# Patient Record
Sex: MALE | Race: WHITE | Employment: OTHER | URBAN - METROPOLITAN AREA
[De-identification: names, ages, dates, MRNs, and addresses within clinical notes are randomized per-mention and may not be internally consistent; named-entity substitution may affect disease eponyms.]

---

## 2018-01-02 ENCOUNTER — OFFICE VISIT (OUTPATIENT)
Dept: INTERNAL MEDICINE CLINIC | Age: 73
End: 2018-01-02

## 2018-01-02 VITALS
HEIGHT: 70 IN | TEMPERATURE: 98.3 F | SYSTOLIC BLOOD PRESSURE: 142 MMHG | WEIGHT: 196 LBS | DIASTOLIC BLOOD PRESSURE: 84 MMHG | OXYGEN SATURATION: 97 % | BODY MASS INDEX: 28.06 KG/M2 | RESPIRATION RATE: 16 BRPM | HEART RATE: 86 BPM

## 2018-01-02 DIAGNOSIS — E78.00 PURE HYPERCHOLESTEROLEMIA: ICD-10-CM

## 2018-01-02 DIAGNOSIS — R37 SEXUAL DYSFUNCTION: ICD-10-CM

## 2018-01-02 DIAGNOSIS — E11.9 TYPE 2 DIABETES MELLITUS WITHOUT COMPLICATION, WITHOUT LONG-TERM CURRENT USE OF INSULIN (HCC): Primary | ICD-10-CM

## 2018-01-02 RX ORDER — CEPHALEXIN 250 MG/1
500 CAPSULE ORAL 4 TIMES DAILY
COMMUNITY
End: 2018-10-01 | Stop reason: ALTCHOICE

## 2018-01-02 RX ORDER — HYDROCORTISONE 1 %
CREAM (GRAM) TOPICAL
Qty: 30 G | Refills: 2 | Status: SHIPPED | OUTPATIENT
Start: 2018-01-02 | End: 2022-06-14

## 2018-01-02 RX ORDER — SILDENAFIL 100 MG/1
TABLET, FILM COATED ORAL
Qty: 6 TAB | Refills: 2 | Status: SHIPPED | OUTPATIENT
Start: 2018-01-02 | End: 2018-10-01

## 2018-01-02 NOTE — PATIENT INSTRUCTIONS
Health Maintenance Due   Topic    Hepatitis C Screening     FOOT EXAM Q1     MICROALBUMIN Q1     EYE EXAM RETINAL OR DILATED Q1     DTaP/Tdap/Td series (1 - Tdap)    FOBT Q 1 YEAR AGE 50-75     ZOSTER VACCINE AGE 60>     GLAUCOMA SCREENING Q2Y     Pneumococcal 65+ Low/Medium Risk (1 of 2 - PCV13)    MEDICARE YEARLY EXAM     LIPID PANEL Q1     HEMOGLOBIN A1C Q6M     Influenza Age 5 to Adult

## 2018-01-02 NOTE — PROGRESS NOTES
1. Have you been to the ER, urgent care clinic since your last visit? Hospitalized since your last visit? No    2. Have you seen or consulted any other health care providers outside of the [address] [place] [address] since your last visit? Include any pap smears or colon screening.  Dr. Margarita Lopez - Neuro Surgeon - two surgeries - Spinal stenosis and osteo arthritis in neck in shoulders

## 2018-01-03 NOTE — PROGRESS NOTES
The patient presents to the office today with the chief complaint of sexual dysfunction    HPI    The patient has type II diabetes mellitus. He had been on medications in the past but he stopped this due to GI side effects. The patient has chronic low back pain from spinal stenosis with a fair response to surgery. The patient has chronic headaches with fair control with daily Excedrin. The patient has chronic sinus congestion. He has used Afrin for years. The patient has noted decreasing sexual function over the past several years. The patient has a chronic problem with dermatitis most marked over his forehead and scalp      Review of Systems   Respiratory: Negative for shortness of breath. Cardiovascular: Negative for chest pain and leg swelling. Musculoskeletal: Positive for back pain. Allergies   Allergen Reactions    Codeine Not Reported This Time    Tegretol [Carbamazepine] Not Reported This Time       Current Outpatient Prescriptions   Medication Sig Dispense Refill    diphenhydrAMINE HCl (WAL-FRED, DIPHENHYDRAMINE,) 25 mg TbDi Take 25 mg by mouth nightly.  cephALEXin (KEFLEX) 250 mg capsule Take 500 mg by mouth four (4) times daily.  aspirin-acetaminophen-caffeine (EXCEDRIN ES) 250-250-65 mg per tablet Take 1 Tab by mouth every six (6) hours as needed for Headache. Indications: Patient takes 4 - 6 tablets daily      sildenafil citrate (VIAGRA) 100 mg tablet 1 tablet before activity 6 Tab 2    hydrocortisone (CORTISONE) 1 % topical cream Apply to face and scalp twice per day for two weeks 30 g 2    oxyCODONE-acetaminophen (PERCOCET) 5-325 mg per tablet Take 1 Tab by mouth every four (4) hours as needed.          Past Medical History:   Diagnosis Date    Arthritis     GERD (gastroesophageal reflux disease)     High blood pressure     Insomnia     Periorbital headache     Sleep apnea     Spinal stenosis        Past Surgical History:   Procedure Laterality Date    ENDOSCOPY, COLON, DIAGNOSTIC      HX LUMBAR DISKECTOMY  6/2001, 4/11    left L3-4 microdiskectomy with intradural rupture    NEUROLOGICAL PROCEDURE UNLISTED  9/11    spinal fusion, L3-5       Social History     Social History    Marital status:      Spouse name: N/A    Number of children: N/A    Years of education: N/A     Occupational History    Not on file. Social History Main Topics    Smoking status: Current Every Day Smoker     Packs/day: 1.00    Smokeless tobacco: Never Used    Alcohol use Yes      Comment: rarely    Drug use: No    Sexual activity: Yes     Partners: Female     Other Topics Concern    Not on file     Social History Narrative       Patient does not have an advanced directive on file    Visit Vitals    /84 (BP 1 Location: Left arm, BP Patient Position: Sitting)    Pulse 86    Temp 98.3 °F (36.8 °C) (Tympanic)    Resp 16    Ht 5' 9.5\" (1.765 m)    Wt 196 lb (88.9 kg)    SpO2 97%    BMI 28.53 kg/m2       Physical Exam    BMI:  OK    No visits with results within 3 Month(s) from this visit.   Latest known visit with results is:    Lab Only on 09/06/2012   Component Date Value Ref Range Status    Hemoglobin A1c 09/06/2012 6.4* 4.8 - 5.6 % Final    Comment:          Increased risk for diabetes: 5.7 - 6.4                                    Diabetes: >6.4                                    Glycemic control for adults with diabetes: <7.0                           Performed At: [address]  [id number]                           Milton Ventura MD                           [id number]    Glucose 09/06/2012 140* 70 - 100 mg/dL Final    Comment: Performed At: [address] [place] [address]  [id number]                           Juan José Del Rosario MD [id number]       . No results found for any visits on 01/02/18. Assessment / Plan      ICD-10-CM ICD-9-CM    1. Type 2 diabetes mellitus without complication, without long-term current use of insulin (HCC) E11.9 250.00 diphenhydrAMINE HCl (WAL-FRED, DIPHENHYDRAMINE,) 25 mg TbDi      cephALEXin (KEFLEX) 250 mg capsule      aspirin-acetaminophen-caffeine (EXCEDRIN ES) 250-250-65 mg per tablet      CBC WITH AUTOMATED DIFF      METABOLIC PANEL, COMPREHENSIVE      PSA SCREENING (SCREENING)      LIPID PANEL      TESTOSTERONE, FREE & TOTAL      HEMOGLOBIN A1C WITH EAG   2. Pure hypercholesterolemia E78.00 272.0 diphenhydrAMINE HCl (WAL-FRED, DIPHENHYDRAMINE,) 25 mg TbDi      cephALEXin (KEFLEX) 250 mg capsule      aspirin-acetaminophen-caffeine (EXCEDRIN ES) 250-250-65 mg per tablet      CBC WITH AUTOMATED DIFF      METABOLIC PANEL, COMPREHENSIVE      PSA SCREENING (SCREENING)      LIPID PANEL      TESTOSTERONE, FREE & TOTAL      HEMOGLOBIN A1C WITH EAG   3. Sexual dysfunction R37 302.70 diphenhydrAMINE HCl (WAL-FRED, DIPHENHYDRAMINE,) 25 mg TbDi      cephALEXin (KEFLEX) 250 mg capsule      aspirin-acetaminophen-caffeine (EXCEDRIN ES) 250-250-65 mg per tablet      CBC WITH AUTOMATED DIFF      METABOLIC PANEL, COMPREHENSIVE      PSA SCREENING (SCREENING)      LIPID PANEL      TESTOSTERONE, FREE & TOTAL      HEMOGLOBIN A1C WITH EAG       Labs  PRN Viagra  Hydrocortisone Cream  Discussed cigarettes - the patient does not wish to stop at this time    Follow-up Disposition:  Return in about 3 months (around 4/2/2018). I asked Cari Dillard if he has any questions and I answered the questions. aCri Dillard states that he understands the treatment plan and agrees with the treatment plan

## 2018-10-01 ENCOUNTER — OFFICE VISIT (OUTPATIENT)
Dept: INTERNAL MEDICINE CLINIC | Age: 73
End: 2018-10-01

## 2018-10-01 VITALS
DIASTOLIC BLOOD PRESSURE: 76 MMHG | HEIGHT: 70 IN | RESPIRATION RATE: 14 BRPM | TEMPERATURE: 98.8 F | BODY MASS INDEX: 26.2 KG/M2 | SYSTOLIC BLOOD PRESSURE: 132 MMHG | HEART RATE: 76 BPM | OXYGEN SATURATION: 97 % | WEIGHT: 183 LBS

## 2018-10-01 DIAGNOSIS — M54.50 CHRONIC MIDLINE LOW BACK PAIN WITHOUT SCIATICA: ICD-10-CM

## 2018-10-01 DIAGNOSIS — I10 ESSENTIAL HYPERTENSION: ICD-10-CM

## 2018-10-01 DIAGNOSIS — Z12.5 PROSTATE CANCER SCREENING: ICD-10-CM

## 2018-10-01 DIAGNOSIS — G89.29 CHRONIC MIDLINE LOW BACK PAIN WITHOUT SCIATICA: ICD-10-CM

## 2018-10-01 DIAGNOSIS — R63.4 WEIGHT LOSS: ICD-10-CM

## 2018-10-01 DIAGNOSIS — E11.40 TYPE 2 DIABETES MELLITUS WITH DIABETIC NEUROPATHY, WITHOUT LONG-TERM CURRENT USE OF INSULIN (HCC): Primary | ICD-10-CM

## 2018-10-01 DIAGNOSIS — N52.01 ERECTILE DYSFUNCTION DUE TO ARTERIAL INSUFFICIENCY: ICD-10-CM

## 2018-10-01 RX ORDER — OXYCODONE AND ACETAMINOPHEN 5; 325 MG/1; MG/1
1 TABLET ORAL
Qty: 40 TAB | Refills: 0 | Status: SHIPPED | OUTPATIENT
Start: 2018-10-01 | End: 2019-02-18 | Stop reason: SDUPTHER

## 2018-10-01 NOTE — PROGRESS NOTES
Blair Villanueva 1945, is a 72 y.o. male, who is seen today for Evaluation of diabetes, probable neuropathy, erectile dysfunction. He has been diabetic for at least 6 years, when I was seeing him 6 years ago he had a hemoglobin A1c of over 8 and that improved a lot with medication but with metformin he had diarrhea and his recollection is that when he is glyburide he had diarrhea as well so he stopped both medicines soon thereafter. He has not really been following his diet but has lost 34 pounds in the last 6 years. He has not been seeing doctors in general except he did see Dr. Vicky Reeves recently. For unclear reasons he now comes back here for evaluation. He has noticed for some time that his toes and distal feet feel cold at night but when he checks his feet they do not feel cold. They do not feel numb. The tips of his fingers feel numb and this bothers him with playing the guitar. That is been going on for at least a few months and he is pretty sure it is from diabetes. He has had chronic back pain which did improve with disc surgery done by Dr. Merry De Jesus, a neurosurgeon but has been left with chronic lumbar pain related to spinal stenosis apparently. He uses Percocet once every 1-2 weeks but has had a hard time getting prescriptions. Past Medical History:   Diagnosis Date    Arthritis     GERD (gastroesophageal reflux disease)     High blood pressure     Insomnia     Periorbital headache     Sleep apnea     Spinal stenosis      Current Outpatient Prescriptions   Medication Sig Dispense Refill    oxyCODONE-acetaminophen (PERCOCET) 5-325 mg per tablet Take 1 Tab by mouth every four (4) hours as needed. Max Daily Amount: 6 Tabs. 40 Tab 0    diphenhydrAMINE HCl (WAL-FRED, DIPHENHYDRAMINE,) 25 mg TbDi Take 25 mg by mouth nightly.  aspirin-acetaminophen-caffeine (EXCEDRIN ES) 250-250-65 mg per tablet Take 1 Tab by mouth every six (6) hours as needed for Headache.  Indications: Patient takes 4 - 6 tablets daily      sildenafil citrate (VIAGRA) 100 mg tablet 1 tablet before activity 6 Tab 2    hydrocortisone (CORTISONE) 1 % topical cream Apply to face and scalp twice per day for two weeks 30 g 2     Visit Vitals    /76    Pulse 76    Temp 98.8 °F (37.1 °C) (Oral)    Resp 14    Ht 5' 9.5\" (1.765 m)    Wt 183 lb (83 kg)    SpO2 97%    BMI 26.64 kg/m2     Lungs are clear to percussion. Good breath sounds with no wheezing or crackles. Heart reveals a regular rhythm with normal S1 and S2 no murmur gallop click or rub. Apical impulse is not palpable. Abdomen is soft and nontender with no hepatosplenomegaly or masses and no bruits. Extremities reveal no clubbing cyanosis or edema. Pulses are 2+. The fingers feel normal though subjectively a little numb. The toes are not cold or discolored. Subjectively slight numbness of the toes. Assessment: #1.  Diabetes uncertain control now that he is off medicine. With a 34 pound weight loss over the last 6 years he may not need medication for his diabetes. We will check hemoglobin A1c and fasting glucose within the next couple of weeks. #2.  Possible diabetic neuropathy, we will check lab, and since the numbness in the fingers is not usually seen at this stage of diabetes I would probably have been seen by a neurologist for consideration of nerve conduction studies for proper diagnosis. #3.  Erectile dysfunction, Viagra was not of any benefit, we will check testosterone level but most likely the erectile dysfunction is related to age and diabetes, i.e. vascular disease. Follow-up in about 2 weeks for 30-minute full evaluation and he will have lab done at least 2 days before that. Manuel Cm MD FACP    Please note: This document has been produced using voice recognition software. Unrecognized errors in transcription may be present.     This visit lasted 25 minutes, greater than 50% of the time was spent counseling on treatment of diabetes, monitoring diabetes, evaluation of neuropathy and its possible relation to diabetes and treatment evaluation of erectile dysfunction.

## 2018-10-01 NOTE — MR AVS SNAPSHOT
Kanchan Cannon 
 
 
 [address], [address] [place] [address] [facility] 
[phone] Patient: Sandi Delvalle MRN: [id number] Visit Information Date & Time Provider Department Dept. Phone Encounter #  
 10/1/2018  3:30 PM Bisi Rodriguez MD [facility] [phone] Your Appointments 10/5/2018  9:05 AM  
LAB with [facility] NURSE VISIT [facility] ([facility]) Appt Note: labs per [facility], [address] [address] [address] [place]  
  
   
 [address], [address]  
  
    
 10/15/2018  2:00 PM  
Office Visit with Bisi Rodriguez MD  
[facility]) Appt Note: 2 week follow up  
 [address], [address] [address] [address] [place]  
  
   
 [address], [address] Upcoming Health Maintenance Date Due Hepatitis C Screening 1945 FOOT EXAM Q1 5/13/1957 MICROALBUMIN Q1 5/13/1957 EYE EXAM RETINAL OR DILATED Q1 5/13/1957 DTaP/Tdap/Td series (1 - Tdap) 5/13/1968 Shingrix Vaccine Age 50> (1 of 2) 5/13/1997 FOBT Q 1 YEAR AGE 50-75 5/13/1997 GLAUCOMA SCREENING Q2Y 5/13/2012 Pneumococcal 65+ Low/Medium Risk (1 of 2 - PCV13) 5/13/2012 LIPID PANEL Q1 2/21/2013 HEMOGLOBIN A1C Q6M 3/6/2013 Influenza Age 5 to Adult 8/1/2018 Allergies as of 10/1/2018  Review Complete On: 10/1/2018 By: Bisi Rodriguez MD  
  
 Severity Noted Reaction Type Reactions Codeine    Not Reported This Time Tegretol [Carbamazepine]    Not Reported This Time Current Immunizations  Never Reviewed No immunizations on file. Not reviewed this visit You Were Diagnosed With   
  
 Codes Comments Type 2 diabetes mellitus with diabetic neuropathy, without long-term current use of insulin (HCC)    -  Primary ICD-10-CM: E11.40 ICD-9-CM: 250.60, 357.2 Chronic midline low back pain without sciatica     ICD-10-CM: M54.5, G89.29 ICD-9-CM: 724.2, 338.29 Vitals BP Pulse Temp Resp Height(growth percentile) Weight(growth percentile) 132/76 76 98.8 °F (37.1 °C) (Oral) 14 5' 9.5\" (1.765 m) 183 lb (83 kg) SpO2 BMI Smoking Status 97% 26.64 kg/m2 Current Every Day Smoker Vitals History BMI and BSA Data Body Mass Index Body Surface Area  
 26.64 kg/m 2 2.02 m 2 Preferred Pharmacy Pharmacy Name Phone [phone] - [address], [address] AT [address] [phone] Your Updated Medication List  
  
   
This list is accurate as of 10/1/18  4:30 PM.  Always use your most recent med list.  
  
  
  
  
 aspirin-acetaminophen-caffeine 250-250-65 mg per tablet Commonly known as:  EXCEDRIN ES Take 1 Tab by mouth every six (6) hours as needed for Headache. Indications: Patient takes 4 - 6 tablets daily  
  
 hydrocortisone 1 % topical cream  
Commonly known as:  Cortisone Apply to face and scalp twice per day for two weeks  
  
 oxyCODONE-acetaminophen 5-325 mg per tablet Commonly known as:  PERCOCET Take 1 Tab by mouth every four (4) hours as needed. Max Daily Amount: 6 Tabs.  
  
 sildenafil citrate 100 mg tablet Commonly known as:  VIAGRA  
1 tablet before activity [employer] (diphenhydrAMINE) 25 mg Tbdi Generic drug:  diphenhydrAMINE HCl Take 25 mg by mouth nightly. Prescriptions Printed Refills  
 oxyCODONE-acetaminophen (PERCOCET) 5-325 mg per tablet 0 Sig: Take 1 Tab by mouth every four (4) hours as needed. Max Daily Amount: 6 Tabs. Class: Print Route: Oral  
  
Introducing [facility] & HEALTH SERVICES! [facility] introduces [employer] patient portal. Now you can access parts of your medical record, email your doctor's office, and request medication refills online.    
 
1. In your internet browser, go to [email or web address] 2. Click on the First Time User? Click Here link in the Sign In box. You will see the New Member Sign Up page. 3. Enter your [employer] Access Code exactly as it appears below. You will not need to use this code after youve completed the sign-up process. If you do not sign up before the expiration date, you must request a new code. · [employer] Access Code: [email or web address] Expires: 12/30/2018  3:32 PM 
 
4. Enter the last four digits of your Social Security Number (xxxx) and Date of Birth (mm/dd/yyyy) as indicated and click Submit. You will be taken to the next sign-up page. 5. Create a Com[employer] ID. This will be your [employer] login ID and cannot be changed, so think of one that is secure and easy to remember. 6. Create a [employer] password. You can change your password at any time. 7. Enter your Password Reset Question and Answer. This can be used at a later time if you forget your password. 8. Enter your e-mail address. You will receive e-mail notification when new information is available in [address] 9. Click Sign Up. You can now view and download portions of your medical record. 10. Click the Download Summary menu link to download a portable copy of your medical information. If you have questions, please visit the Frequently Asked Questions section of the [employer] website. Remember, [employer] is NOT to be used for urgent needs. For medical emergencies, dial 911. Now available from your iPhone and Android! Please provide this summary of care documentation to your next provider. Your primary care clinician is listed as Leilani Osgood. Acie Spain. If you have any questions after today's visit, please call [phone].

## 2018-10-05 ENCOUNTER — LAB ONLY (OUTPATIENT)
Dept: INTERNAL MEDICINE CLINIC | Age: 73
End: 2018-10-05

## 2018-10-05 ENCOUNTER — HOSPITAL ENCOUNTER (OUTPATIENT)
Dept: LAB | Age: 73
Discharge: HOME | End: 2018-10-05
Payer: COMMERCIAL

## 2018-10-05 DIAGNOSIS — Z12.5 PROSTATE CANCER SCREENING: ICD-10-CM

## 2018-10-05 DIAGNOSIS — N52.01 ERECTILE DYSFUNCTION DUE TO ARTERIAL INSUFFICIENCY: ICD-10-CM

## 2018-10-05 DIAGNOSIS — E11.40 TYPE 2 DIABETES MELLITUS WITH DIABETIC NEUROPATHY, WITHOUT LONG-TERM CURRENT USE OF INSULIN (HCC): ICD-10-CM

## 2018-10-05 DIAGNOSIS — R63.4 WEIGHT LOSS: ICD-10-CM

## 2018-10-05 DIAGNOSIS — E11.40 TYPE 2 DIABETES MELLITUS WITH DIABETIC NEUROPATHY, WITHOUT LONG-TERM CURRENT USE OF INSULIN (HCC): Primary | ICD-10-CM

## 2018-10-05 DIAGNOSIS — I10 ESSENTIAL HYPERTENSION: ICD-10-CM

## 2018-10-05 LAB
ALBUMIN SERPL-MCNC: 4.1 G/DL (ref 3.4–5)
ALBUMIN/GLOB SERPL: 1.1 {RATIO} (ref 0.8–1.7)
ALP SERPL-CCNC: 82 U/L (ref 45–117)
ALT SERPL-CCNC: 23 U/L (ref 16–61)
ANION GAP SERPL CALC-SCNC: 8 MMOL/L (ref 3–18)
AST SERPL-CCNC: 14 U/L (ref 15–37)
BASOPHILS # BLD: 0 K/UL (ref 0–0.1)
BASOPHILS NFR BLD: 0 % (ref 0–2)
BILIRUB SERPL-MCNC: 0.5 MG/DL (ref 0.2–1)
BUN SERPL-MCNC: 21 MG/DL (ref 7–18)
BUN/CREAT SERPL: 17 (ref 12–20)
CALCIUM SERPL-MCNC: 9.2 MG/DL (ref 8.5–10.1)
CHLORIDE SERPL-SCNC: 104 MMOL/L (ref 100–108)
CHOLEST SERPL-MCNC: 163 MG/DL
CO2 SERPL-SCNC: 26 MMOL/L (ref 21–32)
CREAT SERPL-MCNC: 1.24 MG/DL (ref 0.6–1.3)
DIFFERENTIAL METHOD BLD: ABNORMAL
EOSINOPHIL # BLD: 0.4 K/UL (ref 0–0.4)
EOSINOPHIL NFR BLD: 5 % (ref 0–5)
ERYTHROCYTE [DISTWIDTH] IN BLOOD BY AUTOMATED COUNT: 13.1 % (ref 11.6–14.5)
EST. AVERAGE GLUCOSE BLD GHB EST-MCNC: 171 MG/DL
GLOBULIN SER CALC-MCNC: 3.7 G/DL (ref 2–4)
GLUCOSE SERPL-MCNC: 165 MG/DL (ref 74–99)
HBA1C MFR BLD: 7.6 % (ref 4.2–5.6)
HCT VFR BLD AUTO: 51.3 % (ref 36–48)
HDLC SERPL-MCNC: 29 MG/DL (ref 40–60)
HDLC SERPL: 5.6 {RATIO} (ref 0–5)
HGB BLD-MCNC: 16.9 G/DL (ref 13–16)
LDLC SERPL CALC-MCNC: 75.6 MG/DL (ref 0–100)
LIPID PROFILE,FLP: ABNORMAL
LYMPHOCYTES # BLD: 2.5 K/UL (ref 0.9–3.6)
LYMPHOCYTES NFR BLD: 31 % (ref 21–52)
MCH RBC QN AUTO: 32.7 PG (ref 24–34)
MCHC RBC AUTO-ENTMCNC: 32.9 G/DL (ref 31–37)
MCV RBC AUTO: 99.2 FL (ref 74–97)
MONOCYTES # BLD: 0.6 K/UL (ref 0.05–1.2)
MONOCYTES NFR BLD: 7 % (ref 3–10)
NEUTS SEG # BLD: 4.5 K/UL (ref 1.8–8)
NEUTS SEG NFR BLD: 57 % (ref 40–73)
PLATELET # BLD AUTO: 208 K/UL (ref 135–420)
PMV BLD AUTO: 11.8 FL (ref 9.2–11.8)
POTASSIUM SERPL-SCNC: 4.2 MMOL/L (ref 3.5–5.5)
PROT SERPL-MCNC: 7.8 G/DL (ref 6.4–8.2)
PSA SERPL-MCNC: 1.8 NG/ML (ref 0–4)
RBC # BLD AUTO: 5.17 M/UL (ref 4.7–5.5)
SODIUM SERPL-SCNC: 138 MMOL/L (ref 136–145)
T4 FREE SERPL-MCNC: 1.2 NG/DL (ref 0.7–1.5)
TRIGL SERPL-MCNC: 292 MG/DL (ref ?–150)
TSH SERPL DL<=0.05 MIU/L-ACNC: 2.7 UIU/ML (ref 0.36–3.74)
VLDLC SERPL CALC-MCNC: 58.4 MG/DL
WBC # BLD AUTO: 8 K/UL (ref 4.6–13.2)

## 2018-10-05 PROCEDURE — 85025 COMPLETE CBC W/AUTO DIFF WBC: CPT | Performed by: INTERNAL MEDICINE

## 2018-10-05 PROCEDURE — 80061 LIPID PANEL: CPT | Performed by: INTERNAL MEDICINE

## 2018-10-05 PROCEDURE — 84443 ASSAY THYROID STIM HORMONE: CPT | Performed by: INTERNAL MEDICINE

## 2018-10-05 PROCEDURE — 84439 ASSAY OF FREE THYROXINE: CPT | Performed by: INTERNAL MEDICINE

## 2018-10-05 PROCEDURE — 84403 ASSAY OF TOTAL TESTOSTERONE: CPT | Performed by: INTERNAL MEDICINE

## 2018-10-05 PROCEDURE — 84153 ASSAY OF PSA TOTAL: CPT | Performed by: INTERNAL MEDICINE

## 2018-10-05 PROCEDURE — 83036 HEMOGLOBIN GLYCOSYLATED A1C: CPT | Performed by: INTERNAL MEDICINE

## 2018-10-05 PROCEDURE — 36415 COLL VENOUS BLD VENIPUNCTURE: CPT | Performed by: INTERNAL MEDICINE

## 2018-10-05 PROCEDURE — 80053 COMPREHEN METABOLIC PANEL: CPT | Performed by: INTERNAL MEDICINE

## 2018-10-06 LAB
TESTOST FREE SERPL-MCNC: 10.3 PG/ML (ref 6.6–18.1)
TESTOST SERPL-MCNC: 197 NG/DL (ref 264–916)

## 2018-10-15 ENCOUNTER — OFFICE VISIT (OUTPATIENT)
Dept: INTERNAL MEDICINE CLINIC | Age: 73
End: 2018-10-15

## 2018-10-15 ENCOUNTER — HOSPITAL ENCOUNTER (OUTPATIENT)
Dept: LAB | Age: 73
Discharge: HOME | End: 2018-10-15
Payer: COMMERCIAL

## 2018-10-15 VITALS
HEIGHT: 69 IN | BODY MASS INDEX: 27.7 KG/M2 | OXYGEN SATURATION: 96 % | WEIGHT: 187 LBS | HEART RATE: 88 BPM | DIASTOLIC BLOOD PRESSURE: 88 MMHG | SYSTOLIC BLOOD PRESSURE: 124 MMHG | TEMPERATURE: 98.5 F

## 2018-10-15 DIAGNOSIS — E11.40 TYPE 2 DIABETES MELLITUS WITH DIABETIC NEUROPATHY, WITHOUT LONG-TERM CURRENT USE OF INSULIN (HCC): Primary | ICD-10-CM

## 2018-10-15 DIAGNOSIS — M51.37 DEGENERATION OF LUMBAR OR LUMBOSACRAL INTERVERTEBRAL DISC: ICD-10-CM

## 2018-10-15 DIAGNOSIS — N52.01 ERECTILE DYSFUNCTION DUE TO ARTERIAL INSUFFICIENCY: ICD-10-CM

## 2018-10-15 DIAGNOSIS — E11.40 TYPE 2 DIABETES MELLITUS WITH DIABETIC NEUROPATHY, WITHOUT LONG-TERM CURRENT USE OF INSULIN (HCC): ICD-10-CM

## 2018-10-15 LAB
APPEARANCE UR: ABNORMAL
BACTERIA URNS QL MICRO: NEGATIVE /HPF
BILIRUB UR QL: NEGATIVE
COLOR UR: YELLOW
EPITH CASTS URNS QL MICRO: NORMAL /LPF (ref 0–5)
GLUCOSE UR STRIP.AUTO-MCNC: 250 MG/DL
HGB UR QL STRIP: NEGATIVE
KETONES UR QL STRIP.AUTO: NEGATIVE MG/DL
LEUKOCYTE ESTERASE UR QL STRIP.AUTO: NEGATIVE
NITRITE UR QL STRIP.AUTO: NEGATIVE
PH UR STRIP: 5.5 [PH] (ref 5–8)
PROT UR STRIP-MCNC: 100 MG/DL
RBC #/AREA URNS HPF: 0 /HPF (ref 0–5)
SP GR UR REFRACTOMETRY: 1.01 (ref 1–1.03)
SPERM URNS QL MICRO: NORMAL
UROBILINOGEN UR QL STRIP.AUTO: 0.2 EU/DL (ref 0.2–1)
WBC URNS QL MICRO: NORMAL /HPF (ref 0–4)

## 2018-10-15 PROCEDURE — 82043 UR ALBUMIN QUANTITATIVE: CPT | Performed by: INTERNAL MEDICINE

## 2018-10-15 PROCEDURE — 81001 URINALYSIS AUTO W/SCOPE: CPT | Performed by: INTERNAL MEDICINE

## 2018-10-15 RX ORDER — TADALAFIL 20 MG/1
TABLET ORAL
Qty: 4 TAB | Refills: 11 | Status: SHIPPED | OUTPATIENT
Start: 2018-10-15 | End: 2022-06-14

## 2018-10-15 NOTE — MR AVS SNAPSHOT
[address] [facility] 
 
 
 [address], [address] [place] [address] [facility] 
[phone] Patient: Kayla Frank MRN: [id number] JARROD:[id number] Visit Information Date & Time Provider Department Dept. Phone Encounter #  
 10/15/2018  2:00 PM Renuka Pack MD [facility] [address] [place] [address] [phone] Your Appointments 2/11/2019  9:05 AM  
LAB with [facility] NURSE VISIT [facility] [address] [place] [address] [facility] Appt Note: fasting labs [address], [address] [place] [address] [address] [place]  
  
   
 [address], [address]  
  
    
 2/18/2019  1:00 PM  
Office Visit with Renuka Pack MD  
[facility] [address] [place] [address]) Appt Note: 4 month with labs [address], [address] [place] [address] [address] [place]  
  
   
 [address], [address] Upcoming Health Maintenance Date Due Hepatitis C Screening 1945 FOOT EXAM Q1 5/13/1957 MICROALBUMIN Q1 5/13/1957 EYE EXAM RETINAL OR DILATED Q1 5/13/1957 DTaP/Tdap/Td series (1 - Tdap) 5/13/1968 Shingrix Vaccine Age 50> (1 of 2) 5/13/1997 FOBT Q 1 YEAR AGE 50-75 5/13/1997 GLAUCOMA SCREENING Q2Y 5/13/2012 Pneumococcal 65+ Low/Medium Risk (1 of 2 - PCV13) 5/13/2012 Influenza Age 5 to Adult 8/1/2018 HEMOGLOBIN A1C Q6M 4/5/2019 LIPID PANEL Q1 10/5/2019 Allergies as of 10/15/2018  Review Complete On: 10/15/2018 By: Renuka Pack MD  
  
 Severity Noted Reaction Type Reactions Codeine    Not Reported This Time Tegretol [Carbamazepine]    Not Reported This Time Current Immunizations  Reviewed on 10/15/2018 No immunizations on file. Reviewed by Renuka Pack MD on 10/15/2018 at  2:04 PM  
You Were Diagnosed With   
  
 Codes Comments  Type 2 diabetes mellitus with diabetic neuropathy, without long-term current use of insulin ([facility] [address]    -  Primary ICD-10-CM: E11.40 ICD-9-CM: 250.60, 357.2 Erectile dysfunction due to arterial insufficiency     ICD-10-CM: N52.01 
ICD-9-CM: 607.84 Degeneration of lumbar or lumbosacral intervertebral disc     ICD-10-CM: M51.37 
ICD-9-CM: 722.52 Vitals BP Pulse Temp Height(growth percentile) Weight(growth percentile) SpO2  
 124/88 (BP 1 Location: Right arm, BP Patient Position: Sitting) 88 98.5 °F (36.9 °C) (Oral) 5' 9\" (1.753 m) 187 lb (84.8 kg) 96% BMI Smoking Status 27.62 kg/m2 Current Every Day Smoker BMI and BSA Data Body Mass Index Body Surface Area  
 27.62 kg/m 2 2.03 m 2 Preferred Pharmacy Pharmacy Name Phone [phone] - [place], [address] [phone] Your Updated Medication List  
  
   
This list is accurate as of 10/15/18  2:22 PM.  Always use your most recent med list.  
  
  
  
  
 aspirin-acetaminophen-caffeine 250-250-65 mg per tablet Commonly known as:  EXCEDRIN ES Take 1 Tab by mouth every six (6) hours as needed for Headache. Indications: Patient takes 4 - 6 tablets daily  
  
 hydrocortisone 1 % topical cream  
Commonly known as:  Cortisone Apply to face and scalp twice per day for two weeks  
  
 oxyCODONE-acetaminophen 5-325 mg per tablet Commonly known as:  PERCOCET Take 1 Tab by mouth every four (4) hours as needed. Max Daily Amount: 6 Tabs.  
  
 tadalafil 20 mg tablet Commonly known as:  CIALIS  
1 tablet at least 1 hour before intercourse [employer] (diphenhydrAMINE) 25 mg Tbdi Generic drug:  diphenhydrAMINE HCl Take 25 mg by mouth nightly. Prescriptions Printed Refills  
 tadalafil (CIALIS) 20 mg tablet 11 Si tablet at least 1 hour before intercourse Class: Print Introducing [facility] & HEALTH SERVICES!    
 [employer] introduces [employer] patient portal. Now you can access parts of your medical record, email your doctor's office, and request medication refills online. 1. In your internet browser, go to [email or web address] 2. Click on the First Time User? Click Here link in the Sign In box. You will see the New Member Sign Up page. 3. Enter your [employer] Access Code exactly as it appears below. You will not need to use this code after youve completed the sign-up process. If you do not sign up before the expiration date, you must request a new code. · [employer] Access Code: [email or web address] Expires: 12/30/2018  3:32 PM 
 
4. Enter the last four digits of your Social Security Number (xxxx) and Date of Birth (mm/dd/yyyy) as indicated and click Submit. You will be taken to the next sign-up page. 5. Create a [employer] ID. This will be your [employer] login ID and cannot be changed, so think of one that is secure and easy to remember. 6. Create a [employer] password. You can change your password at any time. 7. Enter your Password Reset Question and Answer. This can be used at a later time if you forget your password. 8. Enter your e-mail address. You will receive e-mail notification when new information is available in [address] 9. Click Sign Up. You can now view and download portions of your medical record. 10. Click the Download Summary menu link to download a portable copy of your medical information. If you have questions, please visit the Frequently Asked Questions section of the [employer] website. Remember, [employer] is NOT to be used for urgent needs. For medical emergencies, dial 911. Now available from your iPhone and Android! Please provide this summary of care documentation to your next provider. Your primary care clinician is listed as Deann Pitt. Namrata Peter. If you have any questions after today's visit, please call [phone].

## 2018-10-15 NOTE — PROGRESS NOTES
Addendum: Today's visit lasted 30 minutes, greater than 50% of the time was spent counseling on the issues in the assessment.

## 2018-10-15 NOTE — PROGRESS NOTES
Mica Looney 1945, is a 72 y.o. male, who is seen today for reevaluation of diabetes, cigarette smoking, erectile dysfunction, and neuropathy. His feet feel cold at night but when he touches them are not cold. His fingertips are little numb and it makes it hard to play the guitar. He has had erectile dysfunction for quite a while and tried 100 mg of Viagra without benefit. He still smokes 1 pack/day since age 23 and has no interest in quitting. He has had diabetes dating back quite a few years when I used to see him more than 6 years ago he had diarrhea with metformin and other side effects with glyburide so has been off medicine since then. He does not have as much of an appetite the last few years and has lost 34 pounds since this time I saw him 6 years ago. Past Medical History:   Diagnosis Date    Arthritis     GERD (gastroesophageal reflux disease)     High blood pressure     Insomnia     Periorbital headache     Sleep apnea     Spinal stenosis      Past Surgical History:   Procedure Laterality Date    ENDOSCOPY, COLON, DIAGNOSTIC      HX LUMBAR DISKECTOMY  6/2001, 4/11    left L3-4 microdiskectomy with intradural rupture    NEUROLOGICAL PROCEDURE UNLISTED  9/11    spinal fusion, L3-5     Current Outpatient Prescriptions   Medication Sig Dispense Refill    tadalafil (CIALIS) 20 mg tablet 1 tablet at least 1 hour before intercourse 4 Tab 11    oxyCODONE-acetaminophen (PERCOCET) 5-325 mg per tablet Take 1 Tab by mouth every four (4) hours as needed. Max Daily Amount: 6 Tabs. 40 Tab 0    diphenhydrAMINE HCl (WAL-FRED, DIPHENHYDRAMINE,) 25 mg TbDi Take 25 mg by mouth nightly.  aspirin-acetaminophen-caffeine (EXCEDRIN ES) 250-250-65 mg per tablet Take 1 Tab by mouth every six (6) hours as needed for Headache.  Indications: Patient takes 4 - 6 tablets daily      hydrocortisone (CORTISONE) 1 % topical cream Apply to face and scalp twice per day for two weeks 30 g 2     Visit Vitals    /88 (BP 1 Location: Right arm, BP Patient Position: Sitting)    Pulse 88    Temp 98.5 °F (36.9 °C) (Oral)    Ht 5' 9\" (1.753 m)    Wt 187 lb (84.8 kg)    SpO2 96%    BMI 27.62 kg/m2     I checked him the other day and I did not reexamine him. Results for orders placed or performed during the hospital encounter of 10/05/18   LIPID PANEL   Result Value Ref Range    LIPID PROFILE          Cholesterol, total 163 <200 MG/DL    Triglyceride 292 (H) <150 MG/DL    HDL Cholesterol 29 (L) 40 - 60 MG/DL    LDL, calculated 75.6 0 - 100 MG/DL    VLDL, calculated 58.4 MG/DL    CHOL/HDL Ratio 5.6 (H) 0 - 5.0     HEMOGLOBIN A1C WITH EAG   Result Value Ref Range    Hemoglobin A1c 7.6 (H) 4.2 - 5.6 %    Est. average glucose 171 mg/dL   CBC WITH AUTOMATED DIFF   Result Value Ref Range    WBC 8.0 4.6 - 13.2 K/uL    RBC 5.17 4.70 - 5.50 M/uL    HGB 16.9 (H) 13.0 - 16.0 g/dL    HCT 51.3 (H) 36.0 - 48.0 %    MCV 99.2 (H) 74.0 - 97.0 FL    MCH 32.7 24.0 - 34.0 PG    MCHC 32.9 31.0 - 37.0 g/dL    RDW 13.1 11.6 - 14.5 %    PLATELET 527 117 - 359 K/uL    MPV 11.8 9.2 - 11.8 FL    NEUTROPHILS 57 40 - 73 %    LYMPHOCYTES 31 21 - 52 %    MONOCYTES 7 3 - 10 %    EOSINOPHILS 5 0 - 5 %    BASOPHILS 0 0 - 2 %    ABS. NEUTROPHILS 4.5 1.8 - 8.0 K/UL    ABS. LYMPHOCYTES 2.5 0.9 - 3.6 K/UL    ABS. MONOCYTES 0.6 0.05 - 1.2 K/UL    ABS. EOSINOPHILS 0.4 0.0 - 0.4 K/UL    ABS.  BASOPHILS 0.0 0.0 - 0.1 K/UL    DF AUTOMATED     METABOLIC PANEL, COMPREHENSIVE   Result Value Ref Range    Sodium 138 136 - 145 mmol/L    Potassium 4.2 3.5 - 5.5 mmol/L    Chloride 104 100 - 108 mmol/L    CO2 26 21 - 32 mmol/L    Anion gap 8 3.0 - 18 mmol/L    Glucose 165 (H) 74 - 99 mg/dL    BUN 21 (H) 7.0 - 18 MG/DL    Creatinine 1.24 0.6 - 1.3 MG/DL    BUN/Creatinine ratio 17 12 - 20      GFR est AA >60 >60 ml/min/1.73m2    GFR est non-AA 57 (L) >60 ml/min/1.73m2    Calcium 9.2 8.5 - 10.1 MG/DL    Bilirubin, total 0.5 0.2 - 1.0 MG/DL    ALT (SGPT) 23 16 - 61 U/L    AST (SGOT) 14 (L) 15 - 37 U/L    Alk. phosphatase 82 45 - 117 U/L    Protein, total 7.8 6.4 - 8.2 g/dL    Albumin 4.1 3.4 - 5.0 g/dL    Globulin 3.7 2.0 - 4.0 g/dL    A-G Ratio 1.1 0.8 - 1.7     TSH 3RD GENERATION   Result Value Ref Range    TSH 2.70 0.36 - 3.74 uIU/mL   T4, FREE   Result Value Ref Range    T4, Free 1.2 0.7 - 1.5 NG/DL   TESTOSTERONE, FREE & TOTAL   Result Value Ref Range    Testosterone 197 (L) 264 - 916 ng/dL    Free testosterone (Direct) 10.3 6.6 - 18.1 pg/mL   PSA SCREENING (SCREENING)   Result Value Ref Range    Prostate Specific Ag 1.8 0.0 - 4.0 ng/mL     Assessment: #1.  Diabetes fairly well controlled with diet at this point. I told him the literature is not clear as to whether he should keep his A1c less than 8 or less than 7 at this age. For now he will go without medicine for diabetes and we will recheck glucose and A1c in about 4 months. He will continue diabetic type diet. #2.  Erectile dysfunction, did not respond to Viagra, we will try him on Cialis 20 mg at least an hour before intercourse as he requested. Free testosterone level is normal.  #3.  Cigarette smoker, I have told him he really should not smoke and that that is probably part of the reason why he has erectile dysfunction along with being diabetic but he has no interest in quitting. He understands it could cause lung cancer and other serious disease. #4.  Chronic lumbar pain, uses Percocet every 2 or 3 weeks and I did give him Percocet 2 weeks ago. Follow-up in 4 months with [facility] JIM Peck MD FACP    Please note: This document has been produced using voice recognition software. Unrecognized errors in transcription may be present.   He is

## 2018-10-15 NOTE — PROGRESS NOTES
1. Have you been to the ER, urgent care clinic or hospitalized since your last visit? NO           2. Have you seen or consulted any other health care providers outside of the [address] [place] [address] since your last visit (Include any pap smears or colon screening)? YES    Do you have an Advanced Directive? NO    Would you like information on Advanced Directives?  NO

## 2018-10-16 LAB
CREAT UR-MCNC: 78.78 MG/DL (ref 30–125)
MICROALBUMIN UR-MCNC: 1.2 MG/DL (ref 0–3)
MICROALBUMIN/CREAT UR-RTO: 15 MG/G (ref 0–30)

## 2019-02-11 ENCOUNTER — LAB ONLY (OUTPATIENT)
Dept: INTERNAL MEDICINE CLINIC | Age: 74
End: 2019-02-11

## 2019-02-11 ENCOUNTER — HOSPITAL ENCOUNTER (OUTPATIENT)
Dept: LAB | Age: 74
Discharge: HOME | End: 2019-02-11
Payer: COMMERCIAL

## 2019-02-11 DIAGNOSIS — E11.40 TYPE 2 DIABETES MELLITUS WITH DIABETIC NEUROPATHY, WITHOUT LONG-TERM CURRENT USE OF INSULIN (HCC): ICD-10-CM

## 2019-02-11 DIAGNOSIS — Z11.59 NEED FOR HEPATITIS C SCREENING TEST: Primary | ICD-10-CM

## 2019-02-11 LAB
EST. AVERAGE GLUCOSE BLD GHB EST-MCNC: 169 MG/DL
GLUCOSE SERPL-MCNC: 200 MG/DL (ref 74–99)
HBA1C MFR BLD: 7.5 % (ref 4.2–5.6)

## 2019-02-11 PROCEDURE — 86803 HEPATITIS C AB TEST: CPT

## 2019-02-11 PROCEDURE — 36415 COLL VENOUS BLD VENIPUNCTURE: CPT

## 2019-02-11 PROCEDURE — 83036 HEMOGLOBIN GLYCOSYLATED A1C: CPT

## 2019-02-11 PROCEDURE — 82947 ASSAY GLUCOSE BLOOD QUANT: CPT

## 2019-02-12 LAB
HCV AB SER IA-ACNC: 0.03 INDEX
HCV AB SERPL QL IA: NEGATIVE
HCV COMMENT,HCGAC: NORMAL

## 2019-02-18 ENCOUNTER — OFFICE VISIT (OUTPATIENT)
Dept: INTERNAL MEDICINE CLINIC | Age: 74
End: 2019-02-18

## 2019-02-18 VITALS
BODY MASS INDEX: 28.02 KG/M2 | OXYGEN SATURATION: 97 % | HEART RATE: 87 BPM | HEIGHT: 69 IN | TEMPERATURE: 98.7 F | RESPIRATION RATE: 16 BRPM | WEIGHT: 189.2 LBS | DIASTOLIC BLOOD PRESSURE: 86 MMHG | SYSTOLIC BLOOD PRESSURE: 138 MMHG

## 2019-02-18 DIAGNOSIS — R20.2 NUMBNESS AND TINGLING OF BOTH FEET: ICD-10-CM

## 2019-02-18 DIAGNOSIS — N52.01 ERECTILE DYSFUNCTION DUE TO ARTERIAL INSUFFICIENCY: ICD-10-CM

## 2019-02-18 DIAGNOSIS — M54.50 CHRONIC MIDLINE LOW BACK PAIN WITHOUT SCIATICA: ICD-10-CM

## 2019-02-18 DIAGNOSIS — R20.0 NUMBNESS AND TINGLING OF BOTH FEET: ICD-10-CM

## 2019-02-18 DIAGNOSIS — G89.29 CHRONIC MIDLINE LOW BACK PAIN WITHOUT SCIATICA: ICD-10-CM

## 2019-02-18 DIAGNOSIS — R20.2 NUMBNESS AND TINGLING IN BOTH HANDS: ICD-10-CM

## 2019-02-18 DIAGNOSIS — R20.0 NUMBNESS AND TINGLING IN BOTH HANDS: ICD-10-CM

## 2019-02-18 DIAGNOSIS — G25.0 TREMOR, ESSENTIAL: ICD-10-CM

## 2019-02-18 DIAGNOSIS — E11.40 TYPE 2 DIABETES MELLITUS WITH DIABETIC NEUROPATHY, WITHOUT LONG-TERM CURRENT USE OF INSULIN (HCC): Primary | ICD-10-CM

## 2019-02-18 RX ORDER — GLIPIZIDE 5 MG/1
5 TABLET, FILM COATED, EXTENDED RELEASE ORAL DAILY
Qty: 90 TAB | Refills: 3 | Status: SHIPPED | OUTPATIENT
Start: 2019-02-18 | End: 2019-05-22 | Stop reason: SDUPTHER

## 2019-02-18 RX ORDER — OXYCODONE AND ACETAMINOPHEN 5; 325 MG/1; MG/1
1 TABLET ORAL
Qty: 40 TAB | Refills: 0 | Status: SHIPPED | OUTPATIENT
Start: 2019-02-18 | End: 2019-05-22 | Stop reason: SDUPTHER

## 2019-02-18 NOTE — PROGRESS NOTES
1. Have you been to the ER, urgent care clinic or hospitalized since your last visit? NO           2. Have you seen or consulted any other health care providers outside of the [address] [place] [address] since your last visit (Include any pap smears or colon screening)? NO    Do you have an Advanced Directive? YES    Would you like information on Advanced Directives?  NO

## 2019-02-18 NOTE — PROGRESS NOTES
Bethanie Santoro 1945, is a 72 y.o. male, who is seen today for follow-up of diabetes, erectile dysfunction, no mentioning that he also has tingling and numbness in his hands and fingers day and night and the same thing in his toes. Hands feel cold feet feel cold but not quite as close the hands. He has tried Cialis 20 mg without any benefit for ED. Still smoking a pack per day. Try to follow his diabetic diet but is actually gained 6 pounds in the last 4 months. In the past he had been on Metformin which caused severe diarrhea has stopped taking it. Past Medical History:   Diagnosis Date    Arthritis     GERD (gastroesophageal reflux disease)     High blood pressure     Insomnia     Periorbital headache     Sleep apnea     Spinal stenosis      Current Outpatient Medications   Medication Sig Dispense Refill    oxyCODONE-acetaminophen (PERCOCET) 5-325 mg per tablet Take 1 Tab by mouth every four (4) hours as needed. Max Daily Amount: 6 Tabs. 40 Tab 0    glipiZIDE SR (GLUCOTROL XL) 5 mg CR tablet Take 1 Tab by mouth daily. 90 Tab 3    tadalafil (CIALIS) 20 mg tablet 1 tablet at least 1 hour before intercourse 4 Tab 11    diphenhydrAMINE HCl (WAL-FRED, DIPHENHYDRAMINE,) 25 mg TbDi Take 25 mg by mouth nightly.  aspirin-acetaminophen-caffeine (EXCEDRIN ES) 250-250-65 mg per tablet Take 1 Tab by mouth every six (6) hours as needed for Headache. Indications: Patient takes 4 - 6 tablets daily      hydrocortisone (CORTISONE) 1 % topical cream Apply to face and scalp twice per day for two weeks 30 g 2     Visit Vitals  /86 (BP 1 Location: Right arm, BP Patient Position: Sitting)   Pulse 87   Temp 98.7 °F (37.1 °C) (Oral)   Resp 16   Ht 5' 9\" (1.753 m)   Wt 189 lb 3.2 oz (85.8 kg)   SpO2 97%   BMI 27.94 kg/m²     Hands and feet are warm and dry. We will sign is absent. Pulses are 2+.     Results for orders placed or performed during the hospital encounter of 02/11/19   HEMOGLOBIN A1C WITH EAG   Result Value Ref Range    Hemoglobin A1c 7.5 (H) 4.2 - 5.6 %    Est. average glucose 169 mg/dL   GLUCOSE, RANDOM   Result Value Ref Range    Glucose 200 (H) 74 - 99 mg/dL   HEPATITIS C AB   Result Value Ref Range    Hepatitis C virus Ab 0.03 <0.80 Index    Hep C  virus Ab Interp. NEGATIVE  NEG      Hep C  virus Ab comment           Assessment: #1.  Diabetes with A1c no better than it was 4 months ago, start him on glipizide SR 5 mg daily. Continue diabetic diet and work on weight loss. #2.  Some tingling and coolness in the hands and feet without actual coldness, this seems to be neuropathy in the feet and he may have carpal tunnel or other neuropathy in the hands. We will have him see a neurologist for these issues. #3.  Still smoking, of encouraged him to quit smoking. #4.  Erectile dysfunction, and Cialis did not work the others will not help him either. Explained to him that this is generally circulatory and is due to diabetes and smoking etc.  #5.  Tremor in the right hand writing but not with other activities. Discussed that with a neurologist as well. #6.  Chronic lumbar pain, he says he uses Percocet about once a week but he needs a refill so he is clearly using it 2 or 3 times per week, nonetheless this is acceptable. Follow-up in 3 months with [facility] JIM Quevedo MD FACP    Please note: This document has been produced using voice recognition software. Unrecognized errors in transcription may be present.

## 2019-04-05 ENCOUNTER — OFFICE VISIT (OUTPATIENT)
Dept: NEUROLOGY | Age: 74
End: 2019-04-05

## 2019-04-05 DIAGNOSIS — R25.1 TREMOR: Primary | ICD-10-CM

## 2019-04-09 ENCOUNTER — OFFICE VISIT (OUTPATIENT)
Dept: NEUROLOGY | Age: 74
End: 2019-04-09

## 2019-04-09 VITALS
HEIGHT: 69 IN | DIASTOLIC BLOOD PRESSURE: 62 MMHG | WEIGHT: 189 LBS | HEART RATE: 94 BPM | SYSTOLIC BLOOD PRESSURE: 132 MMHG | BODY MASS INDEX: 27.99 KG/M2 | RESPIRATION RATE: 16 BRPM | TEMPERATURE: 98.5 F | OXYGEN SATURATION: 96 %

## 2019-04-09 DIAGNOSIS — G25.0 ESSENTIAL TREMOR: ICD-10-CM

## 2019-04-09 DIAGNOSIS — M48.062 LUMBAR STENOSIS WITH NEUROGENIC CLAUDICATION: ICD-10-CM

## 2019-04-09 DIAGNOSIS — E11.42 DIABETIC POLYNEUROPATHY ASSOCIATED WITH TYPE 2 DIABETES MELLITUS (HCC): Primary | ICD-10-CM

## 2019-04-09 DIAGNOSIS — G44.89 OTHER HEADACHE SYNDROME: ICD-10-CM

## 2019-04-09 NOTE — PROGRESS NOTES
ROOM # 3    Pat Garner presents today for   Chief Complaint   Patient presents with   [address] preferred language for health care discussion is english/other. Depression Screening:  3 most recent PHQ Screens 4/9/2019 2/18/2019 10/15/2018 10/1/2018 1/2/2018   Little interest or pleasure in doing things Not at all Not at all Not at all Several days Not at all   Feeling down, depressed, irritable, or hopeless Not at all Not at all Not at all Several days Nearly every day   Total Score PHQ 2 0 0 0 2 3   Trouble falling or staying asleep, or sleeping too much - - - - Not at all   Feeling tired or having little energy - - - - Nearly every day   Poor appetite, weight loss, or overeating - - - - Nearly every day   Feeling bad about yourself - or that you are a failure or have let yourself or your family down - - - - Not at all   Trouble concentrating on things such as school, work, reading, or watching TV - - - - Not at all   Moving or speaking so slowly that other people could have noticed; or the opposite being so fidgety that others notice - - - - Not at all   Thoughts of being better off dead, or hurting yourself in some way - - - - Not at all   PHQ 9 Score - - - - 9   How difficult have these problems made it for you to do your work, take care of your home and get along with others - - - - Somewhat difficult       Learning Assessment:  No flowsheet data found. Abuse Screening:  No flowsheet data found. Fall Risk  Fall Risk Assessment, last 12 mths 4/9/2019 2/18/2019 10/15/2018 10/1/2018 1/2/2018   Able to walk? Yes Yes Yes Yes Yes   Fall in past 12 months?  No No No No Yes   Fall with injury? - - - - No   Number of falls in past 12 months - - - - 1   Fall Risk Score - - - - 1       Visit Vitals  /62 (BP 1 Location: Left arm, BP Patient Position: Sitting)   Pulse 94   Temp 98.5 °F (36.9 °C) (Oral)   Resp 16   Ht 5' 9\" (1.753 m)   Wt 189 lb (85.7 kg) SpO2 96%   BMI 27.91 kg/m²       Health Maintenance reviewed and discussed per provider. Yes    Francisca Lopez is due for   Health Maintenance Due   Topic Date Due    FOOT EXAM Q1  05/13/1955    EYE EXAM RETINAL OR DILATED  05/13/1957    DTaP/Tdap/Td series (1 - Tdap) 05/13/1968    Shingrix Vaccine Age 50> (1 of 2) 05/13/1997    GLAUCOMA SCREENING Q2Y  05/13/2012    AAA Screening 73-67 YO Male Smoking Patients  05/13/2012    Pneumococcal 65+ years (1 of 2 - PCV13) 05/13/2012     Please order/place referral if appropriate. Advance Directive:  1. Do you have an advance directive in place? Patient Reply: No    2. If not, would you like material regarding how to put one in place? Patient Reply: No    Coordination of Care:  1. Have you been to the ER, urgent care clinic since your last visit? Hospitalized since your last visit?  No

## 2019-04-09 NOTE — ACP (ADVANCE CARE PLANNING)
The patient was advised and counseled regarding advanced directives.   The patient was provided with an information packet Heme/Onc Progress Note (Dr. García )    Interval History: Pt denies any issues overnight including gross bleeding or bruising, no fevers o/n. Patient receiving IVIG therapy.     Allergies    No Known Allergies    Intolerances        Medications:  MEDICATIONS  (STANDING):  chlorhexidine 2% Cloths 1 Application(s) Topical <User Schedule>  enoxaparin Injectable 40 milliGRAM(s) SubCutaneous daily  ertapenem  IVPB 1000 milliGRAM(s) IV Intermittent every 24 hours  famotidine Injectable 20 milliGRAM(s) IV Push two times a day  immune   globulin gamma IVPB 25 Gram(s) IV Intermittent daily  nystatin/triamcinolone Cream 1 Application(s) Topical every 12 hours  pantoprazole  Injectable 40 milliGRAM(s) IV Push daily  thiamine IVPB 500 milliGRAM(s) IV Intermittent every 24 hours    MEDICATIONS  (PRN):  ibuprofen  Tablet. 600 milliGRAM(s) Oral every 6 hours PRN Mild Pain (1 - 3)    enoxaparin Injectable 40 milliGRAM(s) SubCutaneous daily          PHYSICAL EXAM:    T(F): 98.3 (04-09-19 @ 14:16), Max: 99.3 (04-09-19 @ 09:14)  HR: 75 (04-09-19 @ 14:16) (75 - 103)  BP: 134/59 (04-09-19 @ 14:16) (127/84 - 142/88)  RR: 17 (04-09-19 @ 14:16) (15 - 17)  SpO2: 95% (04-09-19 @ 14:16) (89% - 95%)  Wt(kg): --    Daily     Daily     GEN: Pale, sitting in chair  HEENT:AT/NC   CVS:+S1, S2, RRR   LUNG: CTA b/L   GI: +BS, no ostomy no bloody outpt   EXT; no c/c/e, picc line discontinued, no local skin infx  NEURO: aaox3      Labs:                          7.0    5.03  )-----------( 254      ( 08 Apr 2019 08:00 )             23.7     CBC Full  -  ( 09 Apr 2019 06:05 )  WBC Count : x  RBC Count : 2.44 M/uL  Hemoglobin : x  Hematocrit : x  Platelet Count - Automated : x  Mean Cell Volume : x  Mean Cell Hemoglobin : x  Mean Cell Hemoglobin Concentration : x  Auto Neutrophil # : x  Auto Lymphocyte # : x  Auto Monocyte # : x  Auto Eosinophil # : x  Auto Basophil # : x  Auto Neutrophil % : x  Auto Lymphocyte % : x  Auto Monocyte % : x  Auto Eosinophil % : x  Auto Basophil % : x    PT/INR - ( 08 Apr 2019 07:37 )   PT: 11.2 sec;   INR: 0.99          PTT - ( 08 Apr 2019 07:37 )  PTT:30.5 sec    04-08    140  |  97  |  12  ----------------------------<  160<H>  4.0   |  35<H>  |  0.54    Ca    9.2      08 Apr 2019 07:59  Phos  4.3     04-08  Mg     1.8     04-08    TPro  7.5  /  Alb  2.9<L>  /  TBili  0.2  /  DBili  x   /  AST  70<H>  /  ALT  165<H>  /  AlkPhos  364<H>  04-08

## 2019-04-09 NOTE — PROGRESS NOTES
HPI: 82-year-old male who is referred for evaluation of bilateral hand and feet paresthesias as well as a right hand tremor. He comes with his wife. He provides a long history which started around 2008 2009 when he developed terrible back pain. He underwent evaluation and conservative treatment for 2-3 years leading to neuroimaging and a March 2011 laminectomy. He is not sure about which levels he needed repair. At any rate he continues to have problems after days and he ended up a few years later having a second surgery. He has had some screws and some instrumentation. He has continued to have chronic lumbar pain despite his 2 surgeries. He has to take Percocet as needed when his pain is pretty bad. He reports that he walks a certain distance, for example he walks from here to his car and back and when he sits down he gets this severe pain in the back that is relieved after sitting or laying for long time. At one point there was discussion of additional decompressive surgery, but after failing to have surgery attempts and given his age she did not want to pursue any additional surgery. He has a diagnosis recently made of type 2 diabetes and he is on glipizide after he did not do very well with metformin. His last hemoglobin A1c was 7.5. He reports that for the last year he has felt a sensation of a buzzing both hands, that perhaps lately has been a little bit better. He also feels that his feet when he lays down in bed are like a bottle of ice water, symmetrically. He denies burning, tingling, or pain in his feet. He does take about 5 Excedrin as a day to deal with his chronic low back pain. He denies any pain radiating into the lower extremities at this time. He is never been a heavy drinker.     He did see a neurologist back in 2011 for headaches that were happening mostly in the left frontal area, not retro-orbital, not exclusively on the left side, without any symptoms of nausea, light sensitivity, ptosis, lacrimation, or ipsilateral nasalcongestion. Reportedly reportedly a neurologist diagnosed him with clusters, he took oxygen for some time, which reportedly did work, but he has not had any headaches for 4-5 years. He did not have headaches prior to  This period of a year to a year and a half where he had them and has no prior history of migraines. He also has noted a slight tremor when he tries to do fine things or right with the right hand. Denies any resting tremor. Denies any changes in his voice volume. Wife denies any dream enacting behavior.     Social History     Socioeconomic History    Marital status:      Spouse name: Not on file    Number of children: Not on file    Years of education: Not on file    Highest education level: Not on file   Occupational History    Not on file   Social Needs    Financial resource strain: Not on file    Food insecurity:     Worry: Not on file     Inability: Not on file    Transportation needs:     Medical: Not on file     Non-medical: Not on file   Tobacco Use    Smoking status: Current Every Day Smoker     Packs/day: 1.00     Years: 51.00     Pack years: 51.00    Smokeless tobacco: Never Used   Substance and Sexual Activity    Alcohol use: Yes     Comment: rarely    Drug use: No    Sexual activity: Yes     Partners: Female   Lifestyle    Physical activity:     Days per week: Not on file     Minutes per session: Not on file    Stress: Not on file   Relationships    Social connections:     Talks on phone: Not on file     Gets together: Not on file     Attends [employer] service: Not on file     Active member of club or organization: Not on file     Attends meetings of clubs or organizations: Not on file     Relationship status: Not on file    Intimate partner violence:     Fear of current or ex partner: Not on file     Emotionally abused: Not on file     Physically abused: Not on file     Forced sexual activity: Not on file Other Topics Concern    Not on file   Social History Narrative    Not on file       Family History   Problem Relation Age of Onset    Heart Attack Mother     Cancer Mother     Heart Disease Mother     Hypertension Father     Seizures Father     Alcohol abuse Father        Current Outpatient Medications   Medication Sig Dispense Refill    oxyCODONE-acetaminophen (PERCOCET) 5-325 mg per tablet Take 1 Tab by mouth every four (4) hours as needed. Max Daily Amount: 6 Tabs. 40 Tab 0    glipiZIDE SR (GLUCOTROL XL) 5 mg CR tablet Take 1 Tab by mouth daily. 90 Tab 3    diphenhydrAMINE HCl (WAL-FRED, DIPHENHYDRAMINE,) 25 mg TbDi Take 25 mg by mouth nightly.  aspirin-acetaminophen-caffeine (EXCEDRIN ES) 250-250-65 mg per tablet Take 1 Tab by mouth every six (6) hours as needed for Headache.  Indications: Patient takes 4 - 6 tablets daily      tadalafil (CIALIS) 20 mg tablet 1 tablet at least 1 hour before intercourse 4 Tab 11    hydrocortisone (CORTISONE) 1 % topical cream Apply to face and scalp twice per day for two weeks 30 g 2       Past Medical History:   Diagnosis Date    Arthritis     GERD (gastroesophageal reflux disease)     High blood pressure     Insomnia     Periorbital headache     Sleep apnea     Spinal stenosis        Past Surgical History:   Procedure Laterality Date    ENDOSCOPY, COLON, DIAGNOSTIC      HX LUMBAR DISKECTOMY  6/2001, 4/11    left L3-4 microdiskectomy with intradural rupture    NEUROLOGICAL PROCEDURE UNLISTED  9/11    spinal fusion, L3-5       Allergies   Allergen Reactions    Codeine Not Reported This Time    Tegretol [Carbamazepine] Not Reported This Time       Patient Active Problem List   Diagnosis Code    Thoracic or lumbosacral neuritis or radiculitis, unspecified [id number]    Degeneration of lumbar or lumbosacral intervertebral disc M51.37    Lumbago M54.5    Hyperlipemia E78.5    Sleep apnea G47.30    Cluster headaches G44.009    Type 2 diabetes mellitus with diabetic neuropathy, without long-term current use of insulin ([facility]) E11.40    Chronic midline low back pain without sciatica M54.5, G89.29    Erectile dysfunction due to arterial insufficiency N52.01         Review of Systems:   Constitutional: no fever or chills  Skin denies rash or itching  HEENT:  Denies tinnitus, hearing loss, or visual changes  Respiratory: denies shortness of breath  Cardiovascular: denies chest pain, dyspnea on exertion  Gastrointestinal: does not report nausea or vomiting  Genitourinary: does not report dysuria or incontinence  Musculoskeletal: Reports joint pain  Endocrine: denies weight change  Hematology: denies easy bruising or bleeding   Neurological: as above in HPI      PHYSICAL EXAMINATION:         Vital signs:    Visit Vitals  /62 (BP 1 Location: Left arm, BP Patient Position: Sitting)   Pulse 94   Temp 98.5 °F (36.9 °C) (Oral)   Resp 16   Ht 5' 9\" (1.753 m)   Wt 85.7 kg (189 lb)   SpO2 96%   BMI 27.91 kg/m²         GENERAL:                  Well developed, well nourished, in no apparent distress. HEART:                       RR, no murmurs  EXTREMITIES:           No edema is identified. Pulses are +2. HEAD:                         Normocephalic, atraumatic. NEUROLOGIC EXAMINATION       MENTAL STATUS:     Awake, alert, and oriented x 4. Attention and STM are grossly normal. There is no aphasia. Fund of knowledge is adequate. Mood and affect are appropriate  CRANIAL NERVES:   Visual fields are full to confrontation. Pupils are reactive to light and accommodation. Fundi are normal.   Extraocular movements are intact and there is no nystagmus. Facial sensation is normal  Face is symmetrical.   Hearing is present. SCM/TPZ 5/5  Tongue protrudes midline, palate elevates symmetrically. MOTOR:          5/5 strength throughout, normal tone.   No cogwheeling or bradykinesia, normal blink rate     CEREBELLAR: Mild postural and finger to nose to finger tremors and slight tremulousness when initiating the archimides spiral     SENSORY:      Decreased distal pinprick, proprioception, and vibration up to knee level bilaterally. Romberg is negative                 DTR's:                        Absent bilateral ankle jerks, +1 in knees, no long tract signs                 GAIT:                           Normal gait, no shuffling, festination, no parkinsonism        Impression: His distal feet symptoms and hand symptoms are likely related to diabetic polyneuropathy rather than from his lumbar spine disease. He does however have symptoms of neurogenic claudication, so I suspect he still has some significant lumbar stenosis. However he is living with this and he does not want to pursue surgery at this time. He may need neuroimaging if he decides to look into this again. He does have a very mild essential tremor. He is not interested in taking medication at this time. We will observe this. Use of weighted utensils and pains may be of benefit. He had headaches that did not have the features of cluster we will revisit this in the future if needed. Headache syndrome. He has been headache free for 4-5 years, so    Plan:  1-continue diabetic control. 2-he has mostly negative symptoms of polyneuropathy. Therefore I do not think that he would benefit from neuropathic pain medications at this time, but should he be having symptoms of neuropathic pain then I would think that a medication such as duloxetine would be the best option given the fact that he has significant depression related to his chronic low back pain. 3-advised him about the natural progression of lumbar stenosis. If he continues to have increasing problems with ambulation and neurogenic medication he may need repeat neuroimaging. 4-he will return to neurology as needed.        PLEASE NOTE:   Portions of this document may have been produced using voice recognition software. Unrecognized errors in transcription may be present. This note will not be viewable in [address]

## 2019-05-15 ENCOUNTER — APPOINTMENT (OUTPATIENT)
Dept: INTERNAL MEDICINE CLINIC | Age: 74
End: 2019-05-15

## 2019-05-15 ENCOUNTER — HOSPITAL ENCOUNTER (OUTPATIENT)
Dept: LAB | Age: 74
Discharge: HOME | End: 2019-05-15
Payer: COMMERCIAL

## 2019-05-15 DIAGNOSIS — E11.40 TYPE 2 DIABETES MELLITUS WITH DIABETIC NEUROPATHY, WITHOUT LONG-TERM CURRENT USE OF INSULIN (HCC): ICD-10-CM

## 2019-05-15 LAB
EST. AVERAGE GLUCOSE BLD GHB EST-MCNC: 143 MG/DL
GLUCOSE SERPL-MCNC: 142 MG/DL (ref 74–99)
HBA1C MFR BLD: 6.6 % (ref 4.2–5.6)

## 2019-05-15 PROCEDURE — 82947 ASSAY GLUCOSE BLOOD QUANT: CPT

## 2019-05-15 PROCEDURE — 36415 COLL VENOUS BLD VENIPUNCTURE: CPT

## 2019-05-15 PROCEDURE — 83036 HEMOGLOBIN GLYCOSYLATED A1C: CPT

## 2019-05-22 ENCOUNTER — OFFICE VISIT (OUTPATIENT)
Dept: INTERNAL MEDICINE CLINIC | Age: 74
End: 2019-05-22

## 2019-05-22 VITALS
HEART RATE: 83 BPM | HEIGHT: 69 IN | BODY MASS INDEX: 27.49 KG/M2 | DIASTOLIC BLOOD PRESSURE: 68 MMHG | SYSTOLIC BLOOD PRESSURE: 116 MMHG | OXYGEN SATURATION: 98 % | RESPIRATION RATE: 12 BRPM | WEIGHT: 185.6 LBS | TEMPERATURE: 98.3 F

## 2019-05-22 DIAGNOSIS — N52.01 ERECTILE DYSFUNCTION DUE TO ARTERIAL INSUFFICIENCY: ICD-10-CM

## 2019-05-22 DIAGNOSIS — G89.29 CHRONIC MIDLINE LOW BACK PAIN WITHOUT SCIATICA: ICD-10-CM

## 2019-05-22 DIAGNOSIS — M54.50 CHRONIC MIDLINE LOW BACK PAIN WITHOUT SCIATICA: ICD-10-CM

## 2019-05-22 DIAGNOSIS — E11.40 TYPE 2 DIABETES MELLITUS WITH DIABETIC NEUROPATHY, WITHOUT LONG-TERM CURRENT USE OF INSULIN (HCC): Primary | ICD-10-CM

## 2019-05-22 RX ORDER — CLINDAMYCIN HYDROCHLORIDE 300 MG/1
300 CAPSULE ORAL 3 TIMES DAILY
COMMUNITY
End: 2019-06-25 | Stop reason: ALTCHOICE

## 2019-05-22 RX ORDER — OXYCODONE AND ACETAMINOPHEN 5; 325 MG/1; MG/1
1 TABLET ORAL
Qty: 60 TAB | Refills: 0 | Status: SHIPPED | OUTPATIENT
Start: 2019-05-22 | End: 2019-11-25 | Stop reason: SDUPTHER

## 2019-05-22 RX ORDER — GLIPIZIDE 5 MG/1
5 TABLET, FILM COATED, EXTENDED RELEASE ORAL DAILY
Qty: 90 TAB | Refills: 3 | Status: SHIPPED | OUTPATIENT
Start: 2019-05-22 | End: 2019-11-25 | Stop reason: SDUPTHER

## 2019-05-22 NOTE — PROGRESS NOTES
Mahnaz Blackmon 1945, is a 69 y.o. male, who is seen today for reevaluation of diabetes neuropathy overweight and refill dysfunction. He is bothered by neuropathy in his feet and in his hands did have nerve conduction studies and was told to check back with me about that. He says the neurologist did not recommend medication. After reading the neurology evaluation the neurologist felt that it was all diabetic related rather than spinal related and he did not recommend medication. Patient says it does not keep him awake at night but does burn and feel numb at bedtime and off and on through the daytime as well. It does not wake him up or keep him awake at night. Follow his diabetic diet and has lost 4 more pounds in the last 3 months and he is using glipizide regularly. He uses Cialis as needed for ED. No other new complaints. Past Medical History:   Diagnosis Date    Arthritis     GERD (gastroesophageal reflux disease)     High blood pressure     Insomnia     Periorbital headache     Sleep apnea     Spinal stenosis      Current Outpatient Medications   Medication Sig Dispense Refill    clindamycin (CLEOCIN) 300 mg capsule Take 300 mg by mouth three (3) times daily.  oxyCODONE-acetaminophen (PERCOCET) 5-325 mg per tablet Take 1 Tab by mouth every four (4) hours as needed (Back pain) for up to 30 days. Max Daily Amount: 6 Tabs. 60 Tab 0    glipiZIDE SR (GLUCOTROL XL) 5 mg CR tablet Take 1 Tab by mouth daily. 90 Tab 3    tadalafil (CIALIS) 20 mg tablet 1 tablet at least 1 hour before intercourse 4 Tab 11    diphenhydrAMINE HCl (WAL-FRED, DIPHENHYDRAMINE,) 25 mg TbDi Take 25 mg by mouth nightly.  aspirin-acetaminophen-caffeine (EXCEDRIN ES) 250-250-65 mg per tablet Take 1 Tab by mouth every six (6) hours as needed for Headache.  Indications: Patient takes 4 - 6 tablets daily      hydrocortisone (CORTISONE) 1 % topical cream Apply to face and scalp twice per day for two weeks 30 g 2     Visit Vitals  /68 (BP 1 Location: Left arm, BP Patient Position: Sitting)   Pulse 83   Temp 98.3 °F (36.8 °C) (Oral)   Resp 12   Ht 5' 9\" (1.753 m)   Wt 185 lb 9.6 oz (84.2 kg)   SpO2 98%   BMI 27.41 kg/m²     He was not further examined today at his request.    Results for orders placed or performed during the hospital encounter of 05/15/19   HEMOGLOBIN A1C WITH EAG   Result Value Ref Range    Hemoglobin A1c 6.6 (H) 4.2 - 5.6 %    Est. average glucose 143 mg/dL   GLUCOSE, RANDOM   Result Value Ref Range    Glucose 142 (H) 74 - 99 mg/dL     Assessment: #1.  Diabetes doing much better, he will continue diabetic diet, work on gradual further weight loss, continue glipizide SR 5 mg daily and we will recheck him and lab in 6 months. #2.  Cigarette smoker, he understands that he should quit smoking but has not been able to do so. #3.  Feet probably diabetic related but it is unusual to see neuropathy in the hands this early in the course of disease where he does not feel neuropathy more proximally in the legs. Nonetheless it is not terribly painful is not keeping him awake and I do not recommend medicine for him right now I told him I would recommend gabapentin if symptoms worsen to the point where it interferes with his life or keeps him awake at night. #3.  Erectile dysfunction, continue Cialis 20 mg as needed. #4.  Overweight improved by months, he will continue to work on weight loss and follow his diabetic diet. Follow-up 6 months with complete lab    Manuel Ly MD FACP    Please note: This document has been produced using voice recognition software. Unrecognized errors in transcription may be present.

## 2019-06-10 ENCOUNTER — TELEPHONE (OUTPATIENT)
Dept: INTERNAL MEDICINE CLINIC | Age: 74
End: 2019-06-10

## 2019-06-10 NOTE — TELEPHONE ENCOUNTER
Pt said he is having cluster headaches, in the past  9 years ago , his pcp would order him oxygen from [facility], he is asking if Dr Darin Schmitz would fax an order for oxygen to [facility] for his headaches   He said that was the only thing that would help him  With the headaches,   FAX # [phone]

## 2019-06-11 NOTE — TELEPHONE ENCOUNTER
Pt is calling and said [facility] no longer provides oxygen for cluster headaches. Patient wants order for oxygen sent to another medical supply company that will provide it for him. Please advise patient at [phone] (cell) or [phone] (home).

## 2019-06-13 NOTE — TELEPHONE ENCOUNTER
Reji Solis MD  [facility] Nurses Yesterday (7:24 AM)      I have no idea who can supply oxygen for this purpose.  Perhaps 1 of the nurse navigators can answer this question. Maryam Lizama RN  You 17 hours ago (3:57 PM)      He may not be able to get oxygen for this diagnosis because of an insurance issue. Maybe patient should call his neurologist and discuss with him. Routing comment            Pt aware of message below and verbalized understanding. No further questions or concerns from pt at this time.

## 2019-06-25 ENCOUNTER — OFFICE VISIT (OUTPATIENT)
Dept: INTERNAL MEDICINE CLINIC | Age: 74
End: 2019-06-25

## 2019-06-25 VITALS
WEIGHT: 185 LBS | BODY MASS INDEX: 27.4 KG/M2 | RESPIRATION RATE: 12 BRPM | HEIGHT: 69 IN | DIASTOLIC BLOOD PRESSURE: 76 MMHG | OXYGEN SATURATION: 97 % | SYSTOLIC BLOOD PRESSURE: 120 MMHG | HEART RATE: 95 BPM | TEMPERATURE: 98.2 F

## 2019-06-25 DIAGNOSIS — G44.001 INTRACTABLE CLUSTER HEADACHE SYNDROME, UNSPECIFIED CHRONICITY PATTERN: Primary | ICD-10-CM

## 2019-06-25 RX ORDER — VERAPAMIL HYDROCHLORIDE 240 MG/1
TABLET, FILM COATED, EXTENDED RELEASE ORAL
Qty: 90 TAB | Refills: 2 | Status: SHIPPED | OUTPATIENT
Start: 2019-06-25 | End: 2019-08-04 | Stop reason: DRUGHIGH

## 2019-06-25 RX ORDER — SUMATRIPTAN 6 MG/.5ML
INJECTION, SOLUTION SUBCUTANEOUS
Qty: 2 SYRINGE | Refills: 5 | Status: SHIPPED | OUTPATIENT
Start: 2019-06-25 | End: 2019-09-23 | Stop reason: DRUGHIGH

## 2019-06-25 RX ORDER — VERAPAMIL HYDROCHLORIDE 240 MG/1
TABLET, FILM COATED, EXTENDED RELEASE ORAL
Qty: 30 TAB | Refills: 2 | Status: SHIPPED | OUTPATIENT
Start: 2019-06-25 | End: 2019-06-25 | Stop reason: SDUPTHER

## 2019-06-25 NOTE — PROGRESS NOTES
oYng Garcia,born 1945, is a 69 y.o. male, who is seen today for cluster headaches. The patient been having cluster headaches back around the year 2011 and had to surgeries on his cervical spine and after the second 1 headaches totally cleared. 2 weeks ago the headaches came back, they occur 2 or 3 times a day or severe and occur behind his left eye. They are associated with sweating and intense pain. He had some leftover oxygen and within 5 minutes the headaches clear with the oxygen but now he is out of oxygen and his insurance company will not cover oxygen for cluster headaches. He has been using Excedrin Migraine without benefit. Past Medical History:   Diagnosis Date    Arthritis     GERD (gastroesophageal reflux disease)     High blood pressure     Insomnia     Periorbital headache     Sleep apnea     Spinal stenosis      Current Outpatient Medications   Medication Sig Dispense Refill    glipiZIDE SR (GLUCOTROL XL) 5 mg CR tablet Take 1 Tab by mouth daily. 90 Tab 3    tadalafil (CIALIS) 20 mg tablet 1 tablet at least 1 hour before intercourse 4 Tab 11    diphenhydrAMINE HCl (WAL-FRED, DIPHENHYDRAMINE,) 25 mg TbDi Take 25 mg by mouth nightly.  aspirin-acetaminophen-caffeine (EXCEDRIN ES) 250-250-65 mg per tablet Take 1 Tab by mouth every six (6) hours as needed for Headache. Indications: Patient takes 4 - 6 tablets daily      hydrocortisone (CORTISONE) 1 % topical cream Apply to face and scalp twice per day for two weeks 30 g 2     Visit Vitals  /76 (BP 1 Location: Left arm, BP Patient Position: Sitting)   Pulse 95   Temp 98.2 °F (36.8 °C) (Oral)   Resp 12   Ht 5' 9\" (1.753 m)   Wt 185 lb (83.9 kg)   SpO2 97%   BMI 27.32 kg/m²     Scalp is nontender. There is no rash. Assessment: Cluster headaches, we will try him on verapamil 240 mg daily and escalate the dose if needed.   We will also try him on sumatriptan injection, 6 mg subcutaneously as needed for cluster headache. Ideally he would be on oxygen but he cannot afford to pay out-of-pocket for oxygen at home. Follow-up 2 weeks, we may increase verapamil dose if needed at that time. Proven options are quite limited for cluster headaches, it is unfortunate that he is not able to get oxygen which has worked the best for him in the past.    This visit lasted 25 minutes, greater than 50% of the time spent counseling, reviewing the above issues and how to use medicines as above and the fact that his diabetic medicine is not going to interfere with the new medicines I ordered. Manuel Martinez MD FACP    Please note: This document has been produced using voice recognition software. Unrecognized errors in transcription may be present.

## 2019-07-09 ENCOUNTER — OFFICE VISIT (OUTPATIENT)
Dept: INTERNAL MEDICINE CLINIC | Age: 74
End: 2019-07-09

## 2019-07-09 VITALS
BODY MASS INDEX: 27.49 KG/M2 | TEMPERATURE: 97.6 F | RESPIRATION RATE: 12 BRPM | HEART RATE: 61 BPM | HEIGHT: 69 IN | SYSTOLIC BLOOD PRESSURE: 120 MMHG | DIASTOLIC BLOOD PRESSURE: 62 MMHG | WEIGHT: 185.6 LBS | OXYGEN SATURATION: 98 %

## 2019-07-09 DIAGNOSIS — G44.009 CLUSTER HEADACHE, NOT INTRACTABLE, UNSPECIFIED CHRONICITY PATTERN: Primary | ICD-10-CM

## 2019-07-09 RX ORDER — SUMATRIPTAN 100 MG/1
TABLET, FILM COATED ORAL
Qty: 9 TAB | Refills: 5 | Status: SHIPPED | OUTPATIENT
Start: 2019-07-09 | End: 2019-09-23 | Stop reason: DRUGHIGH

## 2019-07-09 NOTE — PROGRESS NOTES
Stevo Garcia,born 1945, is a 69 y.o. male, who is seen today for follow-up of cluster headaches. Started on sustained release verapamil 240 mg daily 2 weeks ago when I last saw him and most recently he has increase that to 3 times a day over the last couple of days, no side effects but no benefit so far for the cluster headaches. Some days he has none and other days he has one and then it keeps coming back. I gave him injectable sumatriptan and he decided he did not want to use a needle so he has the prescription but did not use it. When he has used oxygen in the past within 4 minutes the headache goes away, he can get oxygen now, insurance will not cover it for this indication. He has some oxygen leftover from many years ago and it has helped him when he has used that for 4 minutes. Past Medical History:   Diagnosis Date    Arthritis     GERD (gastroesophageal reflux disease)     High blood pressure     Insomnia     Periorbital headache     Sleep apnea     Spinal stenosis      Current Outpatient Medications   Medication Sig Dispense Refill    SUMAtriptan (IMITREX) 100 mg tablet 1 tablet by mouth up to 1/day as needed for cluster headache 9 Tab 5    verapamil ER (CALAN-SR) 240 mg CR tablet TAKE 1 TABLET BY MOUTH DAILY 90 Tab 2    glipiZIDE SR (GLUCOTROL XL) 5 mg CR tablet Take 1 Tab by mouth daily. 90 Tab 3    tadalafil (CIALIS) 20 mg tablet 1 tablet at least 1 hour before intercourse 4 Tab 11    diphenhydrAMINE HCl (WAL-FRED, DIPHENHYDRAMINE,) 25 mg TbDi Take 25 mg by mouth nightly.  aspirin-acetaminophen-caffeine (EXCEDRIN ES) 250-250-65 mg per tablet Take 1 Tab by mouth every six (6) hours as needed for Headache.  Indications: Patient takes 4 - 6 tablets daily      hydrocortisone (CORTISONE) 1 % topical cream Apply to face and scalp twice per day for two weeks 30 g 2    SUMAtriptan succinate 6 mg/0.5 mL syrg 6 mg subcutaneously daily as needed for cluster headache 2 Syringe 5 Visit Vitals  /62 (BP 1 Location: Left arm, BP Patient Position: Sitting)   Pulse 61   Temp 97.6 °F (36.4 °C) (Oral)   Resp 12   Ht 5' 9\" (1.753 m)   Wt 185 lb 9.6 oz (84.2 kg)   SpO2 98%   BMI 27.41 kg/m²     He is not reexamined today. Assessment: #1.  Cluster headaches persist, told him he could use verapamil 240 mg twice a day but no more than that. His heart rate is good and blood pressure is not too low. He also was given oral sumatriptan and although that may will not work for him, I told him it is much too slow and that cluster headaches for him usually go away before that would help him but he wants a prescription anyway. I will refer him to Dr. Nikolas Jensen, neurology. Follow-up as previously planned in November or sooner if needed    [employer]. Joseph Pedraza MD FACP    Please note: This document has been produced using voice recognition software. Unrecognized errors in transcription may be present. This visit lasted 25 minutes, greater than 50% of the time was spent arranging for consultation and explaining to him how these drugs work and not to use too much of the verapamil and that it would be great to get some oxygen and perhaps Dr. Sanjay Guzman knows how to do that.

## 2019-08-04 RX ORDER — VERAPAMIL HYDROCHLORIDE 240 MG/1
TABLET, FILM COATED, EXTENDED RELEASE ORAL
Qty: 60 TAB | Refills: 0 | Status: SHIPPED | OUTPATIENT
Start: 2019-08-04 | End: 2021-04-09

## 2019-08-04 NOTE — PROGRESS NOTES
The patient called me Davion morning, today, 10:40 AM.  He is still having significant headaches and his appointment with Dr. Sanjay Guzman is not scheduled until August 12. He has been using verapamil 240 mg twice a day, I reluctantly let him use this much because he was actually taking 3 and 4/day prior to our last visit. He tells me he wants an increase in the strength on sumatriptan as well and I told him that there is no information on whether it is safe or effective to increase beyond 100 mg at a time. I would rather he stay with 100 mg since he declined to use the injectable form that I actually did recommend to him, and we will see what Dr. Sanjay Guzman has to offer him in 8 more days. At that point I will leave all prescribing for his headaches up to Dr. Sanjay Guzman.

## 2019-08-06 ENCOUNTER — TELEPHONE (OUTPATIENT)
Dept: INTERNAL MEDICINE CLINIC | Age: 74
End: 2019-08-06

## 2019-08-06 NOTE — TELEPHONE ENCOUNTER
Pt calling says [facility] needs last office note faxed to [phone] [facility]. saying pt needs oxygen. Pt says at last visit RM gave him order for oxygen but medicare doesn't cover it.     [phone] in [place] Number

## 2019-08-06 NOTE — TELEPHONE ENCOUNTER
Yamil Cheatham from [facility]. Says she needs order with liter flow.  Says the npi needs to be on the order

## 2019-09-04 ENCOUNTER — TELEPHONE (OUTPATIENT)
Dept: INTERNAL MEDICINE CLINIC | Age: 74
End: 2019-09-04

## 2019-09-04 NOTE — TELEPHONE ENCOUNTER
Pt calling, says [facility] will not file his oxygen to medicare. He called [facility] and they told him they do file to insurance. Wants an order for oxygen to go to [facility] in home care There phone number is [phone] tried to call them to get fax but no one answered their phone.     He wants call when done

## 2019-09-04 NOTE — TELEPHONE ENCOUNTER
Be tripp from [facility] calling, says they got the rx for oxygen but they need an office note or letter saying that the patient needs the oxygen for the cluster headaches    Fax is [phone]

## 2019-09-05 NOTE — TELEPHONE ENCOUNTER
[facility] calling again. Asking if rm can change the order Says for cluster headaches patient will normally need 8-10 liters for 4 minutes. The order they had was not written for enough liters.  They can only get order for every 90 days so if it isn't enough oxygen it can't be updated for 90 days

## 2019-09-20 ENCOUNTER — TELEPHONE (OUTPATIENT)
Dept: INTERNAL MEDICINE CLINIC | Age: 74
End: 2019-09-20

## 2019-09-20 NOTE — TELEPHONE ENCOUNTER
Called patient back to come to the office and  his MRI order from Dr. Sanjay Guzman. Patient has been instructed to take the order with him at the time of his MRI schedule.

## 2019-09-20 NOTE — TELEPHONE ENCOUNTER
Patient is requesting a new rx for Giana Hassan increased it to take two a day. Pharmacy wont refill it now the way it is currently written.

## 2019-09-20 NOTE — TELEPHONE ENCOUNTER
Pt called (frustrated) said he was referred to Dr Ana Hernandez  For his cluster headaches , Dr Katia Soto put orders in for a MRI,. Pt needs an open MRI  Every location he is being sent to says they do not have open MRI .  Please advise

## 2019-09-20 NOTE — TELEPHONE ENCOUNTER
I called the patient and advised him to go to [address] [place] [address] for an open MRI. Patient was giving # [phone] to schedule an appointment. The patient was advised to get a copy of his MRI order to take with him because [facility] can't see into our database. The patient asked for assistance in retrieving the order in fear the imaging center would not get the order in time for the scheduled appointment. Dr. Tanisha Suarez office is faxing over his MRI order so I can mail a copy to the patient and fax a copy to [facility] [phone].

## 2019-09-23 RX ORDER — SUMATRIPTAN 100 MG/1
TABLET, FILM COATED ORAL
Qty: 18 TAB | Refills: 5 | Status: SHIPPED | OUTPATIENT
Start: 2019-09-23 | End: 2019-10-05 | Stop reason: SDUPTHER

## 2019-09-23 NOTE — TELEPHONE ENCOUNTER
Pt calling again to see when he will get an answer on this.     Ran on [facility]    [phone]

## 2019-09-23 NOTE — TELEPHONE ENCOUNTER
Pt calling again asking to speak to nurse. Very upset he has no answer on this test as of yet.  Please call [phone]

## 2019-09-23 NOTE — TELEPHONE ENCOUNTER
I called patient to let him know his MRI order was faxed from Dr. Liberty Mclean office to Dr. Emma Barba nurse as a curtesy. The patient asked for assistance in finding a facility that had an open MRI. The patient called this morning very upset. He forgot that he was suppose to take the MRI order up to [facility] to be schedule. The patient stated verbal understanding and will go to hand the poorly faxed copy to the facility to make an appointment for his MRI.

## 2019-10-05 ENCOUNTER — TELEPHONE (OUTPATIENT)
Dept: INTERNAL MEDICINE CLINIC | Age: 74
End: 2019-10-05

## 2019-10-05 RX ORDER — SUMATRIPTAN 100 MG/1
TABLET, FILM COATED ORAL
Qty: 18 TAB | Refills: 5 | Status: SHIPPED | OUTPATIENT
Start: 2019-10-05 | End: 2019-10-07 | Stop reason: SDUPTHER

## 2019-10-05 NOTE — TELEPHONE ENCOUNTER
Pt upset that insurance will not cover more than 18 imitrex a month  Requesting that we call another prescription in    Explained to him that some insurances have monthly volume limits on triptans which we usually can't get around    Prescription sent although I told him that i'm not sure the insurance will cover and he may have to pay the $160 cost

## 2019-10-07 ENCOUNTER — TELEPHONE (OUTPATIENT)
Dept: INTERNAL MEDICINE CLINIC | Age: 74
End: 2019-10-07

## 2019-10-07 RX ORDER — SUMATRIPTAN 100 MG/1
TABLET, FILM COATED ORAL
Qty: 27 TAB | Refills: 5 | Status: SHIPPED | OUTPATIENT
Start: 2019-10-07 | End: 2022-09-13

## 2019-10-07 RX ORDER — SUMATRIPTAN 100 MG/1
TABLET, FILM COATED ORAL
Qty: 18 TAB | Refills: 5 | Status: CANCELLED | OUTPATIENT
Start: 2019-10-07

## 2019-10-07 NOTE — TELEPHONE ENCOUNTER
Darron Arnold MD  [facility] Nurses 18 minutes ago (4:02 PM)      It is unlikely his insurance company will give him more than 9 Imitrex tablets per month, I would prefer that he get all of his headache medication from the neurologist, Dr. Oli Toro. Kenny Casiano a neurologist will have better luck getting the insurance company to approve headache medicine.     Routing comment

## 2019-10-07 NOTE — TELEPHONE ENCOUNTER
Called and spoke with patient gave message below. He verbalized understanding. He was not happy. He wanted to know if there was any other medication that he could be prescribed. I asked him if he had contacted his neurologist he said not yet. Advised him again that Dr. Michael Ricks recommends he contact his neurologist for further medications. Patient stated \"ok\" and hung up the phone.

## 2019-10-07 NOTE — TELEPHONE ENCOUNTER
Pt says [facility] needs prior auth for Imitrex [phone]. Says he needs the rx today. Says he gets headaches and insurance will only give him 9 pills at a time.     Says he also had mri done for neurologist.

## 2019-11-05 ENCOUNTER — DOCUMENTATION ONLY (OUTPATIENT)
Dept: INTERNAL MEDICINE CLINIC | Age: 74
End: 2019-11-05

## 2019-11-05 NOTE — PROGRESS NOTES
The prior authorization request for Imitrex 100mg is not approvable at this time. The indicated use of  this medication does not meet the Service Benefit Plans criteria for medical necessity due to the  following reason: the diagnosis of cluster headache is considered an experimental or  investigational indication for this drug. Experimental or investigational indications are those  which further studies or clinical trials are necessary to determine the maximum tolerated  dose, toxicity, safety, efficacy, or efficacy as compared with the standard means of treatment.

## 2019-11-18 ENCOUNTER — APPOINTMENT (OUTPATIENT)
Dept: INTERNAL MEDICINE CLINIC | Age: 74
End: 2019-11-18

## 2019-11-18 ENCOUNTER — HOSPITAL ENCOUNTER (OUTPATIENT)
Dept: LAB | Age: 74
Discharge: HOME | End: 2019-11-18
Payer: COMMERCIAL

## 2019-11-18 DIAGNOSIS — M54.50 CHRONIC MIDLINE LOW BACK PAIN WITHOUT SCIATICA: ICD-10-CM

## 2019-11-18 DIAGNOSIS — E11.40 TYPE 2 DIABETES MELLITUS WITH DIABETIC NEUROPATHY, WITHOUT LONG-TERM CURRENT USE OF INSULIN (HCC): ICD-10-CM

## 2019-11-18 DIAGNOSIS — G89.29 CHRONIC MIDLINE LOW BACK PAIN WITHOUT SCIATICA: ICD-10-CM

## 2019-11-18 LAB
ALBUMIN SERPL-MCNC: 4.1 G/DL (ref 3.4–5)
ALBUMIN/GLOB SERPL: 1.1 {RATIO} (ref 0.8–1.7)
ALP SERPL-CCNC: 96 U/L (ref 45–117)
ALT SERPL-CCNC: 19 U/L (ref 16–61)
ANION GAP SERPL CALC-SCNC: 4 MMOL/L (ref 3–18)
APPEARANCE UR: CLEAR
AST SERPL-CCNC: 9 U/L (ref 10–38)
BASOPHILS # BLD: 0 K/UL (ref 0–0.1)
BASOPHILS NFR BLD: 0 % (ref 0–2)
BILIRUB SERPL-MCNC: 0.3 MG/DL (ref 0.2–1)
BILIRUB UR QL: NEGATIVE
BUN SERPL-MCNC: 18 MG/DL (ref 7–18)
BUN/CREAT SERPL: 16 (ref 12–20)
CALCIUM SERPL-MCNC: 10.1 MG/DL (ref 8.5–10.1)
CHLORIDE SERPL-SCNC: 104 MMOL/L (ref 100–111)
CO2 SERPL-SCNC: 28 MMOL/L (ref 21–32)
COLOR UR: YELLOW
CREAT SERPL-MCNC: 1.11 MG/DL (ref 0.6–1.3)
DIFFERENTIAL METHOD BLD: ABNORMAL
EOSINOPHIL # BLD: 0.6 K/UL (ref 0–0.4)
EOSINOPHIL NFR BLD: 6 % (ref 0–5)
ERYTHROCYTE [DISTWIDTH] IN BLOOD BY AUTOMATED COUNT: 13.1 % (ref 11.6–14.5)
EST. AVERAGE GLUCOSE BLD GHB EST-MCNC: 148 MG/DL
GLOBULIN SER CALC-MCNC: 3.6 G/DL (ref 2–4)
GLUCOSE SERPL-MCNC: 195 MG/DL (ref 74–99)
GLUCOSE UR STRIP.AUTO-MCNC: NEGATIVE MG/DL
HBA1C MFR BLD: 6.8 % (ref 4.2–5.6)
HCT VFR BLD AUTO: 46 % (ref 36–48)
HGB BLD-MCNC: 15.8 G/DL (ref 13–16)
HGB UR QL STRIP: NEGATIVE
KETONES UR QL STRIP.AUTO: NEGATIVE MG/DL
LEUKOCYTE ESTERASE UR QL STRIP.AUTO: NEGATIVE
LYMPHOCYTES # BLD: 2.8 K/UL (ref 0.9–3.6)
LYMPHOCYTES NFR BLD: 28 % (ref 21–52)
MCH RBC QN AUTO: 33.1 PG (ref 24–34)
MCHC RBC AUTO-ENTMCNC: 34.3 G/DL (ref 31–37)
MCV RBC AUTO: 96.2 FL (ref 74–97)
MONOCYTES # BLD: 0.8 K/UL (ref 0.05–1.2)
MONOCYTES NFR BLD: 8 % (ref 3–10)
NEUTS SEG # BLD: 5.7 K/UL (ref 1.8–8)
NEUTS SEG NFR BLD: 58 % (ref 40–73)
NITRITE UR QL STRIP.AUTO: NEGATIVE
PH UR STRIP: 5.5 [PH] (ref 5–8)
PLATELET # BLD AUTO: 225 K/UL (ref 135–420)
PMV BLD AUTO: 11.7 FL (ref 9.2–11.8)
POTASSIUM SERPL-SCNC: 4.4 MMOL/L (ref 3.5–5.5)
PROT SERPL-MCNC: 7.7 G/DL (ref 6.4–8.2)
PROT UR STRIP-MCNC: NEGATIVE MG/DL
RBC # BLD AUTO: 4.78 M/UL (ref 4.7–5.5)
SODIUM SERPL-SCNC: 136 MMOL/L (ref 136–145)
SP GR UR REFRACTOMETRY: 1.02 (ref 1–1.03)
UROBILINOGEN UR QL STRIP.AUTO: 0.2 EU/DL (ref 0.2–1)
WBC # BLD AUTO: 9.9 K/UL (ref 4.6–13.2)

## 2019-11-18 PROCEDURE — 80053 COMPREHEN METABOLIC PANEL: CPT

## 2019-11-18 PROCEDURE — 81003 URINALYSIS AUTO W/O SCOPE: CPT

## 2019-11-18 PROCEDURE — 80061 LIPID PANEL: CPT

## 2019-11-18 PROCEDURE — 83036 HEMOGLOBIN GLYCOSYLATED A1C: CPT

## 2019-11-18 PROCEDURE — 36415 COLL VENOUS BLD VENIPUNCTURE: CPT

## 2019-11-18 PROCEDURE — 82043 UR ALBUMIN QUANTITATIVE: CPT

## 2019-11-18 PROCEDURE — 85025 COMPLETE CBC W/AUTO DIFF WBC: CPT

## 2019-11-19 LAB
CHOLEST SERPL-MCNC: 183 MG/DL
CREAT UR-MCNC: 144 MG/DL (ref 30–125)
HDLC SERPL-MCNC: 28 MG/DL (ref 40–60)
HDLC SERPL: 6.5 {RATIO} (ref 0–5)
LDLC SERPL CALC-MCNC: 87.4 MG/DL (ref 0–100)
LIPID PROFILE,FLP: ABNORMAL
MICROALBUMIN UR-MCNC: 1.99 MG/DL (ref 0–3)
MICROALBUMIN/CREAT UR-RTO: 14 MG/G (ref 0–30)
TRIGL SERPL-MCNC: 338 MG/DL (ref ?–150)
VLDLC SERPL CALC-MCNC: 67.6 MG/DL

## 2019-11-25 ENCOUNTER — OFFICE VISIT (OUTPATIENT)
Dept: INTERNAL MEDICINE CLINIC | Age: 74
End: 2019-11-25

## 2019-11-25 VITALS
WEIGHT: 183 LBS | HEIGHT: 69 IN | RESPIRATION RATE: 16 BRPM | HEART RATE: 84 BPM | BODY MASS INDEX: 27.11 KG/M2 | TEMPERATURE: 98.1 F | SYSTOLIC BLOOD PRESSURE: 124 MMHG | OXYGEN SATURATION: 96 % | DIASTOLIC BLOOD PRESSURE: 72 MMHG

## 2019-11-25 DIAGNOSIS — E11.40 TYPE 2 DIABETES MELLITUS WITH DIABETIC NEUROPATHY, WITHOUT LONG-TERM CURRENT USE OF INSULIN (HCC): Primary | ICD-10-CM

## 2019-11-25 DIAGNOSIS — M54.50 CHRONIC MIDLINE LOW BACK PAIN WITHOUT SCIATICA: ICD-10-CM

## 2019-11-25 DIAGNOSIS — G44.019 EPISODIC CLUSTER HEADACHE, NOT INTRACTABLE: ICD-10-CM

## 2019-11-25 DIAGNOSIS — G89.29 CHRONIC MIDLINE LOW BACK PAIN WITHOUT SCIATICA: ICD-10-CM

## 2019-11-25 RX ORDER — OXYCODONE AND ACETAMINOPHEN 5; 325 MG/1; MG/1
1 TABLET ORAL
Qty: 60 TAB | Refills: 0 | Status: SHIPPED | OUTPATIENT
Start: 2019-11-25 | End: 2019-12-25

## 2019-11-25 RX ORDER — GLIPIZIDE 5 MG/1
5 TABLET, FILM COATED, EXTENDED RELEASE ORAL DAILY
Qty: 90 TAB | Refills: 3 | Status: SHIPPED | OUTPATIENT
Start: 2019-11-25 | End: 2021-02-23 | Stop reason: SDUPTHER

## 2019-11-25 NOTE — PROGRESS NOTES
Riley Aquino presents today for   Chief Complaint   Patient presents with    Diabetes     6 month follow up labs done     Numbness     numbness in feet and hands x 2 months     Headache     cluster headaches               Depression Screening:  3 most recent PHQ Screens 11/25/2019   Little interest or pleasure in doing things Not at all   Feeling down, depressed, irritable, or hopeless Not at all   Total Score PHQ 2 0   Trouble falling or staying asleep, or sleeping too much -   Feeling tired or having little energy -   Poor appetite, weight loss, or overeating -   Feeling bad about yourself - or that you are a failure or have let yourself or your family down -   Trouble concentrating on things such as school, work, reading, or watching TV -   Moving or speaking so slowly that other people could have noticed; or the opposite being so fidgety that others notice -   Thoughts of being better off dead, or hurting yourself in some way -   PHQ 9 Score -   How difficult have these problems made it for you to do your work, take care of your home and get along with others -       Learning Assessment:  No flowsheet data found. Abuse Screening:  Abuse Screening Questionnaire 11/25/2019   Do you ever feel afraid of your partner? N   Are you in a relationship with someone who physically or mentally threatens you? N   Is it safe for you to go home? Y       Fall Risk  Fall Risk Assessment, last 12 mths 11/25/2019   Able to walk? Yes   Fall in past 12 months? No   Fall with injury? -   Number of falls in past 12 months -   Fall Risk Score -           Coordination of Care:  1. Have you been to the ER, urgent care clinic since your last visit? Hospitalized since your last visit? no    2. Have you seen or consulted any other health care providers outside of the [address] [place] [address] since your last visit? Include any pap smears or colon screening.  Yes Dr. Justina Hand

## 2019-11-25 NOTE — PROGRESS NOTES
Shelley Carrillo 1945, is a 69 y.o. male, who is seen today for reevaluation of diabetes, cluster headaches, peripheral neuropathy. He has had neuropathic numbness but not pain in the hands and feet for years, keeps getting worse and he has not been able to play his guitar for the last few years because of this. He has never mentioned it to the neurologists he has seen. He has not had any cluster headaches for 2 weeks but when he does get those, 5 minutes of oxygen generally relieves the headache. It works much better than sumatriptan and he has also been on verapamil without definite benefit for his headaches. He stopped seeing Dr. Rasheed Rosenthal and has been seeing Dr. 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 He has done surgery on his spine twice. Does have chronic back pain and uses Percocet once or twice a week on average when the pain is severe. He is here with his wife as usual.    Past Medical History:   Diagnosis Date    Arthritis     GERD (gastroesophageal reflux disease)     High blood pressure     Insomnia     Periorbital headache     Sleep apnea     Spinal stenosis      Current Outpatient Medications   Medication Sig Dispense Refill    oxyCODONE-acetaminophen (PERCOCET) 5-325 mg per tablet Take 1 Tab by mouth every four (4) hours as needed (Back pain) for up to 30 days. Max Daily Amount: 6 Tabs. 60 Tab 0    glipiZIDE SR (GLUCOTROL XL) 5 mg CR tablet Take 1 Tab by mouth daily. 90 Tab 3    SUMAtriptan (IMITREX) 100 mg tablet 1 tablet by mouth as needed for headache, may repeat 1 tablet if headache is not resolved in 2 hours. Maximum of 200 mg per 24 hours.  27 Tab 5    verapamil ER (CALAN-SR) 240 mg CR tablet 1 tablet by mouth twice a day 60 Tab 0    tadalafil (CIALIS) 20 mg tablet 1 tablet at least 1 hour before intercourse 4 Tab 11    diphenhydrAMINE HCl (WAL-FRED, DIPHENHYDRAMINE,) 25 mg TbDi Take 25 mg by mouth nightly.  aspirin-acetaminophen-caffeine (EXCEDRIN ES) 250-250-65 mg per tablet Take 1 Tab by mouth every six (6) hours as needed for Headache. Indications: Patient takes 4 - 6 tablets daily      hydrocortisone (CORTISONE) 1 % topical cream Apply to face and scalp twice per day for two weeks 30 g 2     Visit Vitals  /72 (BP 1 Location: Left arm, BP Patient Position: Sitting)   Pulse 84   Temp 98.1 °F (36.7 °C) (Oral)   Resp 16   Ht 5' 9\" (1.753 m)   Wt 183 lb (83 kg)   SpO2 96%   BMI 27.02 kg/m²     Carotids are 2+ without bruits. Lungs are clear to percussion. Good breath sounds with no wheezing or crackles. Heart reveals a regular rhythm with normal S1-S2 no murmur gallop click or rub. Apical impulse is not palpable. Abdomen is soft and nontender with no hepatosplenomegaly or masses and no bruits. Extremities reveal no clubbing cyanosis or edema. Pulses are 2+ except absent dorsalis pedis pulses. Feet and hands are warm. Feet reveal no deformities ulcerations or calluses, slightly diminished sensation to monofilament testing in the distal feet. Results for orders placed or performed during the hospital encounter of 11/18/19   CBC WITH AUTOMATED DIFF   Result Value Ref Range    WBC 9.9 4.6 - 13.2 K/uL    RBC 4.78 4.70 - 5.50 M/uL    HGB 15.8 13.0 - 16.0 g/dL    HCT 46.0 36.0 - 48.0 %    MCV 96.2 74.0 - 97.0 FL    MCH 33.1 24.0 - 34.0 PG    MCHC 34.3 31.0 - 37.0 g/dL    RDW 13.1 11.6 - 14.5 %    PLATELET 421 984 - 929 K/uL    MPV 11.7 9.2 - 11.8 FL    NEUTROPHILS 58 40 - 73 %    LYMPHOCYTES 28 21 - 52 %    MONOCYTES 8 3 - 10 %    EOSINOPHILS 6 (H) 0 - 5 %    BASOPHILS 0 0 - 2 %    ABS. NEUTROPHILS 5.7 1.8 - 8.0 K/UL    ABS. LYMPHOCYTES 2.8 0.9 - 3.6 K/UL    ABS. MONOCYTES 0.8 0.05 - 1.2 K/UL    ABS. EOSINOPHILS 0.6 (H) 0.0 - 0.4 K/UL    ABS.  BASOPHILS 0.0 0.0 - 0.1 K/UL    DF AUTOMATED     LIPID PANEL   Result Value Ref Range    LIPID PROFILE          Cholesterol, total 183 <200 MG/DL Triglyceride 338 (H) <150 MG/DL    HDL Cholesterol 28 (L) 40 - 60 MG/DL    LDL, calculated 87.4 0 - 100 MG/DL    VLDL, calculated 67.6 MG/DL    CHOL/HDL Ratio 6.5 (H) 0 - 5.0     HEMOGLOBIN A1C WITH EAG   Result Value Ref Range    Hemoglobin A1c 6.8 (H) 4.2 - 5.6 %    Est. average glucose 354 mg/dL   METABOLIC PANEL, COMPREHENSIVE   Result Value Ref Range    Sodium 136 136 - 145 mmol/L    Potassium 4.4 3.5 - 5.5 mmol/L    Chloride 104 100 - 111 mmol/L    CO2 28 21 - 32 mmol/L    Anion gap 4 3.0 - 18 mmol/L    Glucose 195 (H) 74 - 99 mg/dL    BUN 18 7.0 - 18 MG/DL    Creatinine 1.11 0.6 - 1.3 MG/DL    BUN/Creatinine ratio 16 12 - 20      GFR est AA >60 >60 ml/min/1.73m2    GFR est non-AA >60 >60 ml/min/1.73m2    Calcium 10.1 8.5 - 10.1 MG/DL    Bilirubin, total 0.3 0.2 - 1.0 MG/DL    ALT (SGPT) 19 16 - 61 U/L    AST (SGOT) 9 (L) 10 - 38 U/L    Alk. phosphatase 96 45 - 117 U/L    Protein, total 7.7 6.4 - 8.2 g/dL    Albumin 4.1 3.4 - 5.0 g/dL    Globulin 3.6 2.0 - 4.0 g/dL    A-G Ratio 1.1 0.8 - 1.7     MICROALBUMIN, UR, RAND W/ MICROALB/CREAT RATIO   Result Value Ref Range    Microalbumin,urine random 1.99 0 - 3.0 MG/DL    Creatinine, urine 144.00 (H) 30 - 125 mg/dL    Microalbumin/Creat ratio (mg/g creat) 14 0 - 30 mg/g   URINALYSIS W/ RFLX MICROSCOPIC   Result Value Ref Range    Color YELLOW      Appearance CLEAR      Specific gravity 1.020 1.005 - 1.030      pH (UA) 5.5 5.0 - 8.0      Protein NEGATIVE  NEG mg/dL    Glucose NEGATIVE  NEG mg/dL    Ketone NEGATIVE  NEG mg/dL    Bilirubin NEGATIVE  NEG      Blood NEGATIVE  NEG      Urobilinogen 0.2 0.2 - 1.0 EU/dL    Nitrites NEGATIVE  NEG      Leukocyte Esterase NEGATIVE  NEG       Assessment: #1.  Diabetes controlled, he will continue glipizide SR 5 mg daily and diabetic diet. #2.  Cluster headaches which fluctuate in frequency, he will use oxygen when needed.   #3.  Apparent neuropathy but without pain, told him nothing will really help this since he is not having pain, if he was we could try Lyrica or gabapentin. #4.  Body mass index is 27 which room is acceptable at this point. #8.  History of erectile dysfunction, he will continue Cialis 20 mg when needed. He declines all vaccinations as usual.  Follow-up 6 months with lab. Not interested in statin therapy. Manuel Wells MD FACP    Please note: This document has been produced using voice recognition software. Unrecognized errors in transcription may be present.

## 2020-06-22 ENCOUNTER — HOSPITAL ENCOUNTER (OUTPATIENT)
Dept: LAB | Age: 75
Discharge: HOME | End: 2020-06-22
Payer: COMMERCIAL

## 2020-06-22 ENCOUNTER — APPOINTMENT (OUTPATIENT)
Dept: INTERNAL MEDICINE CLINIC | Age: 75
End: 2020-06-22

## 2020-06-22 DIAGNOSIS — E11.40 TYPE 2 DIABETES MELLITUS WITH DIABETIC NEUROPATHY, WITHOUT LONG-TERM CURRENT USE OF INSULIN (HCC): ICD-10-CM

## 2020-06-22 LAB
ALBUMIN SERPL-MCNC: 4 G/DL (ref 3.4–5)
ALBUMIN/GLOB SERPL: 1 {RATIO} (ref 0.8–1.7)
ALP SERPL-CCNC: 82 U/L (ref 45–117)
ALT SERPL-CCNC: 23 U/L (ref 16–61)
ANION GAP SERPL CALC-SCNC: 7 MMOL/L (ref 3–18)
AST SERPL-CCNC: 14 U/L (ref 10–38)
BILIRUB SERPL-MCNC: 0.4 MG/DL (ref 0.2–1)
BUN SERPL-MCNC: 22 MG/DL (ref 7–18)
BUN/CREAT SERPL: 15 (ref 12–20)
CALCIUM SERPL-MCNC: 9.3 MG/DL (ref 8.5–10.1)
CHLORIDE SERPL-SCNC: 104 MMOL/L (ref 100–111)
CHOLEST SERPL-MCNC: 196 MG/DL
CO2 SERPL-SCNC: 25 MMOL/L (ref 21–32)
CREAT SERPL-MCNC: 1.45 MG/DL (ref 0.6–1.3)
EST. AVERAGE GLUCOSE BLD GHB EST-MCNC: 157 MG/DL
GLOBULIN SER CALC-MCNC: 3.9 G/DL (ref 2–4)
GLUCOSE SERPL-MCNC: 155 MG/DL (ref 74–99)
HBA1C MFR BLD: 7.1 % (ref 4.2–5.6)
HDLC SERPL-MCNC: 29 MG/DL (ref 40–60)
HDLC SERPL: 6.8 {RATIO} (ref 0–5)
LDLC SERPL CALC-MCNC: 101.2 MG/DL (ref 0–100)
LIPID PROFILE,FLP: ABNORMAL
POTASSIUM SERPL-SCNC: 4.4 MMOL/L (ref 3.5–5.5)
PROT SERPL-MCNC: 7.9 G/DL (ref 6.4–8.2)
SODIUM SERPL-SCNC: 136 MMOL/L (ref 136–145)
TRIGL SERPL-MCNC: 329 MG/DL (ref ?–150)
VLDLC SERPL CALC-MCNC: 65.8 MG/DL

## 2020-06-22 PROCEDURE — 80061 LIPID PANEL: CPT

## 2020-06-22 PROCEDURE — 83036 HEMOGLOBIN GLYCOSYLATED A1C: CPT

## 2020-06-22 PROCEDURE — 80053 COMPREHEN METABOLIC PANEL: CPT

## 2020-06-22 PROCEDURE — 36415 COLL VENOUS BLD VENIPUNCTURE: CPT

## 2020-06-29 ENCOUNTER — OFFICE VISIT (OUTPATIENT)
Dept: INTERNAL MEDICINE CLINIC | Age: 75
End: 2020-06-29

## 2020-06-29 VITALS
HEIGHT: 69 IN | HEART RATE: 91 BPM | BODY MASS INDEX: 27.46 KG/M2 | DIASTOLIC BLOOD PRESSURE: 64 MMHG | TEMPERATURE: 98.2 F | SYSTOLIC BLOOD PRESSURE: 104 MMHG | RESPIRATION RATE: 14 BRPM | OXYGEN SATURATION: 95 % | WEIGHT: 185.4 LBS

## 2020-06-29 DIAGNOSIS — G89.29 CHRONIC MIDLINE LOW BACK PAIN WITHOUT SCIATICA: ICD-10-CM

## 2020-06-29 DIAGNOSIS — M54.50 CHRONIC MIDLINE LOW BACK PAIN WITHOUT SCIATICA: ICD-10-CM

## 2020-06-29 DIAGNOSIS — G44.019 EPISODIC CLUSTER HEADACHE, NOT INTRACTABLE: ICD-10-CM

## 2020-06-29 DIAGNOSIS — E11.40 TYPE 2 DIABETES MELLITUS WITH DIABETIC NEUROPATHY, WITHOUT LONG-TERM CURRENT USE OF INSULIN (HCC): Primary | ICD-10-CM

## 2020-06-29 RX ORDER — OXYCODONE AND ACETAMINOPHEN 5; 325 MG/1; MG/1
TABLET ORAL
COMMUNITY
End: 2020-06-29 | Stop reason: SDUPTHER

## 2020-06-29 RX ORDER — OXYCODONE AND ACETAMINOPHEN 5; 325 MG/1; MG/1
1 TABLET ORAL
Qty: 60 TAB | Refills: 0 | Status: SHIPPED | OUTPATIENT
Start: 2020-06-29 | End: 2020-07-29

## 2020-06-29 NOTE — PROGRESS NOTES
Alexei Schneider 1945, is a 70 y.o. male, who is seen today for reevaluation of cluster headaches diabetes hyperlipidemia cigarette smoking. He tells me he has lost 40 pounds in the last year but when I checked his chart his weight is exactly what it was this time last year. Appetite is not as great but he has been eating a lot of [employer] chocolate bars lately. Most of the time he enjoys lots of vegetables and usually eats a very healthy diet. He is having no chest pain or dyspnea or cough. Cluster headaches all of a sudden cleared. He has chronic lumbar pain and only occasionally requires low-dose Percocet. He is diabetic and takes glipizide regularly. Neck reveals no adenopathy or thyromegaly. Past Medical History:   Diagnosis Date    Arthritis     GERD (gastroesophageal reflux disease)     High blood pressure     Insomnia     Periorbital headache     Sleep apnea     Spinal stenosis      Current Outpatient Medications   Medication Sig Dispense Refill    oxyCODONE-acetaminophen (PERCOCET) 5-325 mg per tablet Take 1 Tab by mouth every four (4) hours as needed for Pain for up to 30 days. Max Daily Amount: 6 Tabs. 60 Tab 0    glipiZIDE SR (GLUCOTROL XL) 5 mg CR tablet Take 1 Tab by mouth daily. 90 Tab 3    aspirin-acetaminophen-caffeine (EXCEDRIN ES) 250-250-65 mg per tablet Take 1 Tab by mouth every six (6) hours as needed for Headache. Indications: Patient takes 4 - 6 tablets daily      SUMAtriptan (IMITREX) 100 mg tablet 1 tablet by mouth as needed for headache, may repeat 1 tablet if headache is not resolved in 2 hours. Maximum of 200 mg per 24 hours. 27 Tab 5    verapamil ER (CALAN-SR) 240 mg CR tablet 1 tablet by mouth twice a day 60 Tab 0    tadalafil (CIALIS) 20 mg tablet 1 tablet at least 1 hour before intercourse 4 Tab 11    diphenhydrAMINE HCl (WAL-FRED, DIPHENHYDRAMINE,) 25 mg TbDi Take 25 mg by mouth nightly.       hydrocortisone (CORTISONE) 1 % topical cream Apply to face and scalp twice per day for two weeks 30 g 2     Visit Vitals  /64 (BP 1 Location: Left arm, BP Patient Position: Sitting)   Pulse 91   Temp 98.2 °F (36.8 °C) (Oral)   Resp 14   Ht 5' 9\" (1.753 m)   Wt 185 lb 6.4 oz (84.1 kg)   SpO2 95%   BMI 27.38 kg/m²     Neck reveals no adenopathy or thyromegaly. Carotids are 2+ without bruits. Lungs are clear to percussion. Good breath sounds with no wheezing or crackles. Heart reveals a regular rhythm with normal S1 and S2 no pulse is not palpable. Abdomen is soft and nontender with no hepatosplenomegaly or masses and no bruits. Extremities reveal no clubbing cyanosis or edema. Pulses are intact. Results for orders placed or performed during the hospital encounter of 06/22/20   LIPID PANEL   Result Value Ref Range    LIPID PROFILE          Cholesterol, total 196 <200 MG/DL    Triglyceride 329 (H) <150 MG/DL    HDL Cholesterol 29 (L) 40 - 60 MG/DL    LDL, calculated 101.2 (H) 0 - 100 MG/DL    VLDL, calculated 65.8 MG/DL    CHOL/HDL Ratio 6.8 (H) 0 - 5.0     HEMOGLOBIN A1C WITH EAG   Result Value Ref Range    Hemoglobin A1c 7.1 (H) 4.2 - 5.6 %    Est. average glucose 228 mg/dL   METABOLIC PANEL, COMPREHENSIVE   Result Value Ref Range    Sodium 136 136 - 145 mmol/L    Potassium 4.4 3.5 - 5.5 mmol/L    Chloride 104 100 - 111 mmol/L    CO2 25 21 - 32 mmol/L    Anion gap 7 3.0 - 18 mmol/L    Glucose 155 (H) 74 - 99 mg/dL    BUN 22 (H) 7.0 - 18 MG/DL    Creatinine 1.45 (H) 0.6 - 1.3 MG/DL    BUN/Creatinine ratio 15 12 - 20      GFR est AA 58 (L) >60 ml/min/1.73m2    GFR est non-AA 48 (L) >60 ml/min/1.73m2    Calcium 9.3 8.5 - 10.1 MG/DL    Bilirubin, total 0.4 0.2 - 1.0 MG/DL    ALT (SGPT) 23 16 - 61 U/L    AST (SGOT) 14 10 - 38 U/L    Alk.  phosphatase 82 45 - 117 U/L    Protein, total 7.9 6.4 - 8.2 g/dL    Albumin 4.0 3.4 - 5.0 g/dL    Globulin 3.9 2.0 - 4.0 g/dL    A-G Ratio 1.0 0.8 - 1.7         Assessment: #1.  Diabetes adequately controlled with glipizide, he will continue glipizide and diabetic type diet and avoid significant weight gain. Diet and #2. Hyperlipidemia, continue as at present with healthy diet and stop eating chocolate bars. #3.  Cluster headaches finally subsided. #4.  Chronic lumbar pain, he will continue Excedrin when needed and Percocet when needed occasionally, I did send him another 60 tablets today, this lasts him a long time at 1 or 2 a week. #5.  Creatinine clearance has worsened from last visit, unclear reasons, possibly lab variation, that will be rechecked in 6 months. Follow-up 6 months with Dr. Claudia Melchor. Negar Bal MD FACP    Please note: This document has been produced using voice recognition software. Unrecognized errors in transcription may be present.

## 2021-02-23 RX ORDER — GLIPIZIDE 5 MG/1
5 TABLET, FILM COATED, EXTENDED RELEASE ORAL DAILY
Qty: 90 TAB | Refills: 0 | Status: SHIPPED | OUTPATIENT
Start: 2021-02-23 | End: 2021-06-07 | Stop reason: SDUPTHER

## 2021-03-04 ENCOUNTER — LAB ONLY (OUTPATIENT)
Dept: INTERNAL MEDICINE CLINIC | Age: 76
End: 2021-03-04

## 2021-03-04 DIAGNOSIS — Z12.5 PROSTATE CANCER SCREENING: ICD-10-CM

## 2021-03-04 DIAGNOSIS — E11.40 TYPE 2 DIABETES MELLITUS WITH DIABETIC NEUROPATHY, WITHOUT LONG-TERM CURRENT USE OF INSULIN (HCC): Primary | ICD-10-CM

## 2021-04-02 ENCOUNTER — HOSPITAL ENCOUNTER (OUTPATIENT)
Dept: LAB | Age: 76
Discharge: HOME | End: 2021-04-02
Payer: COMMERCIAL

## 2021-04-02 ENCOUNTER — APPOINTMENT (OUTPATIENT)
Dept: INTERNAL MEDICINE CLINIC | Age: 76
End: 2021-04-02

## 2021-04-02 DIAGNOSIS — E11.40 TYPE 2 DIABETES MELLITUS WITH DIABETIC NEUROPATHY, WITHOUT LONG-TERM CURRENT USE OF INSULIN (HCC): ICD-10-CM

## 2021-04-02 DIAGNOSIS — Z12.5 PROSTATE CANCER SCREENING: ICD-10-CM

## 2021-04-02 LAB
ALBUMIN SERPL-MCNC: 3.9 G/DL (ref 3.4–5)
ALBUMIN/GLOB SERPL: 1 {RATIO} (ref 0.8–1.7)
ALP SERPL-CCNC: 85 U/L (ref 45–117)
ALT SERPL-CCNC: 20 U/L (ref 16–61)
ANION GAP SERPL CALC-SCNC: 10 MMOL/L (ref 3–18)
AST SERPL-CCNC: 10 U/L (ref 10–38)
BILIRUB SERPL-MCNC: 0.6 MG/DL (ref 0.2–1)
BUN SERPL-MCNC: 17 MG/DL (ref 7–18)
BUN/CREAT SERPL: 17 (ref 12–20)
CALCIUM SERPL-MCNC: 9.3 MG/DL (ref 8.5–10.1)
CHLORIDE SERPL-SCNC: 103 MMOL/L (ref 100–111)
CHOLEST SERPL-MCNC: 176 MG/DL
CO2 SERPL-SCNC: 26 MMOL/L (ref 21–32)
CREAT SERPL-MCNC: 1.03 MG/DL (ref 0.6–1.3)
CREAT UR-MCNC: 102 MG/DL (ref 30–125)
GLOBULIN SER CALC-MCNC: 3.8 G/DL (ref 2–4)
GLUCOSE SERPL-MCNC: 187 MG/DL (ref 74–99)
HBA1C MFR BLD: 7.7 % (ref 4.2–5.6)
HDLC SERPL-MCNC: 28 MG/DL (ref 40–60)
HDLC SERPL: 6.3 {RATIO} (ref 0–5)
LDLC SERPL CALC-MCNC: 93 MG/DL (ref 0–100)
LIPID PROFILE,FLP: ABNORMAL
MICROALBUMIN UR-MCNC: 1.47 MG/DL (ref 0–3)
MICROALBUMIN/CREAT UR-RTO: 14 MG/G (ref 0–30)
POTASSIUM SERPL-SCNC: 4.4 MMOL/L (ref 3.5–5.5)
PROT SERPL-MCNC: 7.7 G/DL (ref 6.4–8.2)
PSA SERPL-MCNC: 2.2 NG/ML (ref 0–4)
SODIUM SERPL-SCNC: 139 MMOL/L (ref 136–145)
TRIGL SERPL-MCNC: 275 MG/DL (ref ?–150)
VLDLC SERPL CALC-MCNC: 55 MG/DL

## 2021-04-02 PROCEDURE — 83036 HEMOGLOBIN GLYCOSYLATED A1C: CPT

## 2021-04-02 PROCEDURE — 80061 LIPID PANEL: CPT

## 2021-04-02 PROCEDURE — 84153 ASSAY OF PSA TOTAL: CPT

## 2021-04-02 PROCEDURE — 82043 UR ALBUMIN QUANTITATIVE: CPT

## 2021-04-02 PROCEDURE — 80053 COMPREHEN METABOLIC PANEL: CPT

## 2021-04-09 ENCOUNTER — OFFICE VISIT (OUTPATIENT)
Dept: INTERNAL MEDICINE CLINIC | Age: 76
End: 2021-04-09
Payer: COMMERCIAL

## 2021-04-09 VITALS
RESPIRATION RATE: 20 BRPM | WEIGHT: 188 LBS | OXYGEN SATURATION: 97 % | DIASTOLIC BLOOD PRESSURE: 70 MMHG | SYSTOLIC BLOOD PRESSURE: 135 MMHG | HEART RATE: 79 BPM | TEMPERATURE: 97.8 F | HEIGHT: 69 IN | BODY MASS INDEX: 27.85 KG/M2

## 2021-04-09 DIAGNOSIS — R41.3 MEMORY DIFFICULTIES: ICD-10-CM

## 2021-04-09 DIAGNOSIS — R01.1 HEART MURMUR: ICD-10-CM

## 2021-04-09 DIAGNOSIS — E78.5 DYSLIPIDEMIA: ICD-10-CM

## 2021-04-09 DIAGNOSIS — M54.50 CHRONIC MIDLINE LOW BACK PAIN WITHOUT SCIATICA: ICD-10-CM

## 2021-04-09 DIAGNOSIS — E11.40 TYPE 2 DIABETES MELLITUS WITH DIABETIC NEUROPATHY, WITHOUT LONG-TERM CURRENT USE OF INSULIN (HCC): Primary | ICD-10-CM

## 2021-04-09 DIAGNOSIS — M48.062 SPINAL STENOSIS OF LUMBAR REGION WITH NEUROGENIC CLAUDICATION: ICD-10-CM

## 2021-04-09 DIAGNOSIS — G89.29 CHRONIC MIDLINE LOW BACK PAIN WITHOUT SCIATICA: ICD-10-CM

## 2021-04-09 DIAGNOSIS — I10 ESSENTIAL HYPERTENSION: ICD-10-CM

## 2021-04-09 PROCEDURE — 3051F HG A1C>EQUAL 7.0%<8.0%: CPT | Performed by: INTERNAL MEDICINE

## 2021-04-09 PROCEDURE — 99214 OFFICE O/P EST MOD 30 MIN: CPT | Performed by: INTERNAL MEDICINE

## 2021-04-09 RX ORDER — OXYCODONE AND ACETAMINOPHEN 5; 325 MG/1; MG/1
TABLET ORAL
COMMUNITY
End: 2022-01-10 | Stop reason: SDUPTHER

## 2021-04-09 NOTE — PROGRESS NOTES
Subjective:       Chief Complaint  The patient presents for follow up of diabetesand high cholesterol. Chronic back pain due to spinal stenosis        HPI  Rocío Wang is a 70 y.o. male seen for follow up of diabetes. Healso has  hyperlipidemia. Diabetes no significant medication side effects noted, poorly controlled, on Glucotrol XL 5 mg, pt admits there is a lot of sugar and starches that he can cut from his diet, hyperlipidemia poorly controlled, not on a statin and pt has significant h/o tobacco use. .    Diet and Lifestyle: not attempting to follow a low fat, low cholesterol diet, does not rigorously follow a diabetic diet, sedentary    Diabetic Review of Systems - home glucose monitoring: is not performed. Other symptoms and concerns: Patient's blood pressure appears to be currently controlled without medication and he does not have microalbuminuria. Patient does have peripheral neuropathy from diabetes and possibly carpal tunnel syndrome in his hands. Patient would need EMG with neurology to further evaluate his hands which she does not want to do at this time. Patient according to his wife who is with him today says he has problems with his memory and the concern is that he may have a early signs of Alzheimer's dementia. Discussed with patient and wife that we can refer him for neuropsych evaluation to see how significant problems this is. Patient also has a history of cluster headaches in the past which were treated with home oxygen. The headaches have resolved and he no longer has oxygen at home. He has seen neurology for the headaches but currently is not seeing a neurologist.  Patient also has a history of spinal stenosis and neurogenic claudication. He has had spinal surgery in the past which has helped but he still uses Percocet about 2 tablets he tells me a month it sounds about correct with his refill history seen on [facility].   We will do a urine drug screen in the near future and new pain contract. Discussed the patient's BMI with him. The BMI follow up plan is as follows: I have counseled this patient on diet and exercise regimens. Current Outpatient Medications   Medication Sig    oxyCODONE-acetaminophen (Percocet) 5-325 mg per tablet Take  by mouth every four (4) hours as needed for Pain.  soft lens rinse,store solution (UNISOL PLUS) by Does Not Apply route.  glipiZIDE SR (GLUCOTROL XL) 5 mg CR tablet Take 1 Tab by mouth daily.  aspirin-acetaminophen-caffeine (EXCEDRIN ES) 250-250-65 mg per tablet Take 1 Tab by mouth every six (6) hours as needed for Headache. Indications: Patient takes 4 - 6 tablets daily    SUMAtriptan (IMITREX) 100 mg tablet 1 tablet by mouth as needed for headache, may repeat 1 tablet if headache is not resolved in 2 hours. Maximum of 200 mg per 24 hours.  tadalafil (CIALIS) 20 mg tablet 1 tablet at least 1 hour before intercourse    diphenhydrAMINE HCl (WAL-FRED, DIPHENHYDRAMINE,) 25 mg TbDi Take 25 mg by mouth nightly.  hydrocortisone (CORTISONE) 1 % topical cream Apply to face and scalp twice per day for two weeks     No current facility-administered medications for this visit.             Review of Systems  Respiratory: negative for dyspnea on exertion  Cardiovascular: negative for chest pain    Objective:     Visit Vitals  /70 (BP 1 Location: Left arm, BP Patient Position: Sitting, BP Cuff Size: Adult)   Pulse 79   Temp 97.8 °F (36.6 °C) (Temporal)   Resp 20   Ht 5' 9\" (1.753 m)   Wt 188 lb (85.3 kg)   SpO2 97%   BMI 27.76 kg/m²        Eyes - pupils equal and reactive, extraocular eye movements intact  Ears -decreased hearing  Chest - clear to auscultation, no wheezes, rales or rhonchi, symmetric air entry  Heart - normal rate and regular rhythm, systolic murmur 3/6 at 2nd left intercostal space  Extremities - no pedal edema noted      Results for orders placed or performed during the hospital encounter of 04/02/21   HEMOGLOBIN A1C W/O EAG   Result Value Ref Range    Hemoglobin A1c 7.7 (H) 4.2 - 5.6 %   METABOLIC PANEL, COMPREHENSIVE   Result Value Ref Range    Sodium 139 136 - 145 mmol/L    Potassium 4.4 3.5 - 5.5 mmol/L    Chloride 103 100 - 111 mmol/L    CO2 26 21 - 32 mmol/L    Anion gap 10 3.0 - 18 mmol/L    Glucose 187 (H) 74 - 99 mg/dL    BUN 17 7.0 - 18 MG/DL    Creatinine 1.03 0.6 - 1.3 MG/DL    BUN/Creatinine ratio 17 12 - 20      GFR est AA >60 >60 ml/min/1.73m2    GFR est non-AA >60 >60 ml/min/1.73m2    Calcium 9.3 8.5 - 10.1 MG/DL    Bilirubin, total 0.6 0.2 - 1.0 MG/DL    ALT (SGPT) 20 16 - 61 U/L    AST (SGOT) 10 10 - 38 U/L    Alk. phosphatase 85 45 - 117 U/L    Protein, total 7.7 6.4 - 8.2 g/dL    Albumin 3.9 3.4 - 5.0 g/dL    Globulin 3.8 2.0 - 4.0 g/dL    A-G Ratio 1.0 0.8 - 1.7     LIPID PANEL   Result Value Ref Range    LIPID PROFILE          Cholesterol, total 176 <200 MG/DL    Triglyceride 275 (H) <150 MG/DL    HDL Cholesterol 28 (L) 40 - 60 MG/DL    LDL, calculated 93 0 - 100 MG/DL    VLDL, calculated 55 MG/DL    CHOL/HDL Ratio 6.3 (H) 0 - 5.0     MICROALBUMIN, UR, RAND W/ MICROALB/CREAT RATIO   Result Value Ref Range    Microalbumin,urine random 1.47 0 - 3.0 MG/DL    Creatinine, urine 102.00 30 - 125 mg/dL    Microalbumin/Creat ratio (mg/g creat) 14 0 - 30 mg/g   PSA SCREENING (SCREENING)   Result Value Ref Range    Prostate Specific Ag 2.2 0.0 - 4.0 ng/mL           Assessment / Plan     Diabetes poorly controlled, on Glucotrol XL 5 mg. Patient will try and improve diet over the next 4 months but if not improving would increase his Glucotrol to 10 mg. Hyperlipidemia poorly controlled, with patient's significant history of tobacco use as well as diabetes and with new heart murmur that he is says he has never been told that he has we will refer him to cardiology for more thorough evaluation including possible echocardiogram and nuclear stress test..      ICD-10-CM ICD-9-CM    1.  Type 2 diabetes mellitus with diabetic neuropathy, without long-term current use of insulin (HCC)  E11.40 250.60 HEMOGLOBIN A1C W/O EAG     357.2    2. Dyslipidemia  E78.5 272.4 LIPID PANEL   3. Essential hypertension  I10 401.9  controlled currently off of any medications METABOLIC PANEL, COMPREHENSIVE   4. Chronic midline low back pain without sciatica  M54.5 724.2  patient uses Percocet few tablets a month according to PMI gets his prescription about once or twice a year. G89.29 338.29    5. Spinal stenosis of lumbar region with neurogenic claudication  M48.062 724.03  management as per #4   6. Heart murmur  R01.1 785.2 REFERRAL TO CARDIOLOGY for further evaluation   7. Memory difficulties  R41.3 780.93  patient and wife to consider neuropsych testing            Diabetic issues reviewed with him: diabetic diet discussed in detail, and low cholesterol diet, weight control and daily exercise discussed. Follow-up and Dispositions    · Return in about 4 months (around 8/9/2021) for labs 1 week before. Reviewed plan of care. Patient has provided input and agrees with goals.

## 2021-04-09 NOTE — PROGRESS NOTES
Patient is in the office today to establish care. Patient states he has spinal stenosis and has had 2 back surgeries by Dr. Philip Rodriguez at [facility]. Patient states he has neuropathy cold feet and hands due to diabetes. Patient states he has Cluster Headaches. Patient states he has seen Dr. Paola Oquendo and does not want to go back to Dr. Paola Oquendo. 1. Have you been to the ER, urgent care clinic since your last visit? Hospitalized since your last visit? No    2. Have you seen or consulted any other health care providers outside of the [address] [place] [address] since your last visit? Include any pap smears or colon screening. yes, Dr. Alondra Campos.

## 2021-04-09 NOTE — PATIENT INSTRUCTIONS

## 2021-04-12 ENCOUNTER — TELEPHONE (OUTPATIENT)
Dept: INTERNAL MEDICINE CLINIC | Age: 76
End: 2021-04-12

## 2021-04-12 NOTE — TELEPHONE ENCOUNTER
----- Message from Leida Corona MD sent at 4/9/2021  6:19 PM EDT -----  See if Dr Nelia Nyhan has record of colonoscopy

## 2021-04-12 NOTE — TELEPHONE ENCOUNTER
----- Message from Bethany Garces MD sent at 4/9/2021  6:19 PM EDT -----  See if Dr Kobe Montalvo has record of colonoscopy

## 2021-06-02 ENCOUNTER — OFFICE VISIT (OUTPATIENT)
Dept: CARDIOLOGY CLINIC | Age: 76
End: 2021-06-02
Payer: COMMERCIAL

## 2021-06-02 VITALS
HEIGHT: 69 IN | OXYGEN SATURATION: 97 % | DIASTOLIC BLOOD PRESSURE: 82 MMHG | HEART RATE: 96 BPM | BODY MASS INDEX: 27.4 KG/M2 | SYSTOLIC BLOOD PRESSURE: 120 MMHG | WEIGHT: 185 LBS

## 2021-06-02 DIAGNOSIS — R01.1 HEART MURMUR: Primary | ICD-10-CM

## 2021-06-02 PROCEDURE — 99204 OFFICE O/P NEW MOD 45 MIN: CPT | Performed by: INTERNAL MEDICINE

## 2021-06-02 PROCEDURE — 93000 ELECTROCARDIOGRAM COMPLETE: CPT | Performed by: INTERNAL MEDICINE

## 2021-06-02 NOTE — PROGRESS NOTES
Edu Sanchez presents today for   Chief Complaint   Patient presents with    New Patient     ref by PCP for heart murmur       Edu Sanchez preferred language for health care discussion is english/other. Is someone accompanying this pt? no    Is the patient using any DME equipment during [address]? no    Depression Screening:  3 most recent PHQ Screens 6/2/2021   Little interest or pleasure in doing things Not at all   Feeling down, depressed, irritable, or hopeless Not at all   Total Score PHQ 2 0   Trouble falling or staying asleep, or sleeping too much -   Feeling tired or having little energy -   Poor appetite, weight loss, or overeating -   Feeling bad about yourself - or that you are a failure or have let yourself or your family down -   Trouble concentrating on things such as school, work, reading, or watching TV -   Moving or speaking so slowly that other people could have noticed; or the opposite being so fidgety that others notice -   Thoughts of being better off dead, or hurting yourself in some way -   PHQ 9 Score -   How difficult have these problems made it for you to do your work, take care of your home and get along with others -       Learning Assessment:  Learning Assessment 6/2/2021   PRIMARY LEARNER Patient   PRIMARY LANGUAGE ENGLISH   LEARNER PREFERENCE PRIMARY DEMONSTRATION   ANSWERED BY patient   RELATIONSHIP SELF       Abuse Screening:  Abuse Screening Questionnaire 6/2/2021   Do you ever feel afraid of your partner? N   Are you in a relationship with someone who physically or mentally threatens you? N   Is it safe for you to go home? Y       Fall Risk  Fall Risk Assessment, last 12 mths 6/2/2021   Able to walk? Yes   Fall in past 12 months? 0   Do you feel unsteady? 0   Are you worried about falling 0   Is TUG test greater than 12 seconds? -   Is the gait abnormal? -   Number of falls in past 12 months -   Fall with injury? -       Pt currently taking Anticoagulant therapy? no    Coordination of Care:  1. Have you been to the ER, urgent care clinic since your last visit? Hospitalized since your last visit? no    2. Have you seen or consulted any other health care providers outside of the [address] [place] [address] since your last visit? Include any pap smears or colon screening.  no

## 2021-06-02 NOTE — PROGRESS NOTES
Leonardo Day    Chief Complaint   Patient presents with    New Patient     ref by PCP for heart murmur       HPI    Leonardo Day is a 78 y.o. male with DM2, tobacco abuse, referred for initial CV evaluation of murmur. Pt doesn't know why he's here, brings typed out notes regarding his med hx (see scanned into chart). Says one Dr says he has diabetes, another not, one time his HBA1C 7 and the next time 7.7 \"I dont know what to believe. \"    Spends majority of time discussing his back pain history, and questions about his neuropathy. Regardless, has no known heart disease, has not had any CV evaluation. Hx reviewed below, in detail. 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 Estefany Healy 2011    NEUROLOGICAL PROCEDURE UNLISTED  9/11    spinal fusion, L3-5       Current Outpatient Medications   Medication Sig Dispense Refill    oxyCODONE-acetaminophen (Percocet) 5-325 mg per tablet Take  by mouth every four (4) hours as needed for Pain.  soft lens rinse,store solution (UNISOL PLUS) by Does Not Apply route.  glipiZIDE SR (GLUCOTROL XL) 5 mg CR tablet Take 1 Tab by mouth daily. 90 Tab 0    aspirin-acetaminophen-caffeine (EXCEDRIN ES) 250-250-65 mg per tablet Take 1 Tab by mouth every six (6) hours as needed for Headache. Indications: Patient takes 4 - 6 tablets daily      SUMAtriptan (IMITREX) 100 mg tablet 1 tablet by mouth as needed for headache, may repeat 1 tablet if headache is not resolved in 2 hours. Maximum of 200 mg per 24 hours.  (Patient not taking: Reported on 6/2/2021) 27 Tab 5    tadalafil (CIALIS) 20 mg tablet 1 tablet at least 1 hour before intercourse (Patient not taking: Reported on 6/2/2021) 4 Tab 11    diphenhydrAMINE HCl (WAL-FRED, DIPHENHYDRAMINE,) 25 mg TbDi Take 25 mg by mouth nightly. (Patient not taking: Reported on 6/2/2021)      hydrocortisone (CORTISONE) 1 % topical cream Apply to face and scalp twice per day for two weeks (Patient not taking: Reported on 6/2/2021) 30 g 2       Allergies   Allergen Reactions    Codeine Not Reported This Time    Tegretol [Carbamazepine] Not Reported This Time       Social History     Socioeconomic History    Marital status:      Spouse name: Not on file    Number of children: Not on file    Years of education: Not on file    Highest education level: Not on file   Occupational History    Not on file   Tobacco Use    Smoking status: Current Every Day Smoker     Packs/day: 1.00     Years: 51.00     Pack years: 51.00    Smokeless tobacco: Never Used   Vaping Use    Vaping Use: Never used   Substance and Sexual Activity    Alcohol use: Yes     Comment: rarely    Drug use: No    Sexual activity: Yes     Partners: Female   Other Topics Concern    Not on file   Social History Narrative    Not on file     Social Determinants of Health     Financial Resource Strain:     Difficulty of Paying Living Expenses:    Food Insecurity:     Worried About Running Out of Food in the Last Year:     Ran Out of Food in the Last Year:    Transportation Needs:     Lack of Transportation (Medical):      Lack of Transportation (Non-Medical):    Physical Activity:     Days of Exercise per Week:     Minutes of Exercise per Session:    Stress:     Feeling of Stress :    Social Connections:     Frequency of Communication with Friends and Family:     Frequency of Social Gatherings with Friends and Family:     Attends Anabaptism Services:     Active Member of Clubs or Organizations:     Attends Club or Organization Meetings:     Marital Status: Intimate Partner Violence:     Fear of Current or Ex-Partner:     Emotionally Abused:     Physically Abused:     Sexually Abused:         FH: n/a    Review of Systems    14 pt Review of Systems is negative unless otherwise mentioned in the HPI. Wt Readings from Last 3 Encounters:   06/02/21 83.9 kg (185 lb)   04/09/21 85.3 kg (188 lb)   06/29/20 84.1 kg (185 lb 6.4 oz)     Temp Readings from Last 3 Encounters:   04/09/21 97.8 °F (36.6 °C) (Temporal)   06/29/20 98.2 °F (36.8 °C) (Oral)   11/25/19 98.1 °F (36.7 °C) (Oral)     BP Readings from Last 3 Encounters:   06/02/21 120/82   04/09/21 135/70   06/29/20 104/64     Pulse Readings from Last 3 Encounters:   06/02/21 96   04/09/21 79   06/29/20 91       Physical Exam:    Visit Vitals  /82 (BP 1 Location: Left upper arm, BP Patient Position: Sitting, BP Cuff Size: Adult)   Pulse 96   Ht 5' 9\" (1.753 m)   Wt 83.9 kg (185 lb)   SpO2 97%   BMI 27.32 kg/m²      Says cannot communicate with me if I wear a mask   Physical Exam  HENT:      Head: Normocephalic and atraumatic. Eyes:      General: No scleral icterus. Pupils: Pupils are equal, round, and reactive to light. Cardiovascular:      Rate and Rhythm: Normal rate and regular rhythm. Heart sounds: Murmur heard. No friction rub. No gallop. Pulmonary:      Effort: Pulmonary effort is normal. No respiratory distress. Breath sounds: Normal breath sounds. No wheezing or rales. Chest:      Chest wall: No tenderness. Abdominal:      General: Bowel sounds are normal.      Palpations: Abdomen is soft. Skin:     General: Skin is warm and dry. Findings: No rash. Neurological:      Mental Status: He is alert and oriented to person, place, and time. Psychiatric:         Mood and Affect: Mood is depressed.          EKG today shows: NSR, normal axis and intervals, no ST segment abnormalities    Lab Results   Component Value Date/Time    Cholesterol, total 176 04/02/2021 08:35 AM    HDL Cholesterol 28 (L) 04/02/2021 08:35 AM    LDL, calculated 93 04/02/2021 08:35 AM    VLDL, calculated 55 04/02/2021 08:35 AM    Triglyceride 275 (H) 04/02/2021 08:35 AM    CHOL/HDL Ratio 6.3 (H) 04/02/2021 08:35 AM         Impression and Plan:  Danny Conception is a 78 y. o. with:    1.) Murmur, most likely mild AS/ aortic sclerosis  2.) DM2 with HL  3.) ED  4.) Chronic back pain/ spinal stenosis  5.) Longstanding tobacco abuse    1.) Echocardiogram  2.) Will call with results and guidance if any surveillance necessary    Pt had several questions regarding his back pain/ neuropathy/ DM2- all of which I kindly deferred to his PCP  He also requested I scan his notes and share todays consult with you  Agree has a murmur, sounds benign and related to aortic valve but let's assess severity, with recs to follow    Thank you for allowing me to participate in the care of your patient, please do not hesitate to call with questions or concerns. Follow-up and Dispositions    · Return if symptoms worsen or fail to improve.      [address],    Lonell Selma, DO

## 2021-06-07 RX ORDER — GLIPIZIDE 5 MG/1
5 TABLET, FILM COATED, EXTENDED RELEASE ORAL DAILY
Qty: 90 TABLET | Refills: 1 | Status: SHIPPED | OUTPATIENT
Start: 2021-06-07 | End: 2021-12-12

## 2021-06-07 NOTE — TELEPHONE ENCOUNTER
Last Visit: 4/9/21 with MD Orr  Next Appointment: 8/13/21 with MD Orr  Previous Refill Encounter(s): 2/23/21 #90    Requested Prescriptions     Pending Prescriptions Disp Refills    glipiZIDE SR (GLUCOTROL XL) 5 mg CR tablet 90 Tablet 1     Sig: Take 1 Tablet by mouth daily.

## 2021-06-29 ENCOUNTER — TELEPHONE (OUTPATIENT)
Dept: CARDIOLOGY CLINIC | Age: 76
End: 2021-06-29

## 2021-08-04 ENCOUNTER — DOCUMENTATION ONLY (OUTPATIENT)
Dept: CARDIOLOGY CLINIC | Age: 76
End: 2021-08-04

## 2021-08-04 NOTE — PROGRESS NOTES
Mild AS on echo  Can be monitored by exam  [place] that pt had poor understanding despite lengthy conversation with him and his wife at their appt

## 2021-08-05 ENCOUNTER — TELEPHONE (OUTPATIENT)
Dept: CARDIOLOGY CLINIC | Age: 76
End: 2021-08-05

## 2021-08-05 NOTE — LETTER
8/12/2021    Mr. Whit Arambula  [facility] [address]        Dear  Sandra Khan,    We have been unable to reach you by phone to notify you of your test results. Please call our office at [phone] and ask to speak with my nurse in order to explain these results to you and advise you of any recommendations.       Sincerely,        Ashlie Douglas, DO

## 2021-08-05 NOTE — TELEPHONE ENCOUNTER
----- Message from Roberta Em DO sent at 8/4/2021  8:14 AM EDT -----  He has very mild buildup on his aortic valve that causes the murmur. Its mild and will let his PCP know. Thanks    ----- Message -----  From: Heydi Lemon LPN  Sent: 4/8/1122   8:11 AM EDT  To: Roberta Em DO    Per your last note\" Jenn Dempsey is a 78 y. o. with:     1.) Murmur, most likely mild AS/ aortic sclerosis  2.) DM2 with HL  3.) ED  4.) Chronic back pain/ spinal stenosis  5.) Longstanding tobacco abuse     1.) Echocardiogram  2.) Will call with results and guidance if any surveillance necessary     Pt had several questions regarding his back pain/ neuropathy/ DM2- all of which I kindly deferred to his PCP  He also requested I scan his notes and share todays consult with you  Agree has a murmur, sounds benign and related to aortic valve but let's assess severity, with recs to follow     Thank you for allowing me to participate in the care of your patient, please do not hesitate to call with questions or concerns.

## 2021-08-06 ENCOUNTER — TELEPHONE (OUTPATIENT)
Dept: CARDIOLOGY CLINIC | Age: 76
End: 2021-08-06

## 2021-08-06 NOTE — TELEPHONE ENCOUNTER
Verified consent in chart to speak with patients wife, results and recommendations have been fully explained to the patient, who indicates understanding.

## 2021-08-06 NOTE — TELEPHONE ENCOUNTER
----- Message from Manisha Lyon DO sent at 8/4/2021  8:14 AM EDT -----  He has very mild buildup on his aortic valve that causes the murmur. Its mild and will let his PCP know. Thanks    ----- Message -----  From: Jenetta Hodgkins, LPN  Sent: 9/3/1462   8:11 AM EDT  To: Manisha Lyon DO    Per your last note\" Rei Wiley is a 78 y. o. with:     1.) Murmur, most likely mild AS/ aortic sclerosis  2.) DM2 with HL  3.) ED  4.) Chronic back pain/ spinal stenosis  5.) Longstanding tobacco abuse     1.) Echocardiogram  2.) Will call with results and guidance if any surveillance necessary     Pt had several questions regarding his back pain/ neuropathy/ DM2- all of which I kindly deferred to his PCP  He also requested I scan his notes and share todays consult with you  Agree has a murmur, sounds benign and related to aortic valve but let's assess severity, with recs to follow     Thank you for allowing me to participate in the care of your patient, please do not hesitate to call with questions or concerns.

## 2021-08-10 ENCOUNTER — HOSPITAL ENCOUNTER (OUTPATIENT)
Dept: LAB | Age: 76
Discharge: HOME | End: 2021-08-10
Payer: COMMERCIAL

## 2021-08-10 ENCOUNTER — APPOINTMENT (OUTPATIENT)
Dept: INTERNAL MEDICINE CLINIC | Age: 76
End: 2021-08-10

## 2021-08-10 DIAGNOSIS — I10 ESSENTIAL HYPERTENSION: ICD-10-CM

## 2021-08-10 DIAGNOSIS — E78.5 DYSLIPIDEMIA: ICD-10-CM

## 2021-08-10 DIAGNOSIS — E11.40 TYPE 2 DIABETES MELLITUS WITH DIABETIC NEUROPATHY, WITHOUT LONG-TERM CURRENT USE OF INSULIN (HCC): ICD-10-CM

## 2021-08-10 LAB
ALBUMIN SERPL-MCNC: 3.9 G/DL (ref 3.4–5)
ALBUMIN/GLOB SERPL: 1.1 {RATIO} (ref 0.8–1.7)
ALP SERPL-CCNC: 83 U/L (ref 45–117)
ALT SERPL-CCNC: 16 U/L (ref 16–61)
ANION GAP SERPL CALC-SCNC: 9 MMOL/L (ref 3–18)
AST SERPL-CCNC: 14 U/L (ref 10–38)
BILIRUB SERPL-MCNC: 0.4 MG/DL (ref 0.2–1)
BUN SERPL-MCNC: 20 MG/DL (ref 7–18)
BUN/CREAT SERPL: 17 (ref 12–20)
CALCIUM SERPL-MCNC: 9.3 MG/DL (ref 8.5–10.1)
CHLORIDE SERPL-SCNC: 103 MMOL/L (ref 100–111)
CHOLEST SERPL-MCNC: 185 MG/DL
CO2 SERPL-SCNC: 26 MMOL/L (ref 21–32)
CREAT SERPL-MCNC: 1.15 MG/DL (ref 0.6–1.3)
GLOBULIN SER CALC-MCNC: 3.7 G/DL (ref 2–4)
GLUCOSE SERPL-MCNC: 113 MG/DL (ref 74–99)
HBA1C MFR BLD: 6.7 % (ref 4.2–5.6)
HDLC SERPL-MCNC: 34 MG/DL (ref 40–60)
HDLC SERPL: 5.4 {RATIO} (ref 0–5)
LDLC SERPL CALC-MCNC: 86 MG/DL (ref 0–100)
LIPID PROFILE,FLP: ABNORMAL
POTASSIUM SERPL-SCNC: 4.2 MMOL/L (ref 3.5–5.5)
PROT SERPL-MCNC: 7.6 G/DL (ref 6.4–8.2)
SODIUM SERPL-SCNC: 138 MMOL/L (ref 136–145)
TRIGL SERPL-MCNC: 325 MG/DL (ref ?–150)
VLDLC SERPL CALC-MCNC: 65 MG/DL

## 2021-08-10 PROCEDURE — 80053 COMPREHEN METABOLIC PANEL: CPT

## 2021-08-10 PROCEDURE — 36415 COLL VENOUS BLD VENIPUNCTURE: CPT

## 2021-08-10 PROCEDURE — 80061 LIPID PANEL: CPT

## 2021-08-10 PROCEDURE — 83036 HEMOGLOBIN GLYCOSYLATED A1C: CPT

## 2021-08-13 ENCOUNTER — OFFICE VISIT (OUTPATIENT)
Dept: INTERNAL MEDICINE CLINIC | Age: 76
End: 2021-08-13
Payer: COMMERCIAL

## 2021-08-13 VITALS
TEMPERATURE: 97.1 F | SYSTOLIC BLOOD PRESSURE: 138 MMHG | RESPIRATION RATE: 16 BRPM | HEART RATE: 63 BPM | DIASTOLIC BLOOD PRESSURE: 71 MMHG | BODY MASS INDEX: 26.99 KG/M2 | WEIGHT: 182.8 LBS | OXYGEN SATURATION: 98 %

## 2021-08-13 DIAGNOSIS — R20.0 NUMBNESS IN BOTH HANDS: ICD-10-CM

## 2021-08-13 DIAGNOSIS — E78.5 DYSLIPIDEMIA: ICD-10-CM

## 2021-08-13 DIAGNOSIS — G62.9 PERIPHERAL POLYNEUROPATHY: ICD-10-CM

## 2021-08-13 DIAGNOSIS — E11.40 TYPE 2 DIABETES MELLITUS WITH DIABETIC NEUROPATHY, WITHOUT LONG-TERM CURRENT USE OF INSULIN (HCC): Primary | ICD-10-CM

## 2021-08-13 DIAGNOSIS — I10 ESSENTIAL HYPERTENSION: ICD-10-CM

## 2021-08-13 PROCEDURE — 99214 OFFICE O/P EST MOD 30 MIN: CPT | Performed by: INTERNAL MEDICINE

## 2021-08-13 NOTE — PROGRESS NOTES
Pt is here for   Chief Complaint   Patient presents with    Diabetes     folllow up    Hypertension    Cholesterol Problem     1. Have you been to the ER, urgent care clinic or hospitalized since your last visit? NO.     2. Have you seen or consulted any other health care providers outside of the [address] [place] [address] since your last visit (Include any pap smears or colon screening)? YES, Cardiologist     Do you have an Advanced Directive? YES    Would you like information on Advanced Directives?  NO

## 2021-12-12 RX ORDER — GLIPIZIDE 5 MG/1
5 TABLET, FILM COATED, EXTENDED RELEASE ORAL DAILY
Qty: 90 TABLET | Refills: 1 | Status: SHIPPED | OUTPATIENT
Start: 2021-12-12 | End: 2022-06-09 | Stop reason: ALTCHOICE

## 2022-01-10 DIAGNOSIS — G89.29 CHRONIC MIDLINE LOW BACK PAIN WITHOUT SCIATICA: Primary | ICD-10-CM

## 2022-01-10 DIAGNOSIS — M54.50 CHRONIC MIDLINE LOW BACK PAIN WITHOUT SCIATICA: Primary | ICD-10-CM

## 2022-01-10 RX ORDER — OXYCODONE AND ACETAMINOPHEN 5; 325 MG/1; MG/1
1 TABLET ORAL
Qty: 60 TABLET | Refills: 0 | Status: SHIPPED | OUTPATIENT
Start: 2022-01-10 | End: 2022-02-09

## 2022-01-10 NOTE — TELEPHONE ENCOUNTER
Patient is requesting a refill for his pain. He said he is unable to see the neurosurgeon until the end of January and OTC products are not helping. [facility] reports the last fill date for Percocet as 6/29/21 for a 7 d/s. Last Visit: 8/13/21 with MD Orr  Next Appointment: 2/18/22 with MD Orr  Previous Refill Encounter(s): 6/29/20 #60    Requested Prescriptions     Pending Prescriptions Disp Refills    oxyCODONE-acetaminophen (Percocet) 5-325 mg per tablet 60 Tablet 0     Sig: Take 1 Tablet by mouth every four (4) hours as needed for Pain for up to 30 days. Max Daily Amount: 6 Tablets.

## 2022-04-21 LAB — HBA1C MFR BLD HPLC: 8.7 %

## 2022-04-29 ENCOUNTER — TELEPHONE (OUTPATIENT)
Dept: INTERNAL MEDICINE CLINIC | Age: 77
End: 2022-04-29

## 2022-04-29 NOTE — TELEPHONE ENCOUNTER
Silvio, a nurse with [facility] care, called to let Dr. Hansel Chaney know that they will be establishing care with Patient over the weekend. They already have orders that have been signed by a rehab facility and they will be seeing Patient on Sunday. They will be faxing over careplans that will need to be signed by Dr. Hansel Chaney after they initiate care with Patient. Silvio can be reached at [phone] if needed.  Thank you

## 2022-05-26 ENCOUNTER — TELEPHONE (OUTPATIENT)
Dept: INTERNAL MEDICINE CLINIC | Age: 77
End: 2022-05-26

## 2022-05-26 NOTE — TELEPHONE ENCOUNTER
Renetta Jean-Baptiste, a physical therapist with home health, is asking if she can do a verbal order for a  to come out for the patient. Patient has multiple personal and financial situations which she believe could be assisted by a . Please advise her at [phone].

## 2022-05-31 ENCOUNTER — TELEPHONE (OUTPATIENT)
Dept: INTERNAL MEDICINE CLINIC | Age: 77
End: 2022-05-31

## 2022-05-31 NOTE — TELEPHONE ENCOUNTER
Pt request appt as soon as possible. The only day he can't come is formerly [facility] 06/02/22 this week. He is having light headed and dizziness but that is secondary to pain 24 hours per day in neck and shoulders. Stated he had major surgery Dr Paige Sharp at [facility] 1 mo ago but is still having pain.  Said rehab was not helpful     He was advised to follow up with his pcp       Please advise,

## 2022-05-31 NOTE — TELEPHONE ENCOUNTER
Patient scheduled and advised patient to call surgeon. Patient states he has already called surgeon and has an appointment for this Thursday 6/2/2022.

## 2022-06-03 ENCOUNTER — TELEPHONE (OUTPATIENT)
Dept: INTERNAL MEDICINE CLINIC | Age: 77
End: 2022-06-03

## 2022-06-03 NOTE — TELEPHONE ENCOUNTER
Kahuku Gabriele is calling from [facility] stating she is seeing him for [facility] and PT after having recent back surgery. Stating the son said he is not available today and wants to rsd to next week. She is requesting verbal permission to do so.

## 2022-06-09 ENCOUNTER — OFFICE VISIT (OUTPATIENT)
Dept: INTERNAL MEDICINE CLINIC | Age: 77
End: 2022-06-09
Payer: COMMERCIAL

## 2022-06-09 ENCOUNTER — TELEPHONE (OUTPATIENT)
Dept: INTERNAL MEDICINE CLINIC | Age: 77
End: 2022-06-09

## 2022-06-09 VITALS
TEMPERATURE: 97.7 F | HEIGHT: 69 IN | BODY MASS INDEX: 21.48 KG/M2 | OXYGEN SATURATION: 98 % | DIASTOLIC BLOOD PRESSURE: 75 MMHG | RESPIRATION RATE: 20 BRPM | WEIGHT: 145 LBS | HEART RATE: 84 BPM | SYSTOLIC BLOOD PRESSURE: 120 MMHG

## 2022-06-09 DIAGNOSIS — R41.3 MEMORY LOSS: ICD-10-CM

## 2022-06-09 DIAGNOSIS — R63.4 WEIGHT LOSS: ICD-10-CM

## 2022-06-09 DIAGNOSIS — R42 DIZZINESS: ICD-10-CM

## 2022-06-09 DIAGNOSIS — F51.01 PRIMARY INSOMNIA: ICD-10-CM

## 2022-06-09 DIAGNOSIS — F33.1 MODERATE EPISODE OF RECURRENT MAJOR DEPRESSIVE DISORDER (HCC): ICD-10-CM

## 2022-06-09 DIAGNOSIS — R20.0 NUMBNESS IN BOTH HANDS: ICD-10-CM

## 2022-06-09 DIAGNOSIS — M54.50 CHRONIC MIDLINE LOW BACK PAIN WITHOUT SCIATICA: ICD-10-CM

## 2022-06-09 DIAGNOSIS — E78.5 DYSLIPIDEMIA: ICD-10-CM

## 2022-06-09 DIAGNOSIS — G89.29 CHRONIC MIDLINE LOW BACK PAIN WITHOUT SCIATICA: ICD-10-CM

## 2022-06-09 DIAGNOSIS — E11.40 TYPE 2 DIABETES MELLITUS WITH DIABETIC NEUROPATHY, WITHOUT LONG-TERM CURRENT USE OF INSULIN (HCC): Primary | ICD-10-CM

## 2022-06-09 PROBLEM — F33.0 MAJOR DEPRESSIVE DISORDER, RECURRENT, MILD (HCC): Status: ACTIVE | Noted: 2022-06-09

## 2022-06-09 PROBLEM — F33.9 MAJOR DEPRESSIVE DISORDER, RECURRENT, UNSPECIFIED (HCC): Status: ACTIVE | Noted: 2022-06-09

## 2022-06-09 PROCEDURE — 99214 OFFICE O/P EST MOD 30 MIN: CPT | Performed by: INTERNAL MEDICINE

## 2022-06-09 PROCEDURE — 1123F ACP DISCUSS/DSCN MKR DOCD: CPT | Performed by: INTERNAL MEDICINE

## 2022-06-09 RX ORDER — HYDROCODONE BITARTRATE AND ACETAMINOPHEN 5; 325 MG/1; MG/1
1 TABLET ORAL
COMMUNITY
Start: 2022-06-03 | End: 2022-06-10

## 2022-06-09 RX ORDER — DRONABINOL 2.5 MG/1
2.5 CAPSULE ORAL 2 TIMES DAILY
COMMUNITY
Start: 2022-06-02

## 2022-06-09 RX ORDER — MECLIZINE HYDROCHLORIDE 25 MG/1
25 TABLET ORAL
Qty: 30 TABLET | Refills: 1 | Status: SHIPPED | OUTPATIENT
Start: 2022-06-09 | End: 2022-09-13

## 2022-06-09 RX ORDER — SERTRALINE HYDROCHLORIDE 50 MG/1
50 TABLET, FILM COATED ORAL DAILY
Qty: 90 TABLET | Refills: 2 | Status: ON HOLD | OUTPATIENT
Start: 2022-06-09 | End: 2022-10-13 | Stop reason: SDUPTHER

## 2022-06-09 RX ORDER — TRAZODONE HYDROCHLORIDE 50 MG/1
50 TABLET ORAL
Qty: 30 TABLET | Refills: 2 | Status: SHIPPED | OUTPATIENT
Start: 2022-06-09 | End: 2022-09-13

## 2022-06-09 NOTE — PROGRESS NOTES
Patient is in the office today for dizziness. Patient states he is depressed. Patient states Dr. Ayesha Wang stopped glipizide. Do you have an Advance Directive no  Do you want more information : information given     1. \"Have you been to the ER, urgent care clinic since your last visit? Hospitalized since your last visit? \" yes, [place] for surgery. 2. \"Have you seen or consulted any other health care providers outside of the [address] [place] [address] since your last visit? \" yes, Dr. Juanjose Shoemaker. Neurosurgery. 3. For patients aged 39-70: Has the patient had a colonoscopy / FIT/ Cologuard? NA - based on age      If the patient is female:    4. For patients aged 41-77: Has the patient had a mammogram within the past 2 years? NA - based on age or sex      11. For patients aged 21-65: Has the patient had a pap smear?  NA - based on age or sex

## 2022-06-09 NOTE — PROGRESS NOTES
Subjective:       Chief Complaint  The patient presents for follow up of diabetesand high cholesterol. Chronic back pain due to spinal stenosis        HPI  Lucy Ceja is a 78 y.o. male seen for follow up of diabetes. Healso has  hyperlipidemia. Diabetes no significant medication side effects noted, well controlled, off Glucotrol XL 5 mg due significant weight loss of 40 lbs. hyperlipidemia borderline controlled, not on a statin and pt has significant h/o tobacco use. The 10-year ASCVD risk score (Bravo Elliott, et al., 2013) is: 44.9% would benefit from being on a statin but he is reluctant to add more medications to his regimen. Patient is followed by cardiology for aortic stenosis. Diet and Lifestyle: generally follows a low fat low cholesterol diet, follows a diabetic diet regularly, sedentary    Diabetic Review of Systems - home glucose monitoring: is not performed. Other symptoms and concerns: Patient had extensive spinal surgery done on 4/15/2022 by Dr. Bhakti Cline. Patient had a history of spinal stenosis. Unfortunately has been having continued problems with controlling the pain. His spinal surgeon has been trying to taper down his narcotics. Patient in the meantime tells me that his wife wants to have a divorce and he is about to be foreclosed on his home. He is with his son today but says he has poor support system. He denies any suicidal plan or ideation. He has lost a lot of weight most likely due to severe depression this has led to poor healing of his recent surgery per neurosurgery. Patient is encouraged to eat more protein and has been given appetite stimulant with minimal improvement. Patient is unsteady in his feet and has had multiple falls. Patient does not have Medicaid and is unable to transition to an assisted living facility at this time.   He tells me he does not have any options for living and may end up homeless if he is unable to get into assisted living facility nursing home. He has been advised to go to the emergency room if after he has any difficulty walking or worsening neurological symptoms like leg weakness arm weakness. Patient does have significant problems with his memory and will need neuropsych evaluation at some point. Patient is also having problems with dizziness which is most likely due to vertigo but treated recent back surgery did not want to manipulate him to do the Pacheco-Hallpike maneuver at this time. We will treat empirically with Antivert    Depression  Patient complains of depression. He complains of depressed mood, anhedonia, weight loss, insomnia, psychomotor agitation, impaired memory and suicidal thoughts without plan. Onset was approximately several months ago, gradually worsening since that time. He denies current suicidal and homicidal plan or intent. Family history significant for nothing. Possible organic causes contributing are: none. Risk factors: negative life event patient is going through a divorce and foreclosure on his home previous treatment includes none and no other therapies. He complains of the following side effects from the treatment: none. Discussed the patient's BMI with him. The BMI follow up plan is as follows: I have counseled this patient on diet and exercise regimens. Current Outpatient Medications   Medication Sig    dronabinoL (MARINOL) 2.5 mg capsule Take 2.5 mg by mouth.  traZODone (DESYREL) 50 mg tablet Take 1 Tablet by mouth nightly.  sertraline (ZOLOFT) 50 mg tablet Take 1 Tablet by mouth daily.  meclizine (ANTIVERT) 25 mg tablet Take 1 Tablet by mouth three (3) times daily as needed for Dizziness.  soft lens rinse,store solution (UNISOL PLUS) by Does Not Apply route.  SUMAtriptan (IMITREX) 100 mg tablet 1 tablet by mouth as needed for headache, may repeat 1 tablet if headache is not resolved in 2 hours. Maximum of 200 mg per 24 hours.  (Patient not taking: Reported on 6/2/2021)     No current facility-administered medications for this visit. Review of Systems  Respiratory: negative for dyspnea on exertion  Cardiovascular: negative for chest pain    Objective:     Visit Vitals  /75 (BP 1 Location: Left arm, BP Patient Position: Sitting, BP Cuff Size: Adult)   Pulse 84   Temp 97.7 °F (36.5 °C) (Temporal)   Resp 20   Ht 5' 9\" (1.753 m)   Wt 145 lb (65.8 kg)   SpO2 98%   BMI 21.41 kg/m²        Eyes - pupils equal and reactive, extraocular eye movements intact  Ears -decreased hearing  Chest - clear to auscultation, no wheezes, rales or rhonchi, symmetric air entry  Heart - normal rate and regular rhythm, systolic murmur 3/6 at 2nd left intercostal space  Extremities - no pedal edema noted      Labs:   Lab Results   Component Value Date/Time    WBC 9.9 11/18/2019 09:04 AM    HGB 15.8 11/18/2019 09:04 AM    HCT 46.0 11/18/2019 09:04 AM    PLATELET 229 90/77/3676 09:04 AM    MCV 96.2 11/18/2019 09:04 AM     Lab Results   Component Value Date/Time    Cholesterol, total 185 08/10/2021 03:25 PM    HDL Cholesterol 34 (L) 08/10/2021 03:25 PM    LDL, calculated 86 08/10/2021 03:25 PM    Triglyceride 325 (H) 08/10/2021 03:25 PM    CHOL/HDL Ratio 5.4 (H) 08/10/2021 03:25 PM     Lab Results   Component Value Date/Time    Sodium 138 08/10/2021 03:25 PM    Potassium 4.2 08/10/2021 03:25 PM    Chloride 103 08/10/2021 03:25 PM    CO2 26 08/10/2021 03:25 PM    Anion gap 9 08/10/2021 03:25 PM    Glucose 113 (H) 08/10/2021 03:25 PM    BUN 20 (H) 08/10/2021 03:25 PM    Creatinine 1.15 08/10/2021 03:25 PM    BUN/Creatinine ratio 17 08/10/2021 03:25 PM    GFR est AA >60 08/10/2021 03:25 PM    GFR est non-AA >60 08/10/2021 03:25 PM    Calcium 9.3 08/10/2021 03:25 PM    Bilirubin, total 0.4 08/10/2021 03:25 PM    ALT (SGPT) 16 08/10/2021 03:25 PM    Alk.  phosphatase 83 08/10/2021 03:25 PM    Protein, total 7.6 08/10/2021 03:25 PM    Albumin 3.9 08/10/2021 03:25 PM    Globulin 3.7 08/10/2021 03:25 PM    A-G Ratio 1.1 08/10/2021 03:25 PM      Lab Results   Component Value Date/Time    Hemoglobin A1c 6.7 (H) 08/10/2021 03:25 PM              Assessment / Plan     Diabetes improved with significant weight loss. Will check Hba1c    Hyperlipidemia likely needs to be on a statin with his The 10-year ASCVD risk score (Irish June, et al., 2013) is: 44.9% patient resistant to starting new medications    ICD-10-CM ICD-9-CM    1. Type 2 diabetes mellitus with diabetic neuropathy, without long-term current use of insulin (HCC)  E11.40 250.60      357.2    2. Dyslipidemia  E78.5 272.4    3. Dizziness  R42 780.4 Will treat with meclizine (ANTIVERT) 25 mg tablet   4. Moderate episode of recurrent major depressive disorder (HCC)  F33.1 296.32 Will start on sertraline (ZOLOFT) 50 mg tablet and revaluate in 4 weeks    5. Primary insomnia  F51.01 307.42 Will  treat with traZODone (DESYREL) 50 mg tablet   6. Numbness in both hands  R20.0 782.0    7. Weight loss  R63.4 783.21 Pt will continue dronabinoL (MARINOL) 2.5 mg capsule   8. Chronic midline low back pain without sciatica  M54.50 724.2  patient is status post major surgery and is currently receiving narcotics from his neurosurgeon. Advised patient to continue to get this medication through his neurosurgeon. G89.29 338.29    9. Memory loss  R41.3 780.93  patient will need neuropsych testing to see how severe his his cognitive deficits are              Diabetic issues reviewed with him: diabetic diet discussed in detail, and low cholesterol diet, weight control and daily exercise discussed. Follow-up and Dispositions    · Return in about 4 weeks (around 7/7/2022) for Depression. Reviewed plan of care. Patient has provided input and agrees with goals.

## 2022-06-09 NOTE — TELEPHONE ENCOUNTER
Upon checkout pt was asking to speak with  he wanted to know what is calling his dizziness please advise

## 2022-06-09 NOTE — TELEPHONE ENCOUNTER
Patient is aware per Dr. Garland Solid it could be vertigo which he is unable to check due to him just having surgery, or it could be when he stands up his BP drops because patient has lost so much weight. Patient verbalizes understanding.

## 2022-06-21 ENCOUNTER — APPOINTMENT (OUTPATIENT)
Dept: CT IMAGING | Age: 77
End: 2022-06-21
Attending: EMERGENCY MEDICINE
Payer: COMMERCIAL

## 2022-06-21 ENCOUNTER — APPOINTMENT (OUTPATIENT)
Dept: GENERAL RADIOLOGY | Age: 77
End: 2022-06-21
Attending: EMERGENCY MEDICINE
Payer: COMMERCIAL

## 2022-06-21 ENCOUNTER — APPOINTMENT (OUTPATIENT)
Age: 77
End: 2022-06-21
Attending: EMERGENCY MEDICINE
Payer: COMMERCIAL

## 2022-06-21 ENCOUNTER — HOSPITAL ENCOUNTER (EMERGENCY)
Age: 77
Discharge: NURSING, REHAB OR LONG-TERM CARE | End: 2022-06-23
Attending: EMERGENCY MEDICINE
Payer: COMMERCIAL
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
## 2022-06-21 DIAGNOSIS — Z78.9 UNABLE TO CARE FOR SELF: ICD-10-CM

## 2022-06-21 DIAGNOSIS — S32.019A CLOSED FRACTURE OF FIRST LUMBAR VERTEBRA, UNSPECIFIED FRACTURE MORPHOLOGY, INITIAL ENCOUNTER (HCC): Primary | ICD-10-CM

## 2022-06-21 LAB
ALBUMIN SERPL-MCNC: 3.6 G/DL (ref 3.4–5)
ALBUMIN/GLOB SERPL: 0.9 {RATIO} (ref 0.8–1.7)
ALP SERPL-CCNC: 123 U/L (ref 45–117)
ALT SERPL-CCNC: 25 U/L (ref 16–61)
ANION GAP SERPL CALC-SCNC: 7 MMOL/L (ref 3–18)
APPEARANCE UR: CLEAR
AST SERPL-CCNC: 12 U/L (ref 10–38)
BACTERIA URNS QL MICRO: NEGATIVE /HPF
BASOPHILS # BLD: 0 K/UL (ref 0–0.1)
BASOPHILS NFR BLD: 0 % (ref 0–2)
BILIRUB SERPL-MCNC: 0.4 MG/DL (ref 0.2–1)
BILIRUB UR QL: NEGATIVE
BUN SERPL-MCNC: 15 MG/DL (ref 7–18)
BUN/CREAT SERPL: 13 (ref 12–20)
CALCIUM SERPL-MCNC: 9.4 MG/DL (ref 8.5–10.1)
CHLORIDE SERPL-SCNC: 99 MMOL/L (ref 100–111)
CK SERPL-CCNC: 37 U/L (ref 39–308)
CO2 SERPL-SCNC: 30 MMOL/L (ref 21–32)
COLOR UR: YELLOW
CREAT SERPL-MCNC: 1.12 MG/DL (ref 0.6–1.3)
DIFFERENTIAL METHOD BLD: ABNORMAL
EOSINOPHIL # BLD: 0 K/UL (ref 0–0.4)
EOSINOPHIL NFR BLD: 0 % (ref 0–5)
EPITH CASTS URNS QL MICRO: NORMAL /LPF (ref 0–5)
ERYTHROCYTE [DISTWIDTH] IN BLOOD BY AUTOMATED COUNT: 12.3 % (ref 11.6–14.5)
GLOBULIN SER CALC-MCNC: 4 G/DL (ref 2–4)
GLUCOSE SERPL-MCNC: 167 MG/DL (ref 74–99)
GLUCOSE UR STRIP.AUTO-MCNC: NEGATIVE MG/DL
HCT VFR BLD AUTO: 42 % (ref 36–48)
HGB BLD-MCNC: 15 G/DL (ref 13–16)
HGB UR QL STRIP: ABNORMAL
IMM GRANULOCYTES # BLD AUTO: 0 K/UL (ref 0–0.04)
IMM GRANULOCYTES NFR BLD AUTO: 0 % (ref 0–0.5)
KETONES UR QL STRIP.AUTO: ABNORMAL MG/DL
LEUKOCYTE ESTERASE UR QL STRIP.AUTO: NEGATIVE
LYMPHOCYTES # BLD: 1.2 K/UL (ref 0.9–3.6)
LYMPHOCYTES NFR BLD: 9 % (ref 21–52)
MCH RBC QN AUTO: 31.6 PG (ref 24–34)
MCHC RBC AUTO-ENTMCNC: 35.7 G/DL (ref 31–37)
MCV RBC AUTO: 88.4 FL (ref 78–100)
MONOCYTES # BLD: 0.7 K/UL (ref 0.05–1.2)
MONOCYTES NFR BLD: 5 % (ref 3–10)
NEUTS SEG # BLD: 11.5 K/UL (ref 1.8–8)
NEUTS SEG NFR BLD: 85 % (ref 40–73)
NITRITE UR QL STRIP.AUTO: NEGATIVE
NRBC # BLD: 0 K/UL (ref 0–0.01)
NRBC BLD-RTO: 0 PER 100 WBC
PH UR STRIP: 5.5 [PH] (ref 5–8)
PLATELET # BLD AUTO: 222 K/UL (ref 135–420)
PMV BLD AUTO: 10.4 FL (ref 9.2–11.8)
POTASSIUM SERPL-SCNC: 4.2 MMOL/L (ref 3.5–5.5)
PROT SERPL-MCNC: 7.6 G/DL (ref 6.4–8.2)
PROT UR STRIP-MCNC: NEGATIVE MG/DL
RBC # BLD AUTO: 4.75 M/UL (ref 4.35–5.65)
RBC #/AREA URNS HPF: NORMAL /HPF (ref 0–5)
SODIUM SERPL-SCNC: 136 MMOL/L (ref 136–145)
SP GR UR REFRACTOMETRY: 1.02 (ref 1–1.03)
UROBILINOGEN UR QL STRIP.AUTO: 1 EU/DL (ref 0.2–1)
WBC # BLD AUTO: 13.5 K/UL (ref 4.6–13.2)
WBC URNS QL MICRO: NORMAL /HPF (ref 0–4)

## 2022-06-21 PROCEDURE — 82550 ASSAY OF CK (CPK): CPT

## 2022-06-21 PROCEDURE — 72158 MRI LUMBAR SPINE W/O & W/DYE: CPT

## 2022-06-21 PROCEDURE — A9575 INJ GADOTERATE MEGLUMI 0.1ML: HCPCS | Performed by: EMERGENCY MEDICINE

## 2022-06-21 PROCEDURE — 72131 CT LUMBAR SPINE W/O DYE: CPT

## 2022-06-21 PROCEDURE — 80053 COMPREHEN METABOLIC PANEL: CPT

## 2022-06-21 PROCEDURE — 81001 URINALYSIS AUTO W/SCOPE: CPT

## 2022-06-21 PROCEDURE — 72170 X-RAY EXAM OF PELVIS: CPT

## 2022-06-21 PROCEDURE — 85025 COMPLETE CBC W/AUTO DIFF WBC: CPT

## 2022-06-21 PROCEDURE — 99285 EMERGENCY DEPT VISIT HI MDM: CPT

## 2022-06-21 PROCEDURE — 93005 ELECTROCARDIOGRAM TRACING: CPT

## 2022-06-21 PROCEDURE — 70450 CT HEAD/BRAIN W/O DYE: CPT

## 2022-06-21 PROCEDURE — 74011250636 HC RX REV CODE- 250/636: Performed by: EMERGENCY MEDICINE

## 2022-06-21 PROCEDURE — 71045 X-RAY EXAM CHEST 1 VIEW: CPT

## 2022-06-21 RX ORDER — GLIPIZIDE 5 MG/1
TABLET, FILM COATED, EXTENDED RELEASE ORAL
COMMUNITY
End: 2022-09-13

## 2022-06-21 RX ORDER — GABAPENTIN 300 MG/1
CAPSULE ORAL
COMMUNITY
Start: 2022-04-06 | End: 2022-06-23

## 2022-06-21 RX ORDER — HYDROCODONE BITARTRATE AND ACETAMINOPHEN 5; 325 MG/1; MG/1
TABLET ORAL
COMMUNITY
Start: 2022-06-14 | End: 2022-09-13

## 2022-06-21 RX ORDER — OXYCODONE HYDROCHLORIDE 5 MG/1
TABLET ORAL
COMMUNITY
Start: 2022-05-09 | End: 2022-06-23 | Stop reason: SDUPTHER

## 2022-06-21 RX ORDER — CELECOXIB 200 MG/1
200 CAPSULE ORAL 2 TIMES DAILY
COMMUNITY
Start: 2022-04-29 | End: 2022-09-21

## 2022-06-21 RX ADMIN — GADOTERATE MEGLUMINE 13 ML: 376.9 INJECTION INTRAVENOUS at 16:42

## 2022-06-21 RX ADMIN — SODIUM CHLORIDE 500 ML: 900 INJECTION, SOLUTION INTRAVENOUS at 15:30

## 2022-06-21 NOTE — ED NOTES
Assumed care of patient. Dried fecal matter over rectum, legs, feet, and in nails. Skin care and linen change given. Patient turned on right side for skin care. Warm blankets. I have introduced myself to the patient and discussed anticipated plan of care. Patient resting quietly on stretcher in low and locked position. Call bell in reach. Side rail up 2. Patient verbalizes need for urine specimen and agreement to hit the call bell when he needs to urinate.

## 2022-06-21 NOTE — ED TRIAGE NOTES
Pt presents to ED via EMS after sustaining a fall this morning. States has had frequent falls since his back surgery in April. Pt with noted dried feces on his legs. Pt denies striking his head. Denies any back pain while laying down. States  Ha pain when ambulating.

## 2022-06-21 NOTE — ED NOTES
Patient resting quietly on stretcher, c/o wait times. Patient updated on need for urine specimen, helped with urinal, but unable to produce a specimen. Patient resting quietly on stretcher with warm blankets in position of comfort. Side rial up x2 for safety, call bell in reach. Patient verbalizes intention to call when he needs to urinate.

## 2022-06-21 NOTE — ED PROVIDER NOTES
EMERGENCY DEPARTMENT HISTORY AND PHYSICAL EXAM    2:58 PM  Date: 6/21/2022  Patient Name: Tianna Freed    History of Presenting Illness       History Provided By: patient     HPI: Tianna Freed is a 78 y.o. male with Patient with past medical history of T12/L3 laminectomy, spinal fusion in April presents after losing his balance as he was going to the bathroom this morning. Patient was able to get up and was found by his son on the floor. Denies any LOC, denies any head injury. Patient states that he has chronic back pain. Ambulates with a walker. Patient states that he had multiple falls after surgery. Patient lives alone and he has great difficulty taking care of himself. Patient complains of bilateral leg weakness but no groin numbness. Patient states that he has lost a lot of weight in the last few months. PCP: Brooke Hawkins MD    Past History     Past Medical History:  Past Medical History:   Diagnosis Date    Arthritis     GERD (gastroesophageal reflux disease)     High blood pressure     Insomnia     Periorbital headache     Sleep apnea     Spinal stenosis        Past Surgical History:  Past Surgical History:   Procedure Laterality Date    ENDOSCOPY, COLON, DIAGNOSTIC      HX LUMBAR DISKECTOMY  6/2001, 4/11    left L3-4 microdiskectomy with intradural rupture    HX ORTHOPAEDIC      spinal Surgery Dr. Ayesha Wang 2011   [id number] [facility]  9/11    spinal fusion, L3-5       Family History:  Family History   Problem Relation Age of Onset    Heart Attack Mother     Cancer Mother     Heart Disease Mother     Hypertension Father     Seizures Father     Alcohol abuse Father        Social History:  Social History     Tobacco Use    Smoking status: Current Every Day Smoker     Packs/day: 1.00     Years: 51.00     Pack years: 51.00    Smokeless tobacco: Never Used   Vaping Use    Vaping Use: Never used   Substance Use Topics    Alcohol use:  Yes Comment: rarely    Drug use: No       Allergies: Allergies   Allergen Reactions    Codeine Not Reported This Time    Tegretol [Carbamazepine] Not Reported This Time       Review of Systems   Review of Systems   Constitutional: Negative for activity change, appetite change and chills. HENT: Negative for congestion, ear discharge, ear pain and sore throat. Eyes: Negative for photophobia and pain. Respiratory: Negative for cough and choking. Cardiovascular: Negative for palpitations and leg swelling. Gastrointestinal: Negative for anal bleeding and rectal pain. Endocrine: Negative for polydipsia and polyuria. Genitourinary: Negative for genital sores and urgency. Musculoskeletal: Positive for back pain. Negative for arthralgias and myalgias. Neurological: Positive for weakness. Negative for dizziness, seizures and speech difficulty. Psychiatric/Behavioral: Negative for hallucinations, self-injury and suicidal ideas. Physical Exam     Patient Vitals for the past 12 hrs:   Temp Pulse Resp BP SpO2   06/21/22 1510 97.6 °F (36.4 °C) (!) 102 17 118/82 98 %       Physical Exam  Vitals and nursing note reviewed. Constitutional:       Appearance: He is well-developed. HENT:      Head: Normocephalic and atraumatic. Eyes:      General:         Right eye: No discharge. Left eye: No discharge. Cardiovascular:      Rate and Rhythm: Normal rate and regular rhythm. Heart sounds: Normal heart sounds. No murmur heard. Pulmonary:      Effort: Pulmonary effort is normal. No respiratory distress. Breath sounds: Normal breath sounds. No stridor. No wheezing or rales. Chest:      Chest wall: No tenderness. Abdominal:      General: Bowel sounds are normal. There is no distension. Palpations: Abdomen is soft. Tenderness: There is no abdominal tenderness. There is no guarding or rebound.    Genitourinary:     Comments: Normal anal tone  Musculoskeletal: General: Normal range of motion. Cervical back: Normal range of motion and neck supple. Comments: Incision site at lumbar spine with some packing. No purulent discharge   Skin:     General: Skin is warm and dry. Comments: Gluteal area and thighs covered with feculent material   Neurological:      Mental Status: He is alert and oriented to person, place, and time. Sensory: No sensory deficit. Motor: Weakness present. Comments: Decreased strength in both LE bilaterally         Diagnostic Study Results     Labs -  Recent Results (from the past 12 hour(s))   EKG, 12 LEAD, INITIAL    Collection Time: 06/21/22  3:13 PM   Result Value Ref Range    Ventricular Rate 101 BPM    Atrial Rate 101 BPM    P-R Interval 196 ms    QRS Duration 80 ms    Q-T Interval 358 ms    QTC Calculation (Bezet) 464 ms    Calculated P Axis 77 degrees    Calculated R Axis 71 degrees    Calculated T Axis 82 degrees    Diagnosis       Sinus tachycardia  Septal infarct , age undetermined  Abnormal ECG  No previous ECGs available     CBC WITH AUTOMATED DIFF    Collection Time: 06/21/22  3:25 PM   Result Value Ref Range    WBC 13.5 (H) 4.6 - 13.2 K/uL    RBC 4.75 4.35 - 5.65 M/uL    HGB 15.0 13.0 - 16.0 g/dL    HCT 42.0 36.0 - 48.0 %    MCV 88.4 78.0 - 100.0 FL    MCH 31.6 24.0 - 34.0 PG    MCHC 35.7 31.0 - 37.0 g/dL    RDW 12.3 11.6 - 14.5 %    PLATELET 308 279 - 921 K/uL    MPV 10.4 9.2 - 11.8 FL    NRBC 0.0 0  WBC    ABSOLUTE NRBC 0.00 0.00 - 0.01 K/uL    NEUTROPHILS 85 (H) 40 - 73 %    LYMPHOCYTES 9 (L) 21 - 52 %    MONOCYTES 5 3 - 10 %    EOSINOPHILS 0 0 - 5 %    BASOPHILS 0 0 - 2 %    IMMATURE GRANULOCYTES 0 0.0 - 0.5 %    ABS. NEUTROPHILS 11.5 (H) 1.8 - 8.0 K/UL    ABS. LYMPHOCYTES 1.2 0.9 - 3.6 K/UL    ABS. MONOCYTES 0.7 0.05 - 1.2 K/UL    ABS. EOSINOPHILS 0.0 0.0 - 0.4 K/UL    ABS. BASOPHILS 0.0 0.0 - 0.1 K/UL    ABS. IMM.  GRANS. 0.0 0.00 - 0.04 K/UL    DF AUTOMATED     METABOLIC PANEL, COMPREHENSIVE Collection Time: 06/21/22  3:25 PM   Result Value Ref Range    Sodium 136 136 - 145 mmol/L    Potassium 4.2 3.5 - 5.5 mmol/L    Chloride 99 (L) 100 - 111 mmol/L    CO2 30 21 - 32 mmol/L    Anion gap 7 3.0 - 18 mmol/L    Glucose 167 (H) 74 - 99 mg/dL    BUN 15 7.0 - 18 MG/DL    Creatinine 1.12 0.6 - 1.3 MG/DL    BUN/Creatinine ratio 13 12 - 20      GFR est AA >60 >60 ml/min/1.73m2    GFR est non-AA >60 >60 ml/min/1.73m2    Calcium 9.4 8.5 - 10.1 MG/DL    Bilirubin, total 0.4 0.2 - 1.0 MG/DL    ALT (SGPT) 25 16 - 61 U/L    AST (SGOT) 12 10 - 38 U/L    Alk. phosphatase 123 (H) 45 - 117 U/L    Protein, total 7.6 6.4 - 8.2 g/dL    Albumin 3.6 3.4 - 5.0 g/dL    Globulin 4.0 2.0 - 4.0 g/dL    A-G Ratio 0.9 0.8 - 1.7     CK    Collection Time: 06/21/22  3:25 PM   Result Value Ref Range    CK 37 (L) 39 - 308 U/L       Radiologic Studies -   CT HEAD WO CONT    Result Date: 6/21/2022  No clearly acute intracranial findings. Mild periventricular white matter low-attenuation, likely chronic microvascular ischemic change. CT SPINE LUMB WO CONT    Result Date: 6/21/2022  L1 anterior inferior endplate slightly distracted avulsion fragment, with vertical fracture line, age not completely clear, but acute fracture possible. At least T12 through iliac hardware fixation and T12-L5 posterior decompression, but postoperative changes extends above T12 beyond field-of-view. Associated artifact significantly limits evaluation, but no obvious acute hardware complication. Dextrocurvature. Ostomy also limits evaluation. Degenerative changes summarized above. Left upper pole nonobstructive calculus. Abdominal aorta aneurysm, 3.6 cm caliber. XR PELV 1 OR 2 V    Result Date: 6/21/2022  No acute radiographic findings. Of note, MRI is more sensitive for detecting nondisplaced pelvic fracture. XR CHEST PORT    Result Date: 6/21/2022  No acute radiographic findings.         Medical Decision Making     ED Course: Progress Notes, Reevaluation, and Consults:    2:58 PM Initial assessment performed. The patients presenting problems have been discussed, and they/their family are in agreement with the care plan formulated and outlined with them. I have encouraged them to ask questions as they arise throughout their visit. Provider Notes (Medical Decision Making):   Patient presents with a fall earlier today. Patient had multiple falls after spinal surgery in April. Lives alone have great difficulty ambulating and taking care of himself. Lower body covered in feculent material, no neurological deficit on exam  White cell count 13.5   sinus tachycardia, no ST elevations  X-ray chest pelvis no acute findings  CT head no acute findings  CT spine L1 anterior inferior endplate slightly distracted avulsion fragment, with vertical fracture line, age not completely clear, but acute fracture possible, but no obvious acute hardware complication. Dextrocurvature. Given bilateral LE weakness will obtain MRI to r/o compression, infection of lumbar spine, epidural abscess  Patient most likely will need PT OT evaluation and placement. His son spoke with nurse and confirmed that his father has multiple falls, difficulty ambulating and will need nursing home placement. 18:00 AM : Pt care transferred to Dr. Rubin Weber  ,ED provider. History of patient complaint(s), available diagnostic reports and current treatment plan has been discussed thoroughly. Intended disposition of patient : Admit  Pending diagnostics reports and/or labs (please list): MRI lumbar spine  Dr. Joni Bell assistance in completion of this plan is greatly appreciated but it should be noted that I will be the provider of record for this patient. Vital Signs-Reviewed the patient's vital signs. Reviewed pt's pulse ox reading.          Records Reviewed: old medical records  -I am the first provider for this patient.  -I reviewed the vital signs, available nursing notes, past medical history, past surgical history, family history and social history. Current Outpatient Medications   Medication Sig Dispense Refill    HYDROcodone-acetaminophen (NORCO) 5-325 mg per tablet       oxyCODONE IR (ROXICODONE) 5 mg immediate release tablet       gabapentin (NEURONTIN) 300 mg capsule       glipiZIDE SR (GLUCOTROL XL) 5 mg CR tablet glipizide ER 5 mg tablet, extended release 24 hr      celecoxib (CELEBREX) 200 mg capsule       dronabinoL (MARINOL) 2.5 mg capsule Take 2.5 mg by mouth.  traZODone (DESYREL) 50 mg tablet Take 1 Tablet by mouth nightly. 30 Tablet 2    sertraline (ZOLOFT) 50 mg tablet Take 1 Tablet by mouth daily. 90 Tablet 2    meclizine (ANTIVERT) 25 mg tablet Take 1 Tablet by mouth three (3) times daily as needed for Dizziness. 30 Tablet 1    soft lens rinse,store solution (UNISOL PLUS) by Does Not Apply route.  SUMAtriptan (IMITREX) 100 mg tablet 1 tablet by mouth as needed for headache, may repeat 1 tablet if headache is not resolved in 2 hours. Maximum of 200 mg per 24 hours. (Patient not taking: Reported on 6/2/2021) 27 Tab 5        Clinical Impression     Clinical Impression: No diagnosis found. Disposition: This note was dictated utilizing voice recognition software which may lead to typographical errors. I apologize in advance if the situation occurs. If questions arise please do not hesitate to contact me or call our department.     Mame Squires MD  2:58 PM

## 2022-06-21 NOTE — ED NOTES
Urine specimen colected by a quick cath assisted by [address] [place] [address]. Pt given a urinal previous with no results.

## 2022-06-21 NOTE — ED NOTES
Patient c/o diarrhea, needing BSC. Patient assisted to [facility] with near fall when he attempted to shift body weight around. No fall, staff available to guide body to [facility]. Non slip socks in place, Stay with me plan in place. Patient with diarrhea, skin cleansed and assisted back in bed.

## 2022-06-22 LAB
ATRIAL RATE: 101 BPM
CALCULATED P AXIS, ECG09: 77 DEGREES
CALCULATED R AXIS, ECG10: 71 DEGREES
CALCULATED T AXIS, ECG11: 82 DEGREES
DIAGNOSIS, 93000: NORMAL
P-R INTERVAL, ECG05: 196 MS
Q-T INTERVAL, ECG07: 358 MS
QRS DURATION, ECG06: 80 MS
QTC CALCULATION (BEZET), ECG08: 464 MS
VENTRICULAR RATE, ECG03: 101 BPM

## 2022-06-22 PROCEDURE — 74011250637 HC RX REV CODE- 250/637: Performed by: STUDENT IN AN ORGANIZED HEALTH CARE EDUCATION/TRAINING PROGRAM

## 2022-06-22 PROCEDURE — 97166 OT EVAL MOD COMPLEX 45 MIN: CPT

## 2022-06-22 PROCEDURE — 97530 THERAPEUTIC ACTIVITIES: CPT

## 2022-06-22 PROCEDURE — 97162 PT EVAL MOD COMPLEX 30 MIN: CPT

## 2022-06-22 PROCEDURE — 97535 SELF CARE MNGMENT TRAINING: CPT

## 2022-06-22 RX ORDER — SERTRALINE HYDROCHLORIDE 50 MG/1
50 TABLET, FILM COATED ORAL DAILY
Status: DISCONTINUED | OUTPATIENT
Start: 2022-06-22 | End: 2022-06-23 | Stop reason: HOSPADM

## 2022-06-22 RX ORDER — TRAZODONE HYDROCHLORIDE 100 MG/1
50 TABLET ORAL
Status: DISCONTINUED | OUTPATIENT
Start: 2022-06-22 | End: 2022-06-23 | Stop reason: HOSPADM

## 2022-06-22 RX ORDER — GABAPENTIN 300 MG/1
300 CAPSULE ORAL 2 TIMES DAILY
Status: DISCONTINUED | OUTPATIENT
Start: 2022-06-22 | End: 2022-06-23 | Stop reason: HOSPADM

## 2022-06-22 RX ORDER — CELECOXIB 100 MG/1
200 CAPSULE ORAL DAILY
Status: DISCONTINUED | OUTPATIENT
Start: 2022-06-22 | End: 2022-06-23 | Stop reason: HOSPADM

## 2022-06-22 RX ORDER — ACETAMINOPHEN 325 MG/1
975 TABLET ORAL
Status: DISCONTINUED | OUTPATIENT
Start: 2022-06-22 | End: 2022-06-23 | Stop reason: HOSPADM

## 2022-06-22 RX ORDER — OXYCODONE HYDROCHLORIDE 5 MG/1
5 TABLET ORAL
Status: DISCONTINUED | OUTPATIENT
Start: 2022-06-22 | End: 2022-06-23 | Stop reason: HOSPADM

## 2022-06-22 RX ORDER — GLIPIZIDE 5 MG/1
5 TABLET ORAL
Status: DISCONTINUED | OUTPATIENT
Start: 2022-06-22 | End: 2022-06-23 | Stop reason: HOSPADM

## 2022-06-22 RX ADMIN — TRAZODONE HYDROCHLORIDE 50 MG: 100 TABLET ORAL at 01:53

## 2022-06-22 RX ADMIN — GABAPENTIN 300 MG: 300 CAPSULE ORAL at 22:07

## 2022-06-22 RX ADMIN — TRAZODONE HYDROCHLORIDE 50 MG: 100 TABLET ORAL at 22:07

## 2022-06-22 RX ADMIN — OXYCODONE HYDROCHLORIDE 5 MG: 5 TABLET ORAL at 22:07

## 2022-06-22 NOTE — ED NOTES
Patient care assumed from Dr. Molly Johnson. Refer to original note for more details. Briefly is a 28-year-old male with history of T12-L3 laminectomy and spinal fusion this morning. Complains of back pain. He is unkempt, covered in fecal matter and unable to care for himself. Patient lives alone. Imaging demonstrates an L1 superior endplate avulsion fracture. Discussed case with spinal surgery, Dr. Shira Leventhal at [facility]. Patient is nonoperative and will require PT and OT. Patient is an unsafe to discharge as he lives alone and will require case management for discharge planning. Patient care will be signed out to Dr. Aida Napoles to follow up with case management.

## 2022-06-22 NOTE — PROGRESS NOTES
CM called and spoke with patient's son Jos Canada [phone], collected information for UAI/LTSS to be completed. Patient's son said patient has Medicare, he is going to see about [facility] faxing Medicare card for [facility] registration. CM will start the UAI/LTSS in the [facility], but CM will need Medicaid number to submit UAI/LTSS, or may be denied without all insurance for patient if not listed. CM spoke to [address] [place] [address], and updated.              Aguilar Verdugo, RN  Case Management [phone]

## 2022-06-22 NOTE — ED NOTES
Pt stated \" need to go poop\". Pt was assisted with myself and a tech to the bedside commode. Pt cleaned and placed back in bed. Pt is a high risk for a fall. Pt has a hard time even keeping balance when being assisted on both sides. Pt covered in blankets and call bell within reach, bed rails up and bed lowered.

## 2022-06-22 NOTE — PROGRESS NOTES
Spoke with pt's son Isha Packer. He stated he will like for pt to go long term care. He stated he applied for Medicaid for pt and he received approval yesterday. Informed him to give the Medicaid information to Registration at [facility] so they can update pt's demography. We discussed placement at [facility] and pt's on ok with it. Informed him that he can take the Medicaid info to [facility] and CM will call [address] of [place] ZAMBRANOdericklolly Nicholson and she stated she will be looking out for pt's son. 1510: Spoke with Aleena Rosalino. She stated she can see Medicaid pending in pt's face sheet. She stated they will need verification or more information concerning pt's Medicaid. She stated they can bring pt in through [facility] but will need PTOT notes for auth. She also asked if pt has Medicare. They will need a UAI. She stated they cannot accept pt today as they have some pt's discharging and beds will e available tomorrow. Called pt's son Cassidy Gorman [phone]. He stated pt has Medicare but card is with pt. He stated pt is not covid vaccinated. He will be contacting [facility] today or tomorrow morning. Informed pt's son that he is ready to be discharged from the ED. Spoke with nurse Sabrina Hidalgo updated her and she confirmed pt has his Medicare card and she will give it to Registration to update pt's face sheet. HAYLIE SandersN RN  Care Management  Pager: [phone]

## 2022-06-22 NOTE — ED NOTES
Pt stood up to be placed on bedside commode and is unable to stand on his own, very unbalanced and needs assistance. There was a small amount of red blood noted in the urine left in bucket. Pt cleaned and placed back in the bed. Provider made aware.

## 2022-06-22 NOTE — PROGRESS NOTES
Per Case Management consult, pt needs SNF placement. Pt has [facility]. Spoke with Dr. Pablito Bates about PTOT consults. CM will wait for PTOT notes and sent to facilities for review. Informed him that [employer] usually take longer for auth. Called pt's home number to speak to family but no one picked up.             Kathy Salas, BSN RN  Care Management  Pager: [phone]

## 2022-06-22 NOTE — ED NOTES
ED Course as of 06/23/22 0833   Wed Jun 22, 2022   0829 Discussed with Miranda Treviño, patient will likely need long-term placement. She will follow-up.  [CB]      ED Course User Index  [CB] Vero Miranda MD

## 2022-06-22 NOTE — ED NOTES
Transferred patient to hospital bed for comfort. Call bell given to patient and encouraged to call for assistance if needed, verbalized understanding.

## 2022-06-22 NOTE — ED NOTES
Patient noted to be trying to get out of bed. Patient say he needs to use urinal. Patient is confused. Patient states that he is talking to his wife (no one else is in the room). Patient re-oriented to place and situation.  Patient voids in urinal.

## 2022-06-22 NOTE — ED NOTES
Son returned call and stated that \" called  to place him in Nursing home because I can't take care of him, Im taking care of my mom. He can;t take care of himself, urinates and defecates on himself and is rude, uncooperative and house is getting foreclosed on\". \" I need help for him\".

## 2022-06-22 NOTE — PROGRESS NOTES
Problem: Mobility Impaired (Adult and Pediatric)  Goal: *Acute Goals and Plan of Care (Insert Text)  Description: Physical Therapy Goals  Initiated 6/22/2022 and to be accomplished within 7 day(s)  1. Patient will move from supine to sit and sit to supine , scoot up and down, and roll side to side in bed with modified independence. 2.  Patient will transfer from bed to chair and chair to bed with supervision/set-up using the least restrictive device. 3.  Patient will perform sit to stand with supervision/set-up. 4.  Patient will ambulate with supervision/set-up for 50 feet with the least restrictive device. 5.  Patient will ascend/descend 3 stairs with 1 handrail(s) with minimal assistance/contact guard assist.  6.  Patient will perform BLE therex as prescribed to tolerance. PLOF: Pt living with son in a 1 SH with 3 MARCO, mod ind with functional mobility with cane and walker as needed however with increase in falls recently, hx spinal surgery in April 2022      Outcome: Progressing Towards Goal     PHYSICAL THERAPY EVALUATION    Patient: Rich Fair (73 y.o. male)  Date: 6/22/2022  Primary Diagnosis: No admission diagnoses are documented for this encounter. Precautions:   Fall,Skin  WBAT   PLOF: see above     ASSESSMENT :  Based on the objective data described below, the patient presents with generalized weakness, decreased activity tolerance, impaired balance and gait, decreased functional mobility and insight into deficits with impaired safety awareness. Pt seen in bed in NAD, oriented to person and place at start of session however otherwise confused throughout, pt thinks he is at home talking about his bedroom and throw rugs. Pt with noted posterior lean in sitting and standing this date, min A for cues for anterior weight shifting although difficulty maintaining this date.  Pt amb around room approx 15 ft with one LOB needing mod A for recovery with the RW, otherwise min A for safety and steadying and would be high falls risk if not being assisted due to the posterior lean. At end of session, pt returned to bed with bed alarm on and all needs met, will continue to follow per POC in the acute setting, recommend SNF rehab at discharge. Patient will benefit from skilled intervention to address the above impairments. Patient's rehabilitation potential is considered to be Good  Factors which may influence rehabilitation potential include:   []         None noted  [x]         Mental ability/status  [x]         Medical condition  [x]         Home/family situation and support systems  [x]         Safety awareness  []         Pain tolerance/management  []         Other:      PLAN :  Recommendations and Planned Interventions:   [x]           Bed Mobility Training             [x]    Neuromuscular Re-Education  [x]           Transfer Training                   []    Orthotic/Prosthetic Training  [x]           Gait Training                          []    Modalities  [x]           Therapeutic Exercises           []    Edema Management/Control  [x]           Therapeutic Activities            [x]    Family Training/Education  [x]           Patient Education  []           Other (comment):    Frequency/Duration: Patient will be followed by physical therapy 1-2 times per day/3-5 days per week to address goals. Discharge Recommendations: SNF   Further Equipment Recommendations for Discharge: rolling walker     SUBJECTIVE:   Patient stated I wont stay another day in this place. 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 Matheus Dale  9/11    spinal fusion, L3-5     Barriers to Learning/Limitations: yes;  physical  Compensate with: Visual Cues, Verbal Cues, and Tactile Cues  Home Situation:  Home Situation  Home Environment: Private residence  # Steps to Enter: 4  One/Two Story Residence: One story  Living Alone: No  Support Systems: Child(clau)  Current DME Used/Available at Home: Walker, rolling,Cane, straight  Critical Behavior:  Neurologic State: Alert;Confused;Irritable  Orientation Level: Oriented to person;Oriented to place  Cognition: Follows commands;Decreased attention/concentration  Safety/Judgement: Fall prevention  Psychosocial  Patient Behaviors: Calm;Confused                 Strength:    Strength: Generally decreased, functional                    Tone & Sensation:   Tone: Normal              Sensation: Intact               Range Of Motion:  AROM: Within functional limits                       Posture:        Functional Mobility:  Bed Mobility:     Supine to Sit: Minimum assistance  Sit to Supine: Minimum assistance  Scooting: Contact guard assistance  Transfers:  Sit to Stand: Minimum assistance  Stand to Sit: Minimum assistance                       Balance:   Sitting: Impaired; With support  Sitting - Static: Fair (occasional)  Sitting - Dynamic: Fair (occasional)  Standing: Impaired; With support  Standing - Static: Fair  Standing - Dynamic : Fair  Wheelchair Mobility:        Ambulation/Gait Training:  Distance (ft): 15 Feet (ft)  Assistive Device: Walker, rolling  Ambulation - Level of Assistance: Minimal assistance; Moderate assistance        Gait Abnormalities: Decreased step clearance;Scissoring;Shuffling gait        Base of Support: Narrowed; Center of gravity altered     Speed/Nayeli: Slow;Shuffled  Step Length: Right shortened;Left shortened          Pain:  Pain level pre-treatment: 8/10   Pain level post-treatment: 8/10   Pain Intervention(s) : Medication (see MAR); Rest, Ice, Repositioning  Response to intervention: Nurse notified    Activity Tolerance:   Good    Please refer to the flowsheet for vital signs taken during this treatment. After treatment:   []         Patient left in no apparent distress sitting up in chair  [x]         Patient left in no apparent distress in bed  [x]         Call bell left within reach  [x]         Nursing notified  []         Caregiver present  [x]         Bed alarm activated  []         SCDs applied    COMMUNICATION/EDUCATION:   [x]         Role of Physical Therapy in the acute care setting. [x]         Fall prevention education was provided and the patient/caregiver indicated understanding. [x]         Patient/family have participated as able in goal setting and plan of care. [x]         Patient/family agree to work toward stated goals and plan of care. []         Patient understands intent and goals of therapy, but is neutral about his/her participation. []         Patient is unable to participate in goal setting/plan of care: ongoing with therapy staff.  []         Other:     Thank you for this referral.  Angie Peraza   Time Calculation: 20 mins      Eval Complexity: History: MEDIUM  Complexity : 1-2 comorbidities / personal factors will impact the outcome/ POC Exam:MEDIUM Complexity : 3 Standardized tests and measures addressing body structure, function, activity limitation and / or participation in recreation  Presentation: MEDIUM Complexity : Evolving with changing characteristics  Clinical Decision Making:Medium Complexity    Overall Complexity:MEDIUM

## 2022-06-22 NOTE — ED NOTES
Patient noted to be trying to get out of bed. Patient confused and only oriented to self. There is no bed alarm available. Patient back in bed comfortable with call light in reach.

## 2022-06-22 NOTE — PROGRESS NOTES
For UAI/LTSS, patient lives alone, is , and home is in [facility], son said to list as other for housing. Patient is 5\"9, 145 lbs. Patient uses a RW, and Cane at home. CM cannot start UAI/LTSS at this time, patient's race is White, as confirmed by patient's son, but patient has a Medicaid number in the [address] Medicaid Site, and race is listed as Black, and CM cannot change the race field on the the [address] Medicaid Site. [place] all patient identifiers as correctly listed, SSN, and . CM tried to update Shira Ades via phone. CM spoke with Leonor KING, and updated.            Yaneli Aldridge, RN  Case Management [phone]

## 2022-06-22 NOTE — ED NOTES
Pt given urinal to void. Repositioned in hospital bed & adjusted his covers. Call bell within reach; pt encouraged to ring for assistance as opposed to calling out to staff.

## 2022-06-22 NOTE — PROGRESS NOTES
INTERIM UPDATE - 2145 EST on 6/21/2022    [facility] ER Physician calls requesting admission for a Patient with L1 Endplate Fracture. Notably, Patient has a Spinal Surgery performed last month (5/2022) at [facility] in which the L1 was among the vertebrae fused. Plan:  Given the likelihood that another Orthopedic Surgeon would be apprehensive about working on another Surgeon's previous work and that Patient is only 1 month out, it is strongly recommended that Patient be Transferred to [facility] to the service of Patient's Primary Spinal Orthopedic Surgeon for continuity of care and increased likelihood of surgery if surgery, indeed, is found to be an option for treatment.

## 2022-06-22 NOTE — ED NOTES
Report received from [place], [address] [place] [address]. Assumed care of this patient at this time.

## 2022-06-23 VITALS
RESPIRATION RATE: 16 BRPM | TEMPERATURE: 98 F | HEART RATE: 85 BPM | SYSTOLIC BLOOD PRESSURE: 131 MMHG | HEIGHT: 69 IN | WEIGHT: 145 LBS | OXYGEN SATURATION: 100 % | BODY MASS INDEX: 21.48 KG/M2 | DIASTOLIC BLOOD PRESSURE: 63 MMHG

## 2022-06-23 PROCEDURE — 74011250637 HC RX REV CODE- 250/637: Performed by: STUDENT IN AN ORGANIZED HEALTH CARE EDUCATION/TRAINING PROGRAM

## 2022-06-23 RX ORDER — GABAPENTIN 300 MG/1
300 CAPSULE ORAL 2 TIMES DAILY
Qty: 14 CAPSULE | Refills: 0 | Status: SHIPPED | OUTPATIENT
Start: 2022-06-23 | End: 2022-06-30

## 2022-06-23 RX ORDER — OXYCODONE HYDROCHLORIDE 5 MG/1
5 TABLET ORAL
Qty: 15 TABLET | Refills: 0 | Status: SHIPPED | OUTPATIENT
Start: 2022-06-23 | End: 2022-06-25

## 2022-06-23 RX ADMIN — GLIPIZIDE 5 MG: 5 TABLET ORAL at 12:33

## 2022-06-23 RX ADMIN — SERTRALINE 50 MG: 50 TABLET, FILM COATED ORAL at 12:32

## 2022-06-23 RX ADMIN — CELECOXIB 200 MG: 100 CAPSULE ORAL at 12:32

## 2022-06-23 RX ADMIN — GABAPENTIN 300 MG: 300 CAPSULE ORAL at 12:33

## 2022-06-23 NOTE — ED NOTES
Answered call bell; pt asking where his spouse is & why she is not in his room. Informed pt RN is unsure of her whereabouts. Pt asked \"well what did you do with her; she was laying in the bed with me all morning\". Informed pt he has been alone in his room all morning & that he has not had any visitors today. Pt stated \"I don't know where you get your information from but you're an idiot. You know what, just get the hell out of here\". RN exited the room.

## 2022-06-23 NOTE — PROGRESS NOTES
Problem: Self Care Deficits Care Plan (Adult)  Goal: *Acute Goals and Plan of Care (Insert Text)  Description: Occupational Therapy Goals  Initiated 6/22/2022 within 7 day(s). 1.  Patient will perform lower body dressing with minimal assistance. 2.  Patient will perform functional task for 5 minutes in standing with supervision for balance. 3.  Patient will perform toilet transfers with supervision/set-up. 4.  Patient will perform all aspects of toileting with minimal assistance/contact guard assist.  5.  Patient will participate in upper extremity therapeutic exercise/activities with supervision/set-up for 8 minutes to increase strength/endurance for ADLs. Prior Level of Function: Pt was modified independent with basic self care tasks and used a RW/SPC for functional mobility PTA. He lives with his son. Outcome: Progressing Towards Goal   OCCUPATIONAL THERAPY EVALUATION    Patient: Carrie Hurt (27 y.o. male)  Date: 6/22/2022  Primary Diagnosis: No admission diagnoses are documented for this encounter. Precautions:   Fall,Skin    ASSESSMENT :  Based on the objective data described below, the patient presents with decreased ADLs, decreased functional mobility and muscle weakness, complicated by confusion/irritation. Patient alert and oriented x2 (person, place) upon entry into room. Min to mod assist needed for basic self care tasks while seated and in standing. While seated on EOB, support needed secondary to posterior lean. Min assist also given for functional standing and transfers. Patient seen with PT to maximize safety and functional progress during session. He was confused at times thinking he was at home and there were \"throw rugs\" on the floor. Patient returned to supine in bed at end of session with all needs met. Recommend continued therapy at SNF/LTC to increase his function and safety.       Patient will benefit from skilled intervention to address the above impairments. Patient's rehabilitation potential is considered to be Fair  Factors which may influence rehabilitation potential include:   []             None noted  [x]             Mental ability/status  [x]             Medical condition  []             Home/family situation and support systems  []             Safety awareness  []             Pain tolerance/management  []             Other:      PLAN :  Recommendations and Planned Interventions:   [x]               Self Care Training                  [x]      Therapeutic Activities  [x]               Functional Mobility Training   []      Cognitive Retraining  [x]               Therapeutic Exercises           [x]      Endurance Activities  [x]               Balance Training                    []      Neuromuscular Re-Education  []               Visual/Perceptual Training     [x]      Home Safety Training  [x]               Patient Education                   [x]      Family Training/Education  []               Other (comment):    Frequency/Duration: Patient will be followed by occupational therapy 1-2 times per day/4-7 days per week to address goals. Discharge Recommendations: Skilled Nursing Facility/LTC  Further Equipment Recommendations for Discharge: N/A     SUBJECTIVE:   Patient stated Look in the second drawer down for my pants (referring to his home). 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 Rosalinda Cornejo  9/11    spinal fusion, L3-5     Barriers to Learning/Limitations: yes;  altered mental status (i.e. Confusion)  Compensate with: visual, verbal, tactile, kinesthetic cues/model    Home Situation:   Home Situation  Home Environment: Private residence  # Steps to Enter: 4  One/Two Story Residence: One story  Living Alone: No  Support Systems: Child(clau)  Current DME Used/Available at Home: Walker, rolling,Cane, straight  Tub or Shower Type: Tub/Shower combination  [x]  Right hand dominant   []  Left hand dominant    Cognitive/Behavioral Status:  Neurologic State: Alert;Confused;Irritable  Orientation Level: Oriented to person;Oriented to place  Cognition: Follows commands;Decreased attention/concentration  Safety/Judgement: Fall prevention    Skin: Intact on UEs  Edema: None noted in UEs    Vision/Perceptual:    Acuity: Within Defined Limits      Coordination: BUE  Coordination: Generally decreased, functional  Fine Motor Skills-Upper: Left Intact; Right Intact    Gross Motor Skills-Upper: Left Intact; Right Intact    Balance:  Sitting: Impaired; With support  Sitting - Static: Fair (occasional)  Sitting - Dynamic: Fair (occasional)  Standing: Impaired; With support  Standing - Static: Fair  Standing - Dynamic : Fair    Strength: BUE  Strength: Generally decreased, functional    Tone & Sensation: BUE  Tone: Normal  Sensation: Intact    Range of Motion: BUE  AROM: Within functional limits    Functional Mobility and Transfers for ADLs:  Bed Mobility:  Supine to Sit: Minimum assistance  Sit to Supine: Minimum assistance  Scooting: Contact guard assistance  Transfers:  Sit to Stand: Minimum assistance  Stand to Sit: Minimum assistance   Toilet Transfer : Minimum assistance (with RW)    ADL Assessment:   Feeding: Setup;Supervision    Oral Facial Hygiene/Grooming: Minimum assistance    Bathing: Moderate assistance    Upper Body Dressing: Setup;Supervision    Lower Body Dressing: Moderate assistance    Toileting: Moderate assistance    ADL Intervention:  Patient practiced doffing/donning socks while seated on EOB.   He was able to doff socks but required assist to get socks back over his toes secondary to discomfort/inflexibility. Mod assist given to don underwear while seated and in standing. Underwear removed secondary to being soiled. Cognitive Retraining  Safety/Judgement: Fall prevention    Pain:  Pain level pre-treatment: 5/10, in back and shoulders   Pain level post-treatment: 5/10, in back and shoulders   Pain Intervention(s): Medication (see MAR)  Response to intervention: Nurse notified, See doc flow    Activity Tolerance:   Good  Please refer to the flowsheet for vital signs taken during this treatment. After treatment:   [] Patient left in no apparent distress sitting up in chair  [x] Patient left in no apparent distress in bed  [x] Call bell left within reach  [x] Nursing notified  [] Caregiver present  [] Bed alarm activated    COMMUNICATION/EDUCATION:   [x] Role of Occupational Therapy in the acute care setting  [x] Home safety education was provided and the patient/caregiver indicated understanding. [x] Patient/family have participated as able in goal setting and plan of care. [x] Patient/family agree to work toward stated goals and plan of care. [] Patient understands intent and goals of therapy, but is neutral about his/her participation. [] Patient is unable to participate in goal setting and plan of care. Thank you for this referral.  Kojo Diaz MS OTR/L   Time Calculation: 20 mins    Eval Complexity: History: MEDIUM Complexity : Expanded review of history including physical, cognitive and psychosocial  history ; Examination: MEDIUM Complexity : 3-5 performance deficits relating to physical, cognitive , or psychosocial skils that result in activity limitations and / or participation restrictions; Decision Making:MEDIUM Complexity : Patient may present with comorbidities that affect occupational performnce.  Miniml to moderate modification of tasks or assistance (eg, physical or verbal ) with assesment(s) is necessary to enable patient to complete evaluation

## 2022-06-23 NOTE — ED NOTES
Pt resting with eyes closed & snoring; breathing unlabored. Lights are off for comfort & call bell is clipped to the sheet within pt's reach.

## 2022-06-23 NOTE — ED NOTES
Assisted pt with use of urinal. Repositioned in hospital bed. Oriented pt to location of call bell, which is clipped to the bedding by his left hand.

## 2022-06-23 NOTE — PROGRESS NOTES
Pt's nurse Narda Sherman called CM that she tried calling report to [facility] [employer] but Christian Bull was requesting for a UAI. CM tried calling Christian Bull but she did not . Left a message. Sent text to Ulice Boxer, Case management Director. Called pt's nurse back and she stated Christian Bull just called her to send pt. Sent efe to Ulice Boxer.               Manual HAYLIE LealN RN  Care Management  Pager: [phone]

## 2022-06-23 NOTE — PROGRESS NOTES
Spoke with pt's son Yobani Davies. Informed him we are still waiting for Medicare card to be uploaded in the system. Called Radha of [employer].   Informed her that we could not complete a UAI/LTSS screening because pt is in the ED and CM did not see pt facee to face. Radha stated if pt's Medicare card can be upoaded in the system, they can accept pt for skilled nursing and go from there. Called  and spoke with pt's nurse Dami Roche to please get pt's card for Registration to scan it in the system. She stated she will do and call CM back. 1549: Dami Roche called back. Pt's Medicare now in the system. Informed Radha of [employer] and she will run [employer] and call CM back    Left a message for pt's son.           HAYLIE PalmerN RN  Care Management  Pager: [phone]

## 2022-06-23 NOTE — ED NOTES
Getting patient's medicare card scanned into the system via registration by request [facility] She says she has placement for him but cannot complete process without insurance info.

## 2022-06-23 NOTE — ED NOTES
Evening medications administered. Patient still confused at this time. Patient positioned comfortably in bed with call light in reach.

## 2022-07-06 ENCOUNTER — TELEPHONE (OUTPATIENT)
Dept: INTERNAL MEDICINE CLINIC | Age: 77
End: 2022-07-06

## 2022-07-06 NOTE — TELEPHONE ENCOUNTER
----- Message from Devi Rodriguez sent at 7/6/2022  3:11 PM EDT -----  Subject: Message to Provider    QUESTIONS  Information for Provider? Patient son Chucho Mckinney called stating his father   was put in [facility] and he is thinking he   will be in long term due to back pain and not getting out of bed. He was   admitted on 6/20.  ---------------------------------------------------------------------------  --------------  Kyung Man INFO  [phone]; OK to leave message on voicemail  ---------------------------------------------------------------------------  --------------  SCRIPT ANSWERS  Relationship to Patient? Third Party  Third Party Type? Occupational Therapy Office? Representative Name?  Chucho Mckinney (son)

## 2022-07-21 ENCOUNTER — APPOINTMENT (OUTPATIENT)
Dept: GENERAL RADIOLOGY | Age: 77
End: 2022-07-21
Attending: EMERGENCY MEDICINE
Payer: COMMERCIAL

## 2022-07-21 ENCOUNTER — APPOINTMENT (OUTPATIENT)
Dept: CT IMAGING | Age: 77
End: 2022-07-21
Attending: EMERGENCY MEDICINE
Payer: COMMERCIAL

## 2022-07-21 ENCOUNTER — HOSPITAL ENCOUNTER (EMERGENCY)
Age: 77
Discharge: PSYCHIATRIC OR SUBSTANCE USE FACILITY | End: 2022-07-21
Attending: EMERGENCY MEDICINE
Payer: COMMERCIAL

## 2022-07-21 VITALS
HEIGHT: 70 IN | HEART RATE: 67 BPM | SYSTOLIC BLOOD PRESSURE: 152 MMHG | RESPIRATION RATE: 20 BRPM | BODY MASS INDEX: 21.19 KG/M2 | OXYGEN SATURATION: 100 % | DIASTOLIC BLOOD PRESSURE: 75 MMHG | TEMPERATURE: 97.5 F | WEIGHT: 148 LBS

## 2022-07-21 DIAGNOSIS — I60.9 SUBARACHNOID HEMORRHAGE (HCC): Primary | ICD-10-CM

## 2022-07-21 LAB
ALBUMIN SERPL-MCNC: 3.6 G/DL (ref 3.4–5)
ALBUMIN/GLOB SERPL: 1.1 {RATIO} (ref 0.8–1.7)
ALP SERPL-CCNC: 102 U/L (ref 45–117)
ALT SERPL-CCNC: 17 U/L (ref 16–61)
ANION GAP SERPL CALC-SCNC: 5 MMOL/L (ref 3–18)
AST SERPL-CCNC: 28 U/L (ref 10–38)
ATRIAL RATE: 80 BPM
BASOPHILS # BLD: 0 K/UL (ref 0–0.1)
BASOPHILS NFR BLD: 0 % (ref 0–2)
BILIRUB SERPL-MCNC: 0.7 MG/DL (ref 0.2–1)
BNP SERPL-MCNC: 422 PG/ML (ref 0–1800)
BUN SERPL-MCNC: 21 MG/DL (ref 7–18)
BUN/CREAT SERPL: 24 (ref 12–20)
CALCIUM SERPL-MCNC: 9.4 MG/DL (ref 8.5–10.1)
CALCULATED P AXIS, ECG09: 70 DEGREES
CALCULATED R AXIS, ECG10: 57 DEGREES
CALCULATED T AXIS, ECG11: 73 DEGREES
CHLORIDE SERPL-SCNC: 106 MMOL/L (ref 100–111)
CK SERPL-CCNC: 67 U/L (ref 39–308)
CO2 SERPL-SCNC: 28 MMOL/L (ref 21–32)
CREAT SERPL-MCNC: 0.86 MG/DL (ref 0.6–1.3)
DIAGNOSIS, 93000: NORMAL
DIFFERENTIAL METHOD BLD: ABNORMAL
EOSINOPHIL # BLD: 0.2 K/UL (ref 0–0.4)
EOSINOPHIL NFR BLD: 2 % (ref 0–5)
ERYTHROCYTE [DISTWIDTH] IN BLOOD BY AUTOMATED COUNT: 13 % (ref 11.6–14.5)
GLOBULIN SER CALC-MCNC: 3.4 G/DL (ref 2–4)
GLUCOSE SERPL-MCNC: 101 MG/DL (ref 74–99)
HCT VFR BLD AUTO: 42.1 % (ref 36–48)
HGB BLD-MCNC: 14.5 G/DL (ref 13–16)
IMM GRANULOCYTES # BLD AUTO: 0 K/UL (ref 0–0.04)
IMM GRANULOCYTES NFR BLD AUTO: 0 % (ref 0–0.5)
LYMPHOCYTES # BLD: 1.2 K/UL (ref 0.9–3.6)
LYMPHOCYTES NFR BLD: 13 % (ref 21–52)
MCH RBC QN AUTO: 30.9 PG (ref 24–34)
MCHC RBC AUTO-ENTMCNC: 34.4 G/DL (ref 31–37)
MCV RBC AUTO: 89.6 FL (ref 78–100)
MONOCYTES # BLD: 0.5 K/UL (ref 0.05–1.2)
MONOCYTES NFR BLD: 6 % (ref 3–10)
NEUTS SEG # BLD: 7.1 K/UL (ref 1.8–8)
NEUTS SEG NFR BLD: 79 % (ref 40–73)
NRBC # BLD: 0 K/UL (ref 0–0.01)
NRBC BLD-RTO: 0 PER 100 WBC
P-R INTERVAL, ECG05: 212 MS
PLATELET # BLD AUTO: 204 K/UL (ref 135–420)
PMV BLD AUTO: 10.6 FL (ref 9.2–11.8)
POTASSIUM SERPL-SCNC: 5.3 MMOL/L (ref 3.5–5.5)
PROT SERPL-MCNC: 7 G/DL (ref 6.4–8.2)
Q-T INTERVAL, ECG07: 396 MS
QRS DURATION, ECG06: 78 MS
QTC CALCULATION (BEZET), ECG08: 456 MS
RBC # BLD AUTO: 4.7 M/UL (ref 4.35–5.65)
SODIUM SERPL-SCNC: 139 MMOL/L (ref 136–145)
TROPONIN-HIGH SENSITIVITY: 10 NG/L (ref 0–78)
VENTRICULAR RATE, ECG03: 80 BPM
WBC # BLD AUTO: 9 K/UL (ref 4.6–13.2)

## 2022-07-21 PROCEDURE — 85025 COMPLETE CBC W/AUTO DIFF WBC: CPT

## 2022-07-21 PROCEDURE — 93005 ELECTROCARDIOGRAM TRACING: CPT

## 2022-07-21 PROCEDURE — 99285 EMERGENCY DEPT VISIT HI MDM: CPT

## 2022-07-21 PROCEDURE — 72125 CT NECK SPINE W/O DYE: CPT

## 2022-07-21 PROCEDURE — 80053 COMPREHEN METABOLIC PANEL: CPT

## 2022-07-21 PROCEDURE — 70450 CT HEAD/BRAIN W/O DYE: CPT

## 2022-07-21 PROCEDURE — 82550 ASSAY OF CK (CPK): CPT

## 2022-07-21 PROCEDURE — 70486 CT MAXILLOFACIAL W/O DYE: CPT

## 2022-07-21 PROCEDURE — 75810000293 HC SIMP/SUPERF WND  RPR

## 2022-07-21 PROCEDURE — 72170 X-RAY EXAM OF PELVIS: CPT

## 2022-07-21 PROCEDURE — 71045 X-RAY EXAM CHEST 1 VIEW: CPT

## 2022-07-21 PROCEDURE — 84484 ASSAY OF TROPONIN QUANT: CPT

## 2022-07-21 PROCEDURE — 83880 ASSAY OF NATRIURETIC PEPTIDE: CPT

## 2022-07-21 NOTE — ED TRIAGE NOTES
Pt brought in by EMS/[place] [id number] from [address] [facility] d/t the pt tripping & falling. Pt did not have LOC & has a small laceration about left eye. Pt has dementia & is at his mentation baseline.

## 2022-07-21 NOTE — ED NOTES
RN called [facility] to to verify story concerning pt.'s fall. Per staff, pt. Was seen in bed this AM. Pt. Was found around noon today on floor. They believe he fell trying to go into the hallway. It was unwitnessed but believed his head hit the ground but did not lose consciousness. They verify that he is not blood thinners. They also stated they would update son.

## 2022-07-21 NOTE — ED PROVIDER NOTES
EMERGENCY DEPARTMENT HISTORY AND PHYSICAL EXAM    12:52 PM  Date: 7/21/2022  Patient Name: Chris Crews    History of Presenting Illness       History Provided By: Patient and paramedics    HPI: Chris Crews is a 78 y.o. male with past medical history as below including frequent falls, T12/L3 laminectomy, spinal fusion presents after an unwitnessed fall at nursing home at around noon. Patient states that he \"lost his footing\". Per nursing home staff patient did not lose consciousness. Patient sustained laceration above the left eyelid. Denies any chest pain, abdominal pain, extremity pain. Denies vision changes. Patient is not on anticoagulants. PCP: Dale Guerin MD    Past History     Past Medical History:  Past Medical History:   Diagnosis Date    Arthritis     GERD (gastroesophageal reflux disease)     High blood pressure     Insomnia     Periorbital headache     Sleep apnea     Spinal stenosis        Past Surgical History:  Past Surgical History:   Procedure Laterality Date    ENDOSCOPY, COLON, DIAGNOSTIC      HX LUMBAR DISKECTOMY  6/2001, 4/11    left L3-4 microdiskectomy with intradural rupture    HX ORTHOPAEDIC      spinal Surgery Dr. Leona Poon  9/11    spinal fusion, L3-5       Family History:  Family History   Problem Relation Age of Onset    Heart Attack Mother     Cancer Mother     Heart Disease Mother     Hypertension Father     Seizures Father     Alcohol abuse Father        Social History:  Social History     Tobacco Use    Smoking status: Every Day     Packs/day: 1.00     Years: 51.00     Pack years: 51.00     Types: Cigarettes    Smokeless tobacco: Never   Vaping Use    Vaping Use: Never used   Substance Use Topics    Alcohol use: Yes     Comment: rarely    Drug use: No       Allergies:   Allergies   Allergen Reactions    Codeine Not Reported This Time    Tegretol [Carbamazepine] Not Reported This Time       Review of Systems   Review of Systems   Constitutional:  Negative for activity change, appetite change and chills. HENT:  Negative for congestion, ear discharge, ear pain and sore throat. Eyes:  Negative for photophobia and pain. Respiratory:  Negative for cough and choking. Cardiovascular:  Negative for palpitations and leg swelling. Gastrointestinal:  Negative for anal bleeding and rectal pain. Endocrine: Negative for polydipsia and polyuria. Genitourinary:  Negative for genital sores and urgency. Musculoskeletal:  Negative for arthralgias and myalgias. Neurological:  Negative for dizziness, seizures and speech difficulty. Psychiatric/Behavioral:  Negative for hallucinations, self-injury and suicidal ideas. Physical Exam     Patient Vitals for the past 12 hrs:   Temp Pulse Resp BP SpO2   07/21/22 1423 -- -- -- (!) 136/59 100 %   07/21/22 1323 -- -- -- (!) 149/67 100 %   07/21/22 1243 97.5 °F (36.4 °C) 67 20 (!) 145/72 100 %       Physical Exam  Vitals and nursing note reviewed. Constitutional:       Appearance: He is well-developed. HENT:      Head: Normocephalic and atraumatic. Eyes:      General:         Right eye: No discharge. Left eye: No discharge. Cardiovascular:      Rate and Rhythm: Normal rate and regular rhythm. Heart sounds: Normal heart sounds. No murmur heard. Pulmonary:      Effort: Pulmonary effort is normal. No respiratory distress. Breath sounds: Normal breath sounds. No stridor. No wheezing or rales. Chest:      Chest wall: No tenderness. Abdominal:      General: Bowel sounds are normal. There is no distension. Palpations: Abdomen is soft. Tenderness: There is no abdominal tenderness. There is no guarding or rebound. Musculoskeletal:         General: Normal range of motion. Cervical back: Normal range of motion and neck supple. No tenderness. Skin:     General: Skin is warm and dry.       Comments: Laceration above left eyelid   Neurological: General: No focal deficit present. Mental Status: He is alert and oriented to person, place, and time. Cranial Nerves: No cranial nerve deficit. Diagnostic Study Results     Labs -  Recent Results (from the past 12 hour(s))   EKG, 12 LEAD, INITIAL    Collection Time: 07/21/22  2:33 PM   Result Value Ref Range    Ventricular Rate 80 BPM    Atrial Rate 80 BPM    P-R Interval 212 ms    QRS Duration 78 ms    Q-T Interval 396 ms    QTC Calculation (Bezet) 456 ms    Calculated P Axis 70 degrees    Calculated R Axis 57 degrees    Calculated T Axis 73 degrees    Diagnosis       Sinus rhythm with 1st degree AV block  Septal infarct (cited on or before 21-JUN-2022)  Abnormal ECG  When compared with ECG of 21-JUN-2022 15:13,  No significant change was found     CBC WITH AUTOMATED DIFF    Collection Time: 07/21/22  2:35 PM   Result Value Ref Range    WBC 9.0 4.6 - 13.2 K/uL    RBC 4.70 4.35 - 5.65 M/uL    HGB 14.5 13.0 - 16.0 g/dL    HCT 42.1 36.0 - 48.0 %    MCV 89.6 78.0 - 100.0 FL    MCH 30.9 24.0 - 34.0 PG    MCHC 34.4 31.0 - 37.0 g/dL    RDW 13.0 11.6 - 14.5 %    PLATELET 951 582 - 306 K/uL    MPV 10.6 9.2 - 11.8 FL    NRBC 0.0 0  WBC    ABSOLUTE NRBC 0.00 0.00 - 0.01 K/uL    NEUTROPHILS 79 (H) 40 - 73 %    LYMPHOCYTES 13 (L) 21 - 52 %    MONOCYTES 6 3 - 10 %    EOSINOPHILS 2 0 - 5 %    BASOPHILS 0 0 - 2 %    IMMATURE GRANULOCYTES 0 0.0 - 0.5 %    ABS. NEUTROPHILS 7.1 1.8 - 8.0 K/UL    ABS. LYMPHOCYTES 1.2 0.9 - 3.6 K/UL    ABS. MONOCYTES 0.5 0.05 - 1.2 K/UL    ABS. EOSINOPHILS 0.2 0.0 - 0.4 K/UL    ABS. BASOPHILS 0.0 0.0 - 0.1 K/UL    ABS. IMM. GRANS. 0.0 0.00 - 0.04 K/UL    DF AUTOMATED         Radiologic Studies -   CT HEAD WO CONT    Result Date: 7/21/2022  See separate dedicated CT facial and cervical spine reports. 1.  Small left frontal subarachnoid hemorrhage. No midline shift. 2.  Small left periorbital contusion.  3.  Mild sequela of chronic microvascular ischemic disease and mild generalized volume loss. Results discussed with Dr. Raudel Rodriguez on 7/21/2022 at 1424 hours. CT MAXILLOFACIAL WO CONT    Result Date: 7/21/2022  See separate dedicated CT head and cervical spine reports. 1.  Left periorbital contusion. No underlying fracture. 2.  Left sphenoid sinusitis. 3.  Dental caries and periapical abscesses, as above. 4.  Degenerative changes temporomandibular joints. CT SPINE CERV WO CONT    Result Date: 7/21/2022  1. No acute findings of trauma. 2.  Progressed severe degenerative changes with canal and right greater than left neural foramina stenosis. 3.  Multinodular thyroid, better evaluated with ultrasound on a nonemergent outpatient basis. XR PELV 1 OR 2 V    Result Date: 7/21/2022  No clearly acute radiographic findings. See additional details above. XR CHEST PORT    Result Date: 7/21/2022  No acute radiographic findings. Medical Decision Making     ED Course: Progress Notes, Reevaluation, and Consults:    12:52 PM Initial assessment performed. The patients presenting problems have been discussed, and they/their family are in agreement with the care plan formulated and outlined with them. I have encouraged them to ask questions as they arise throughout their visit.       Provider Notes (Medical Decision Making):   Patient presents with unwitnessed fall from nursing home  Patient with laceration above left eyelid  No neurological deficit on exam  Vitals within normal limits apart from hypertension  No chest wall or abdominal tenderness  Plan to obtain labs, imaging  Labs as interpreted by me  No leukocytosis, no electrolyte abnormality, no elevated troponin  Old medical records reviewed:  EKG as interpreted by me: 80, sinus rhythm, no ST elevation  Laceration repaired as below  CT head with small left frontal subarachnoid hemorrhage  CT: maxillofacial bones, cervical spine: no acute fracture  X-ray chest pelvis no acute findings  Discussed with patient that he will need transfer to trauma center [facility]  Discussed With Dr. Frances Arriola- trauma surgeon who recommended transfer to the emergency department at [facility] for further trauma evaluation  Discussed with Dr. Gustavo Soni ED physician who accepts transfer      Wound Repair    Date/Time: 7/21/2022 3:09 PM  Performed by: attendingPreparation: sterile field established  Location details: face  Wound length:2.6 - 7.5 cm  Anesthesia: local infiltration    Anesthesia:  Local Anesthetic: lidocaine 1% without epinephrine  Foreign bodies: no foreign bodies  Irrigation solution: saline  Irrigation method: jet lavage  Subcutaneous closure: Vicryl  Number of sutures: 4  Technique: simple  Approximation: close                  Vital Signs-Reviewed the patient's vital signs. Reviewed pt's pulse ox reading. Records Reviewed: old medical records  -I am the first provider for this patient.  -I reviewed the vital signs, available nursing notes, past medical history, past surgical history, family history and social history. Current Outpatient Medications   Medication Sig Dispense Refill    HYDROcodone-acetaminophen (NORCO) 5-325 mg per tablet       glipiZIDE SR (GLUCOTROL XL) 5 mg CR tablet glipizide ER 5 mg tablet, extended release 24 hr      celecoxib (CELEBREX) 200 mg capsule       dronabinoL (MARINOL) 2.5 mg capsule Take 2.5 mg by mouth. traZODone (DESYREL) 50 mg tablet Take 1 Tablet by mouth nightly. 30 Tablet 2    sertraline (ZOLOFT) 50 mg tablet Take 1 Tablet by mouth daily. 90 Tablet 2    meclizine (ANTIVERT) 25 mg tablet Take 1 Tablet by mouth three (3) times daily as needed for Dizziness. 30 Tablet 1    soft lens rinse,store solution (UNISOL PLUS) by Does Not Apply route. SUMAtriptan (IMITREX) 100 mg tablet 1 tablet by mouth as needed for headache, may repeat 1 tablet if headache is not resolved in 2 hours. Maximum of 200 mg per 24 hours.  (Patient not taking: Reported on 6/2/2021) 27 Tab 5        Clinical Impression     Clinical Impression: No diagnosis found. Disposition: This note was dictated utilizing voice recognition software which may lead to typographical errors. I apologize in advance if the situation occurs. If questions arise please do not hesitate to contact me or call our department.     Chelita Das MD  12:52 PM

## 2022-07-21 NOTE — ED NOTES
[facility], Encounter, imaging (CD), and other documents from nursing home that were faxed from facility were sent with patient to [facility] loading patient into vehicle @ this time.

## 2022-07-27 ENCOUNTER — HOSPITAL ENCOUNTER (EMERGENCY)
Age: 77
Discharge: NURSING, REHAB OR LONG-TERM CARE | End: 2022-07-28
Attending: EMERGENCY MEDICINE
Payer: COMMERCIAL

## 2022-07-27 ENCOUNTER — APPOINTMENT (OUTPATIENT)
Dept: CT IMAGING | Age: 77
End: 2022-07-27
Attending: EMERGENCY MEDICINE
Payer: COMMERCIAL

## 2022-07-27 VITALS
TEMPERATURE: 97.6 F | BODY MASS INDEX: 22.22 KG/M2 | RESPIRATION RATE: 16 BRPM | HEIGHT: 69 IN | OXYGEN SATURATION: 100 % | DIASTOLIC BLOOD PRESSURE: 60 MMHG | WEIGHT: 150 LBS | HEART RATE: 71 BPM | SYSTOLIC BLOOD PRESSURE: 116 MMHG

## 2022-07-27 DIAGNOSIS — S09.90XA HEAD TRAUMA, INITIAL ENCOUNTER: Primary | ICD-10-CM

## 2022-07-27 DIAGNOSIS — S01.81XA FACIAL LACERATION, INITIAL ENCOUNTER: ICD-10-CM

## 2022-07-27 PROCEDURE — 99284 EMERGENCY DEPT VISIT MOD MDM: CPT

## 2022-07-27 PROCEDURE — 72125 CT NECK SPINE W/O DYE: CPT

## 2022-07-27 PROCEDURE — 70450 CT HEAD/BRAIN W/O DYE: CPT

## 2022-07-28 RX ORDER — ACETAMINOPHEN 325 MG/1
650 TABLET ORAL
Qty: 30 TABLET | Refills: 0 | Status: SHIPPED | OUTPATIENT
Start: 2022-07-28 | End: 2022-09-13

## 2022-07-28 RX ORDER — CEPHALEXIN 500 MG/1
500 CAPSULE ORAL 4 TIMES DAILY
Qty: 20 CAPSULE | Refills: 0 | Status: SHIPPED | OUTPATIENT
Start: 2022-07-28 | End: 2022-08-02

## 2022-07-28 RX ORDER — CEPHALEXIN 500 MG/1
500 CAPSULE ORAL 4 TIMES DAILY
Qty: 20 CAPSULE | Refills: 0 | Status: SHIPPED | OUTPATIENT
Start: 2022-07-28 | End: 2022-07-28

## 2022-07-28 NOTE — ED NOTES
[facility] here and patient transferred to EMS cot. Patient transferred back to nursing home.

## 2022-07-28 NOTE — ED PROVIDER NOTES
HPI 82-year-old male who lives in a nursing home slipped and fell. He was found on the floor. History of frequent falls. He was seen in an ER 1 week ago for head trauma. CT scan should he had a small stable subdural hematoma.      Past Medical History:   Diagnosis Date    Arthritis     GERD (gastroesophageal reflux disease)     High blood pressure     Insomnia     Periorbital headache     Sleep apnea     Spinal stenosis        Past Surgical History:   Procedure Laterality Date    ENDOSCOPY, COLON, DIAGNOSTIC      HX LUMBAR DISKECTOMY  6/2001, 4/11    left L3-4 microdiskectomy with intradural rupture    HX ORTHOPAEDIC      spinal Surgery Dr. Britta Garcia  9/11    spinal fusion, L3-5         Family History:   Problem Relation Age of Onset    Heart Attack Mother     Cancer Mother     Heart Disease Mother     Hypertension Father     Seizures Father     Alcohol abuse Father        Social History     Socioeconomic History    Marital status:      Spouse name: Not on file    Number of children: Not on file    Years of education: Not on file    Highest education level: Not on file   Occupational History    Not on file   Tobacco Use    Smoking status: Every Day     Packs/day: 1.00     Years: 51.00     Pack years: 51.00     Types: Cigarettes    Smokeless tobacco: Never   Vaping Use    Vaping Use: Never used   Substance and Sexual Activity    Alcohol use: Yes     Comment: rarely    Drug use: No    Sexual activity: Yes     Partners: Female   Other Topics Concern    Not on file   Social History Narrative    Not on file     Social Determinants of Health     Financial Resource Strain: Not on file   Food Insecurity: Not on file   Transportation Needs: Not on file   Physical Activity: Not on file   Stress: Not on file   Social Connections: Not on file   Intimate Partner Violence: Not on file   Housing Stability: Not on file         ALLERGIES: Codeine and Tegretol [carbamazepine]    Review of Systems   Constitutional: Negative. HENT: Negative. Face: (+) 1 cm laceration through right eyebrow   Eyes: Negative. Respiratory: Negative. Cardiovascular: Negative. Gastrointestinal: Negative. Endocrine: Negative. Genitourinary: Negative. Musculoskeletal: Negative. Skin: Negative. Allergic/Immunologic: Negative. Neurological: Negative. Hematological: Negative. Psychiatric/Behavioral: Negative. All other systems reviewed and are negative. Vitals:    07/27/22 2150   BP: 116/60   Pulse: 71   Resp: 16   Temp: 97.6 °F (36.4 °C)   SpO2: 100%   Weight: 68 kg (150 lb)   Height: 5' 9\" (1.753 m)            Physical Exam  Vitals and nursing note reviewed. Constitutional:       General: He is not in acute distress. Appearance: He is well-developed. HENT:      Head: Normocephalic. Eyes:      Conjunctiva/sclera: Conjunctivae normal.      Pupils: Pupils are equal, round, and reactive to light. Cardiovascular:      Rate and Rhythm: Normal rate and regular rhythm. Heart sounds: Normal heart sounds. No murmur heard. Pulmonary:      Effort: Pulmonary effort is normal. No respiratory distress. Breath sounds: Normal breath sounds. No wheezing or rales. Chest:      Chest wall: No tenderness. Abdominal:      General: Bowel sounds are normal. There is no distension. Palpations: Abdomen is soft. Tenderness: There is no abdominal tenderness. There is no rebound. Musculoskeletal:         General: No tenderness. Normal range of motion. Cervical back: Normal range of motion and neck supple. Skin:     General: Skin is warm and dry. Findings: No rash. Neurological:      Mental Status: He is alert and oriented to person, place, and time. Cranial Nerves: No cranial nerve deficit. Motor: No abnormal muscle tone.       Coordination: Coordination normal.   Psychiatric:         Behavior: Behavior normal. Thought Content: Thought content normal.         Judgment: Judgment normal.        MDM     Amount and/or Complexity of Data Reviewed  Tests in the radiology section of CPT®: ordered and reviewed    CT scan: interpreted by me, read by radiologist.  (+) stable subdural hemotoma unchange from prior subdural hematoma he acquired 1 week ago. Procedures    Diagnosis: Fracture scar, laceration face, subdural hematoma, epidural hematoma    Diagnosis: laceration of face  Contusion of head    Disposition: D/C home. Head trauma instruction. Suture removal in 8 days by PCP. Rx: keflex tylenol. Return to ER prn. Dictation disclaimer:  Please note that this dictation was completed with [employer], the computer voice recognition software. Quite often unanticipated grammatical, syntax, homophones, and other interpretive errors are inadvertently transcribed by the computer software. Please disregard these errors. Please excuse any errors that have escaped final proofreading.

## 2022-07-28 NOTE — ED TRIAGE NOTES
Patient with history of falls and dementia had ground level fall. No loss of consciousness. Laceration above right eye. Vitals stable.

## 2022-08-11 ENCOUNTER — APPOINTMENT (OUTPATIENT)
Dept: CT IMAGING | Age: 77
End: 2022-08-11
Attending: EMERGENCY MEDICINE
Payer: COMMERCIAL

## 2022-08-11 ENCOUNTER — HOSPITAL ENCOUNTER (EMERGENCY)
Age: 77
Discharge: NURSING, REHAB OR LONG-TERM CARE | End: 2022-08-12
Attending: EMERGENCY MEDICINE
Payer: COMMERCIAL

## 2022-08-11 DIAGNOSIS — Z13.9 ENCOUNTER FOR MEDICAL SCREENING EXAMINATION: Primary | ICD-10-CM

## 2022-08-11 LAB
ALBUMIN SERPL-MCNC: 3.3 G/DL (ref 3.4–5)
ALBUMIN/GLOB SERPL: 1 {RATIO} (ref 0.8–1.7)
ALP SERPL-CCNC: 122 U/L (ref 45–117)
ALT SERPL-CCNC: 12 U/L (ref 16–61)
ANION GAP SERPL CALC-SCNC: 3 MMOL/L (ref 3–18)
APPEARANCE UR: CLEAR
AST SERPL-CCNC: 12 U/L (ref 10–38)
BASOPHILS # BLD: 0 K/UL (ref 0–0.1)
BASOPHILS NFR BLD: 0 % (ref 0–2)
BILIRUB SERPL-MCNC: 0.8 MG/DL (ref 0.2–1)
BILIRUB UR QL: NEGATIVE
BUN SERPL-MCNC: 17 MG/DL (ref 7–18)
BUN/CREAT SERPL: 17 (ref 12–20)
CALCIUM SERPL-MCNC: 9.3 MG/DL (ref 8.5–10.1)
CHLORIDE SERPL-SCNC: 105 MMOL/L (ref 100–111)
CO2 SERPL-SCNC: 30 MMOL/L (ref 21–32)
COLOR UR: YELLOW
CREAT SERPL-MCNC: 0.98 MG/DL (ref 0.6–1.3)
DIFFERENTIAL METHOD BLD: ABNORMAL
EOSINOPHIL # BLD: 0.2 K/UL (ref 0–0.4)
EOSINOPHIL NFR BLD: 3 % (ref 0–5)
ERYTHROCYTE [DISTWIDTH] IN BLOOD BY AUTOMATED COUNT: 13.7 % (ref 11.6–14.5)
GLOBULIN SER CALC-MCNC: 3.4 G/DL (ref 2–4)
GLUCOSE SERPL-MCNC: 149 MG/DL (ref 74–99)
GLUCOSE UR STRIP.AUTO-MCNC: NEGATIVE MG/DL
HCT VFR BLD AUTO: 37.5 % (ref 36–48)
HGB BLD-MCNC: 12.9 G/DL (ref 13–16)
HGB UR QL STRIP: NEGATIVE
IMM GRANULOCYTES # BLD AUTO: 0 K/UL (ref 0–0.04)
IMM GRANULOCYTES NFR BLD AUTO: 0 % (ref 0–0.5)
KETONES UR QL STRIP.AUTO: NEGATIVE MG/DL
LEUKOCYTE ESTERASE UR QL STRIP.AUTO: NEGATIVE
LYMPHOCYTES # BLD: 1.8 K/UL (ref 0.9–3.6)
LYMPHOCYTES NFR BLD: 25 % (ref 21–52)
MAGNESIUM SERPL-MCNC: 2.4 MG/DL (ref 1.6–2.6)
MCH RBC QN AUTO: 30.4 PG (ref 24–34)
MCHC RBC AUTO-ENTMCNC: 34.4 G/DL (ref 31–37)
MCV RBC AUTO: 88.2 FL (ref 78–100)
MONOCYTES # BLD: 0.6 K/UL (ref 0.05–1.2)
MONOCYTES NFR BLD: 8 % (ref 3–10)
NEUTS SEG # BLD: 4.6 K/UL (ref 1.8–8)
NEUTS SEG NFR BLD: 64 % (ref 40–73)
NITRITE UR QL STRIP.AUTO: NEGATIVE
NRBC # BLD: 0 K/UL (ref 0–0.01)
NRBC BLD-RTO: 0 PER 100 WBC
PH UR STRIP: 6.5 [PH] (ref 5–8)
PLATELET # BLD AUTO: 212 K/UL (ref 135–420)
PMV BLD AUTO: 10.5 FL (ref 9.2–11.8)
POTASSIUM SERPL-SCNC: 4.5 MMOL/L (ref 3.5–5.5)
PROT SERPL-MCNC: 6.7 G/DL (ref 6.4–8.2)
PROT UR STRIP-MCNC: NEGATIVE MG/DL
RBC # BLD AUTO: 4.25 M/UL (ref 4.35–5.65)
SODIUM SERPL-SCNC: 138 MMOL/L (ref 136–145)
SP GR UR REFRACTOMETRY: 1.01 (ref 1–1.03)
TROPONIN-HIGH SENSITIVITY: 13 NG/L (ref 0–78)
TROPONIN-HIGH SENSITIVITY: 14 NG/L (ref 0–78)
UROBILINOGEN UR QL STRIP.AUTO: 1 EU/DL (ref 0.2–1)
WBC # BLD AUTO: 7.3 K/UL (ref 4.6–13.2)

## 2022-08-11 PROCEDURE — 74011250636 HC RX REV CODE- 250/636: Performed by: EMERGENCY MEDICINE

## 2022-08-11 PROCEDURE — 85025 COMPLETE CBC W/AUTO DIFF WBC: CPT

## 2022-08-11 PROCEDURE — 81003 URINALYSIS AUTO W/O SCOPE: CPT

## 2022-08-11 PROCEDURE — 84484 ASSAY OF TROPONIN QUANT: CPT

## 2022-08-11 PROCEDURE — 99284 EMERGENCY DEPT VISIT MOD MDM: CPT

## 2022-08-11 PROCEDURE — 83735 ASSAY OF MAGNESIUM: CPT

## 2022-08-11 PROCEDURE — 93005 ELECTROCARDIOGRAM TRACING: CPT

## 2022-08-11 PROCEDURE — 70450 CT HEAD/BRAIN W/O DYE: CPT

## 2022-08-11 PROCEDURE — 80053 COMPREHEN METABOLIC PANEL: CPT

## 2022-08-11 RX ADMIN — SODIUM CHLORIDE 500 ML: 900 INJECTION, SOLUTION INTRAVENOUS at 18:22

## 2022-08-11 NOTE — ED TRIAGE NOTES
Pt sent from [facility] for increased in [facility], hx of dementia. Pt answering questions appropriatly at present.

## 2022-08-11 NOTE — ED NOTES
Pt back from CT, placed on monitor. Pt resting with eyes closed, no apparent distress noted at this time, will continue to monitor pt closely.

## 2022-08-11 NOTE — ED NOTES
Bedside shift change report given to Courtney Eddy by  Dilma Lemon RN. Report included the following information SBAR, ED Summary, and MAR.

## 2022-08-11 NOTE — ED PROVIDER NOTES
EMERGENCY DEPARTMENT HISTORY AND PHYSICAL EXAM    6:48 PM      Date: 8/11/2022  Patient Name: Chris Crews    History of Presenting Illness     Chief Complaint   Patient presents with    Altered mental status         History Provided By: Patient and Nursing Home/SNF/Rehab Center  Location/Duration/Severity/Modifying factors   A 13year-old gentleman currently at the [facility] rehab center, brought in by EMS with concern for altered mental status. Unclear context of the patient's altered mental status. Patient states he is uncertain why he is here, expresses discontent and being brought to the emergency department. He denies any falls today or other trauma. He denies any focal chest pain, abdominal pain, difficulty breathing. Review of the medical record shows that the patient has a previous subdural hematoma. HPI somewhat limited due to patient's recall. Altered mental status   Functional status baseline:  [EPIC#1537^NOTE}     PCP: Dale Guerin MD    Current Facility-Administered Medications   Medication Dose Route Frequency Provider Last Rate Last Admin    sodium chloride 0.9 % bolus infusion 500 mL  500 mL IntraVENous ONCE Nato Jean  mL/hr at 08/11/22 1822 500 mL at 08/11/22 1822     Current Outpatient Medications   Medication Sig Dispense Refill    acetaminophen (TYLENOL) 325 mg tablet Take 2 Tablets by mouth every four (4) hours as needed for Pain. 30 Tablet 0    HYDROcodone-acetaminophen (NORCO) 5-325 mg per tablet       glipiZIDE SR (GLUCOTROL XL) 5 mg CR tablet glipizide ER 5 mg tablet, extended release 24 hr      celecoxib (CELEBREX) 200 mg capsule       dronabinoL (MARINOL) 2.5 mg capsule Take 2.5 mg by mouth. traZODone (DESYREL) 50 mg tablet Take 1 Tablet by mouth nightly. 30 Tablet 2    sertraline (ZOLOFT) 50 mg tablet Take 1 Tablet by mouth daily.  90 Tablet 2    meclizine (ANTIVERT) 25 mg tablet Take 1 Tablet by mouth three (3) times daily as needed for Dizziness. 30 Tablet 1    soft lens rinse,store solution (UNISOL PLUS) by Does Not Apply route. SUMAtriptan (IMITREX) 100 mg tablet 1 tablet by mouth as needed for headache, may repeat 1 tablet if headache is not resolved in 2 hours. Maximum of 200 mg per 24 hours. 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 Gavin Lake  9/11    spinal fusion, L3-5       Family History:  Family History   Problem Relation Age of Onset    Heart Attack Mother     Cancer Mother     Heart Disease Mother     Hypertension Father     Seizures Father     Alcohol abuse Father        Social History:  Social History     Tobacco Use    Smoking status: Every Day     Packs/day: 1.00     Years: 51.00     Pack years: 51.00     Types: Cigarettes    Smokeless tobacco: Never   Vaping Use    Vaping Use: Never used   Substance Use Topics    Alcohol use: Yes     Comment: rarely    Drug use: No       Allergies: Allergies   Allergen Reactions    Codeine Not Reported This Time    Tegretol [Carbamazepine] Not Reported This Time         Review of Systems       Review of Systems   Unable to perform ROS: Other (Patient's recall, participation and cooperation.)       Physical Exam   Visit Vitals  BP (!) 142/55   Pulse 60   Temp 97.9 °F (36.6 °C)   Resp 16   Ht 6' (1.829 m)   Wt 68 kg (150 lb)   SpO2 100%   BMI 20.34 kg/m²         Physical Exam  Nursing note and vitals reviewed.   General appearance: non-toxic, no distress sitting in stretcher  Eyes: PERRL, EOMI, anicteric sclera  HEENT: mucous membranes tacky  Pulmonary: Breath sounds present bilaterally, no wheeze  Cardiac: normal rate and regular rhythm,  1+DP pulses, 2+ radial pulses  Abdomen: soft, nontender, nondistended  MSK: no pre-tibial edema  Neuro: Awake, alert, answers some questions, face symmetric, tongue midline,  symmetric, no drift of the upper or lower extremities  Skin: capillary refill 2 to 3 seconds      Diagnostic Study Results     Labs -  Recent Results (from the past 12 hour(s))   CBC WITH AUTOMATED DIFF    Collection Time: 08/11/22  4:53 PM   Result Value Ref Range    WBC 7.3 4.6 - 13.2 K/uL    RBC 4.25 (L) 4.35 - 5.65 M/uL    HGB 12.9 (L) 13.0 - 16.0 g/dL    HCT 37.5 36.0 - 48.0 %    MCV 88.2 78.0 - 100.0 FL    MCH 30.4 24.0 - 34.0 PG    MCHC 34.4 31.0 - 37.0 g/dL    RDW 13.7 11.6 - 14.5 %    PLATELET 951 050 - 649 K/uL    MPV 10.5 9.2 - 11.8 FL    NRBC 0.0 0  WBC    ABSOLUTE NRBC 0.00 0.00 - 0.01 K/uL    NEUTROPHILS 64 40 - 73 %    LYMPHOCYTES 25 21 - 52 %    MONOCYTES 8 3 - 10 %    EOSINOPHILS 3 0 - 5 %    BASOPHILS 0 0 - 2 %    IMMATURE GRANULOCYTES 0 0.0 - 0.5 %    ABS. NEUTROPHILS 4.6 1.8 - 8.0 K/UL    ABS. LYMPHOCYTES 1.8 0.9 - 3.6 K/UL    ABS. MONOCYTES 0.6 0.05 - 1.2 K/UL    ABS. EOSINOPHILS 0.2 0.0 - 0.4 K/UL    ABS. BASOPHILS 0.0 0.0 - 0.1 K/UL    ABS. IMM.  GRANS. 0.0 0.00 - 0.04 K/UL    DF AUTOMATED     METABOLIC PANEL, COMPREHENSIVE    Collection Time: 08/11/22  4:53 PM   Result Value Ref Range    Sodium 138 136 - 145 mmol/L    Potassium 4.5 3.5 - 5.5 mmol/L    Chloride 105 100 - 111 mmol/L    CO2 30 21 - 32 mmol/L    Anion gap 3 3.0 - 18 mmol/L    Glucose 149 (H) 74 - 99 mg/dL    BUN 17 7.0 - 18 MG/DL    Creatinine 0.98 0.6 - 1.3 MG/DL    BUN/Creatinine ratio 17 12 - 20      GFR est AA >60 >60 ml/min/1.73m2    GFR est non-AA >60 >60 ml/min/1.73m2    Calcium 9.3 8.5 - 10.1 MG/DL    Bilirubin, total 0.8 0.2 - 1.0 MG/DL    ALT (SGPT) 12 (L) 16 - 61 U/L    AST (SGOT) 12 10 - 38 U/L    Alk. phosphatase 122 (H) 45 - 117 U/L    Protein, total 6.7 6.4 - 8.2 g/dL    Albumin 3.3 (L) 3.4 - 5.0 g/dL    Globulin 3.4 2.0 - 4.0 g/dL    A-G Ratio 1.0 0.8 - 1.7         Radiologic Studies -   CT HEAD WO CONT    (Results Pending)         Medical Decision Making   I am the first provider for this patient. I reviewed the vital signs, available nursing notes, past medical history, past surgical history, family history and social history. Vital Signs-Reviewed the patient's vital signs. EKG: EKG interpreted by me, time 1656 hrs., sinus rhythm with sinus arrhythmia, normal axis, rate 64, , QTc 460, poor R wave progression, no ST elevation or ST depression noted. Records Reviewed: Old Medical Records, Previous Radiology Studies, and Previous Laboratory Studies (Time of Review: 6:48 PM)    ED Course: Progress Notes, Reevaluation, and Consults:         Provider Notes (Medical Decision Making):   MDM  Number of Diagnoses or Management Options  Encounter for medical screening examination  Diagnosis management comments: 68-year-old gentleman brought over from [facility] [address] [place] [address], apparently has altered mental status. On initial examination, the patient somewhat cantankerous, irritated that he is in the hospital again, and answers some questions but is somewhat limited in his cooperation during the H&P. No obvious deficits noted on examination, there is no report of any acute trauma. Review of the EMR shows he has a history of a subdural hematoma. Noncontrast CT head has been ordered, along with labs, EKG, urinalysis. Patient signed over to the overnight physician, Dr. Donal Lamar, for reevaluation pending completion of his medical work-up and final disposition. Procedures    Critical Care Time: 0      Diagnosis     Clinical Impression:   1.  Encounter for medical screening examination        Disposition: Signed out to Dr. Donal Lamar with pending completion of his work-up. Follow-up Information    None          Patient's Medications   Start Taking    No medications on file   Continue Taking    ACETAMINOPHEN (TYLENOL) 325 MG TABLET    Take 2 Tablets by mouth every four (4) hours as needed for Pain. CELECOXIB (CELEBREX) 200 MG CAPSULE        DRONABINOL (MARINOL) 2.5 MG CAPSULE    Take 2.5 mg by mouth. GLIPIZIDE SR (GLUCOTROL XL) 5 MG CR TABLET    glipizide ER 5 mg tablet, extended release 24 hr    HYDROCODONE-ACETAMINOPHEN (NORCO) 5-325 MG PER TABLET        MECLIZINE (ANTIVERT) 25 MG TABLET    Take 1 Tablet by mouth three (3) times daily as needed for Dizziness. SERTRALINE (ZOLOFT) 50 MG TABLET    Take 1 Tablet by mouth daily. SOFT LENS RINSE,STORE SOLUTION (UNISOL PLUS)    by Does Not Apply route. SUMATRIPTAN (IMITREX) 100 MG TABLET    1 tablet by mouth as needed for headache, may repeat 1 tablet if headache is not resolved in 2 hours. Maximum of 200 mg per 24 hours. TRAZODONE (DESYREL) 50 MG TABLET    Take 1 Tablet by mouth nightly. These Medications have changed    No medications on file   Stop Taking    No medications on file     Disclaimer: Sections of this note are dictated using utilizing voice recognition software. Minor typographical errors may be present. If questions arise, please do not hesitate to contact me or call our department.

## 2022-08-12 VITALS
HEIGHT: 72 IN | WEIGHT: 150 LBS | TEMPERATURE: 97.9 F | RESPIRATION RATE: 15 BRPM | SYSTOLIC BLOOD PRESSURE: 142 MMHG | OXYGEN SATURATION: 100 % | HEART RATE: 58 BPM | BODY MASS INDEX: 20.32 KG/M2 | DIASTOLIC BLOOD PRESSURE: 64 MMHG

## 2022-08-12 LAB
ATRIAL RATE: 64 BPM
CALCULATED P AXIS, ECG09: 68 DEGREES
CALCULATED R AXIS, ECG10: 71 DEGREES
CALCULATED T AXIS, ECG11: 79 DEGREES
DIAGNOSIS, 93000: NORMAL
P-R INTERVAL, ECG05: 198 MS
Q-T INTERVAL, ECG07: 446 MS
QRS DURATION, ECG06: 78 MS
QTC CALCULATION (BEZET), ECG08: 460 MS
VENTRICULAR RATE, ECG03: 64 BPM

## 2022-08-12 RX ORDER — ACETAMINOPHEN 325 MG/1
650 TABLET ORAL
Status: DISCONTINUED | OUTPATIENT
Start: 2022-08-12 | End: 2022-08-12 | Stop reason: HOSPADM

## 2022-08-12 NOTE — ED NOTES
9:45 PM CT head: negative   patient: Patient alert and oriented. And back to normal self. Disposition: DC back to nursing home. Patient to continues his regular medications as scheduled and follow-up. PCP in 2 days. Return to ER as needed.

## 2022-09-13 ENCOUNTER — HOSPITAL ENCOUNTER (INPATIENT)
Age: 77
LOS: 8 days | Discharge: NURSING, REHAB OR LONG-TERM CARE | DRG: 271 | End: 2022-09-21
Attending: EMERGENCY MEDICINE | Admitting: FAMILY MEDICINE
Payer: MEDICARE

## 2022-09-13 ENCOUNTER — APPOINTMENT (OUTPATIENT)
Dept: GENERAL RADIOLOGY | Age: 77
DRG: 271 | End: 2022-09-13
Attending: STUDENT IN AN ORGANIZED HEALTH CARE EDUCATION/TRAINING PROGRAM
Payer: MEDICARE

## 2022-09-13 DIAGNOSIS — I73.9 PAD (PERIPHERAL ARTERY DISEASE) (HCC): ICD-10-CM

## 2022-09-13 DIAGNOSIS — Z51.5 ENCOUNTER FOR PALLIATIVE CARE: ICD-10-CM

## 2022-09-13 DIAGNOSIS — I73.9 PVD (PERIPHERAL VASCULAR DISEASE) (HCC): ICD-10-CM

## 2022-09-13 DIAGNOSIS — Z71.89 GOALS OF CARE, COUNSELING/DISCUSSION: ICD-10-CM

## 2022-09-13 DIAGNOSIS — L03.039 CELLULITIS OF TOE, UNSPECIFIED LATERALITY: ICD-10-CM

## 2022-09-13 DIAGNOSIS — I96 GANGRENE (HCC): Primary | ICD-10-CM

## 2022-09-13 PROBLEM — L03.90 CELLULITIS: Status: ACTIVE | Noted: 2022-09-13

## 2022-09-13 LAB
ALBUMIN SERPL-MCNC: 2.9 G/DL (ref 3.4–5)
ALBUMIN/GLOB SERPL: 0.6 {RATIO} (ref 0.8–1.7)
ALP SERPL-CCNC: 88 U/L (ref 45–117)
ALT SERPL-CCNC: 15 U/L (ref 16–61)
ANION GAP SERPL CALC-SCNC: 3 MMOL/L (ref 3–18)
AST SERPL-CCNC: 9 U/L (ref 10–38)
BASOPHILS # BLD: 0 K/UL (ref 0–0.1)
BASOPHILS NFR BLD: 0 % (ref 0–2)
BILIRUB SERPL-MCNC: 0.7 MG/DL (ref 0.2–1)
BUN SERPL-MCNC: 24 MG/DL (ref 7–18)
BUN/CREAT SERPL: 23 (ref 12–20)
CALCIUM SERPL-MCNC: 9.3 MG/DL (ref 8.5–10.1)
CHLORIDE SERPL-SCNC: 100 MMOL/L (ref 100–111)
CO2 SERPL-SCNC: 32 MMOL/L (ref 21–32)
CREAT SERPL-MCNC: 1.06 MG/DL (ref 0.6–1.3)
CRP SERPL-MCNC: 17.8 MG/DL (ref 0–0.3)
DIFFERENTIAL METHOD BLD: ABNORMAL
EOSINOPHIL # BLD: 0.1 K/UL (ref 0–0.4)
EOSINOPHIL NFR BLD: 1 % (ref 0–5)
ERYTHROCYTE [DISTWIDTH] IN BLOOD BY AUTOMATED COUNT: 13.1 % (ref 11.6–14.5)
ERYTHROCYTE [SEDIMENTATION RATE] IN BLOOD: >140 MM/HR (ref 0–20)
EST. AVERAGE GLUCOSE BLD GHB EST-MCNC: 120 MG/DL
GLOBULIN SER CALC-MCNC: 4.9 G/DL (ref 2–4)
GLUCOSE SERPL-MCNC: 152 MG/DL (ref 74–99)
HBA1C MFR BLD: 5.8 % (ref 4.2–5.6)
HCT VFR BLD AUTO: 32.7 % (ref 36–48)
HGB BLD-MCNC: 10.9 G/DL (ref 13–16)
IMM GRANULOCYTES # BLD AUTO: 0.1 K/UL (ref 0–0.04)
IMM GRANULOCYTES NFR BLD AUTO: 1 % (ref 0–0.5)
LYMPHOCYTES # BLD: 0.7 K/UL (ref 0.9–3.6)
LYMPHOCYTES NFR BLD: 8 % (ref 21–52)
MCH RBC QN AUTO: 29.8 PG (ref 24–34)
MCHC RBC AUTO-ENTMCNC: 33.3 G/DL (ref 31–37)
MCV RBC AUTO: 89.3 FL (ref 78–100)
MONOCYTES # BLD: 0.9 K/UL (ref 0.05–1.2)
MONOCYTES NFR BLD: 11 % (ref 3–10)
NEUTS SEG # BLD: 6.9 K/UL (ref 1.8–8)
NEUTS SEG NFR BLD: 80 % (ref 40–73)
NRBC # BLD: 0 K/UL (ref 0–0.01)
NRBC BLD-RTO: 0 PER 100 WBC
PLATELET # BLD AUTO: 285 K/UL (ref 135–420)
PMV BLD AUTO: 9.8 FL (ref 9.2–11.8)
POTASSIUM SERPL-SCNC: 5 MMOL/L (ref 3.5–5.5)
PROT SERPL-MCNC: 7.8 G/DL (ref 6.4–8.2)
RBC # BLD AUTO: 3.66 M/UL (ref 4.35–5.65)
SODIUM SERPL-SCNC: 135 MMOL/L (ref 136–145)
WBC # BLD AUTO: 8.7 K/UL (ref 4.6–13.2)

## 2022-09-13 PROCEDURE — 80053 COMPREHEN METABOLIC PANEL: CPT

## 2022-09-13 PROCEDURE — 87040 BLOOD CULTURE FOR BACTERIA: CPT

## 2022-09-13 PROCEDURE — APPSS60 APP SPLIT SHARED TIME 46-60 MINUTES: Performed by: NURSE PRACTITIONER

## 2022-09-13 PROCEDURE — 87150 DNA/RNA AMPLIFIED PROBE: CPT

## 2022-09-13 PROCEDURE — 86140 C-REACTIVE PROTEIN: CPT

## 2022-09-13 PROCEDURE — 85025 COMPLETE CBC W/AUTO DIFF WBC: CPT

## 2022-09-13 PROCEDURE — 73630 X-RAY EXAM OF FOOT: CPT

## 2022-09-13 PROCEDURE — 94761 N-INVAS EAR/PLS OXIMETRY MLT: CPT

## 2022-09-13 PROCEDURE — 85652 RBC SED RATE AUTOMATED: CPT

## 2022-09-13 PROCEDURE — 83036 HEMOGLOBIN GLYCOSYLATED A1C: CPT

## 2022-09-13 PROCEDURE — 65270000029 HC RM PRIVATE

## 2022-09-13 PROCEDURE — 99223 1ST HOSP IP/OBS HIGH 75: CPT | Performed by: NURSE PRACTITIONER

## 2022-09-13 PROCEDURE — 99285 EMERGENCY DEPT VISIT HI MDM: CPT

## 2022-09-13 RX ORDER — ENOXAPARIN SODIUM 100 MG/ML
40 INJECTION SUBCUTANEOUS DAILY
Status: DISCONTINUED | OUTPATIENT
Start: 2022-09-14 | End: 2022-09-22 | Stop reason: HOSPADM

## 2022-09-13 RX ORDER — DEXTROSE MONOHYDRATE 100 MG/ML
0-250 INJECTION, SOLUTION INTRAVENOUS AS NEEDED
Status: DISCONTINUED | OUTPATIENT
Start: 2022-09-13 | End: 2022-09-22 | Stop reason: HOSPADM

## 2022-09-13 RX ORDER — CEPHALEXIN 500 MG/1
500 CAPSULE ORAL 2 TIMES DAILY
COMMUNITY
Start: 2022-09-09 | End: 2022-09-21

## 2022-09-13 RX ORDER — DIVALPROEX SODIUM 250 MG/1
250 TABLET, DELAYED RELEASE ORAL 2 TIMES DAILY
Status: DISCONTINUED | OUTPATIENT
Start: 2022-09-14 | End: 2022-09-22 | Stop reason: HOSPADM

## 2022-09-13 RX ORDER — POLYETHYLENE GLYCOL 3350 17 G/17G
17 POWDER, FOR SOLUTION ORAL DAILY PRN
Status: DISCONTINUED | OUTPATIENT
Start: 2022-09-13 | End: 2022-09-22 | Stop reason: HOSPADM

## 2022-09-13 RX ORDER — ACETAMINOPHEN 325 MG/1
650 TABLET ORAL
Status: DISCONTINUED | OUTPATIENT
Start: 2022-09-13 | End: 2022-09-22 | Stop reason: HOSPADM

## 2022-09-13 RX ORDER — GABAPENTIN 300 MG/1
300 CAPSULE ORAL 2 TIMES DAILY
COMMUNITY

## 2022-09-13 RX ORDER — OXYCODONE HYDROCHLORIDE 5 MG/1
5 CAPSULE ORAL
COMMUNITY
End: 2022-09-21

## 2022-09-13 RX ORDER — ACETAMINOPHEN 650 MG/1
650 SUPPOSITORY RECTAL
Status: DISCONTINUED | OUTPATIENT
Start: 2022-09-13 | End: 2022-09-22 | Stop reason: HOSPADM

## 2022-09-13 RX ORDER — ONDANSETRON 4 MG/1
4 TABLET, ORALLY DISINTEGRATING ORAL
Status: DISCONTINUED | OUTPATIENT
Start: 2022-09-13 | End: 2022-09-22 | Stop reason: HOSPADM

## 2022-09-13 RX ORDER — DIVALPROEX SODIUM 250 MG/1
TABLET, DELAYED RELEASE ORAL 2 TIMES DAILY
COMMUNITY

## 2022-09-13 RX ORDER — SODIUM CHLORIDE 0.9 % (FLUSH) 0.9 %
5-40 SYRINGE (ML) INJECTION AS NEEDED
Status: DISCONTINUED | OUTPATIENT
Start: 2022-09-13 | End: 2022-09-22 | Stop reason: HOSPADM

## 2022-09-13 RX ORDER — DRONABINOL 2.5 MG/1
2.5 CAPSULE ORAL 2 TIMES DAILY
Status: DISCONTINUED | OUTPATIENT
Start: 2022-09-14 | End: 2022-09-22 | Stop reason: HOSPADM

## 2022-09-13 RX ORDER — INSULIN LISPRO 100 [IU]/ML
INJECTION, SOLUTION INTRAVENOUS; SUBCUTANEOUS
Status: DISCONTINUED | OUTPATIENT
Start: 2022-09-14 | End: 2022-09-13

## 2022-09-13 RX ORDER — SODIUM CHLORIDE 9 MG/ML
100 INJECTION, SOLUTION INTRAVENOUS CONTINUOUS
Status: DISPENSED | OUTPATIENT
Start: 2022-09-13 | End: 2022-09-18

## 2022-09-13 RX ORDER — SUMATRIPTAN 100 MG/1
100 TABLET, FILM COATED ORAL
COMMUNITY
End: 2022-09-21

## 2022-09-13 RX ORDER — SERTRALINE HYDROCHLORIDE 50 MG/1
50 TABLET, FILM COATED ORAL DAILY
Status: DISCONTINUED | OUTPATIENT
Start: 2022-09-14 | End: 2022-09-22 | Stop reason: HOSPADM

## 2022-09-13 RX ORDER — INSULIN LISPRO 100 [IU]/ML
INJECTION, SOLUTION INTRAVENOUS; SUBCUTANEOUS EVERY 6 HOURS
Status: DISCONTINUED | OUTPATIENT
Start: 2022-09-14 | End: 2022-09-22 | Stop reason: HOSPADM

## 2022-09-13 RX ORDER — SODIUM CHLORIDE 9 MG/ML
100 INJECTION, SOLUTION INTRAVENOUS CONTINUOUS
Status: DISCONTINUED | OUTPATIENT
Start: 2022-09-13 | End: 2022-09-13

## 2022-09-13 RX ORDER — SODIUM CHLORIDE 0.9 % (FLUSH) 0.9 %
5-40 SYRINGE (ML) INJECTION EVERY 8 HOURS
Status: DISCONTINUED | OUTPATIENT
Start: 2022-09-13 | End: 2022-09-22 | Stop reason: HOSPADM

## 2022-09-13 RX ORDER — ONDANSETRON 2 MG/ML
4 INJECTION INTRAMUSCULAR; INTRAVENOUS
Status: DISCONTINUED | OUTPATIENT
Start: 2022-09-13 | End: 2022-09-22 | Stop reason: HOSPADM

## 2022-09-13 RX ORDER — GABAPENTIN 300 MG/1
300 CAPSULE ORAL 2 TIMES DAILY
Status: DISCONTINUED | OUTPATIENT
Start: 2022-09-14 | End: 2022-09-22 | Stop reason: HOSPADM

## 2022-09-13 RX ORDER — MAGNESIUM SULFATE 100 %
4 CRYSTALS MISCELLANEOUS AS NEEDED
Status: DISCONTINUED | OUTPATIENT
Start: 2022-09-13 | End: 2022-09-22 | Stop reason: HOSPADM

## 2022-09-13 NOTE — ED PROVIDER NOTES
EMERGENCY DEPARTMENT HISTORY AND PHYSICAL EXAM    7:40 PM      Date: 9/13/2022  Patient Name: Soham Covert    History of Presenting Illness     Chief Complaint   Patient presents with    Wound Infection         History Provided By: Patient  Location/Duration/Severity/Modifying factors   46-year-old male with history of DM, HTN, headaches presents for 2 to 3 weeks of worsening \" infection in toes. \"  [facility] Patient states that he came to the ER at the request of his family and requests a foot specialist.  Patient denies systemic symptoms, fevers, cough, chest pain, dyspnea, injury to the area. He has been on Keflex recently with no response of cellulitis. PCP: Dilshad Bergeron MD    Current Facility-Administered Medications   Medication Dose Route Frequency Provider Last Rate Last Admin    0.9% sodium chloride infusion  100 mL/hr IntraVENous CONTINUOUS Nicko Ward MD         Current Outpatient Medications   Medication Sig Dispense Refill    acetaminophen (TYLENOL) 325 mg tablet Take 2 Tablets by mouth every four (4) hours as needed for Pain. 30 Tablet 0    HYDROcodone-acetaminophen (NORCO) 5-325 mg per tablet       glipiZIDE SR (GLUCOTROL XL) 5 mg CR tablet glipizide ER 5 mg tablet, extended release 24 hr      celecoxib (CELEBREX) 200 mg capsule       dronabinoL (MARINOL) 2.5 mg capsule Take 2.5 mg by mouth. traZODone (DESYREL) 50 mg tablet Take 1 Tablet by mouth nightly. 30 Tablet 2    sertraline (ZOLOFT) 50 mg tablet Take 1 Tablet by mouth daily. 90 Tablet 2    meclizine (ANTIVERT) 25 mg tablet Take 1 Tablet by mouth three (3) times daily as needed for Dizziness. 30 Tablet 1    soft lens rinse,store solution (UNISOL PLUS) by Does Not Apply route. SUMAtriptan (IMITREX) 100 mg tablet 1 tablet by mouth as needed for headache, may repeat 1 tablet if headache is not resolved in 2 hours. Maximum of 200 mg per 24 hours.  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 Mica Ojeda  9/11    spinal fusion, L3-5       Family History:  Family History   Problem Relation Age of Onset    Heart Attack Mother     Cancer Mother     Heart Disease Mother     Hypertension Father     Seizures Father     Alcohol abuse Father        Social History:  Social History     Tobacco Use    Smoking status: Every Day     Packs/day: 1.00     Years: 51.00     Pack years: 51.00     Types: Cigarettes    Smokeless tobacco: Never   Vaping Use    Vaping Use: Never used   Substance Use Topics    Alcohol use: Yes     Comment: rarely    Drug use: No       Allergies: Allergies   Allergen Reactions    Codeine Not Reported This Time    Tegretol [Carbamazepine] Not Reported This Time         Review of Systems       Review of Systems   Constitutional:  Negative for chills and fever. HENT:  Negative for congestion. Eyes:  Negative for redness and visual disturbance. Respiratory:  Negative for cough, chest tightness and shortness of breath. Cardiovascular:  Negative for chest pain, palpitations and leg swelling. Gastrointestinal:  Negative for abdominal pain, blood in stool, diarrhea, nausea and vomiting. Genitourinary:  Negative for dysuria, flank pain and hematuria. Musculoskeletal:  Positive for arthralgias. Negative for myalgias. Skin:  Positive for rash and wound. Neurological:  Negative for dizziness and headaches.    Psychiatric/Behavioral:  Negative for confusion. Physical Exam   Visit Vitals  /61   Pulse 90   Temp 97.6 °F (36.4 °C)   Resp 18   SpO2 98%         Physical Exam  Vitals and nursing note reviewed. Constitutional:       General: He is not in acute distress. HENT:      Head: Normocephalic and atraumatic. Nose: No congestion. Mouth/Throat:      Mouth: Mucous membranes are moist.      Pharynx: Oropharynx is clear. Eyes:      Extraocular Movements: Extraocular movements intact. Conjunctiva/sclera: Conjunctivae normal.   Cardiovascular:      Rate and Rhythm: Normal rate and regular rhythm. Pulses: Normal pulses. Heart sounds: No murmur heard. Pulmonary:      Effort: No respiratory distress. Breath sounds: Normal breath sounds. No wheezing or rales. Abdominal:      General: Abdomen is flat. There is no distension. Palpations: Abdomen is soft. Tenderness: There is no abdominal tenderness. Musculoskeletal:         General: No swelling. Cervical back: Normal range of motion. Right lower leg: No edema. Left lower leg: No edema. Comments: Right foot: Distal second and third digit with blackened hardened skin. Surrounding erythema involving distal foot    Left Foot: Distal second digit blackened and hardened. Surrounding erythema to distal foot. Skin:     General: Skin is warm and dry. Capillary Refill: Capillary refill takes less than 2 seconds. Findings: No rash. Neurological:      General: No focal deficit present. Mental Status: He is alert and oriented to person, place, and time. Motor: No weakness.    Psychiatric:         Mood and Affect: Mood normal.         Behavior: Behavior normal.         Diagnostic Study Results     Labs -  Recent Results (from the past 12 hour(s))   METABOLIC PANEL, COMPREHENSIVE    Collection Time: 09/13/22  9:13 PM   Result Value Ref Range    Sodium 135 (L) 136 - 145 mmol/L    Potassium 5.0 3.5 - 5.5 mmol/L    Chloride 100 100 - 111 mmol/L    CO2 32 21 - 32 mmol/L    Anion gap 3 3.0 - 18 mmol/L    Glucose 152 (H) 74 - 99 mg/dL    BUN 24 (H) 7.0 - 18 MG/DL    Creatinine 1.06 0.6 - 1.3 MG/DL    BUN/Creatinine ratio 23 (H) 12 - 20      GFR est AA >60 >60 ml/min/1.73m2    GFR est non-AA >60 >60 ml/min/1.73m2    Calcium 9.3 8.5 - 10.1 MG/DL    Bilirubin, total 0.7 0.2 - 1.0 MG/DL    ALT (SGPT) 15 (L) 16 - 61 U/L    AST (SGOT) 9 (L) 10 - 38 U/L    Alk. phosphatase 88 45 - 117 U/L    Protein, total 7.8 6.4 - 8.2 g/dL    Albumin 2.9 (L) 3.4 - 5.0 g/dL    Globulin 4.9 (H) 2.0 - 4.0 g/dL    A-G Ratio 0.6 (L) 0.8 - 1.7     CBC WITH AUTOMATED DIFF    Collection Time: 09/13/22  9:13 PM   Result Value Ref Range    WBC 8.7 4.6 - 13.2 K/uL    RBC 3.66 (L) 4.35 - 5.65 M/uL    HGB 10.9 (L) 13.0 - 16.0 g/dL    HCT 32.7 (L) 36.0 - 48.0 %    MCV 89.3 78.0 - 100.0 FL    MCH 29.8 24.0 - 34.0 PG    MCHC 33.3 31.0 - 37.0 g/dL    RDW 13.1 11.6 - 14.5 %    PLATELET 935 111 - 562 K/uL    MPV 9.8 9.2 - 11.8 FL    NRBC 0.0 0  WBC    ABSOLUTE NRBC 0.00 0.00 - 0.01 K/uL    NEUTROPHILS 80 (H) 40 - 73 %    LYMPHOCYTES 8 (L) 21 - 52 %    MONOCYTES 11 (H) 3 - 10 %    EOSINOPHILS 1 0 - 5 %    BASOPHILS 0 0 - 2 %    IMMATURE GRANULOCYTES 1 (H) 0.0 - 0.5 %    ABS. NEUTROPHILS 6.9 1.8 - 8.0 K/UL    ABS. LYMPHOCYTES 0.7 (L) 0.9 - 3.6 K/UL    ABS. MONOCYTES 0.9 0.05 - 1.2 K/UL    ABS. EOSINOPHILS 0.1 0.0 - 0.4 K/UL    ABS. BASOPHILS 0.0 0.0 - 0.1 K/UL    ABS. IMM. GRANS. 0.1 (H) 0.00 - 0.04 K/UL    DF AUTOMATED     C REACTIVE PROTEIN, QT    Collection Time: 09/13/22  9:13 PM   Result Value Ref Range    C-Reactive protein 17.8 (H) 0 - 0.3 mg/dL   SED RATE (ESR)    Collection Time: 09/13/22  9:13 PM   Result Value Ref Range    Sed rate, automated >140 (H) 0 - 20 mm/hr       Radiologic Studies -   XR FOOT LT MIN 3 V    (Results Pending)   XR FOOT RT MIN 3 V    (Results Pending)         Medical Decision Making   I am the first provider for this patient.     I reviewed the vital signs, available nursing notes, past medical history, past surgical history, family history and social history. Vital Signs-Reviewed the patient's vital signs. 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 No evidence of sepsis. No leukocytosis, slight anemia. Culture sent. Held antibiotics pending evaluation for surgery should they want bone culture. Paged hospitalist for admission. Multiple pages to podiatrist without response. Case with Dr. Lizeth Yao who will discuss the case with  [facility] who will admit. We will hold IV antibiotics at this time given no evidence of sepsis although mild cellulitis associated with wounds. Defer to podiatry for antibiotic therapy. Provider Notes (Medical Decision Making):   MDM  29-year-old male with appearance of dry gangrene of bilateral toes with surrounding cellulitis cellulitis. No evidence of sepsis today. Lab work-up and will obtain x-rays. Anticipate admission for IV antibiotics and surgical management. Procedures    Critical Care Time: 0      Diagnosis     Clinical Impression:   1. Gangrene ([address]    2. Cellulitis of toe, unspecified laterality        Disposition: Admit    Follow-up Information    None          Patient's Medications   Start Taking    No medications on file   Continue Taking    ACETAMINOPHEN (TYLENOL) 325 MG TABLET    Take 2 Tablets by mouth every four (4) hours as needed for Pain. CELECOXIB (CELEBREX) 200 MG CAPSULE        DRONABINOL (MARINOL) 2.5 MG CAPSULE    Take 2.5 mg by mouth. GLIPIZIDE SR (GLUCOTROL XL) 5 MG CR TABLET    glipizide ER 5 mg tablet, extended release 24 hr    HYDROCODONE-ACETAMINOPHEN (NORCO) 5-325 MG PER TABLET        MECLIZINE (ANTIVERT) 25 MG TABLET    Take 1 Tablet by mouth three (3) times daily as needed for Dizziness.     SERTRALINE (ZOLOFT) 50 MG TABLET    Take 1 Tablet by mouth daily. SOFT LENS RINSE,STORE SOLUTION (UNISOL PLUS)    by Does Not Apply route. SUMATRIPTAN (IMITREX) 100 MG TABLET    1 tablet by mouth as needed for headache, may repeat 1 tablet if headache is not resolved in 2 hours. Maximum of 200 mg per 24 hours. TRAZODONE (DESYREL) 50 MG TABLET    Take 1 Tablet by mouth nightly. These Medications have changed    No medications on file   Stop Taking    No medications on file     Disclaimer: Sections of this note are dictated using utilizing voice recognition software. Minor typographical errors may be present. If questions arise, please do not hesitate to contact me or call our department.

## 2022-09-13 NOTE — Clinical Note
Vessel: bilateral iliaciliac. Power injection to the artery. Single view taken. PSI = 1000. Rate of rise = 0.5 sec. Injection rate = 7 mL/sec. Total injected volume = 15 mL.

## 2022-09-13 NOTE — Clinical Note
TRANSFER - IN REPORT:     Verbal report received from: SHAMIKA Barrera. Report consisted of patient's Situation, Background, Assessment and   Recommendations(SBAR). Opportunity for questions and clarification was provided. Assessment completed upon patient's arrival to unit and care assumed. Patient transported with a Registered Nurse.

## 2022-09-13 NOTE — Clinical Note
TRANSFER - OUT REPORT:     Verbal report given to: Melissa Judge RN. Report consisted of patient's Situation, Background, Assessment and   Recommendations(SBAR). Opportunity for questions and clarification was provided. Patient transported with a Registered Nurse. Patient transported to: holding area.

## 2022-09-13 NOTE — Clinical Note
Contrast Dose Calculator:   Patient's age: 78.   Patient's sex: Male. Patient weight (kg) = 68. Creatinine level (mg/dL) = 0.87. Creatinine clearance (mL/min): 71. Contrast concentration (mg/mL) = 300. MACD = 300 mL. Max Contrast dose per Creatinine Cl calculator = 159.75 mL.

## 2022-09-13 NOTE — Clinical Note
Sheath #1: sheath exchanged for INTRO [facility] W/GDWIRE [id number] -- BRITE TIP - ORDER AS EA.

## 2022-09-13 NOTE — Clinical Note
Vessel: bilateral iliaciliac. Power injection to the aorta, artery. Multiple views taken. PSI = 1000. Rate of rise = 0.5 sec. Injection rate = 10 mL/sec. Total injected volume = 20 mL.

## 2022-09-13 NOTE — ED NOTES
[facility] [address].

## 2022-09-13 NOTE — Clinical Note
Sheath #1: sheath exchanged for SHEATH GUID L55CM ID2. 2MM INTRO 6FR GWIRE 0.035IN PTFE AQ. 6fr kody

## 2022-09-13 NOTE — ED TRIAGE NOTES
Pt presents via EMS from [facility] for evaluation of gangrene to his L foot. He was on Keflex with no improvement. A&OX4.

## 2022-09-13 NOTE — Clinical Note
Status[de-identified] INPATIENT [101]   Type of Bed: Medical [8]   Inpatient Hospitalization Certified Necessary for the Following Reasons: 3. Patient receiving treatment that can only be provided in an inpatient setting (further clarification in H&P documentation)   Admitting Diagnosis: Gangrene ([facility] [address] [place]. 4. ICD-9-CM]   Admitting Diagnosis: Cellulitis [432198]   Admitting Physician: Merriam Boxer [[id number]]   Attending Physician: Merriam Boxer [[id number]]   Estimated Length of Stay: 2 Midnights   Discharge Plan[de-identified] Extended Care Facility (e.g. Adult Home, Nursing Home, etc.)

## 2022-09-13 NOTE — Clinical Note
Vessel: left SFA, popliteal, PTA, peroneal and SHARI. Hand injection to the artery. Multiple views taken.

## 2022-09-14 ENCOUNTER — APPOINTMENT (OUTPATIENT)
Dept: VASCULAR SURGERY | Age: 77
DRG: 271 | End: 2022-09-14
Attending: PODIATRIST
Payer: MEDICARE

## 2022-09-14 LAB
ALBUMIN SERPL-MCNC: 2.8 G/DL (ref 3.4–5)
ALBUMIN/GLOB SERPL: 0.6 {RATIO} (ref 0.8–1.7)
ALP SERPL-CCNC: 85 U/L (ref 45–117)
ALT SERPL-CCNC: 13 U/L (ref 16–61)
ANION GAP SERPL CALC-SCNC: 6 MMOL/L (ref 3–18)
AST SERPL-CCNC: 9 U/L (ref 10–38)
BILIRUB SERPL-MCNC: 0.7 MG/DL (ref 0.2–1)
BUN SERPL-MCNC: 19 MG/DL (ref 7–18)
BUN/CREAT SERPL: 20 (ref 12–20)
CALCIUM SERPL-MCNC: 9.3 MG/DL (ref 8.5–10.1)
CHLORIDE SERPL-SCNC: 99 MMOL/L (ref 100–111)
CO2 SERPL-SCNC: 31 MMOL/L (ref 21–32)
CREAT SERPL-MCNC: 0.93 MG/DL (ref 0.6–1.3)
ERYTHROCYTE [DISTWIDTH] IN BLOOD BY AUTOMATED COUNT: 13.1 % (ref 11.6–14.5)
GLOBULIN SER CALC-MCNC: 4.7 G/DL (ref 2–4)
GLUCOSE BLD STRIP.AUTO-MCNC: 136 MG/DL (ref 70–110)
GLUCOSE SERPL-MCNC: 123 MG/DL (ref 74–99)
HCT VFR BLD AUTO: 31.7 % (ref 36–48)
HGB BLD-MCNC: 10.6 G/DL (ref 13–16)
LEFT ABI: 0.79
LEFT CALF PRESSURE: 130 MMHG
LEFT POSTERIOR TIBIAL: 122 MMHG
MCH RBC QN AUTO: 30 PG (ref 24–34)
MCHC RBC AUTO-ENTMCNC: 33.4 G/DL (ref 31–37)
MCV RBC AUTO: 89.8 FL (ref 78–100)
NRBC # BLD: 0 K/UL (ref 0–0.01)
NRBC BLD-RTO: 0 PER 100 WBC
PLATELET # BLD AUTO: 334 K/UL (ref 135–420)
PMV BLD AUTO: 9.6 FL (ref 9.2–11.8)
POTASSIUM SERPL-SCNC: 4.4 MMOL/L (ref 3.5–5.5)
PROT SERPL-MCNC: 7.5 G/DL (ref 6.4–8.2)
RBC # BLD AUTO: 3.53 M/UL (ref 4.35–5.65)
RIGHT ABI: 0.92
RIGHT ARM BP: 154 MMHG
RIGHT CALF PRESSURE: 169 MMHG
RIGHT POSTERIOR TIBIAL: 142 MMHG
SODIUM SERPL-SCNC: 136 MMOL/L (ref 136–145)
VAS LEFT DORSALIS PEDIS BP: 117 MMHG
VAS RIGHT DORSALIS PEDIS BP: 134 MMHG
WBC # BLD AUTO: 8.3 K/UL (ref 4.6–13.2)

## 2022-09-14 PROCEDURE — 80053 COMPREHEN METABOLIC PANEL: CPT

## 2022-09-14 PROCEDURE — 74011000250 HC RX REV CODE- 250: Performed by: NURSE PRACTITIONER

## 2022-09-14 PROCEDURE — 74011250637 HC RX REV CODE- 250/637: Performed by: NURSE PRACTITIONER

## 2022-09-14 PROCEDURE — 99232 SBSQ HOSP IP/OBS MODERATE 35: CPT | Performed by: HOSPITALIST

## 2022-09-14 PROCEDURE — 74011250636 HC RX REV CODE- 250/636: Performed by: STUDENT IN AN ORGANIZED HEALTH CARE EDUCATION/TRAINING PROGRAM

## 2022-09-14 PROCEDURE — 82962 GLUCOSE BLOOD TEST: CPT

## 2022-09-14 PROCEDURE — 74011250636 HC RX REV CODE- 250/636: Performed by: NURSE PRACTITIONER

## 2022-09-14 PROCEDURE — 65270000046 HC RM TELEMETRY

## 2022-09-14 PROCEDURE — 93923 UPR/LXTR ART STDY 3+ LVLS: CPT

## 2022-09-14 PROCEDURE — 85027 COMPLETE CBC AUTOMATED: CPT

## 2022-09-14 PROCEDURE — 99222 1ST HOSP IP/OBS MODERATE 55: CPT | Performed by: NURSE PRACTITIONER

## 2022-09-14 RX ORDER — THERA TABS 400 MCG
1 TAB ORAL DAILY
Status: DISCONTINUED | OUTPATIENT
Start: 2022-09-15 | End: 2022-09-22 | Stop reason: HOSPADM

## 2022-09-14 RX ADMIN — DRONABINOL 2.5 MG: 2.5 CAPSULE ORAL at 11:19

## 2022-09-14 RX ADMIN — GABAPENTIN 300 MG: 300 CAPSULE ORAL at 09:17

## 2022-09-14 RX ADMIN — SERTRALINE 50 MG: 50 TABLET, FILM COATED ORAL at 09:17

## 2022-09-14 RX ADMIN — SODIUM CHLORIDE, PRESERVATIVE FREE 10 ML: 5 INJECTION INTRAVENOUS at 07:59

## 2022-09-14 RX ADMIN — SODIUM CHLORIDE 100 ML/HR: 9 INJECTION, SOLUTION INTRAVENOUS at 18:26

## 2022-09-14 RX ADMIN — SODIUM CHLORIDE 100 ML/HR: 9 INJECTION, SOLUTION INTRAVENOUS at 00:31

## 2022-09-14 RX ADMIN — ENOXAPARIN SODIUM 40 MG: 100 INJECTION SUBCUTANEOUS at 09:16

## 2022-09-14 RX ADMIN — GABAPENTIN 300 MG: 300 CAPSULE ORAL at 18:23

## 2022-09-14 RX ADMIN — SODIUM CHLORIDE, PRESERVATIVE FREE 10 ML: 5 INJECTION INTRAVENOUS at 21:59

## 2022-09-14 RX ADMIN — DIVALPROEX SODIUM 250 MG: 250 TABLET, DELAYED RELEASE ORAL at 18:23

## 2022-09-14 RX ADMIN — SODIUM CHLORIDE, PRESERVATIVE FREE 10 ML: 5 INJECTION INTRAVENOUS at 00:31

## 2022-09-14 RX ADMIN — DIVALPROEX SODIUM 250 MG: 250 TABLET, DELAYED RELEASE ORAL at 09:16

## 2022-09-14 RX ADMIN — DRONABINOL 2.5 MG: 2.5 CAPSULE ORAL at 18:23

## 2022-09-14 NOTE — PROGRESS NOTES
Pt placed on turn team, prevalon boots placed bilaterally, incont care performed, condom catheter placed.

## 2022-09-14 NOTE — H&P
History & Physical    Patient: Jill Ferguson MRN: [id number]  CSN: [id number]    YOB: 1945  Age: 78 y.o. Sex: male      DOA: 9/13/2022  CC:gangrene toes    PCP: Milka Peña MD       HPI:     Jill Ferguson is a 78 y.o. male who presents from [facility] for evaluation of gangrene toes/feet. Requested for further medical management from the family and facility. Which requires evaluation from podiatry. Unknown how long his bilateral toes have been gangrene. [facility] records no indication when the symptoms started. Started on Keflex on 9/9/22 per [facility] for cellulitis. Patient has history of cognitive impairment and disoriented. Unable to get medical history from patient. In ER bilateral foot xray and podiatry consult. No s/s of infection at this time. No antibiotics initiated at this time. ROS unable to compete due to cognitive impairment.      Past Medical History:   Diagnosis Date    Arthritis     GERD (gastroesophageal reflux disease)     High blood pressure     Insomnia     Periorbital headache     Sleep apnea     Spinal stenosis        Past Surgical History:   Procedure Laterality Date    ENDOSCOPY, COLON, DIAGNOSTIC      HX LUMBAR DISKECTOMY  6/2001, 4/11    left L3-4 microdiskectomy with intradural rupture    HX ORTHOPAEDIC      spinal Surgery Dr. Ban Mcdaniel  9/11    spinal fusion, L3-5       Family History   Problem Relation Age of Onset    Heart Attack Mother     Cancer Mother     Heart Disease Mother     Hypertension Father     Seizures Father     Alcohol abuse Father        Social History     Socioeconomic History    Marital status:    Tobacco Use    Smoking status: Every Day     Packs/day: 1.00     Years: 51.00     Pack years: 51.00     Types: Cigarettes    Smokeless tobacco: Never   Vaping Use    Vaping Use: Never used   Substance and Sexual Activity    Alcohol use: Yes     Comment: rarely    Drug use: No    Sexual activity: Yes     Partners: Female       Prior to Admission medications    Medication Sig Start Date End Date Taking? Authorizing Provider   divalproex DR (Depakote) 250 mg tablet Take  by mouth two (2) times a day. Yes Provider, Historical   gabapentin (NEURONTIN) 300 mg capsule Take 300 mg by mouth two (2) times a day. Yes Provider, Historical   cephALEXin (Keflex) 500 mg capsule Take 500 mg by mouth two (2) times a day. 9/9/22  Yes Provider, Historical   celecoxib (CELEBREX) 200 mg capsule 200 mg two (2) times a day. 4/29/22  Yes Other, MD Theodore   dronabinoL (MARINOL) 2.5 mg capsule Take 2.5 mg by mouth two (2) times a day. 6/2/22  Yes Provider, Historical   sertraline (ZOLOFT) 50 mg tablet Take 1 Tablet by mouth daily. 6/9/22  Yes Kandace Orr MD   SUMAtriptan (IMITREX) 100 mg tablet Take 100 mg by mouth once as needed for Migraine. Provider, Historical   oxyCODONE (OXYIR) 5 mg capsule Take 5 mg by mouth every four (4) hours as needed for Pain. Provider, Historical       Allergies   Allergen Reactions    Codeine Not Reported This Time    Tegretol [Carbamazepine] Not Reported This Time              Physical Exam:      Visit Vitals  /61   Pulse 90   Temp 97.6 °F (36.4 °C)   Resp 18   SpO2 98%       Physical Exam:  General:         Alert, cooperative, no acute distress    HEENT:           NC, Atraumatic. PERRLA, anicteric sclerae. Lungs:            CTA bilaterally. No Wheezing/Rhonchi/Rales  Heart:              RRR, No murmur, No Rubs, No Gallops  Abdomen:      Soft, Non distended, Non tender. +Bowel sounds, no HSM  Extremities:   bilateral gangrene toes   Psych:              Good insight. Not anxious or agitated. Neurologic:     Alert and disoriented.   No acute neurological deficits     Lab/Data Review:  Labs: Results:       Chemistry Recent Labs     09/13/22 2113   *   *   K 5.0      CO2 32   BUN 24*   CREA 1.06   CA 9.3   AGAP 3   BUCR 23*   AP 88   TP 7.8 ALB 2.9*   GLOB 4.9*   AGRAT 0.6*      CBC w/Diff Recent Labs     09/13/22 2113   WBC 8.7   RBC 3.66*   HGB 10.9*   HCT 32.7*      GRANS 80*   LYMPH 8*   EOS 1      Coagulation No results for input(s): PTP, INR, APTT, INREXT in the last 72 hours. Iron/Ferritin No results for input(s): IRON in the last 72 hours. No lab exists for component: TIBCCALC   BNP No results for input(s): BNPP in the last 72 hours. Cardiac Enzymes No results for input(s): CPK, CKND1, HALLIE in the last 72 hours. No lab exists for component: CKRMB, TROIP   Liver Enzymes Recent Labs     09/13/22 2113   TP 7.8   ALB 2.9*   AP 88      Thyroid Studies Lab Results   Component Value Date/Time    TSH 2.70 10/05/2018 09:20 AM          All Micro Results       Procedure Component Value Units Date/Time    CULTURE, BLOOD [[id number]] Collected: 09/13/22 2123    Order Status: Sent Specimen: Blood Updated: 09/13/22 2141            Imaging Reviewed:  Pending       Assessment:   Active Problems:    Gangrene ([address] (9/13/2022)      Cellulitis (9/13/2022)            Plan:   Podiatry consult. Full consult to follow. No antibiotics initiated this time. NPO, IVF  POC glucose every 6 hours. A1c pending. History of DMT2  Monitor metabolic panel and renal function   Reviewed SNF paperwork and MAR. Resumed appropriate medications  Full code per  paperwork  Further recommendations pending hospital course. 30 minutes in total spent today in preparation for visit, review of external notes and test results, review of test results, order placement, obtaining history, physical exam of the patient, and/or counseling the patient concerning diagnosis, test results, treatment plan and speaking with physicians and nurses involved in patient's care.  Additional time spent in review and independent interpretation of external notes, imaging, labs via hospital EMR and/or Care Everywhere, as well as pre-charting activity all of which occur on the day of service.              Jordy Tyson NP  9/13/2022, 10:36 PM

## 2022-09-14 NOTE — PROGRESS NOTES
spoke with admission coordinator, Leida Munguia, regarding the patient returning to [facility] once the patient is medically stable. Leida Munguia indicated that the patient is able to return. Leida Munguia indicated that the patient was on the rehab side.  informed Mayank that she would open the patient in [place] [address]. Leida Munguia voiced an understanding.         LYNN Juarez

## 2022-09-14 NOTE — CONSULTS
[facility]: [phone]  [facility]: [phone]     Patient Name: Sang Kwok  YOB: 1945    Date of consult : 9/14/2022  Reason for Consult: establish goals of care  Requesting Provider:    Primary Care Physician: Gabrielle Perea MD      SUMMARY:   Sang Kwok is a 78 y.o. male with a past history of DM2,HTN, Major Depressive disorder, Degeneration of lumbar, neuropathy, who was admitted on 9/13/2022 from [facility] with a diagnosis of dry gangrene to BL toes. Current medical issues leading to Palliative Medicine involvement include: Pt with a long term life limiting chronic disease process that warrants discussions about her goals of care. CHIEF COMPLAINT: immobility     HPI/SUBJECTIVE:    Patient is a 68-year-old  male that was brought in from physical rehabilitation and patient's due to gangrene of bilateral feet. Reported by staff patient had a worsening infection in his toes. Patient has past history of cognitive impairment and some disorientation. He appears to be a poor historian of his past medical history and his social history. The patient is:   [] Verbal and participatory  [x] Non-participatory due to: Patient somewhat cognitively impaired and unable to explain why he was in rehab or what brought him to the hospital.  But appears to be completely oblivious to the gangrene in his feet.     GOALS OF CARE:  Patient/Health Care Proxy Stated Goals: Prolong life      TREATMENT PREFERENCES:   Code Status: Full Code         PALLIATIVE DIAGNOSES:   Goals of care/ACP  Gangrene bilateral lower extremities  Peripheral arterial disease  Encounter for palliative medicine       PLAN:   Goals of care/ACP  This NP along with Valerie Hamlin RN in to see patient at the bedside he is alert and interactive able to state his name and that he is in the hospital although unaware of exactly which [facility].  Patient relates that he is living in [place] although the listed address is [place]. He was able to name his 2 sons Leatha Cannon and Andrew Haider but stated that he was sent here by his \"girlfriend\". Then he stated that he is  and lives with his wife. Patient seems completely unaware that he has any issues going on with his feet. He appears at this time to be unable to participate in his goals of care discussion as he is not able to understand the complexity of these decisions. We obtained permission to contact his son Andrew Haider who is listed as his emergency contact. This NP reached out and left a message for Ernesto to please call us as soon as possible. Goals of care: Patient remains a full code with full interventions  Gangrene bilateral lower extremities  Patient seen by podiatry noted dry gangrene with eschar of right distal second and third toes, preulcerative lesions noted to the right submetatarsal 5. Left foot second and fourth toe eschar noted at the distal tip of the toes. Also eschar noted to plantar medial left hallux. Podiatry is following up. PAD  Bilateral pulses nonpalpable with absent hair growth. Delayed capillary refill time no edema vascular surgical consult has been placed by podiatry awaiting their assessment  Encounter for palliative medicine  Patient has multiple comorbid health conditions with likely poor outcome post cardiac arrest.  He is unable to participate in these conversations at this time as he is unable to understand the complexity of those decisions. Palliative to provide education on the burdens and benefits of full aggressive care. Patient also has a lowered palliative performance score of between 40 and 50% he is mainly in bed with a chair to bed existence unable to do any work due to extensive disease progression he is mainly assisted in his self-care and functional ADLs and has full to some confusion level of consciousness.   Initial consult note routed to primary continuity provider  Communicated plan of care with: Palliative IDT      Advance Care Planning:  [] [facility] Interdisciplinary Team has updated the ACP Navigator with [address] and Patient Capacity    Primary Decision [facility] ([address]):     Medical Interventions: Full interventions   Other Instructions:         As far as possible, the palliative care team has discussed with patient / health care proxy about goals of care / treatment preferences for patient. HISTORY:     History obtained from: Chart review  Active Problems:    Gangrene ([address] (9/13/2022)      Cellulitis (9/13/2022)    Past Medical History:   Diagnosis Date    Arthritis     Cognitive impairment     Depression     Dorsalgia, unspecified     GERD (gastroesophageal reflux disease)     High blood pressure     Insomnia     L1 vertebral fracture (HCC)     Migraine headache     Other chronic pain     Periorbital headache     Sleep apnea     Spinal stenosis     Type 2 diabetes mellitus, without long-term current use of insulin [facility])       Past Surgical History:   Procedure Laterality Date    ENDOSCOPY, COLON, DIAGNOSTIC      HX LUMBAR DISKECTOMY  6/2001, 4/11    left L3-4 microdiskectomy with intradural rupture    HX ORTHOPAEDIC      spinal Surgery Dr. Oumar Casillas  9/11    spinal fusion, L3-5      Family History   Problem Relation Age of Onset    Heart Attack Mother     Cancer Mother     Heart Disease Mother     Hypertension Father     Seizures Father     Alcohol abuse Father      History reviewed, no pertinent family history.   Social History     Tobacco Use    Smoking status: Every Day     Packs/day: 1.00     Years: 51.00     Pack years: 51.00     Types: Cigarettes    Smokeless tobacco: Never   Substance Use Topics    Alcohol use: Yes     Comment: rarely     Allergies   Allergen Reactions    Codeine Not Reported This Time    Tegretol [Carbamazepine] Not Reported This Time Current Facility-Administered Medications   Medication Dose Route Frequency    0.9% sodium chloride infusion  100 mL/hr IntraVENous CONTINUOUS    sodium chloride (NS) flush 5-40 mL  5-40 mL IntraVENous Q8H    sodium chloride (NS) flush 5-40 mL  5-40 mL IntraVENous PRN    acetaminophen (TYLENOL) tablet 650 mg  650 mg Oral Q6H PRN    Or    acetaminophen (TYLENOL) suppository 650 mg  650 mg Rectal Q6H PRN    polyethylene glycol (MIRALAX) packet 17 g  17 g Oral DAILY PRN    ondansetron (ZOFRAN ODT) tablet 4 mg  4 mg Oral Q8H PRN    Or    ondansetron (ZOFRAN) injection 4 mg  4 mg IntraVENous Q6H PRN    enoxaparin (LOVENOX) injection 40 mg  40 mg SubCUTAneous DAILY    glucose chewable tablet 16 g  4 Tablet Oral PRN    glucagon (GLUCAGEN) injection 1 mg  1 mg IntraMUSCular PRN    dextrose 10% infusion 0-250 mL  0-250 mL IntraVENous PRN    insulin lispro (HUMALOG) injection   SubCUTAneous Q6H    divalproex DR (DEPAKOTE) tablet 250 mg  250 mg Oral BID    dronabinoL (MARINOL) capsule 2.5 mg  2.5 mg Oral BID    gabapentin (NEURONTIN) capsule 300 mg  300 mg Oral BID    sertraline (ZOLOFT) tablet 50 mg  50 mg Oral DAILY          Clinical Pain Assessment (nonverbal scale for nonverbal patients): Clinical Pain Assessment  Severity: 0          Duration: for how long has pt been experiencing pain (e.g., 2 days, 1 month, years)  Frequency: how often pain is an issue (e.g., several times per day, once every few days, constant)     FUNCTIONAL ASSESSMENT:     Palliative Performance Scale (PPS):  PPS: 50    ECOG  ECOG Status : Ambulatory, but unable to carry out work activities     PSYCHOSOCIAL/SPIRITUAL SCREENING:      Any spiritual / Hoahaoism concerns:  [] Yes /  [x] No    Caregiver Burnout:  [] Yes /  [] No /  [x] No Caregiver Present      Anticipatory grief assessment:   [x] Normal  / [] Maladaptive        REVIEW OF SYSTEMS:     Systems: constitutional, ears/nose/mouth/throat, respiratory, gastrointestinal, genitourinary, musculoskeletal, integumentary, neurologic, psychiatric, endocrine. Positive findings noted below. Modified ESAS Completed by: provider           Pain: 0           Dyspnea: 0               Positive and pertinent negative findings in ROS are noted above in HPI. The following systems were [x] reviewed / [] unable to be reviewed as noted in HPI  Other findings are noted below. PHYSICAL EXAM:     Constitutional: alert and interactive, no complaints of distress  Eyes: pupils equal, anicteric  ENMT: no nasal discharge, moist mucous membranes  Cardiovascular: regular rhythm, distal pulses intact  Respiratory: breathing not labored, symmetric  Gastrointestinal: soft non-tender, +bowel sounds  Musculoskeletal: no deformity, no tenderness to palpation  Skin: warm, dry  Neurologic: AA and oriented X 3 following commands, moving all extremities    Other: Wt Readings from Last 3 Encounters:   08/11/22 68 kg (150 lb)   07/27/22 68 kg (150 lb)   07/21/22 67.1 kg (148 lb)     Blood pressure 134/69, pulse 67, temperature 98.1 °F (36.7 °C), resp. rate 18, SpO2 96 %. Pain:  Pain Scale 1: Numeric (0 - 10)  Pain Intensity 1: 0                      LAB AND IMAGING FINDINGS:     Lab Results   Component Value Date/Time    WBC 8.3 09/14/2022 03:47 AM    HGB 10.6 (L) 09/14/2022 03:47 AM    PLATELET 512 71/45/3520 03:47 AM     Lab Results   Component Value Date/Time    Sodium 136 09/14/2022 03:47 AM    Potassium 4.4 09/14/2022 03:47 AM    Chloride 99 (L) 09/14/2022 03:47 AM    CO2 31 09/14/2022 03:47 AM    BUN 19 (H) 09/14/2022 03:47 AM    Creatinine 0.93 09/14/2022 03:47 AM    Calcium 9.3 09/14/2022 03:47 AM    Magnesium 2.4 08/11/2022 04:53 PM      Lab Results   Component Value Date/Time    Alk.  phosphatase 85 09/14/2022 03:47 AM    Protein, total 7.5 09/14/2022 03:47 AM    Albumin 2.8 (L) 09/14/2022 03:47 AM    Globulin 4.7 (H) 09/14/2022 03:47 AM     No results found for: INR, PTMR, PTP, PT1, PT2, APTT, INREXT, INREXT   No results found for: IRON, FE, TIBC, IBCT, PSAT, FERR   No results found for: PH, PCO2, PO2  No components found for: [place]   Lab Results   Component Value Date/Time    CK 67 07/21/2022 02:35 PM              Total time: 50 minutes  Counseling / coordination time, spent as noted above:   > 50% counseling / coordination:  Time spent in direct consultation with the patient, medical team, and family     Prolonged service was provided for  []30 min   []75 min in face to face time in the presence of the patient, spent as noted above. Time Start:   Time End:     Disclaimer: Sections of this note are dictated using utilizing voice recognition software, which may have resulted in some phonetic based errors in grammar and contents. Even though attempts were made to correct all the mistakes, some may have been missed, and remained in the body of the document. If questions arise, please contact our department.

## 2022-09-14 NOTE — ED NOTES
BG checked. No coverage required at this time. Pt remains awake and alert at this time. No acute distress noted.

## 2022-09-14 NOTE — PROGRESS NOTES
Bedside report given to [facility]. Pt resting quietly in bed at this time, call bell within reach.

## 2022-09-14 NOTE — ACP (ADVANCE CARE PLANNING)
Advance Care Planning     General Advance Care Planning (ACP) Conversation      Date of Conversation: 9/14/2022      Palliative Medicine team members Willette Bosworth, NP and this writer for consult visit for goals of care. Karen Maria is a 78 y.o. male with a past history of DM2,HTN, Major Depressive disorder, Degeneration of lumbar, neuropathy, who was admitted on 9/13/2022 from [facility] with a diagnosis of dry gangrene to BL toes. Patient does not have an Advance Medical Directive on file and is not able to complete one today. Mr Erika Golden was lying in bed with head elevated,  Team assisted in repositioning patient. He is alert and engaged in answering our questions but his answers were inconsistent. Able to state his name and that he is in the hospital although unaware of exactly which hospital [facility]). Patient relates that he is living in [place] although the listed address is [place]. He was able to name his 2 sons Brittny Rivero and Nick Bishop but stated that he was sent here by his \"girlfriend\". Then he stated that he is  and lives with his wife, Warren. Patient seems completely unaware of the medical condition affecting his bilateral feet. He appears currently to not be able to participate in his goals of care discussion. We obtained verbal permission to contact his son Nick Bisohp who is listed as his emergency contact. Palliative NP reached out and left a message for Ernesto to return call to Palliative Department, number provided.      Goals of care: Patient remains a full code with full interventions    Cristina VEGA, RN, [address] [place] [address]: [phone]

## 2022-09-14 NOTE — PROGRESS NOTES
placed the patient in [place] [id number] and sent referral to [facility].       Neena Lopez

## 2022-09-14 NOTE — PROGRESS NOTES
Report received from [facility] will pass to accepting primary RN Dane Uribe.

## 2022-09-14 NOTE — PROGRESS NOTES
[facility]  [facility]    Patient: Ventura Mahoney Age: 78 y.o. : 1945 MR#: [id number] SSN: [id number]  Date/Time: 2022    Subjective:     Patient seen and examined at bedside, he is able to state his name. Does not know where he is. Denies pain anywhere, denies shortness of breath. No family at bedside. Assessment/Plan:     1. 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 Podiatry consult. Vascular consult. Arterial studies ordered. weightbear as tolerated in surgical shoe bilateral  2. DM2. A1c 5.8. Sliding scale insulin. 3.  Advanced age. Consult SLP. 4.  Thin body habitus. Height and weight to chart requested. Consult nutrition. Multivitamin. 5.  Anemia normocytic normochromic  6. Full code. Palliative care consult requested. Patient came from [employer]. I discussed the case with NP Ignacio. Additional Notes:      Case discussed with:  [x]Patient  []Family  [x]Nursing  []Case Management  DVT Prophylaxis:  []Lovenox  []Hep SQ  []SCDs  []Coumadin   []On Heparin gtt    Objective:   VS: Visit Vitals  /69 (BP 1 Location: Right upper arm, BP Patient Position: At rest)   Pulse 67   Temp 98.1 °F (36.7 °C)   Resp 18   SpO2 96%      Tmax/24hrs: Temp (24hrs), Av.8 °F (36.6 °C), Min:97.6 °F (36.4 °C), Max:98.1 °F (36.7 °C)    Input/Output: No intake or output data in the 24 hours ending 22 1720    General: Oriented x1. Lying in bed no acute distress. HEENT normocephalic atraumatic pupils equally round and reactive to light. Cardiovascular: Regular rate and rhythm  Pulmonary: Clear to auscultation bilateral anterior and infra axillary fields  GI: Scaphoid, soft, nontender nondistended nabs  Extremities: Nonpitting edema bilaterally, right greater than left. .  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 DP dopplerable bilaterally. Absent hair growth on dorsal feet and digits. Additional: Generalized weakness.   Withdraws all 4 extremities to stimulus. Labs:    Recent Results (from the past 24 hour(s))   METABOLIC PANEL, COMPREHENSIVE    Collection Time: 09/13/22  9:13 PM   Result Value Ref Range    Sodium 135 (L) 136 - 145 mmol/L    Potassium 5.0 3.5 - 5.5 mmol/L    Chloride 100 100 - 111 mmol/L    CO2 32 21 - 32 mmol/L    Anion gap 3 3.0 - 18 mmol/L    Glucose 152 (H) 74 - 99 mg/dL    BUN 24 (H) 7.0 - 18 MG/DL    Creatinine 1.06 0.6 - 1.3 MG/DL    BUN/Creatinine ratio 23 (H) 12 - 20      GFR est AA >60 >60 ml/min/1.73m2    GFR est non-AA >60 >60 ml/min/1.73m2    Calcium 9.3 8.5 - 10.1 MG/DL    Bilirubin, total 0.7 0.2 - 1.0 MG/DL    ALT (SGPT) 15 (L) 16 - 61 U/L    AST (SGOT) 9 (L) 10 - 38 U/L    Alk. phosphatase 88 45 - 117 U/L    Protein, total 7.8 6.4 - 8.2 g/dL    Albumin 2.9 (L) 3.4 - 5.0 g/dL    Globulin 4.9 (H) 2.0 - 4.0 g/dL    A-G Ratio 0.6 (L) 0.8 - 1.7     CBC WITH AUTOMATED DIFF    Collection Time: 09/13/22  9:13 PM   Result Value Ref Range    WBC 8.7 4.6 - 13.2 K/uL    RBC 3.66 (L) 4.35 - 5.65 M/uL    HGB 10.9 (L) 13.0 - 16.0 g/dL    HCT 32.7 (L) 36.0 - 48.0 %    MCV 89.3 78.0 - 100.0 FL    MCH 29.8 24.0 - 34.0 PG    MCHC 33.3 31.0 - 37.0 g/dL    RDW 13.1 11.6 - 14.5 %    PLATELET 397 559 - 324 K/uL    MPV 9.8 9.2 - 11.8 FL    NRBC 0.0 0  WBC    ABSOLUTE NRBC 0.00 0.00 - 0.01 K/uL    NEUTROPHILS 80 (H) 40 - 73 %    LYMPHOCYTES 8 (L) 21 - 52 %    MONOCYTES 11 (H) 3 - 10 %    EOSINOPHILS 1 0 - 5 %    BASOPHILS 0 0 - 2 %    IMMATURE GRANULOCYTES 1 (H) 0.0 - 0.5 %    ABS. NEUTROPHILS 6.9 1.8 - 8.0 K/UL    ABS. LYMPHOCYTES 0.7 (L) 0.9 - 3.6 K/UL    ABS. MONOCYTES 0.9 0.05 - 1.2 K/UL    ABS. EOSINOPHILS 0.1 0.0 - 0.4 K/UL    ABS. BASOPHILS 0.0 0.0 - 0.1 K/UL    ABS. IMM.  GRANS. 0.1 (H) 0.00 - 0.04 K/UL    DF AUTOMATED     C REACTIVE PROTEIN, QT    Collection Time: 09/13/22  9:13 PM   Result Value Ref Range    C-Reactive protein 17.8 (H) 0 - 0.3 mg/dL   SED RATE (ESR)    Collection Time: 09/13/22  9:13 PM   Result Value Ref Range    Sed rate, automated >140 (H) 0 - 20 mm/hr   HEMOGLOBIN A1C WITH EAG    Collection Time: 09/13/22  9:13 PM   Result Value Ref Range    Hemoglobin A1c 5.8 (H) 4.2 - 5.6 %    Est. average glucose 120 mg/dL   CULTURE, BLOOD    Collection Time: 09/13/22  9:23 PM    Specimen: Blood   Result Value Ref Range    Special Requests: NO SPECIAL REQUESTS      Culture result: NO GROWTH AFTER 8 HOURS     GLUCOSE, POC    Collection Time: 09/14/22 12:29 AM   Result Value Ref Range    Glucose (POC) 136 (H) 70 - 110 mg/dL   CBC W/O DIFF    Collection Time: 09/14/22  3:47 AM   Result Value Ref Range    WBC 8.3 4.6 - 13.2 K/uL    RBC 3.53 (L) 4.35 - 5.65 M/uL    HGB 10.6 (L) 13.0 - 16.0 g/dL    HCT 31.7 (L) 36.0 - 48.0 %    MCV 89.8 78.0 - 100.0 FL    MCH 30.0 24.0 - 34.0 PG    MCHC 33.4 31.0 - 37.0 g/dL    RDW 13.1 11.6 - 14.5 %    PLATELET 117 016 - 410 K/uL    MPV 9.6 9.2 - 11.8 FL    NRBC 0.0 0  WBC    ABSOLUTE NRBC 0.00 0.00 - 3.38 K/uL   METABOLIC PANEL, COMPREHENSIVE    Collection Time: 09/14/22  3:47 AM   Result Value Ref Range    Sodium 136 136 - 145 mmol/L    Potassium 4.4 3.5 - 5.5 mmol/L    Chloride 99 (L) 100 - 111 mmol/L    CO2 31 21 - 32 mmol/L    Anion gap 6 3.0 - 18 mmol/L    Glucose 123 (H) 74 - 99 mg/dL    BUN 19 (H) 7.0 - 18 MG/DL    Creatinine 0.93 0.6 - 1.3 MG/DL    BUN/Creatinine ratio 20 12 - 20      GFR est AA >60 >60 ml/min/1.73m2    GFR est non-AA >60 >60 ml/min/1.73m2    Calcium 9.3 8.5 - 10.1 MG/DL    Bilirubin, total 0.7 0.2 - 1.0 MG/DL    ALT (SGPT) 13 (L) 16 - 61 U/L    AST (SGOT) 9 (L) 10 - 38 U/L    Alk.  phosphatase 85 45 - 117 U/L    Protein, total 7.5 6.4 - 8.2 g/dL    Albumin 2.8 (L) 3.4 - 5.0 g/dL    Globulin 4.7 (H) 2.0 - 4.0 g/dL    A-G Ratio 0.6 (L) 0.8 - 1.7     LOWER EXT ART PVR MULT LEVEL SEG PRESSURES    Collection Time: 09/14/22  4:13 PM   Result Value Ref Range    Left calf pressure 130 mmHg    Left posterior tibial 122 mmHg    Left dorsalis pedis  mmHg Right arm  mmHg    Right calf pressure 169 mmHg    Right posterior tibial 142 mmHg    Right dorsalis pedis  mmHg    Left ALICE 0.79     Right ALICE 0.92      Additional Data Reviewed:      Signed By: Stan Montero MD     September 14, 2022 5:20 PM

## 2022-09-14 NOTE — CONSULTS
Podiatry History and Physical    Subjective:         Date of Consultation:  September 14, 2022    Referring Physician:     Patient is a 78 y.o.  male who is being seen for bilateral toe gangrene. Patient is a poor historian and cannot get medical history from him. Patient came from [facility]. Patient started on keflex. He denies N/V/C/F.      Patient Active Problem List    Diagnosis Date Noted    Gangrene ([facility] [address] 09/13/2022    Cellulitis 09/13/2022    Major depressive disorder, recurrent, mild 06/09/2022    Major depressive disorder, recurrent, moderate 06/09/2022    Major depressive disorder, recurrent, unspecified 06/09/2022    Type 2 diabetes mellitus with diabetic neuropathy, without long-term current use of insulin ([address] 10/01/2018    Chronic midline low back pain without sciatica 10/01/2018    Erectile dysfunction due to arterial insufficiency 10/01/2018    Hyperlipemia     Sleep apnea     Cluster headaches     Thoracic or lumbosacral neuritis or radiculitis, unspecified     Degeneration of lumbar or lumbosacral intervertebral disc     Lumbago      Past Medical History:   Diagnosis Date    Arthritis     Cognitive impairment     Depression     Dorsalgia, unspecified     GERD (gastroesophageal reflux disease)     High blood pressure     Insomnia     L1 vertebral fracture (HCC)     Migraine headache     Other chronic pain     Periorbital headache     Sleep apnea     Spinal stenosis     Type 2 diabetes mellitus, without long-term current use of insulin ([address]       Family History   Problem Relation Age of Onset    Heart Attack Mother     Cancer Mother     Heart Disease Mother     Hypertension Father     Seizures Father     Alcohol abuse Father       Social History     Tobacco Use    Smoking status: Every Day     Packs/day: 1.00     Years: 51.00     Pack years: 51.00     Types: Cigarettes    Smokeless tobacco: Never   Substance Use Topics    Alcohol use: Yes     Comment: rarely     Past Surgical History:   Procedure Laterality Date    ENDOSCOPY, COLON, DIAGNOSTIC      HX LUMBAR DISKECTOMY  2001,     left L3-4 microdiskectomy with intradural rupture    HX ORTHOPAEDIC      spinal Surgery Dr. Lois Hull      spinal fusion, L3-5      Prior to Admission medications    Medication Sig Start Date End Date Taking? Authorizing Provider   divalproex DR (Depakote) 250 mg tablet Take  by mouth two (2) times a day. Yes Provider, Historical   gabapentin (NEURONTIN) 300 mg capsule Take 300 mg by mouth two (2) times a day. Yes Provider, Historical   cephALEXin (Keflex) 500 mg capsule Take 500 mg by mouth two (2) times a day. 22  Yes Provider, Historical   celecoxib (CELEBREX) 200 mg capsule 200 mg two (2) times a day. 22  Yes Sherice, MD Theodore   dronabinoL (MARINOL) 2.5 mg capsule Take 2.5 mg by mouth two (2) times a day. 22  Yes Provider, Historical   sertraline (ZOLOFT) 50 mg tablet Take 1 Tablet by mouth daily. 22  Yes Harrison Orr MD   SUMAtriptan (IMITREX) 100 mg tablet Take 100 mg by mouth once as needed for Migraine. Provider, Historical   oxyCODONE (OXYIR) 5 mg capsule Take 5 mg by mouth every four (4) hours as needed for Pain. Provider, Historical     Allergies   Allergen Reactions    Codeine Not Reported This Time    Tegretol [Carbamazepine] Not Reported This Time        Review of Systems:  A comprehensive review of systems was negative except for that written in the HPI.     Objective:     Patient Vitals for the past 8 hrs:   BP Temp Pulse Resp SpO2   22 1205 133/64 97.9 °F (36.6 °C) 69 17 99 %   22 1100 (!) 126/58 97.6 °F (36.4 °C) 68 18 100 %   22 0700 (!) 126/49 97.6 °F (36.4 °C) 72 18 100 %   22 0600 (!) 145/61 -- 78 17 --     Temp (24hrs), Av.7 °F (36.5 °C), Min:97.6 °F (36.4 °C), Max:97.9 °F (36.6 °C)    Bilateral VICENTE:     Vascular: DP and PT pulses are non-palpable bilateral. Absent hair growth noticed on the dorsal foot and digits. Capillary refill time is delayed to distal digits bilateral. No edema around bilateral LE. Neurological: Protective sensation is diminished to bilateral foot upon testing with a Semmis Sterling 5.07 monofilament. Sharp/dull sensation is diminished to the fore foot. Achilles and patellar deep tendon reflexes are within normal limits bilateral.  Dermatological: Skin shows signs of mild atrophy with diffuse dry xerosis. Web spaces 1-4 bilateral are clean, dry, and intact. Dry gangrene with eschar of right distal second and third toes, pre-ulcerative lesion noted to right submet 5. Left foot second and fourth toe eschar noted at distal tip of toes. Also eschar noted to plantarmedial left hallux. No malodor noted. No purulence noted. Mild localized erythema and edema noted to right second and third and left second and fourth toes. Musculoskeletal: Muscle Strength is 5/5 for all extrinsic muscle groups of the foot bilateral. First  MTPJ and Ankle range of motion is mildly limited with no pain or crepitus. Data Review:   Recent Results (from the past 24 hour(s))   METABOLIC PANEL, COMPREHENSIVE    Collection Time: 09/13/22  9:13 PM   Result Value Ref Range    Sodium 135 (L) 136 - 145 mmol/L    Potassium 5.0 3.5 - 5.5 mmol/L    Chloride 100 100 - 111 mmol/L    CO2 32 21 - 32 mmol/L    Anion gap 3 3.0 - 18 mmol/L    Glucose 152 (H) 74 - 99 mg/dL    BUN 24 (H) 7.0 - 18 MG/DL    Creatinine 1.06 0.6 - 1.3 MG/DL    BUN/Creatinine ratio 23 (H) 12 - 20      GFR est AA >60 >60 ml/min/1.73m2    GFR est non-AA >60 >60 ml/min/1.73m2    Calcium 9.3 8.5 - 10.1 MG/DL    Bilirubin, total 0.7 0.2 - 1.0 MG/DL    ALT (SGPT) 15 (L) 16 - 61 U/L    AST (SGOT) 9 (L) 10 - 38 U/L    Alk.  phosphatase 88 45 - 117 U/L    Protein, total 7.8 6.4 - 8.2 g/dL    Albumin 2.9 (L) 3.4 - 5.0 g/dL    Globulin 4.9 (H) 2.0 - 4.0 g/dL    A-G Ratio 0.6 (L) 0.8 - 1.7     CBC WITH AUTOMATED DIFF    Collection Time: 09/13/22 9:13 PM   Result Value Ref Range    WBC 8.7 4.6 - 13.2 K/uL    RBC 3.66 (L) 4.35 - 5.65 M/uL    HGB 10.9 (L) 13.0 - 16.0 g/dL    HCT 32.7 (L) 36.0 - 48.0 %    MCV 89.3 78.0 - 100.0 FL    MCH 29.8 24.0 - 34.0 PG    MCHC 33.3 31.0 - 37.0 g/dL    RDW 13.1 11.6 - 14.5 %    PLATELET 074 256 - 250 K/uL    MPV 9.8 9.2 - 11.8 FL    NRBC 0.0 0  WBC    ABSOLUTE NRBC 0.00 0.00 - 0.01 K/uL    NEUTROPHILS 80 (H) 40 - 73 %    LYMPHOCYTES 8 (L) 21 - 52 %    MONOCYTES 11 (H) 3 - 10 %    EOSINOPHILS 1 0 - 5 %    BASOPHILS 0 0 - 2 %    IMMATURE GRANULOCYTES 1 (H) 0.0 - 0.5 %    ABS. NEUTROPHILS 6.9 1.8 - 8.0 K/UL    ABS. LYMPHOCYTES 0.7 (L) 0.9 - 3.6 K/UL    ABS. MONOCYTES 0.9 0.05 - 1.2 K/UL    ABS. EOSINOPHILS 0.1 0.0 - 0.4 K/UL    ABS. BASOPHILS 0.0 0.0 - 0.1 K/UL    ABS. IMM.  GRANS. 0.1 (H) 0.00 - 0.04 K/UL    DF AUTOMATED     C REACTIVE PROTEIN, QT    Collection Time: 09/13/22  9:13 PM   Result Value Ref Range    C-Reactive protein 17.8 (H) 0 - 0.3 mg/dL   SED RATE (ESR)    Collection Time: 09/13/22  9:13 PM   Result Value Ref Range    Sed rate, automated >140 (H) 0 - 20 mm/hr   HEMOGLOBIN A1C WITH EAG    Collection Time: 09/13/22  9:13 PM   Result Value Ref Range    Hemoglobin A1c 5.8 (H) 4.2 - 5.6 %    Est. average glucose 120 mg/dL   CULTURE, BLOOD    Collection Time: 09/13/22  9:23 PM    Specimen: Blood   Result Value Ref Range    Special Requests: NO SPECIAL REQUESTS      Culture result: NO GROWTH AFTER 8 HOURS     GLUCOSE, POC    Collection Time: 09/14/22 12:29 AM   Result Value Ref Range    Glucose (POC) 136 (H) 70 - 110 mg/dL   CBC W/O DIFF    Collection Time: 09/14/22  3:47 AM   Result Value Ref Range    WBC 8.3 4.6 - 13.2 K/uL    RBC 3.53 (L) 4.35 - 5.65 M/uL    HGB 10.6 (L) 13.0 - 16.0 g/dL    HCT 31.7 (L) 36.0 - 48.0 %    MCV 89.8 78.0 - 100.0 FL    MCH 30.0 24.0 - 34.0 PG    MCHC 33.4 31.0 - 37.0 g/dL    RDW 13.1 11.6 - 14.5 %    PLATELET 745 529 - 386 K/uL    MPV 9.6 9.2 - 11.8 FL    NRBC 0.0 0  WBC ABSOLUTE NRBC 0.00 0.00 - 3.01 K/uL   METABOLIC PANEL, COMPREHENSIVE    Collection Time: 09/14/22  3:47 AM   Result Value Ref Range    Sodium 136 136 - 145 mmol/L    Potassium 4.4 3.5 - 5.5 mmol/L    Chloride 99 (L) 100 - 111 mmol/L    CO2 31 21 - 32 mmol/L    Anion gap 6 3.0 - 18 mmol/L    Glucose 123 (H) 74 - 99 mg/dL    BUN 19 (H) 7.0 - 18 MG/DL    Creatinine 0.93 0.6 - 1.3 MG/DL    BUN/Creatinine ratio 20 12 - 20      GFR est AA >60 >60 ml/min/1.73m2    GFR est non-AA >60 >60 ml/min/1.73m2    Calcium 9.3 8.5 - 10.1 MG/DL    Bilirubin, total 0.7 0.2 - 1.0 MG/DL    ALT (SGPT) 13 (L) 16 - 61 U/L    AST (SGOT) 9 (L) 10 - 38 U/L    Alk. phosphatase 85 45 - 117 U/L    Protein, total 7.5 6.4 - 8.2 g/dL    Albumin 2.8 (L) 3.4 - 5.0 g/dL    Globulin 4.7 (H) 2.0 - 4.0 g/dL    A-G Ratio 0.6 (L) 0.8 - 1.7           Impression:     Diabetes with dry gangrene of multiple toes bilateral, cellulitis of feet    Recommendation:     - x-ray of bilateral foot reviewed. No acute bony changes noted. No soft tissue gas noted. No cortical erosion or bony destruction noted. - Ordered arterial duplex of bilateral LE's. Vascular surgery on board. - Will need toe amputation after vascular surgery intervention.   - Patient can weightbear as tolerated in surgical shoe bilateral.   - Agree with holding off on antibiotics for now, no signs of sepsis noted. - Medical management per primary team.     - Thank you for allowing me the opportunity to participate in Mr. Radha galvez.

## 2022-09-14 NOTE — PROGRESS NOTES
conducted an initial consultation and Spiritual Assessment for Ventura Mahoney, who is a 78 y. o.,male. Patients Primary Language is: [place]. According to the patients EMR Anabaptist Affiliation is: Unknown. The reason the Patient came to the hospital is:   Patient Active Problem List    Diagnosis Date Noted    [facility] 09/13/2022    Cellulitis 09/13/2022    Major depressive disorder, recurrent, mild 06/09/2022    Major depressive disorder, recurrent, moderate 06/09/2022    Major depressive disorder, recurrent, unspecified 06/09/2022    Type 2 diabetes mellitus with diabetic neuropathy, without long-term current use of insulin ([facility] [address] 10/01/2018    Chronic midline low back pain without sciatica 10/01/2018    Erectile dysfunction due to arterial insufficiency 10/01/2018    Hyperlipemia     Sleep apnea     Cluster headaches     Thoracic or lumbosacral neuritis or radiculitis, unspecified     Degeneration of lumbar or lumbosacral intervertebral disc     Lumbago         The  provided the following Interventions:  Initiated a relationship of care and support. Listened empathically. Provided information about Spiritual Care Services. Chart reviewed. Assessment:  Patient declined visit from . He was upset with his medical condition.     [address]  ([phone])

## 2022-09-15 PROBLEM — E44.0 MODERATE PROTEIN-CALORIE MALNUTRITION (HCC): Status: ACTIVE | Noted: 2022-09-15

## 2022-09-15 LAB
ACC. NO. FROM MICRO ORDER, ACCP: ABNORMAL
ACINETOBACTER CALCOACETICUS-BAUMANII COMPLEX, ACBCX: NOT DETECTED
ANION GAP SERPL CALC-SCNC: 7 MMOL/L (ref 3–18)
BACTEROIDES FRAGILIS, BFRA: NOT DETECTED
BASOPHILS # BLD: 0 K/UL (ref 0–0.1)
BASOPHILS NFR BLD: 0 % (ref 0–2)
BIOFIRE COMMENT, BCIDPF: ABNORMAL
BUN SERPL-MCNC: 17 MG/DL (ref 7–18)
BUN/CREAT SERPL: 21 (ref 12–20)
C GLABRATA DNA VAG QL NAA+PROBE: NOT DETECTED
CALCIUM SERPL-MCNC: 8.9 MG/DL (ref 8.5–10.1)
CANDIDA ALBICANS: NOT DETECTED
CANDIDA AURIS, CAAU: NOT DETECTED
CANDIDA KRUSEI, CKRP: NOT DETECTED
CANDIDA PARAPSILOSIS, CPAUP: NOT DETECTED
CANDIDA TROPICALIS, CTROP: NOT DETECTED
CHLORIDE SERPL-SCNC: 100 MMOL/L (ref 100–111)
CO2 SERPL-SCNC: 28 MMOL/L (ref 21–32)
CREAT SERPL-MCNC: 0.82 MG/DL (ref 0.6–1.3)
CRYPTO NEOFORMANS/GATTII, CRYNEG: NOT DETECTED
DIFFERENTIAL METHOD BLD: ABNORMAL
ENTEROBACTER CLOACAE COMPLEX, ECCP: NOT DETECTED
ENTEROBACTERALES SP. , ENBLS: NOT DETECTED
ENTEROCOCCUS FAECALIS, ENFA: NOT DETECTED
ENTEROCOCCUS FAECIUM, ENFAM: NOT DETECTED
EOSINOPHIL # BLD: 0.1 K/UL (ref 0–0.4)
EOSINOPHIL NFR BLD: 1 % (ref 0–5)
ERYTHROCYTE [DISTWIDTH] IN BLOOD BY AUTOMATED COUNT: 12.9 % (ref 11.6–14.5)
ESCHERICHIA COLI: NOT DETECTED
GLUCOSE BLD STRIP.AUTO-MCNC: 113 MG/DL (ref 70–110)
GLUCOSE BLD STRIP.AUTO-MCNC: 138 MG/DL (ref 70–110)
GLUCOSE BLD STRIP.AUTO-MCNC: 164 MG/DL (ref 70–110)
GLUCOSE BLD STRIP.AUTO-MCNC: 190 MG/DL (ref 70–110)
GLUCOSE SERPL-MCNC: 97 MG/DL (ref 74–99)
HAEMOPHILUS INFLUENZAE, HMI: NOT DETECTED
HCT VFR BLD AUTO: 34.1 % (ref 36–48)
HGB BLD-MCNC: 11.3 G/DL (ref 13–16)
IMM GRANULOCYTES # BLD AUTO: 0.1 K/UL (ref 0–0.04)
IMM GRANULOCYTES NFR BLD AUTO: 1 % (ref 0–0.5)
INR PPP: 1 (ref 0.8–1.2)
KLEBSIELLA AEROGENES, KLAE: NOT DETECTED
KLEBSIELLA OXYTOCA: NOT DETECTED
KLEBSIELLA PNEUMONIAE GROUP, KPPG: NOT DETECTED
LISTERIA MONOCYTOGENES, LMONP: NOT DETECTED
LYMPHOCYTES # BLD: 1.3 K/UL (ref 0.9–3.6)
LYMPHOCYTES NFR BLD: 14 % (ref 21–52)
MAGNESIUM SERPL-MCNC: 2.5 MG/DL (ref 1.6–2.6)
MCH RBC QN AUTO: 29.9 PG (ref 24–34)
MCHC RBC AUTO-ENTMCNC: 33.1 G/DL (ref 31–37)
MCV RBC AUTO: 90.2 FL (ref 78–100)
MONOCYTES # BLD: 1 K/UL (ref 0.05–1.2)
MONOCYTES NFR BLD: 11 % (ref 3–10)
NEISSERIA MENINGITIDIS, NMNI: NOT DETECTED
NEUTS SEG # BLD: 7 K/UL (ref 1.8–8)
NEUTS SEG NFR BLD: 74 % (ref 40–73)
NRBC # BLD: 0 K/UL (ref 0–0.01)
NRBC BLD-RTO: 0 PER 100 WBC
PHOSPHATE SERPL-MCNC: 3.4 MG/DL (ref 2.5–4.9)
PLATELET # BLD AUTO: 309 K/UL (ref 135–420)
PMV BLD AUTO: 11 FL (ref 9.2–11.8)
POTASSIUM SERPL-SCNC: 4.5 MMOL/L (ref 3.5–5.5)
PROTEUS, PRP: NOT DETECTED
PROTHROMBIN TIME: 13.5 SEC (ref 11.5–15.2)
PSEUDOMONAS AERUGINOSA: NOT DETECTED
RBC # BLD AUTO: 3.78 M/UL (ref 4.35–5.65)
RESISTANT GENE SPACE, REGENE: ABNORMAL
SALMONELLA, SALMO: NOT DETECTED
SERRATIA MARCESCENS: NOT DETECTED
SODIUM SERPL-SCNC: 135 MMOL/L (ref 136–145)
STAPH EPIDERMIDIS, STEP: NOT DETECTED
STAPH LUGDUNENSIS, STALUG: NOT DETECTED
STAPHYLOCOCCUS AUREUS: NOT DETECTED
STAPHYLOCOCCUS, STAPP: DETECTED
STENO MALTOPHILIA, STMA: NOT DETECTED
STREPTOCOCCUS , STPSP: NOT DETECTED
STREPTOCOCCUS AGALACTIAE (GROUP B): NOT DETECTED
STREPTOCOCCUS PNEUMONIAE , SPNP: NOT DETECTED
STREPTOCOCCUS PYOGENES (GROUP A), SPYOP: NOT DETECTED
WBC # BLD AUTO: 9.5 K/UL (ref 4.6–13.2)

## 2022-09-15 PROCEDURE — 82962 GLUCOSE BLOOD TEST: CPT

## 2022-09-15 PROCEDURE — 80048 BASIC METABOLIC PNL TOTAL CA: CPT

## 2022-09-15 PROCEDURE — 84100 ASSAY OF PHOSPHORUS: CPT

## 2022-09-15 PROCEDURE — 83735 ASSAY OF MAGNESIUM: CPT

## 2022-09-15 PROCEDURE — 74011250636 HC RX REV CODE- 250/636: Performed by: NURSE PRACTITIONER

## 2022-09-15 PROCEDURE — 36415 COLL VENOUS BLD VENIPUNCTURE: CPT

## 2022-09-15 PROCEDURE — 97166 OT EVAL MOD COMPLEX 45 MIN: CPT

## 2022-09-15 PROCEDURE — 74011250636 HC RX REV CODE- 250/636: Performed by: STUDENT IN AN ORGANIZED HEALTH CARE EDUCATION/TRAINING PROGRAM

## 2022-09-15 PROCEDURE — 74011250637 HC RX REV CODE- 250/637: Performed by: HOSPITALIST

## 2022-09-15 PROCEDURE — 92610 EVALUATE SWALLOWING FUNCTION: CPT

## 2022-09-15 PROCEDURE — 92526 ORAL FUNCTION THERAPY: CPT

## 2022-09-15 PROCEDURE — 74011636637 HC RX REV CODE- 636/637: Performed by: NURSE PRACTITIONER

## 2022-09-15 PROCEDURE — 77030018842 HC SOL IRR SOD CL 9% BAXT -A

## 2022-09-15 PROCEDURE — 97162 PT EVAL MOD COMPLEX 30 MIN: CPT

## 2022-09-15 PROCEDURE — 65270000046 HC RM TELEMETRY

## 2022-09-15 PROCEDURE — 99232 SBSQ HOSP IP/OBS MODERATE 35: CPT | Performed by: HOSPITALIST

## 2022-09-15 PROCEDURE — 85025 COMPLETE CBC W/AUTO DIFF WBC: CPT

## 2022-09-15 PROCEDURE — 74011250637 HC RX REV CODE- 250/637: Performed by: NURSE PRACTITIONER

## 2022-09-15 PROCEDURE — 85610 PROTHROMBIN TIME: CPT

## 2022-09-15 RX ORDER — LANOLIN ALCOHOL/MO/W.PET/CERES
100 CREAM (GRAM) TOPICAL DAILY
Status: DISCONTINUED | OUTPATIENT
Start: 2022-09-15 | End: 2022-09-22 | Stop reason: HOSPADM

## 2022-09-15 RX ADMIN — SERTRALINE 50 MG: 50 TABLET, FILM COATED ORAL at 08:38

## 2022-09-15 RX ADMIN — GABAPENTIN 300 MG: 300 CAPSULE ORAL at 08:38

## 2022-09-15 RX ADMIN — DIVALPROEX SODIUM 250 MG: 250 TABLET, DELAYED RELEASE ORAL at 18:05

## 2022-09-15 RX ADMIN — ENOXAPARIN SODIUM 40 MG: 100 INJECTION SUBCUTANEOUS at 08:38

## 2022-09-15 RX ADMIN — DRONABINOL 2.5 MG: 2.5 CAPSULE ORAL at 18:05

## 2022-09-15 RX ADMIN — DIVALPROEX SODIUM 250 MG: 250 TABLET, DELAYED RELEASE ORAL at 08:38

## 2022-09-15 RX ADMIN — SODIUM CHLORIDE 100 ML/HR: 9 INJECTION, SOLUTION INTRAVENOUS at 06:09

## 2022-09-15 RX ADMIN — GABAPENTIN 300 MG: 300 CAPSULE ORAL at 18:05

## 2022-09-15 RX ADMIN — Medication 2 UNITS: at 18:05

## 2022-09-15 RX ADMIN — THERA TABS 1 TABLET: TAB at 08:38

## 2022-09-15 RX ADMIN — Medication 100 MG: at 12:36

## 2022-09-15 RX ADMIN — Medication 2 UNITS: at 12:00

## 2022-09-15 RX ADMIN — DRONABINOL 2.5 MG: 2.5 CAPSULE ORAL at 08:38

## 2022-09-15 NOTE — PROGRESS NOTES
Problem: Pressure Injury - Risk of  Goal: *Prevention of pressure injury  Description: Document Dionte Scale and appropriate interventions in the flowsheet. Outcome: Progressing Towards Goal  Note: Pressure Injury Interventions:  Sensory Interventions: Assess changes in LOC, Avoid rigorous massage over bony prominences, Check visual cues for pain, Keep linens dry and wrinkle-free, Maintain/enhance activity level, Minimize linen layers, Monitor skin under medical devices, Suspension boots    Moisture Interventions: Absorbent underpads, Apply protective barrier, creams and emollients, Check for incontinence Q2 hours and as needed, Internal/External urinary devices, Maintain skin hydration (lotion/cream), Moisture barrier, Offer toileting Q_hr    Activity Interventions: Pressure redistribution bed/mattress(bed type)    Mobility Interventions: HOB 30 degrees or less, Pressure redistribution bed/mattress (bed type)    Nutrition Interventions: Document food/fluid/supplement intake                     Problem: Patient Education: Go to Patient Education Activity  Goal: Patient/Family Education  Outcome: Progressing Towards Goal     Problem: Falls - Risk of  Goal: *Absence of Falls  Description: Document Jackson Fall Risk and appropriate interventions in the flowsheet.   Outcome: Progressing Towards Goal  Note: Fall Risk Interventions:  Mobility Interventions: Bed/chair exit alarm, Communicate number of staff needed for ambulation/transfer, Patient to call before getting OOB    Mentation Interventions: Adequate sleep, hydration, pain control, Bed/chair exit alarm, Door open when patient unattended, Evaluate medications/consider consulting pharmacy, Reorient patient    Medication Interventions: Bed/chair exit alarm, Evaluate medications/consider consulting pharmacy, Patient to call before getting OOB    Elimination Interventions: Bed/chair exit alarm, Call light in reach, Patient to call for help with toileting needs, Toilet paper/wipes in reach, Urinal in reach    History of Falls Interventions: Bed/chair exit alarm, Door open when patient unattended, Evaluate medications/consider consulting pharmacy         Problem: Patient Education: Go to Patient Education Activity  Goal: Patient/Family Education  Outcome: Progressing Towards Goal

## 2022-09-15 NOTE — PROGRESS NOTES
Problem: Mobility Impaired (Adult and Pediatric)  Goal: *Acute Goals and Plan of Care (Insert Text)  Description: Physical Therapy Goals  Initiated 9/15/2022 and to be accomplished within 7 day(s)  1. Patient will move from supine to sit and sit to supine  and roll side to side in bed with moderate assistance . 2.  Patient will transfer from bed to chair and chair to bed with moderate assistance  using the least restrictive device. 3.  Patient will perform sit to stand with moderate assistance . 4. Patient will ambulate with moderate assistance  for 50 feet with the least restrictive device. PLOF: Patient poor historian. States he was ambulatory. Coming from [facility]'''  rehab      Outcome: Progressing Towards Goal     PHYSICAL THERAPY EVALUATION    Patient: Karina Postal (48 y.o. male)  Date: 9/15/2022  Primary Diagnosis: Gangrene ([address] [I96]  Cellulitis [L03.90]  Procedure(s) (LRB):  ANGIOGRAPHY LOWER EXT LEFT (N/A)     Precautions:   Fall  PLOF:     ASSESSMENT :  Based on the objective data described below, the patient presents with confusion and decreased participation in functional mobility. Re-oriented to place and situation. He refuses to move his LE. He keeps his legs crossed and resists movement. Total A to scoot up in bed for repositioning. Will place on 3 day trial to further assess progress towards goals and willingness to participate. Patient will benefit from skilled intervention to address the above impairments.   Patient's rehabilitation potential is considered to be Guarded  Factors which may influence rehabilitation potential include:   []         None noted  [x]         Mental ability/status  [x]         Medical condition  [x]         Home/family situation and support systems  [x]         Safety awareness  []         Pain tolerance/management  []         Other:      PLAN :  Recommendations and Planned Interventions:   [x]           Bed Mobility Training             [x] Neuromuscular Re-Education  [x]           Transfer Training                   []    Orthotic/Prosthetic Training  [x]           Gait Training                          []    Modalities  [x]           Therapeutic Exercises           []    Edema Management/Control  [x]           Therapeutic Activities            [x]    Family Training/Education  [x]           Patient Education  []           Other (comment):    Frequency/Duration: Patient will be followed by physical therapy 1-2 times per day/4-7 days per week to address goals. Further Equipment Recommendations for Discharge: hospital bed, rolling walker, wheelchair     AMPAC: Based on an AM-PAC score of 14/24 (**/20 if omitting stairs) and their current functional mobility deficits, it is recommended that the patient have 3-5 sessions per week of Physical Therapy at d/c to increase the patient's independence. This AMPAC score should be considered in conjunction with interdisciplinary team recommendations to determine the most appropriate discharge setting. Patient's social support, diagnosis, medical stability, and prior level of function should also be taken into consideration. SUBJECTIVE:   Patient stated You guys woke me up.     OBJECTIVE DATA SUMMARY:     Past Medical History:   Diagnosis Date    Arthritis     Cognitive impairment     Depression     Dorsalgia, unspecified     GERD (gastroesophageal reflux disease)     High blood pressure     Insomnia     L1 vertebral fracture (HCC)     Migraine headache     Other chronic pain     Periorbital headache     Sleep apnea     Spinal stenosis     Type 2 diabetes mellitus, without long-term current use of insulin [facility])      Past Surgical History:   Procedure Laterality Date    ENDOSCOPY, COLON, DIAGNOSTIC      HX LUMBAR DISKECTOMY  6/2001, 4/11    left L3-4 microdiskectomy with intradural rupture    HX ORTHOPAEDIC      spinal Surgery Dr. Grace Junior  9/11    spinal fusion, L3-5     Barriers to Learning/Limitations: yes;  confusion  Compensate with: Visual Cues and Verbal Cues  Home Situation:  Home Situation  Home Environment: Long term care  Living Alone: No  Support Systems: [place]  Current DME Used/Available at Home: katarina [place], straight  Critical Behavior:  Neurologic State: Alert  Orientation Level: Oriented to person  Cognition: Decreased command following;Decreased attention/concentration  Safety/Judgement: Fall prevention  Psychosocial  Patient Behaviors: Agitated;Confused; Uncooperative  Needs Expressed: Educational  Purposeful Interaction: Yes  Pt Identified Daily Priority: Clinical issues (comment)  Caritas Process: Nurture loving kindness;Establish trust;Teaching/learning; Attend basic human needs;Create healing environment  Caring Interventions: Therapeutic modalities; Reassure  Reassure: Therapeutic listening; Informing; Acceptance;Caring rounds  Therapeutic Modalities: Intentional therapeutic touch  Skin Condition/Temp: Fragile; Warm     Skin Integrity: Other (comment)  Skin Integumentary  Skin Color: Black (toes)  Skin Condition/Temp: Fragile; Warm  Skin Integrity: Other (comment)  Turgor: Epidermis thin w/ loss of subcut tissue  Hair Growth: Present  Varicosities: Absent  Nails: Exceptions to WDL  Exceptions to WDL: Other (comment)     Strength:    Strength: Generally decreased, functional                    Tone & Sensation:   Tone: Normal                              Range Of Motion:  AROM:  (unable to assess)                   Functional Mobility:  Bed Mobility:  SCooting: [place]            Pain:  Pain level pre-treatment: 0/10   Pain level post-treatment: 0/10   Pain Intervention(s) : Medication (see MAR); Rest, Ice, Repositioning  Response to intervention: Nurse notified, See doc flow    Activity Tolerance:   Poor  Please refer to the flowsheet for vital signs taken during this treatment.   After treatment:   []         Patient left in no apparent distress sitting up in chair  [x]         Patient left in no apparent distress in bed  [x]         Call bell left within reach  []         Nursing notified  []         Caregiver present  [x]         Bed alarm activated  []         SCDs applied    COMMUNICATION/EDUCATION:   [x]         Role of Physical Therapy in the acute care setting. [x]         Fall prevention education was provided and the patient/caregiver indicated understanding. [x]         Patient/family have participated as able in goal setting and plan of care. [x]         Patient/family agree to work toward stated goals and plan of care. []         Patient understands intent and goals of therapy, but is neutral about his/her participation. []         Patient is unable to participate in goal setting/plan of care: ongoing with therapy staff.  []         Other: Thank you for this referral.  Tricia Stevenson, PT   Time Calculation: 8 mins      Eval Complexity: History: MEDIUM  Complexity : 1-2 comorbidities / personal factors will impact the outcome/ POC Exam:MEDIUM Complexity : 3 Standardized tests and measures addressing body structure, function, activity limitation and / or participation in recreation  Presentation: MEDIUM Complexity : Evolving with changing characteristics  Clinical Decision Making:Medium Complexity    Overall Complexity:MEDIUM    [facility] AM-PAC® Basic Mobility Inpatient Short Form (6-Clicks) Version 2    How much HELP from another person does the patient currently need    (If the patient hasn't done an activity recently, how much help from another person do you think he/she would need if he/she tried?)   Total (Total A or Dep)   A Lot  (Mod to Max A)   A Little (Sup or Min A)   None (Mod I to I)   Turning from your back to your side while in a flat bed without using bedrails? [] 1 [] 2 [x] 3 [] 4   2. Moving from lying on your back to sitting on the side of a flat bed without using bedrails?     [] 1 [] 2 [x] 3 [] 4   3. Moving to and from a bed to a chair (including a wheelchair)? [] 1 [x] 2 [] 3 [] 4   4. Standing up from a chair using your arms (e.g., wheelchair, or bedside chair)? [] 1 [x] 2 [] 3 [] 4   5. Walking in hospital room? [] 1 [x] 2 [] 3 [] 4   6. Climbing 3-5 steps with a railing?+   [] 1 [x] 2 [] 3 [] 4   +If stair climbing cannot be assessed, skip item #6. Sum responses from items 1-5.

## 2022-09-15 NOTE — CONSULTS
Comprehensive Nutrition Assessment    Type and Reason for Visit: Initial, Consult, Wound    Nutrition Recommendations/Plan:   Add oral nutrition supplement to optimize nutrition intake opportunity and wound healing: Gelatein 20 (each provides 80 kcal, 20g protein) BID    Add oral nutrition supplement to optimize nutrition intake opportunity: Glucerna (each provides 220 kcal, 10g protein) BID    Continue MVI daily and add thiamine. Monitor PO intake, compliance of oral supplement, weight, labs, and plan of care during admission. Malnutrition Assessment:  Malnutrition Status: Moderate malnutrition (09/15/22 1147)    Context:  Chronic illness     Findings of the 6 clinical characteristics of malnutrition:   Energy Intake:  No significant decrease in energy intake  Weight Loss:  20% over 1 year (-17.5% x 1 year)     Body Fat Loss:  Mild body fat loss, Buccal region   Muscle Mass Loss:  Mild muscle mass loss, Clavicles (pectoralis &deltoids)  Fluid Accumulation:  No significant fluid accumulation,     Strength:  Not performed     Nutrition History and Allergies:   Past medical history: T2DM, arthritis, GERD, high blood pressure, insomnia, periorbital headache, sleep apnea, spinal stenosis. NKFA. Weight history per chart review:  CBW: 08/11/22 : 68 kg (150 lb), 30 days: 07/21/22 : 67.1 kg (148 lb), 60 days: 06/09/22 : 65.8 kg (145 lb), 1 year: 08/13/21 : 82.9 kg (182 lb 12.8 oz), 2 years: 06/29/20 : 84.1 kg (185 lb 6.4 oz). Weight trending down: -17.5% significant change x 1 year. Nutrition Assessment:    Visited pt in room, laying in bed, continues to display confusion. Followed up with SHAMIKA Funes. RN reported pt tolerating current diet with % PO intake at most meals-no additional oral supplements at this time. Pt admitted for gangrene toes/feet-podiatry and vascular consulted. Per chart review, possible toe amputation pending. Current lab shows low Na (135) and elevated BUN/Creatinine ratio (21). Wound noted. Nutrition Related Findings:    Last BM 9/13. Output: 200mL (urine). Pertinent Medications: MVI sodium chloride infusion 100mL/hour, miralax, zofran, lovenox, marinol. Wound Type: Multiple, Pressure injury, Skin tears    Current Nutrition Intake & Therapies:  Average Meal Intake: %  Average Supplement Intake: None ordered  ADULT DIET Regular  DIET NPO    Anthropometric Measures:  Height: 6' (182.9 cm)  Ideal Body Weight (IBW): 178 lbs (81 kg)  Admission Body Weight: 150 lb  Current Body Wt:  68 kg (150 lb), 84.3 % IBW.  Not specified  Current BMI (kg/m2): 20.3  Usual Body Weight: 81.6 kg (180 lb)  % Weight Change (Calculated): -16.7  Weight Adjustment: No adjustment  BMI Category: Underweight (BMI less than 22) age over 72    Estimated Daily Nutrient Needs:  Energy Requirements Based On: Formula (MSJ x 1.2-1.4)  Weight Used for Energy Requirements: Current  Energy (kcal/day): 8004-4447  Weight Used for Protein Requirements: Current (0.8-1.2)  Protein (g/day): 54-82  Method Used for Fluid Requirements: 1 ml/kcal  Fluid (ml/day): 3366-2227    Nutrition Diagnosis:   Increased nutrient needs related to increased demand for energy/nutrients as evidenced by wounds  Moderate malnutrition, In context of chronic illness related to cognitive or neurological impairment, inadequate protein-energy intake as evidenced by weight loss, Criteria as identified in malnutrition assessment    Nutrition Interventions:   Food and/or Nutrient Delivery: Continue current diet, Vitamin supplement, Mineral supplement, Start oral nutrition supplement  Nutrition Education/Counseling: No recommendations at this time, Education not indicated  Coordination of Nutrition Care: Continue to monitor while inpatient  Plan of Care discussed with: SHAMIKA Herrera    Goals:     Goals: Meet at least 75% of estimated needs, by next RD assessment       Nutrition Monitoring and Evaluation:   Behavioral-Environmental Outcomes: None identified  Food/Nutrient Intake Outcomes: Diet advancement/tolerance, Food and nutrient intake, Supplement intake, Vitamin/mineral intake  Physical Signs/Symptoms Outcomes: Biochemical data, GI status, Meal time behavior, Hemodynamic status, Weight, Nutrition focused physical findings    Discharge Planning:    Continue current diet, Continue oral nutrition supplement    Pb Mcguire MA, RDN, LD   Contact: [phone]

## 2022-09-15 NOTE — PROGRESS NOTES
Palliative Medicine    Telephone call made to number found in chart believed to be pt's wife Dominga Stout [phone]. No answer - VM left asking for return call. Telephone call also made to pt's son Mardene Fothergill. No answer - VM left asking for return call. Pt remains FULL code with FULL aggressive medical management. Thank you for the Palliative Medicine consult and allowing us to participate in the care of Mr. Abe Leonard. Will continue to monitor and provide support.     Luisa Barfield RN, BSN  Palliative Medicine Inpatient RN  [facility]  Palliative COPE Line: [phone] ([phone])

## 2022-09-15 NOTE — PROGRESS NOTES
Problem: Pressure Injury - Risk of  Goal: *Prevention of pressure injury  Description: Document Dionte Scale and appropriate interventions in the flowsheet. Outcome: Progressing Towards Goal  Note: Pressure Injury Interventions:  Sensory Interventions: Assess changes in LOC, Avoid rigorous massage over bony prominences, Check visual cues for pain, Keep linens dry and wrinkle-free, Maintain/enhance activity level, Minimize linen layers, Monitor skin under medical devices, Suspension boots    Moisture Interventions: Absorbent underpads, Apply protective barrier, creams and emollients, Check for incontinence Q2 hours and as needed, Internal/External urinary devices, Maintain skin hydration (lotion/cream), Moisture barrier, Offer toileting Q_hr    Activity Interventions: Pressure redistribution bed/mattress(bed type)    Mobility Interventions: HOB 30 degrees or less, Pressure redistribution bed/mattress (bed type)    Nutrition Interventions: Document food/fluid/supplement intake                     Problem: Falls - Risk of  Goal: *Absence of Falls  Description: Document Jackson Fall Risk and appropriate interventions in the flowsheet.   Outcome: Progressing Towards Goal  Note: Fall Risk Interventions:  Mobility Interventions: Bed/chair exit alarm, Communicate number of staff needed for ambulation/transfer, Patient to call before getting OOB    Mentation Interventions: Adequate sleep, hydration, pain control, Bed/chair exit alarm, Door open when patient unattended, Evaluate medications/consider consulting pharmacy, Reorient patient    Medication Interventions: Bed/chair exit alarm, Evaluate medications/consider consulting pharmacy, Patient to call before getting OOB    Elimination Interventions: Bed/chair exit alarm, Call light in reach, Patient to call for help with toileting needs, Toilet paper/wipes in reach, Urinal in reach    History of Falls Interventions: Bed/chair exit alarm, Door open when patient unattended, Evaluate medications/consider consulting pharmacy         Problem: Patient Education: Go to Patient Education Activity  Goal: Patient/Family Education  Outcome: Progressing Towards Goal

## 2022-09-15 NOTE — PROGRESS NOTES
Incontinence care administered, urinal given to pt.    6068: Pt with episode of urinary incontinence, care given, condom catheter replaced. Pt resting quietly in bed, respiration even and breathing non labored, bed alarm device cont'd for pt's safety.

## 2022-09-15 NOTE — CONSULTS
Fidelina Miller    Chief Complaint   Patient presents with    Wound Infection       History and Physical    77 y/o male with htn, dm, current tobacco use, non renal pt presents with gangrene of tips of toes bilateral feet. Has cognitive impairment but is aware of self and location.     Past Medical History:   Diagnosis Date    Arthritis     Cognitive impairment     Depression     Dorsalgia, unspecified     GERD (gastroesophageal reflux disease)     High blood pressure     Insomnia     L1 vertebral fracture (HCC)     Migraine headache     Other chronic pain     Periorbital headache     Sleep apnea     Spinal stenosis     Type 2 diabetes mellitus, without long-term current use of insulin ([facility] [address]      Patient Active Problem List   Diagnosis Code    Thoracic or lumbosacral neuritis or radiculitis, unspecified [id number]    Degeneration of lumbar or lumbosacral intervertebral disc M51.37    Lumbago M54.50    Hyperlipemia E78.5    Sleep apnea G47.30    Cluster headaches G44.009    Type 2 diabetes mellitus with diabetic neuropathy, without long-term current use of insulin ([facility]) E11.40    Chronic midline low back pain without sciatica M54.50, G89.29    Erectile dysfunction due to arterial insufficiency N52.01    Major depressive disorder, recurrent, mild F33.0    Major depressive disorder, recurrent, moderate F33.1    Major depressive disorder, recurrent, unspecified F33.9    Gangrene ([facility] [address] I96    Cellulitis L03.90     Past Surgical History:   Procedure Laterality Date    ENDOSCOPY, COLON, DIAGNOSTIC      HX LUMBAR DISKECTOMY  6/2001, 4/11    left L3-4 microdiskectomy with intradural rupture    HX ORTHOPAEDIC      spinal Surgery Dr. Jamel Rose  9/11    spinal fusion, L3-5     Current Facility-Administered Medications   Medication Dose Route Frequency Provider Last Rate Last Admin    therapeutic multivitamin (THERAGRAN) tablet 1 Tablet  1 Tablet Oral DAILY Lorenzo Hutchins MD   1 Tablet at 09/15/22 0838    0.9% sodium chloride infusion  100 mL/hr IntraVENous CONTINUOUS Madi Villanueva  mL/hr at 09/15/22 0609 100 mL/hr at 09/15/22 8236    sodium chloride (NS) flush 5-40 mL  5-40 mL IntraVENous Q8H April Blackwood NP   10 mL at 09/14/22 2159    sodium chloride (NS) flush 5-40 mL  5-40 mL IntraVENous PRN Rivas Blackwood NP        acetaminophen (TYLENOL) tablet 650 mg  650 mg Oral Q6H PRN Rivas Blackwood NP        Or    acetaminophen (TYLENOL) suppository 650 mg  650 mg Rectal Q6H PRN April Blackwood NP        polyethylene glycol (MIRALAX) packet 17 g  17 g Oral DAILY PRN April Blackwood NP        ondansetron (ZOFRAN ODT) tablet 4 mg  4 mg Oral Q8H PRN April Blackwood NP        Or    ondansetron (ZOFRAN) injection 4 mg  4 mg IntraVENous Q6H PRN April Blackwood NP        enoxaparin (LOVENOX) injection 40 mg  40 mg SubCUTAneous DAILY April Blackwood NP   40 mg at 09/15/22 3187    glucose chewable tablet 16 g  4 Tablet Oral PRN Rivas Blackwood NP        glucagon (GLUCAGEN) injection 1 mg  1 mg IntraMUSCular PRN April Blackwood NP        dextrose 10% infusion 0-250 mL  0-250 mL IntraVENous PRN April Blackwodo NP        insulin lispro (HUMALOG) injection   SubCUTAneous Q6H April Blackwood NP        divalproex DR (DEPAKOTE) tablet 250 mg  250 mg Oral BID April Blackwood NP   250 mg at 09/15/22 8515    dronabinoL (MARINOL) capsule 2.5 mg  2.5 mg Oral BID Rivas Blackwood NP   2.5 mg at 09/15/22 0580    gabapentin (NEURONTIN) capsule 300 mg  300 mg Oral BID Rivas Blackwood NP   300 mg at 09/15/22 7908    sertraline (ZOLOFT) tablet 50 mg  50 mg Oral DAILY Rivas Blackwood, MONICA   50 mg at 09/15/22 9073     Allergies   Allergen Reactions    Codeine Not Reported This Time    Tegretol [Carbamazepine] Not Reported This Time     Social History Socioeconomic History    Marital status:      Spouse name: Not on file    Number of children: Not on file    Years of education: Not on file    Highest education level: Not on file   Occupational History    Not on file   Tobacco Use    Smoking status: Every Day     Packs/day: 1.00     Years: 51.00     Pack years: 51.00     Types: Cigarettes    Smokeless tobacco: Never   Vaping Use    Vaping Use: Never used   Substance and Sexual Activity    Alcohol use: Yes     Comment: rarely    Drug use: No    Sexual activity: Yes     Partners: Female   Other Topics Concern    Not on file   Social History Narrative    Not on file     Social Determinants of Health     Financial Resource Strain: Not on file   Food Insecurity: Not on file   Transportation Needs: Not on file   Physical Activity: Not on file   Stress: Not on file   Social Connections: Not on file   Intimate Partner Violence: Not on file   Housing Stability: Not on file      Family History   Problem Relation Age of Onset    Heart Attack Mother     Cancer Mother     Heart Disease Mother     Hypertension Father     Seizures Father     Alcohol abuse Father        Review of Systems    A full review of systems was completed times ten organ systems and was deemed negative unless otherwise mentioned in the HPI. Physical Exam:    Visit Vitals  BP (!) 141/67 (BP 1 Location: Right upper arm, BP Patient Position: At rest)   Pulse 80   Temp 99.7 °F (37.6 °C)   Resp 16   SpO2 98%      Alert and comfortable  No distress  Head is normocephalic  Neck no jvd  Chest is clear  Card is regular  Abd soft, no guarding  Right lower extremity has palpable dp pulse  Non palpable left  Gangrene on toes both feet  Pvr reviewed      Impression and Plan:    PVD left lower extremity with gangrene  Discussed with the patient and his son, Nick Mason.  Plan for angio left leg tomorrow       Fabiola Kat MD    PLEASE NOTE:  This document has been produced using voice recognition software. Unrecognized errors in transcription may be present.

## 2022-09-15 NOTE — PROGRESS NOTES
Progress Note    Patient: Rosalie Rodriguez MRN: [id number]  SSN: [id number]    YOB: 1945  Age: 78 y.o. Sex: male      Admit Date: 9/13/2022  * No surgery date entered *     Procedure:   Procedure(s):  ANGIOGRAPHY LOWER EXT LEFT    Subjective:     Patient seen resting quiet and comfortably and no apparent distress. Patient had arterial duplex and found to have mild PAD on right and moderate on left side. Dr. Marcio Durham is planning for angiogram of left LE tomorrow. He has stable, dry gangrene of toes and no new pedal complaint today. Denies N/V/C/F. Patient is confused and asking me to give him updates on plan. Objective:     Visit Vitals  /69 (BP 1 Location: Right upper arm, BP Patient Position: At rest)   Pulse 78   Temp 99.1 °F (37.3 °C)   Resp 16   Ht 6' (1.829 m)   SpO2 97%   BMI 20.34 kg/m²        Physical Exam:  Unchanged physical pedal exam since yesterday. No further progression of gangrene/infection noted. Labs/Radiology Review: images and reports reviewed    Assessment:     Hospital Problems  Date Reviewed: 6/9/2022            Codes Class Noted POA    Moderate protein-calorie malnutrition ([facility] [address] ICD-10-CM: E44.0  ICD-9-CM: 263.0  9/15/2022 Unknown        Gangrene ([facility] [address] ICD-10-CM: T39  ICD-9-CM: 785.4  9/13/2022 Unknown        Cellulitis ICD-10-CM: L03.90  ICD-9-CM: 682.9  9/13/2022 Unknown           Plan/Recommendations/Medical Decision Making:     - Patient is going for left LE angiogram tomorrow with Dr. Marcio Durham. - Will perform amputation of bilateral necrotic toes afterwards. - Smoking cessation education given.

## 2022-09-15 NOTE — ROUTINE PROCESS
Bedside shift change report given to David Echols RN (oncoming nurse) by Avril Zabala RN (offgoing nurse). Report included the following information SBAR, Procedure Summary, Intake/Output, MAR, and Recent Results.

## 2022-09-15 NOTE — PROGRESS NOTES
Problem: Dysphagia (Adult)  Goal: *Acute Goals and Plan of Care (Insert Text)  Description:   Patient will:  1. Tolerate PO trials with 0 s/s overt distress in 4/5 trials- met  2. Utilize compensatory swallow strategies/maneuvers (decrease bite/sip, size/rate, alt. liq/sol) with min cues in 4/5 trials- met    Rec:     Reg solid with thin liquids  Aspiration precautions  HOB >45 during po intake, remain >30 for 30-45 minutes after po   Small bites/sips; alternate liquid/solid with slow feeding rate   Oral care TID  Meds per pt preference  Outcome: Resolved/Met   SPEECH LANGUAGE PATHOLOGY BEDSIDE SWALLOW EVALUATION/TREATMENT AND DISCHARGE    Patient: Jonah Henriquez (11 y.o. male)  Date: 9/15/2022  Primary Diagnosis: Gangrene ([facility] [address] [I96]  Cellulitis [L03.90]  Procedure(s) (LRB):  ANGIOGRAPHY LOWER EXT LEFT (N/A)     Precautions: aspiration     PLOF: As per H&P    ASSESSMENT :  Based on the objective data described below, the patient presents with oropharyngeal swallow fxn essentially WFL. Strength, ROM, and coordination of the orofacial musculature were all found to be [facility] Pt tolerated reg solid, puree, and thin liquids +/- straw consecutive swallows without any overt s/sx of aspiration. Mastication was appropriate with timely a-p transit. Positive oral clearance observed across all trials. Pt safe for initiation of reg solid diet with thin liquids, aspiration precautions, oral care TID, and meds as tolerated. TREATMENT :  Skilled therapy initiated; Educated pt on aspiration precautions and importance of compensatory swallow techniques to decrease aspiration risk (decrease rate of intake & sip/bite size, upright @HOB for all po intake and ~30 minutes after po); verbalized comprehension. No further skilled SLP services are indicated at this time. Please re-consult if needed.      PLAN :  Recommendations and Planned Interventions:  See above  Frequency/Duration: x eval/tx only     SUBJECTIVE:   Patient stated Meera Cornelius. You can leave now.     OBJECTIVE:     Past Medical History:   Diagnosis Date    Arthritis     Cognitive impairment     Depression     Dorsalgia, unspecified     GERD (gastroesophageal reflux disease)     High blood pressure     Insomnia     L1 vertebral fracture (HCC)     Migraine headache     Other chronic pain     Periorbital headache     Sleep apnea     Spinal stenosis     Type 2 diabetes mellitus, without long-term current use of insulin ([facility] [address]      Past Surgical History:   Procedure Laterality Date    ENDOSCOPY, COLON, DIAGNOSTIC      HX LUMBAR DISKECTOMY  6/2001, 4/11    left L3-4 microdiskectomy with intradural rupture    HX ORTHOPAEDIC      spinal Surgery Dr. Josette Yang  9/11    spinal fusion, L3-5     Prior Level of Function/Home Situation: No reported swallowing or speech deficits  Diet prior to admission: reg solid with thin  Current Diet:  reg solid with thin   Cognitive and Communication Status:  Neurologic State: Alert  Orientation Level: Oriented X4  Cognition: Follows commands  Oral Assessment:  Oral Assessment  Labial: No impairment  Dentition: Natural;Intact  Oral Hygiene: Adequate  Lingual: No impairment  Velum: No impairment  Mandible: No impairment  P.O. Trials:  Patient Position: [facility] at [facility]  Vocal quality prior to P.O.: No impairment  Consistency Presented: Thin liquid; Solid;Puree  How Presented: Self-fed/presented;Cup/sip;Spoon;Straw;Successive swallows  Bolus Acceptance: No impairment  Bolus Formation/Control: No impairment  Propulsion: No impairment  Oral Residue: None  Initiation of Swallow: No impairment  Laryngeal Elevation: Functional  Aspiration Signs/Symptoms: None  Pharyngeal Phase Characteristics: No impairment, issues, or problems   Effective Modifications: None  Cues for Modifications: None  Oral Phase Severity: No impairment  Pharyngeal Phase Severity : No impairment    PAIN:  Start of Eval: 0  End of Eval: 0     After treatment: []            Patient left in no apparent distress sitting up in chair  [x]            Patient left in no apparent distress in bed  [x]            Call bell left within reach  [x]            Nursing notified  []            Family present  []            Caregiver present  []            Bed alarm activated    COMMUNICATION/EDUCATION:   [x]            Aspiration precautions; swallow safety; compensatory techniques. [x]            Patient/family have participated as able in goal setting and plan of care. []            Patient/family agree to work toward stated goals and plan of care. []            Patient understands intent and goals of therapy; neutral about participation. []            Patient unable to participate in goal setting/plan of care; educ ongoing with interdisciplinary staff  [x]         Posted safety precautions in patient's room.     Thank you for this referral.    Jimena Coulter M.S., CCC-SLP/L  Speech-Language Pathologist

## 2022-09-15 NOTE — PROGRESS NOTES
Problem: Self Care Deficits Care Plan (Adult)  Goal: *Acute Goals and Plan of Care (Insert Text)  Outcome: Resolved/Not Met   OCCUPATIONAL THERAPY EVALUATION/DISCHARGE    Patient: Amanda Chou (50 y.o. male)  Date: 9/15/2022  Primary Diagnosis: Gangrene ([address] [I96]  Cellulitis [L03.90]  Procedure(s) (LRB):  ANGIOGRAPHY LOWER EXT LEFT (N/A)     Precautions:   Fall  PLOF: pt poor historian and appears agitated, unwilling to fully participate in OT evaluation and tx and discuss PLOF    ASSESSMENT AND RECOMMENDATIONS:  Nursing/RN cleared for pt to participate in OT evaluation and tx session. Patient was seen with PT to maximize patient safety, participation, and functional mobility in preparation for self-care tasks. Patient presents lying supine in bed, appears agitated, declined participation in bed mobility and/or functional transfer. Pt demonstrates BUE AROM WFL, poor fine motor/dexterity during retrieval of beverage from bedside table-nursing notified. Patient presents at baseline as PLOF with ADLs and functional mobility and refuses to fully participate in OT evaluation and tx session. Further Equipment Recommendations for Discharge: hospital bed    AMPAC: Based on an AM-PAC score of 14/24 and their current ADL deficits; it is recommended that the patient have 3-5 sessions per week of Occupational Therapy at d/c to increase the patient's independence. This AMPAC score should be considered in conjunction with interdisciplinary team recommendations to determine the most appropriate discharge setting. Patient's social support, diagnosis, medical stability, and prior level of function should also be taken into consideration. SUBJECTIVE:   Patient stated I'm not doing anything, you can just go ahead and leave.     OBJECTIVE DATA SUMMARY:     Past Medical History:   Diagnosis Date    Arthritis     Cognitive impairment     Depression     Dorsalgia, unspecified     GERD (gastroesophageal reflux disease)     High blood pressure     Insomnia     L1 vertebral fracture (HCC)     Migraine headache     Other chronic pain     Periorbital headache     Sleep apnea     Spinal stenosis     Type 2 diabetes mellitus, without long-term current use of insulin [facility])      Past Surgical History:   Procedure Laterality Date    ENDOSCOPY, COLON, DIAGNOSTIC      HX LUMBAR DISKECTOMY  6/2001, 4/11    left L3-4 microdiskectomy with intradural rupture    HX ORTHOPAEDIC      spinal Surgery Dr. Kristen Dasilva  9/11    spinal fusion, L3-5     Barriers to Learning/Limitations: yes;  cognitive and altered mental status (i.e.Sedation, Confusion)  Compensate with: visual, verbal, tactile, kinesthetic cues/model    Home Situation:   Home Situation  Home Environment: Long term care  Living Alone: No  Support Systems: [employer]  Current DME Used/Available at Home: [employer] [place], rolling, Cane, straight  []     Right hand dominant   []     Left hand dominant    Cognitive/Behavioral Status:  Neurologic State: Alert  Orientation Level: Oriented to person  Cognition: Decreased command following;Decreased attention/concentration  Safety/Judgement: Fall prevention    Skin: appears intact  Edema: none noted    Vision/Perceptual:  appears intact       Coordination: BUE  Coordination: Generally decreased, functional  Fine Motor Skills-Upper: Left Intact; Right Intact (mild impairment e.g. opening food/beverage containers)    Gross Motor Skills-Upper: Left Intact; Right Intact    Strength: BUE  Strength: Generally decreased, functional     Tone & Sensation: BUE  Tone: Normal    Range of Motion: BUE  AROM: Generally decreased, functional     ADL Assessment:  Feeding: Setup    Oral Facial Hygiene/Grooming: Minimum assistance    Bathing: Maximum assistance; Moderate assistance    Upper Body Dressing: Minimum assistance    Lower Body Dressing: Maximum assistance    Toileting:  Moderate assistance     ADL Intervention:  Feeding  Feeding Assistance: Set-up  Cognitive Retraining  Safety/Judgement: Fall prevention    Pain:  Pain level pre-treatment: 0/10   Pain level post-treatment: 0/10     Activity Tolerance:   poor  Please refer to the flowsheet for vital signs taken during this treatment. After treatment:   []  Patient left in no apparent distress sitting up in chair  [x]  Patient left in no apparent distress in bed  [x]  Call bell left within reach  [x]  Nursing notified  []  Caregiver present  [x]  Bed alarm activated    COMMUNICATION/EDUCATION:   [x]      Role of Occupational Therapy in the acute care setting  [x]      Home safety education was provided and the patient/caregiver indicated understanding. [x]      Patient/family have participated as able and agree with findings and recommendations. []      Patient is unable to participate in plan of care at this time. Thank you for this referral.  Stacey Montaño  Time Calculation: 8 mins      Eval Complexity: History: MEDIUM Complexity : Expanded review of history including physical, cognitive and psychosocial  history ; Examination: MEDIUM Complexity : 3-5 performance deficits relating to physical, cognitive , or psychosocial skils that result in activity limitations and / or participation restrictions; Decision Making:MEDIUM Complexity : Patient may present with comorbidities that affect occupational performnce.  Miniml to moderate modification of tasks or assistance (eg, physical or verbal ) with assesment(s) is necessary to enable patient to complete evaluation     [facility] [employer]® Daily Activity Inpatient Short Form (6-Clicks)*    How much HELP from another person does the patient currently need    (If the patient hasn't done an activity recently, how much help from another person do you think he/she would need if he/she tried?)   Total (Total A or Dep)   A Lot  (Mod to Max A)   A Little (Sup or Min A)   None (Mod I to I)   Putting on and taking off regular lower body clothing? [] 1 [x] 2 [] 3 [] 4   2. Bathing (including washing, rinsing,      drying)? [] 1 [x] 2 [] 3 [] 4   3. Toileting, which includes using toilet, bedpan or urinal?   [] 1 [x] 2 [] 3 [] 4   4. Putting on and taking off regular upper body clothing? [] 1 [x] 2 [] 3 [] 4   5. Taking care of personal grooming such as brushing teeth? [] 1 [] 2 [x] 3 [] 4   6. Eating meals?    [] 1 [] 2 [x] 3 [] 4

## 2022-09-15 NOTE — PROGRESS NOTES
[facility]    Patient: Karina Postal Age: 78 y.o. : 1945 MR#: [id number] SSN: [id number]  Date/Time: 9/15/2022    Subjective:     Patient seen and examined at bedside, he is more alert today. Denies chest pain, sob, n/v/d/c. States his foot hurts. States he lives at home w/ wife. Did well with SLP. Assessment/Plan:     1. 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 weightbear as tolerated in surgical shoe bilateral. amputation of bilateral necrotic toes after angiogram. Podiatry follows. 2.  DM2. A1c 5.8. Sliding scale insulin. 3.  Advanced age. Consult SLP. 4.  Thin body habitus. Height and weight to chart requested. Consult nutrition. Multivitamin. 5.  Anemia normocytic normochromic  6. Moderate malnutrition (09/15/22 1147)    Context:  Chronic illness     Findings of the 6 clinical characteristics of malnutrition:   Energy Intake:  No significant decrease in energy intake  Weight Loss:  20% over 1 year (-17.5% x 1 year)     Body Fat Loss:  Mild body fat loss, Buccal region   Muscle Mass Loss:  Mild muscle mass loss, Clavicles (pectoralis &deltoids)  Fluid Accumulation:  No significant fluid accumulation,    --> on multivits, supplements. Nutritionist follows. 7.. PVD LLE with dry gangrene. Plan for angio left leg tomorrow. Vascular follows. 8. DM2. ADA diet. Ssi.   9. Full code. Palliative care consult requested. Patient came from [facility] apparently. Additional Notes:  discussed on IDR.      Case discussed with:  [x]Patient  []Family  [x]Nursing  [x]Case Management  DVT Prophylaxis:  []Lovenox  []Hep SQ  []SCDs  []Coumadin   []On Heparin gtt    Objective:   VS: Visit Vitals  BP (!) 141/67 (BP 1 Location: Right upper arm, BP Patient Position: At rest)   Pulse 80   Temp 99.7 °F (37.6 °C)   Resp 16   SpO2 98%        Tmax/24hrs: Temp (24hrs), Av.1 °F (36.7 °C), Min:97.4 °F (36.3 °C), Max:99.7 °F (37.6 °C)    Input/Output:   Intake/Output Summary (Last 24 hours) at 9/15/2022 0926  Last data filed at 9/15/2022 0613  Gross per 24 hour   Intake 120 ml   Output 450 ml   Net -330 ml       General: Oriented x2-3. Lying in bed no acute distress. HEENT normocephalic atraumatic pupils equally round and reactive to light. Cardiovascular: Regular rate and rhythm  Pulmonary: Clear to auscultation bilateral anterior and infra axillary fields  GI: Scaphoid, soft, nontender nondistended nabs  Extremities: Nonpitting edema bilaterally, right greater than left. .  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 DP dopplerable bilaterally. Absent hair growth on dorsal feet and digits. Neuro: Generalized weakness. No focal deficit  Skin: no rash to visible skin. Labs:    Recent Results (from the past 24 hour(s))   LOWER EXT ART PVR MULT LEVEL SEG PRESSURES    Collection Time: 09/14/22  4:13 PM   Result Value Ref Range    Left calf pressure 130 mmHg    Left posterior tibial 122 mmHg    Left dorsalis pedis  mmHg    Right arm  mmHg    Right calf pressure 169 mmHg    Right posterior tibial 142 mmHg    Right dorsalis pedis  mmHg    Left ALICE 0.79     Right ALICE 0.92    GLUCOSE, POC    Collection Time: 09/15/22 12:27 AM   Result Value Ref Range    Glucose (POC) 113 (H) 70 - 110 mg/dL   CBC WITH AUTOMATED DIFF    Collection Time: 09/15/22  1:45 AM   Result Value Ref Range    WBC 9.5 4.6 - 13.2 K/uL    RBC 3.78 (L) 4.35 - 5.65 M/uL    HGB 11.3 (L) 13.0 - 16.0 g/dL    HCT 34.1 (L) 36.0 - 48.0 %    MCV 90.2 78.0 - 100.0 FL    MCH 29.9 24.0 - 34.0 PG    MCHC 33.1 31.0 - 37.0 g/dL    RDW 12.9 11.6 - 14.5 %    PLATELET 358 337 - 916 K/uL    MPV 11.0 9.2 - 11.8 FL    NRBC 0.0 0  WBC    ABSOLUTE NRBC 0.00 0.00 - 0.01 K/uL    NEUTROPHILS 74 (H) 40 - 73 %    LYMPHOCYTES 14 (L) 21 - 52 %    MONOCYTES 11 (H) 3 - 10 %    EOSINOPHILS 1 0 - 5 %    BASOPHILS 0 0 - 2 %    IMMATURE GRANULOCYTES 1 (H) 0.0 - 0.5 %    ABS.  NEUTROPHILS 7.0 1.8 - 8.0 K/UL    ABS. LYMPHOCYTES 1.3 0.9 - 3.6 K/UL    ABS. MONOCYTES 1.0 0.05 - 1.2 K/UL    ABS. EOSINOPHILS 0.1 0.0 - 0.4 K/UL    ABS. BASOPHILS 0.0 0.0 - 0.1 K/UL    ABS. IMM.  GRANS. 0.1 (H) 0.00 - 0.04 K/UL    DF AUTOMATED     MAGNESIUM    Collection Time: 09/15/22  1:45 AM   Result Value Ref Range    Magnesium 2.5 1.6 - 2.6 mg/dL   METABOLIC PANEL, BASIC    Collection Time: 09/15/22  1:45 AM   Result Value Ref Range    Sodium 135 (L) 136 - 145 mmol/L    Potassium 4.5 3.5 - 5.5 mmol/L    Chloride 100 100 - 111 mmol/L    CO2 28 21 - 32 mmol/L    Anion gap 7 3.0 - 18 mmol/L    Glucose 97 74 - 99 mg/dL    BUN 17 7.0 - 18 MG/DL    Creatinine 0.82 0.6 - 1.3 MG/DL    BUN/Creatinine ratio 21 (H) 12 - 20      GFR est AA >60 >60 ml/min/1.73m2    GFR est non-AA >60 >60 ml/min/1.73m2    Calcium 8.9 8.5 - 10.1 MG/DL   PROTHROMBIN TIME + INR    Collection Time: 09/15/22  1:45 AM   Result Value Ref Range    Prothrombin time 13.5 11.5 - 15.2 sec    INR 1.0 0.8 - 1.2     PHOSPHORUS    Collection Time: 09/15/22  1:45 AM   Result Value Ref Range    Phosphorus 3.4 2.5 - 4.9 MG/DL   GLUCOSE, POC    Collection Time: 09/15/22  6:48 AM   Result Value Ref Range    Glucose (POC) 138 (H) 70 - 110 mg/dL     Additional Data Reviewed:      Signed By: Samantha Chen MD     September 15, 2022 5:20 PM

## 2022-09-16 ENCOUNTER — ANESTHESIA EVENT (OUTPATIENT)
Dept: CARDIAC CATH/INVASIVE PROCEDURES | Age: 77
DRG: 271 | End: 2022-09-16
Payer: MEDICARE

## 2022-09-16 ENCOUNTER — ANESTHESIA (OUTPATIENT)
Dept: CARDIAC CATH/INVASIVE PROCEDURES | Age: 77
DRG: 271 | End: 2022-09-16
Payer: MEDICARE

## 2022-09-16 LAB
ANION GAP SERPL CALC-SCNC: 7 MMOL/L (ref 3–18)
BACTERIA SPEC CULT: ABNORMAL
BASOPHILS # BLD: 0 K/UL (ref 0–0.1)
BASOPHILS NFR BLD: 0 % (ref 0–2)
BUN SERPL-MCNC: 13 MG/DL (ref 7–18)
BUN/CREAT SERPL: 15 (ref 12–20)
CALCIUM SERPL-MCNC: 9.1 MG/DL (ref 8.5–10.1)
CHLORIDE SERPL-SCNC: 100 MMOL/L (ref 100–111)
CO2 SERPL-SCNC: 25 MMOL/L (ref 21–32)
CREAT SERPL-MCNC: 0.87 MG/DL (ref 0.6–1.3)
DIFFERENTIAL METHOD BLD: ABNORMAL
EOSINOPHIL # BLD: 0.1 K/UL (ref 0–0.4)
EOSINOPHIL NFR BLD: 1 % (ref 0–5)
ERYTHROCYTE [DISTWIDTH] IN BLOOD BY AUTOMATED COUNT: 12.9 % (ref 11.6–14.5)
GLUCOSE BLD STRIP.AUTO-MCNC: 130 MG/DL (ref 70–110)
GLUCOSE BLD STRIP.AUTO-MCNC: 138 MG/DL (ref 70–110)
GLUCOSE BLD STRIP.AUTO-MCNC: 140 MG/DL (ref 70–110)
GLUCOSE BLD STRIP.AUTO-MCNC: 152 MG/DL (ref 70–110)
GLUCOSE SERPL-MCNC: 116 MG/DL (ref 74–99)
GRAM STN SPEC: ABNORMAL
GRAM STN SPEC: ABNORMAL
HCT VFR BLD AUTO: 33.7 % (ref 36–48)
HGB BLD-MCNC: 11 G/DL (ref 13–16)
IMM GRANULOCYTES # BLD AUTO: 0.1 K/UL (ref 0–0.04)
IMM GRANULOCYTES NFR BLD AUTO: 1 % (ref 0–0.5)
LYMPHOCYTES # BLD: 1.6 K/UL (ref 0.9–3.6)
LYMPHOCYTES NFR BLD: 14 % (ref 21–52)
MAGNESIUM SERPL-MCNC: 2.4 MG/DL (ref 1.6–2.6)
MCH RBC QN AUTO: 29.5 PG (ref 24–34)
MCHC RBC AUTO-ENTMCNC: 32.6 G/DL (ref 31–37)
MCV RBC AUTO: 90.3 FL (ref 78–100)
MONOCYTES # BLD: 1.5 K/UL (ref 0.05–1.2)
MONOCYTES NFR BLD: 14 % (ref 3–10)
NEUTS SEG # BLD: 7.5 K/UL (ref 1.8–8)
NEUTS SEG NFR BLD: 69 % (ref 40–73)
NRBC # BLD: 0 K/UL (ref 0–0.01)
NRBC BLD-RTO: 0 PER 100 WBC
PLATELET # BLD AUTO: 309 K/UL (ref 135–420)
PMV BLD AUTO: 10.6 FL (ref 9.2–11.8)
POTASSIUM SERPL-SCNC: 4.8 MMOL/L (ref 3.5–5.5)
RBC # BLD AUTO: 3.73 M/UL (ref 4.35–5.65)
SERVICE CMNT-IMP: ABNORMAL
SODIUM SERPL-SCNC: 132 MMOL/L (ref 136–145)
WBC # BLD AUTO: 10.9 K/UL (ref 4.6–13.2)

## 2022-09-16 PROCEDURE — C1769 GUIDE WIRE: HCPCS | Performed by: SURGERY

## 2022-09-16 PROCEDURE — 82962 GLUCOSE BLOOD TEST: CPT

## 2022-09-16 PROCEDURE — C1876 STENT, NON-COA/NON-COV W/DEL: HCPCS | Performed by: SURGERY

## 2022-09-16 PROCEDURE — 76937 US GUIDE VASCULAR ACCESS: CPT | Performed by: SURGERY

## 2022-09-16 PROCEDURE — B41F1ZZ FLUOROSCOPY OF RIGHT LOWER EXTREMITY ARTERIES USING LOW OSMOLAR CONTRAST: ICD-10-PCS | Performed by: SURGERY

## 2022-09-16 PROCEDURE — 37225 HC ARTHERC FEMPOP UNI +/-PTA: CPT | Performed by: SURGERY

## 2022-09-16 PROCEDURE — C1760 CLOSURE DEV, VASC: HCPCS | Performed by: SURGERY

## 2022-09-16 PROCEDURE — 85025 COMPLETE CBC W/AUTO DIFF WBC: CPT

## 2022-09-16 PROCEDURE — 77030004561 HC CATH ANGI DX COBRA ANGI -B: Performed by: SURGERY

## 2022-09-16 PROCEDURE — 74011250636 HC RX REV CODE- 250/636: Performed by: NURSE PRACTITIONER

## 2022-09-16 PROCEDURE — 65270000046 HC RM TELEMETRY

## 2022-09-16 PROCEDURE — 74011250637 HC RX REV CODE- 250/637: Performed by: NURSE PRACTITIONER

## 2022-09-16 PROCEDURE — 77030013744: Performed by: SURGERY

## 2022-09-16 PROCEDURE — 77030013519 HC DEV INFL BASIX MRTM -B: Performed by: SURGERY

## 2022-09-16 PROCEDURE — C1724 CATH, TRANS ATHEREC,ROTATION: HCPCS | Performed by: SURGERY

## 2022-09-16 PROCEDURE — 75710 ARTERY X-RAYS ARM/LEG: CPT | Performed by: SURGERY

## 2022-09-16 PROCEDURE — 83735 ASSAY OF MAGNESIUM: CPT

## 2022-09-16 PROCEDURE — 74011250637 HC RX REV CODE- 250/637: Performed by: HOSPITALIST

## 2022-09-16 PROCEDURE — C1887 CATHETER, GUIDING: HCPCS | Performed by: SURGERY

## 2022-09-16 PROCEDURE — 04CL3ZZ EXTIRPATION OF MATTER FROM LEFT FEMORAL ARTERY, PERCUTANEOUS APPROACH: ICD-10-PCS | Performed by: SURGERY

## 2022-09-16 PROCEDURE — 99100 ANES PT EXTEME AGE<1 YR&>70: CPT | Performed by: ANESTHESIOLOGY

## 2022-09-16 PROCEDURE — 74011000250 HC RX REV CODE- 250: Performed by: SURGERY

## 2022-09-16 PROCEDURE — 77030008584 HC TOOL GDWRE DEV TERU -A: Performed by: SURGERY

## 2022-09-16 PROCEDURE — 01924 ANES THER IVNTL RAD ARTL NOS: CPT | Performed by: ANESTHESIOLOGY

## 2022-09-16 PROCEDURE — 76060000034 HC ANESTHESIA 1.5 TO 2 HR: Performed by: SURGERY

## 2022-09-16 PROCEDURE — 047L3DZ DILATION OF LEFT FEMORAL ARTERY WITH INTRALUMINAL DEVICE, PERCUTANEOUS APPROACH: ICD-10-PCS | Performed by: SURGERY

## 2022-09-16 PROCEDURE — 99232 SBSQ HOSP IP/OBS MODERATE 35: CPT | Performed by: HOSPITALIST

## 2022-09-16 PROCEDURE — B41C1ZZ FLUOROSCOPY OF PELVIC ARTERIES USING LOW OSMOLAR CONTRAST: ICD-10-PCS | Performed by: SURGERY

## 2022-09-16 PROCEDURE — 36415 COLL VENOUS BLD VENIPUNCTURE: CPT

## 2022-09-16 PROCEDURE — B41G1ZZ FLUOROSCOPY OF LEFT LOWER EXTREMITY ARTERIES USING LOW OSMOLAR CONTRAST: ICD-10-PCS | Performed by: SURGERY

## 2022-09-16 PROCEDURE — 37221 HC PLC STNT ILIAC +/- PTA INIT: CPT | Performed by: SURGERY

## 2022-09-16 PROCEDURE — 37229 HC ARTHERC TIB/PER UNI +/-PTA INIT: CPT | Performed by: SURGERY

## 2022-09-16 PROCEDURE — 74011250636 HC RX REV CODE- 250/636: Performed by: NURSE ANESTHETIST, CERTIFIED REGISTERED

## 2022-09-16 PROCEDURE — 047J3DZ DILATION OF LEFT EXTERNAL ILIAC ARTERY WITH INTRALUMINAL DEVICE, PERCUTANEOUS APPROACH: ICD-10-PCS | Performed by: SURGERY

## 2022-09-16 PROCEDURE — 2709999900 HC NON-CHARGEABLE SUPPLY

## 2022-09-16 PROCEDURE — C1725 CATH, TRANSLUMIN NON-LASER: HCPCS | Performed by: SURGERY

## 2022-09-16 PROCEDURE — 047L3Z1 DILATION OF LEFT FEMORAL ARTERY USING DRUG-COATED BALLOON, PERCUTANEOUS APPROACH: ICD-10-PCS | Performed by: SURGERY

## 2022-09-16 PROCEDURE — 74011250637 HC RX REV CODE- 250/637: Performed by: SURGERY

## 2022-09-16 PROCEDURE — C1894 INTRO/SHEATH, NON-LASER: HCPCS | Performed by: SURGERY

## 2022-09-16 PROCEDURE — 75625 CONTRAST EXAM ABDOMINL AORTA: CPT | Performed by: SURGERY

## 2022-09-16 PROCEDURE — 74011000250 HC RX REV CODE- 250: Performed by: NURSE PRACTITIONER

## 2022-09-16 PROCEDURE — 80048 BASIC METABOLIC PNL TOTAL CA: CPT

## 2022-09-16 PROCEDURE — 74011000636 HC RX REV CODE- 636: Performed by: SURGERY

## 2022-09-16 DEVICE — PREMOUNTED STENT SYSTEM
Type: IMPLANTABLE DEVICE | Status: FUNCTIONAL
Brand: EXPRESS® LD ILIAC / BILIARY

## 2022-09-16 RX ORDER — IODIXANOL 320 MG/ML
INJECTION, SOLUTION INTRAVASCULAR AS NEEDED
Status: DISCONTINUED | OUTPATIENT
Start: 2022-09-16 | End: 2022-09-16 | Stop reason: HOSPADM

## 2022-09-16 RX ORDER — NITROGLYCERIN 40 MG/100ML
INJECTION INTRAVENOUS
Status: COMPLETED | OUTPATIENT
Start: 2022-09-16 | End: 2022-09-16

## 2022-09-16 RX ORDER — CLOPIDOGREL BISULFATE 75 MG/1
300 TABLET ORAL ONCE
Status: COMPLETED | OUTPATIENT
Start: 2022-09-16 | End: 2022-09-16

## 2022-09-16 RX ORDER — PHENYLEPHRINE HCL IN 0.9% NACL 1 MG/10 ML
SYRINGE (ML) INTRAVENOUS AS NEEDED
Status: DISCONTINUED | OUTPATIENT
Start: 2022-09-16 | End: 2022-09-16 | Stop reason: HOSPADM

## 2022-09-16 RX ORDER — HEPARIN SODIUM 1000 [USP'U]/ML
INJECTION, SOLUTION INTRAVENOUS; SUBCUTANEOUS AS NEEDED
Status: DISCONTINUED | OUTPATIENT
Start: 2022-09-16 | End: 2022-09-16 | Stop reason: HOSPADM

## 2022-09-16 RX ORDER — CLOPIDOGREL BISULFATE 75 MG/1
75 TABLET ORAL DAILY
Status: DISCONTINUED | OUTPATIENT
Start: 2022-09-17 | End: 2022-09-22 | Stop reason: HOSPADM

## 2022-09-16 RX ORDER — VERAPAMIL HYDROCHLORIDE 2.5 MG/ML
INJECTION, SOLUTION INTRAVENOUS AS NEEDED
Status: DISCONTINUED | OUTPATIENT
Start: 2022-09-16 | End: 2022-09-16 | Stop reason: HOSPADM

## 2022-09-16 RX ORDER — PROPOFOL 10 MG/ML
VIAL (ML) INTRAVENOUS
Status: DISCONTINUED | OUTPATIENT
Start: 2022-09-16 | End: 2022-09-16 | Stop reason: HOSPADM

## 2022-09-16 RX ORDER — LIDOCAINE HYDROCHLORIDE 10 MG/ML
INJECTION, SOLUTION EPIDURAL; INFILTRATION; INTRACAUDAL; PERINEURAL AS NEEDED
Status: DISCONTINUED | OUTPATIENT
Start: 2022-09-16 | End: 2022-09-16 | Stop reason: HOSPADM

## 2022-09-16 RX ADMIN — DRONABINOL 2.5 MG: 2.5 CAPSULE ORAL at 11:35

## 2022-09-16 RX ADMIN — DRONABINOL 2.5 MG: 2.5 CAPSULE ORAL at 17:50

## 2022-09-16 RX ADMIN — GABAPENTIN 300 MG: 300 CAPSULE ORAL at 11:35

## 2022-09-16 RX ADMIN — GABAPENTIN 300 MG: 300 CAPSULE ORAL at 17:50

## 2022-09-16 RX ADMIN — SODIUM CHLORIDE, PRESERVATIVE FREE 10 ML: 5 INJECTION INTRAVENOUS at 06:30

## 2022-09-16 RX ADMIN — ENOXAPARIN SODIUM 40 MG: 100 INJECTION SUBCUTANEOUS at 11:34

## 2022-09-16 RX ADMIN — SODIUM CHLORIDE, PRESERVATIVE FREE 10 ML: 5 INJECTION INTRAVENOUS at 00:27

## 2022-09-16 RX ADMIN — DIVALPROEX SODIUM 250 MG: 250 TABLET, DELAYED RELEASE ORAL at 11:35

## 2022-09-16 RX ADMIN — HEPARIN SODIUM 5000 UNITS: 1000 INJECTION, SOLUTION INTRAVENOUS; SUBCUTANEOUS at 08:52

## 2022-09-16 RX ADMIN — Medication 100 MG: at 11:35

## 2022-09-16 RX ADMIN — PROPOFOL 50 MCG/KG/MIN: 10 INJECTION, EMULSION INTRAVENOUS at 08:36

## 2022-09-16 RX ADMIN — HEPARIN SODIUM 2000 UNITS: 1000 INJECTION, SOLUTION INTRAVENOUS; SUBCUTANEOUS at 09:03

## 2022-09-16 RX ADMIN — SERTRALINE 50 MG: 50 TABLET, FILM COATED ORAL at 11:35

## 2022-09-16 RX ADMIN — CLOPIDOGREL BISULFATE 300 MG: 300 TABLET, FILM COATED ORAL at 11:35

## 2022-09-16 RX ADMIN — DIVALPROEX SODIUM 250 MG: 250 TABLET, DELAYED RELEASE ORAL at 17:50

## 2022-09-16 RX ADMIN — SODIUM CHLORIDE, PRESERVATIVE FREE 10 ML: 5 INJECTION INTRAVENOUS at 11:35

## 2022-09-16 RX ADMIN — THERA TABS 1 TABLET: TAB at 11:35

## 2022-09-16 RX ADMIN — Medication 100 MCG: at 09:37

## 2022-09-16 NOTE — PROGRESS NOTES
[facility]  Progress Note    Patient: Jeremy Gonzalez Age: 78 y.o. : 1945 MR#: [id number] SSN: [id number]  Date/Time: 2022    Subjective:     S/p orbital atherectomy of left tibial peroneal artery with angiogram interpretation  Drug-coated balloon angioplasty of left popliteal and tibial peroneal artery with angiogram interpretation. Orbital atherectomy of left superficial femoral artery with angiogram interpretation. Drug-coated balloon angioplasty of left superficial femoral artery with angiogram interpretation. Balloon angioplasty and stent of left external iliac artery. Did well w/ SLP. Patient seen and examined in CCL, he is found oriented x2-3, answering questions following commands. Denies chest pain, shortness of breath, nausea vomiting. Pain is fairly well controlled. Discussed with son, he states patient is a full code. States he has power of . Patient does not have an advanced directive or living well. Assessment/Plan:     1. 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 S/p revascularization 22. Podiatry follows regarding amputation. weightbear as tolerated in surgical shoe bilateral  2. DM2. A1c 5.8. Sliding scale insulin. 3.  Advanced age. 4.  Moderate malnutrition (09/15/22 1147)    Context:  Chronic illness     Findings of the 6 clinical characteristics of malnutrition:   Energy Intake:  No significant decrease in energy intake  Weight Loss:  20% over 1 year (-17.5% x 1 year)     Body Fat Loss:  Mild body fat loss, Buccal region   Muscle Mass Loss:  Mild muscle mass loss, Clavicles (pectoralis &deltoids)  Fluid Accumulation:  No significant fluid accumulation,     --> on supplements. Nutritionist follows. Multivitamin. 5.  Anemia normocytic normochromic  6. Delirium, chronic per son. 7. Full code. Palliative care input appreciated.  Patient is LTC at [facility].     Son updated 4:37 pm, he states he will bring in POA so we can scan to patient's chart. Discussed on IDRs. Additional Notes:      Case discussed with:  [x]Patient  [x]Family  [x]Nursing  [x]Case Management  DVT Prophylaxis:  []Lovenox  []Hep SQ  []SCDs  []Coumadin   []On Heparin gtt    Objective:   VS: Visit Vitals  BP (!) 122/59 (BP 1 Location: Right upper arm, BP Patient Position: At rest)   Pulse 74   Temp 97.9 °F (36.6 °C)   Resp 16   Ht 6' (1.829 m)   SpO2 97%   BMI 20.34 kg/m²        Tmax/24hrs: Temp (24hrs), Av.7 °F (37.1 °C), Min:97.9 °F (36.6 °C), Max:99.5 °F (37.5 °C)    Input/Output:   Intake/Output Summary (Last 24 hours) at 2022 1428  Last data filed at 2022 0949  Gross per 24 hour   Intake 1300 ml   Output --   Net 1300 ml       General: Oriented x3. Lying in bed no acute distress. Seen in CCL. HEENT normocephalic atraumatic pupils equally round and reactive to light. Cardiovascular: Regular rate and rhythm  Pulmonary: Clear to auscultation bilateral anterior and infra axillary fields  GI: Scaphoid, soft, nontender nondistended nabs  Extremities: Nonpitting edema bilaterally, right greater than left. .  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 DP dopplerable bilaterally. Absent hair growth on dorsal feet and digits. Additional: Generalized weakness. Withdraws all 4 extremities to stimulus.     Labs:    Recent Results (from the past 24 hour(s))   GLUCOSE, POC    Collection Time: 09/15/22  5:54 PM   Result Value Ref Range    Glucose (POC) 164 (H) 70 - 110 mg/dL   GLUCOSE, POC    Collection Time: 22 12:24 AM   Result Value Ref Range    Glucose (POC) 152 (H) 70 - 110 mg/dL   CBC WITH AUTOMATED DIFF    Collection Time: 22  3:46 AM   Result Value Ref Range    WBC 10.9 4.6 - 13.2 K/uL    RBC 3.73 (L) 4.35 - 5.65 M/uL    HGB 11.0 (L) 13.0 - 16.0 g/dL    HCT 33.7 (L) 36.0 - 48.0 %    MCV 90.3 78.0 - 100.0 FL    MCH 29.5 24.0 - 34.0 PG    MCHC 32.6 31.0 - 37.0 g/dL    RDW 12.9 11.6 - 14.5 %    PLATELET 364 157 - 360 K/uL    MPV 10.6 9.2 - 11.8 FL    NRBC 0.0 0  WBC    ABSOLUTE NRBC 0.00 0.00 - 0.01 K/uL    NEUTROPHILS 69 40 - 73 %    LYMPHOCYTES 14 (L) 21 - 52 %    MONOCYTES 14 (H) 3 - 10 %    EOSINOPHILS 1 0 - 5 %    BASOPHILS 0 0 - 2 %    IMMATURE GRANULOCYTES 1 (H) 0.0 - 0.5 %    ABS. NEUTROPHILS 7.5 1.8 - 8.0 K/UL    ABS. LYMPHOCYTES 1.6 0.9 - 3.6 K/UL    ABS. MONOCYTES 1.5 (H) 0.05 - 1.2 K/UL    ABS. EOSINOPHILS 0.1 0.0 - 0.4 K/UL    ABS. BASOPHILS 0.0 0.0 - 0.1 K/UL    ABS. IMM.  GRANS. 0.1 (H) 0.00 - 0.04 K/UL    DF AUTOMATED     MAGNESIUM    Collection Time: 09/16/22  3:46 AM   Result Value Ref Range    Magnesium 2.4 1.6 - 2.6 mg/dL   METABOLIC PANEL, BASIC    Collection Time: 09/16/22  3:46 AM   Result Value Ref Range    Sodium 132 (L) 136 - 145 mmol/L    Potassium 4.8 3.5 - 5.5 mmol/L    Chloride 100 100 - 111 mmol/L    CO2 25 21 - 32 mmol/L    Anion gap 7 3.0 - 18 mmol/L    Glucose 116 (H) 74 - 99 mg/dL    BUN 13 7.0 - 18 MG/DL    Creatinine 0.87 0.6 - 1.3 MG/DL    BUN/Creatinine ratio 15 12 - 20      GFR est AA >60 >60 ml/min/1.73m2    GFR est non-AA >60 >60 ml/min/1.73m2    Calcium 9.1 8.5 - 10.1 MG/DL   GLUCOSE, POC    Collection Time: 09/16/22  6:26 AM   Result Value Ref Range    Glucose (POC) 140 (H) 70 - 110 mg/dL   GLUCOSE, POC    Collection Time: 09/16/22 11:14 AM   Result Value Ref Range    Glucose (POC) 138 (H) 70 - 110 mg/dL     Additional Data Reviewed:      Signed By: Natalia Boxer, MD     September 16, 2022 5:20 PM

## 2022-09-16 NOTE — ANESTHESIA POSTPROCEDURE EVALUATION
Procedure(s):  ANGIOGRAPHY LOWER EXT LEFT  ATHERECTOMY PERIPHERAL ARTERY  ANGIOPLASTY PERIPHERAL ARTERY  INSERT STENT COMMON ILIAC ARTERY. MAC    Anesthesia Post Evaluation      Multimodal analgesia: multimodal analgesia used between 6 hours prior to anesthesia start to PACU discharge  Patient location during evaluation: bedside  Patient participation: complete - patient participated  Level of consciousness: awake  Pain management: adequate  Airway patency: patent  Anesthetic complications: no  Cardiovascular status: stable  Respiratory status: acceptable  Hydration status: acceptable  Post anesthesia nausea and vomiting:  controlled      INITIAL Post-op Vital signs:   Vitals Value Taken Time   /82 09/16/22 1239   Temp 36.6 °C (97.9 °F) 09/16/22 1157   Pulse 60 09/16/22 1241   Resp 16 09/16/22 1157   SpO2 95 % 09/16/22 1241   Vitals shown include unvalidated device data.

## 2022-09-16 NOTE — OP NOTES
Operative Note    Patient: Roldan Hill  YOB: 1945  MRN: [id number]    Date of Procedure: 9/16/2022     Pre-Op Diagnosis: PVD (peripheral vascular disease) ([facility] [address] [I73.9]    Post-Op Diagnosis:  Critical arterial stenosis of proximal external iliac artery, critical arterial stenosis of left superficial femoral artery, critical arterial stenosis of distal popliteal and proximal tibial peroneal trunk. Gangrene of toes       Procedure(s):  Ultrasound of right femoral artery with interpretation  Placement of catheter to aorta, aortic angiogram with interpretation  Placement of catheter to left femoral artery, left leg angiogram with interpretation  Placement of catheter to left tibial peroneal artery, angiogram of tibial peroneal artery  Orbital atherectomy of left tibial peroneal artery with angiogram interpretation  Drug-coated balloon angioplasty of left popliteal and tibial peroneal artery with angiogram interpretation  Orbital atherectomy of left superficial femoral artery with angiogram interpretation  Drug-coated balloon angioplasty of left superficial femoral artery with angiogram interpretation  Balloon angioplasty and stent of left external iliac artery  Angiogram of right femoral artery      Surgeon(s):  Linda Rodriguez MD    Surgical Assistant: None    Anesthesia: MAC     Estimated Blood Loss (mL):  Minimal    Complications: None    Specimens: * No specimens in log *     Implants:   Implant Name Type Inv. Item Serial No.  Lot No. LRB No. Used Action   STENT BILI LD PMTD [id number] -- EXPRESS [id number] - [id number]  STENT BILI LD PMTD [id number] -- EXPRESS [id number]  [employer] [id number]  1 Implanted       Drains: * No LDAs found *    Findings: Aorta: Distal aorta is patent with distal aortic aneurysm. Bifurcation is patent. Bilateral common iliac arteries are patent.   There is a critical stenosis at the inflow portion of the left external iliac artery. The right external iliac artery is patent. Right femoral artery: Calcific with moderate atheroma 50% narrowing. Profunda and proximal SFA are patent. Left leg: The common femoral is patent. The profunda is patent. The superficial femoral artery is diffusely atherosclerotic with tandem lesions in a critical lesion at the mid SFA. The popliteal artery is patent. The distal popliteal transition to tibial peroneal trunk is a critical focal stenosis. The dominant runoff is the posterior tibial artery. Peroneal is patent. Anterior tibial small but patent    Successful balloon angioplasty and stent of left external iliac artery. Successful atherectomy and drug-coated balloon angioplasty of left superficial femoral artery. Successful atherectomy and drug-coated balloon angioplasty of left popliteal and tibioperoneal trunk      Detailed Description of Procedure:   Patient is admitted to the hospital with gangrene of the toes Doppler shows multilevel disease left lower extremity. Discussed with him and also his son who are agreeable to procedure. He is brought to the Cath Lab. Moderate sedation appropriate prepped to the groins draped in usual standard fashion. He is already receiving antibiotics. Examined the right femoral artery with an ultrasound and recorded the picture. At the mid to upper seem to be the best location regarding calcifications and accessed after localizing with 1% lidocaine punctured the vessel and placed a wire into the aorta. His vessels demonstrated significant tortuosity throughout the iliacs. He has a distal aortic aneurysm. A lot of spine hardware in place. Aortoiliac angiography was performed. Catheters wires placed up and over into the left femoral and femoral artery gram was done. The catheter was advanced into the superficial femoral artery and a left leg angiogram was done.   Placed a stiff wire brought a 6 Mexican sheath up and over then brought the wire and a crossing catheter all the way down to the tibial peroneal trunk And crossed the lesions. Catheter was placed in the tibioperoneal trunk and angiogram was performed. Give a total of 7000 of heparin. Placed a Viper wire into the posterior tibial runoff. Performed a 1.5 solid atherectomy orbital of the proximal tibial peroneal trunk with 3 escalating passes and then a balloon angioplasty with a 4 x 60 drug-coated balloon to a pressure of 8 for 3 minutes From the popliteal to the tibial peroneal trunk. Post angiogram reveals no residual stenosis with three-vessel runoff. Next, orbital atherectomy of the critical lesion on the SFA. 3 escalating passes were done. This was followed by a 5 x 200 drug-coated balloon with the whole time of 3 minutes to a pressure of 10. Post angiogram shows no residual stenosis no dissection was pleased with the result. Replaced a Viper wire for a Magic torque wire and pulled the sheath back and performed a focused angiography of the iliac arteries on the left. Using a mapping technique I deployed a 7 x 37 balloon expandable stent in the proximal external iliac artery covering the lesion. I postdilated with a 9 x 40 balloon. Final angiogram shows no residual stenosis and excellent flow. There is no dissection and no extravasation. Pulled the sheath back and replaced the wire into the aorta and performed a right femoral arteriogram.  Deployed a closure device on the right femoral without complication. We will do Plavix loading start Plavix therapy.   Will discuss with podiatry for clearance for procedure as needed  Electronically Signed by Lexii Monique MD on 9/16/2022 at 10:40 AM

## 2022-09-16 NOTE — ROUTINE PROCESS
TRANSFER - IN REPORT:    Verbal report received from Ricki Hernandez (name) on Marla Grajeda  being received from CCL (unit) for routine post - op      Report consisted of patients Situation, Background, Assessment and   Recommendations(SBAR). Information from the following report(s) SBAR, Procedure Summary, and MAR was reviewed with the receiving nurse. Opportunity for questions and clarification was provided. Assessment completed upon patients arrival to unit and care assumed.

## 2022-09-16 NOTE — PROGRESS NOTES
Problem: Pressure Injury - Risk of  Goal: *Prevention of pressure injury  Description: Document Dionte Scale and appropriate interventions in the flowsheet. Outcome: Progressing Towards Goal  Note: Pressure Injury Interventions:  Sensory Interventions: Assess changes in LOC, Maintain/enhance activity level, Minimize linen layers    Moisture Interventions: Absorbent underpads, Apply protective barrier, creams and emollients    Activity Interventions: Pressure redistribution bed/mattress(bed type)    Mobility Interventions: Turn and reposition approx. every two hours(pillow and wedges)    Nutrition Interventions: Document food/fluid/supplement intake                     Problem: Falls - Risk of  Goal: *Absence of Falls  Description: Document Danne Lobe Fall Risk and appropriate interventions in the flowsheet.   Outcome: Progressing Towards Goal  Note: Fall Risk Interventions:  Mobility Interventions: Bed/chair exit alarm    Mentation Interventions: Bed/chair exit alarm    Medication Interventions: Patient to call before getting OOB, Bed/chair exit alarm    Elimination Interventions: Bed/chair exit alarm, Call light in reach    History of Falls Interventions: Bed/chair exit alarm         Problem: Nutrition Deficit  Goal: *Optimize nutritional status  Outcome: Progressing Towards Goal

## 2022-09-16 NOTE — ROUTINE PROCESS
TRANSFER - OUT REPORT:    Verbal report given to 3N RN (name) on [place]  being transferred to 3N (unit) for routine progression of care       Report consisted of patients Situation, Background, Assessment and   Recommendations(SBAR). Information from the following report(s) SBAR and Procedure Summary was reviewed with the receiving nurse. Lines:   Peripheral IV 09/13/22 Left Antecubital (Active)   Site Assessment Clean, dry, & intact 09/16/22 1000   Phlebitis Assessment 0 09/16/22 1000   Infiltration Assessment 0 09/16/22 1000   Dressing Status Clean, dry, & intact 09/16/22 1000   Dressing Type Transparent 09/16/22 1000   Hub Color/Line Status Pink;Flushed 09/16/22 1000   Action Taken Open ports on tubing capped 09/16/22 0750   Alcohol Cap Used Yes 09/16/22 0750        Opportunity for questions and clarification was provided.       Patient transported with:   Biolase

## 2022-09-16 NOTE — ANESTHESIA PREPROCEDURE EVALUATION
Relevant Problems   No relevant active problems       Anesthetic History   No history of anesthetic complications            Review of Systems / Medical History  Patient summary reviewed and pertinent labs reviewed    Pulmonary        Sleep apnea           Neuro/Psych         Psychiatric history     Cardiovascular    Hypertension                   GI/Hepatic/Renal     GERD           Endo/Other    Diabetes: type 2    Arthritis     Other Findings              Physical Exam    Airway  Mallampati: II  TM Distance: 4 - 6 cm  Neck ROM: decreased range of motion   Mouth opening: Diminished (comment)     Cardiovascular    Rhythm: regular  Rate: normal         Dental    Dentition: Poor dentition     Pulmonary  Breath sounds clear to auscultation               Abdominal  GI exam deferred       Other Findings            Anesthetic Plan    ASA: 3  Anesthesia type: MAC            Anesthetic plan and risks discussed with: Patient

## 2022-09-16 NOTE — ROUTINE PROCESS
1910 - Received bedside report from [employer], report included SBAR, MAR, and Kardex. Patient was resting in bed, bed elevated, bed in lowest position, bed alarm in place, call button within reach of patient. Gave bedside report to to [employer], report included SBAR, MAR, and Kardex.

## 2022-09-16 NOTE — ROUTINE PROCESS
TRANSFER - IN REPORT:    Verbal report received from Angel Luis Mayer, [facility] (name) on Osvaldo Leal  being received from 3N (unit) for ordered procedure      Report consisted of patients Situation, Background, Assessment and   Recommendations(SBAR). Information from the following report(s) SBAR and Pre Procedure Checklist was reviewed with the receiving nurse. Opportunity for questions and clarification was provided. Assessment completed upon patients arrival to unit and care assumed.

## 2022-09-17 ENCOUNTER — ANESTHESIA (OUTPATIENT)
Dept: SURGERY | Age: 77
DRG: 271 | End: 2022-09-17
Payer: MEDICARE

## 2022-09-17 ENCOUNTER — APPOINTMENT (OUTPATIENT)
Dept: GENERAL RADIOLOGY | Age: 77
DRG: 271 | End: 2022-09-17
Attending: PODIATRIST
Payer: MEDICARE

## 2022-09-17 ENCOUNTER — ANESTHESIA EVENT (OUTPATIENT)
Dept: SURGERY | Age: 77
DRG: 271 | End: 2022-09-17
Payer: MEDICARE

## 2022-09-17 LAB
ANION GAP SERPL CALC-SCNC: 7 MMOL/L (ref 3–18)
BASOPHILS # BLD: 0 K/UL (ref 0–0.1)
BASOPHILS NFR BLD: 0 % (ref 0–2)
BUN SERPL-MCNC: 21 MG/DL (ref 7–18)
BUN/CREAT SERPL: 20 (ref 12–20)
CALCIUM SERPL-MCNC: 9.3 MG/DL (ref 8.5–10.1)
CHLORIDE SERPL-SCNC: 98 MMOL/L (ref 100–111)
CO2 SERPL-SCNC: 28 MMOL/L (ref 21–32)
COVID-19 RAPID TEST, COVR: NOT DETECTED
CREAT SERPL-MCNC: 1.05 MG/DL (ref 0.6–1.3)
DIFFERENTIAL METHOD BLD: ABNORMAL
EOSINOPHIL # BLD: 0.1 K/UL (ref 0–0.4)
EOSINOPHIL NFR BLD: 1 % (ref 0–5)
ERYTHROCYTE [DISTWIDTH] IN BLOOD BY AUTOMATED COUNT: 13 % (ref 11.6–14.5)
GLUCOSE BLD STRIP.AUTO-MCNC: 114 MG/DL (ref 70–110)
GLUCOSE BLD STRIP.AUTO-MCNC: 119 MG/DL (ref 70–110)
GLUCOSE BLD STRIP.AUTO-MCNC: 136 MG/DL (ref 70–110)
GLUCOSE BLD STRIP.AUTO-MCNC: 154 MG/DL (ref 70–110)
GLUCOSE BLD STRIP.AUTO-MCNC: 220 MG/DL (ref 70–110)
GLUCOSE SERPL-MCNC: 106 MG/DL (ref 74–99)
HCT VFR BLD AUTO: 33.7 % (ref 36–48)
HGB BLD-MCNC: 11.2 G/DL (ref 13–16)
IMM GRANULOCYTES # BLD AUTO: 0.1 K/UL (ref 0–0.04)
IMM GRANULOCYTES NFR BLD AUTO: 1 % (ref 0–0.5)
LYMPHOCYTES # BLD: 1.2 K/UL (ref 0.9–3.6)
LYMPHOCYTES NFR BLD: 12 % (ref 21–52)
MCH RBC QN AUTO: 29.2 PG (ref 24–34)
MCHC RBC AUTO-ENTMCNC: 33.2 G/DL (ref 31–37)
MCV RBC AUTO: 88 FL (ref 78–100)
MONOCYTES # BLD: 1 K/UL (ref 0.05–1.2)
MONOCYTES NFR BLD: 10 % (ref 3–10)
NEUTS SEG # BLD: 7.7 K/UL (ref 1.8–8)
NEUTS SEG NFR BLD: 76 % (ref 40–73)
NRBC # BLD: 0 K/UL (ref 0–0.01)
NRBC BLD-RTO: 0 PER 100 WBC
PLATELET # BLD AUTO: 338 K/UL (ref 135–420)
PMV BLD AUTO: 10.2 FL (ref 9.2–11.8)
POTASSIUM SERPL-SCNC: 4.3 MMOL/L (ref 3.5–5.5)
RBC # BLD AUTO: 3.83 M/UL (ref 4.35–5.65)
SODIUM SERPL-SCNC: 133 MMOL/L (ref 136–145)
SOURCE, COVRS: NORMAL
WBC # BLD AUTO: 10.2 K/UL (ref 4.6–13.2)

## 2022-09-17 PROCEDURE — 88311 DECALCIFY TISSUE: CPT

## 2022-09-17 PROCEDURE — 0Y6T0Z2 DETACHMENT AT RIGHT 3RD TOE, MID, OPEN APPROACH: ICD-10-PCS | Performed by: PODIATRIST

## 2022-09-17 PROCEDURE — 74011250636 HC RX REV CODE- 250/636: Performed by: NURSE ANESTHETIST, CERTIFIED REGISTERED

## 2022-09-17 PROCEDURE — 87635 SARS-COV-2 COVID-19 AMP PRB: CPT

## 2022-09-17 PROCEDURE — 77030006773 HC BLD SAW OSC BRSM -A: Performed by: PODIATRIST

## 2022-09-17 PROCEDURE — 77030038692 HC WND DEB SYS IRMX -B: Performed by: PODIATRIST

## 2022-09-17 PROCEDURE — 76210000006 HC OR PH I REC 0.5 TO 1 HR: Performed by: PODIATRIST

## 2022-09-17 PROCEDURE — 87186 SC STD MICRODIL/AGAR DIL: CPT

## 2022-09-17 PROCEDURE — 77030000032 HC CUF TRNQT ZIMM -B: Performed by: PODIATRIST

## 2022-09-17 PROCEDURE — 0Y6R0Z2 DETACHMENT AT RIGHT 2ND TOE, MID, OPEN APPROACH: ICD-10-PCS | Performed by: PODIATRIST

## 2022-09-17 PROCEDURE — 87075 CULTR BACTERIA EXCEPT BLOOD: CPT

## 2022-09-17 PROCEDURE — 77030013708 HC HNDPC SUC IRR PULS STRY –B: Performed by: PODIATRIST

## 2022-09-17 PROCEDURE — 99100 ANES PT EXTEME AGE<1 YR&>70: CPT | Performed by: ANESTHESIOLOGY

## 2022-09-17 PROCEDURE — 88305 TISSUE EXAM BY PATHOLOGIST: CPT

## 2022-09-17 PROCEDURE — 76010000149 HC OR TIME 1 TO 1.5 HR: Performed by: PODIATRIST

## 2022-09-17 PROCEDURE — 77030011265 HC ELECTRD BLD HEX COVD -A: Performed by: PODIATRIST

## 2022-09-17 PROCEDURE — 01480 ANES OPEN PX LOWER L/A/F NOS: CPT | Performed by: ANESTHESIOLOGY

## 2022-09-17 PROCEDURE — 74011000250 HC RX REV CODE- 250: Performed by: PODIATRIST

## 2022-09-17 PROCEDURE — 74011250636 HC RX REV CODE- 250/636: Performed by: PODIATRIST

## 2022-09-17 PROCEDURE — 87077 CULTURE AEROBIC IDENTIFY: CPT

## 2022-09-17 PROCEDURE — 73620 X-RAY EXAM OF FOOT: CPT

## 2022-09-17 PROCEDURE — 2709999900 HC NON-CHARGEABLE SUPPLY

## 2022-09-17 PROCEDURE — 87205 SMEAR GRAM STAIN: CPT

## 2022-09-17 PROCEDURE — 87147 CULTURE TYPE IMMUNOLOGIC: CPT

## 2022-09-17 PROCEDURE — 82962 GLUCOSE BLOOD TEST: CPT

## 2022-09-17 PROCEDURE — 65270000046 HC RM TELEMETRY

## 2022-09-17 PROCEDURE — 99232 SBSQ HOSP IP/OBS MODERATE 35: CPT | Performed by: HOSPITALIST

## 2022-09-17 PROCEDURE — 0Y6S0Z2 DETACHMENT AT LEFT 2ND TOE, MID, OPEN APPROACH: ICD-10-PCS | Performed by: PODIATRIST

## 2022-09-17 PROCEDURE — 2709999900 HC NON-CHARGEABLE SUPPLY: Performed by: PODIATRIST

## 2022-09-17 PROCEDURE — 74011000250 HC RX REV CODE- 250: Performed by: NURSE PRACTITIONER

## 2022-09-17 PROCEDURE — 0Y6W0Z2 DETACHMENT AT LEFT 4TH TOE, MID, OPEN APPROACH: ICD-10-PCS | Performed by: PODIATRIST

## 2022-09-17 PROCEDURE — 74011250636 HC RX REV CODE- 250/636: Performed by: NURSE PRACTITIONER

## 2022-09-17 PROCEDURE — 77030018836 HC SOL IRR NACL ICUM -A: Performed by: PODIATRIST

## 2022-09-17 PROCEDURE — 36415 COLL VENOUS BLD VENIPUNCTURE: CPT

## 2022-09-17 PROCEDURE — 80048 BASIC METABOLIC PNL TOTAL CA: CPT

## 2022-09-17 PROCEDURE — 74011000272 HC RX REV CODE- 272: Performed by: PODIATRIST

## 2022-09-17 PROCEDURE — 85025 COMPLETE CBC W/AUTO DIFF WBC: CPT

## 2022-09-17 PROCEDURE — 76060000033 HC ANESTHESIA 1 TO 1.5 HR: Performed by: PODIATRIST

## 2022-09-17 PROCEDURE — 77030002986 HC SUT PROL J&J -A: Performed by: PODIATRIST

## 2022-09-17 RX ORDER — OXYCODONE AND ACETAMINOPHEN 5; 325 MG/1; MG/1
1 TABLET ORAL
Status: DISCONTINUED | OUTPATIENT
Start: 2022-09-17 | End: 2022-09-22 | Stop reason: HOSPADM

## 2022-09-17 RX ORDER — SODIUM CHLORIDE, SODIUM LACTATE, POTASSIUM CHLORIDE, CALCIUM CHLORIDE 600; 310; 30; 20 MG/100ML; MG/100ML; MG/100ML; MG/100ML
50 INJECTION, SOLUTION INTRAVENOUS CONTINUOUS
Status: DISCONTINUED | OUTPATIENT
Start: 2022-09-17 | End: 2022-09-17 | Stop reason: HOSPADM

## 2022-09-17 RX ORDER — HYDROMORPHONE HYDROCHLORIDE 1 MG/ML
0.2 INJECTION, SOLUTION INTRAMUSCULAR; INTRAVENOUS; SUBCUTANEOUS
Status: DISCONTINUED | OUTPATIENT
Start: 2022-09-17 | End: 2022-09-17 | Stop reason: HOSPADM

## 2022-09-17 RX ORDER — MAGNESIUM SULFATE 100 %
4 CRYSTALS MISCELLANEOUS AS NEEDED
Status: DISCONTINUED | OUTPATIENT
Start: 2022-09-17 | End: 2022-09-17 | Stop reason: HOSPADM

## 2022-09-17 RX ORDER — CEFAZOLIN SODIUM 1 G/3ML
INJECTION, POWDER, FOR SOLUTION INTRAMUSCULAR; INTRAVENOUS AS NEEDED
Status: DISCONTINUED | OUTPATIENT
Start: 2022-09-17 | End: 2022-09-17 | Stop reason: HOSPADM

## 2022-09-17 RX ORDER — FENTANYL CITRATE 50 UG/ML
INJECTION, SOLUTION INTRAMUSCULAR; INTRAVENOUS AS NEEDED
Status: DISCONTINUED | OUTPATIENT
Start: 2022-09-17 | End: 2022-09-17 | Stop reason: HOSPADM

## 2022-09-17 RX ORDER — DEXTROSE MONOHYDRATE 100 MG/ML
0-250 INJECTION, SOLUTION INTRAVENOUS AS NEEDED
Status: DISCONTINUED | OUTPATIENT
Start: 2022-09-17 | End: 2022-09-17 | Stop reason: HOSPADM

## 2022-09-17 RX ORDER — LIDOCAINE HYDROCHLORIDE 10 MG/ML
0.1 INJECTION, SOLUTION EPIDURAL; INFILTRATION; INTRACAUDAL; PERINEURAL AS NEEDED
Status: DISCONTINUED | OUTPATIENT
Start: 2022-09-17 | End: 2022-09-17 | Stop reason: HOSPADM

## 2022-09-17 RX ORDER — CLOPIDOGREL BISULFATE 75 MG/1
75 TABLET ORAL DAILY
Qty: 30 TABLET | Refills: 3 | Status: SHIPPED | OUTPATIENT
Start: 2022-09-17

## 2022-09-17 RX ORDER — SODIUM CHLORIDE, SODIUM LACTATE, POTASSIUM CHLORIDE, CALCIUM CHLORIDE 600; 310; 30; 20 MG/100ML; MG/100ML; MG/100ML; MG/100ML
125 INJECTION, SOLUTION INTRAVENOUS CONTINUOUS
Status: DISCONTINUED | OUTPATIENT
Start: 2022-09-17 | End: 2022-09-17 | Stop reason: HOSPADM

## 2022-09-17 RX ORDER — SODIUM CHLORIDE 0.9 % (FLUSH) 0.9 %
5-40 SYRINGE (ML) INJECTION EVERY 8 HOURS
Status: DISCONTINUED | OUTPATIENT
Start: 2022-09-17 | End: 2022-09-17 | Stop reason: HOSPADM

## 2022-09-17 RX ORDER — INSULIN LISPRO 100 [IU]/ML
INJECTION, SOLUTION INTRAVENOUS; SUBCUTANEOUS ONCE
Status: DISCONTINUED | OUTPATIENT
Start: 2022-09-17 | End: 2022-09-17 | Stop reason: HOSPADM

## 2022-09-17 RX ORDER — SODIUM CHLORIDE 0.9 % (FLUSH) 0.9 %
5-40 SYRINGE (ML) INJECTION AS NEEDED
Status: DISCONTINUED | OUTPATIENT
Start: 2022-09-17 | End: 2022-09-17 | Stop reason: HOSPADM

## 2022-09-17 RX ORDER — NALOXONE HYDROCHLORIDE 0.4 MG/ML
0.04 INJECTION, SOLUTION INTRAMUSCULAR; INTRAVENOUS; SUBCUTANEOUS AS NEEDED
Status: DISCONTINUED | OUTPATIENT
Start: 2022-09-17 | End: 2022-09-17 | Stop reason: HOSPADM

## 2022-09-17 RX ORDER — FAMOTIDINE 20 MG/1
20 TABLET, FILM COATED ORAL ONCE
Status: DISCONTINUED | OUTPATIENT
Start: 2022-09-17 | End: 2022-09-17 | Stop reason: HOSPADM

## 2022-09-17 RX ORDER — MIDAZOLAM HYDROCHLORIDE 1 MG/ML
INJECTION, SOLUTION INTRAMUSCULAR; INTRAVENOUS AS NEEDED
Status: DISCONTINUED | OUTPATIENT
Start: 2022-09-17 | End: 2022-09-17 | Stop reason: HOSPADM

## 2022-09-17 RX ORDER — PROPOFOL 10 MG/ML
VIAL (ML) INTRAVENOUS
Status: DISCONTINUED | OUTPATIENT
Start: 2022-09-17 | End: 2022-09-17 | Stop reason: HOSPADM

## 2022-09-17 RX ORDER — ONDANSETRON 2 MG/ML
4 INJECTION INTRAMUSCULAR; INTRAVENOUS ONCE
Status: DISCONTINUED | OUTPATIENT
Start: 2022-09-17 | End: 2022-09-17 | Stop reason: HOSPADM

## 2022-09-17 RX ORDER — FENTANYL CITRATE 50 UG/ML
25 INJECTION, SOLUTION INTRAMUSCULAR; INTRAVENOUS AS NEEDED
Status: DISCONTINUED | OUTPATIENT
Start: 2022-09-17 | End: 2022-09-17 | Stop reason: HOSPADM

## 2022-09-17 RX ADMIN — ONDANSETRON 4 MG: 2 INJECTION INTRAMUSCULAR; INTRAVENOUS at 14:19

## 2022-09-17 RX ADMIN — PROPOFOL 50 MCG/KG/MIN: 10 INJECTION, EMULSION INTRAVENOUS at 16:47

## 2022-09-17 RX ADMIN — CEFAZOLIN SODIUM 2 G: 1 INJECTION, POWDER, FOR SOLUTION INTRAMUSCULAR; INTRAVENOUS at 17:34

## 2022-09-17 RX ADMIN — SODIUM CHLORIDE, PRESERVATIVE FREE 10 ML: 5 INJECTION INTRAVENOUS at 21:30

## 2022-09-17 RX ADMIN — SODIUM CHLORIDE, PRESERVATIVE FREE 10 ML: 5 INJECTION INTRAVENOUS at 12:22

## 2022-09-17 RX ADMIN — MIDAZOLAM HYDROCHLORIDE 1 MG: 2 INJECTION, SOLUTION INTRAMUSCULAR; INTRAVENOUS at 16:39

## 2022-09-17 RX ADMIN — FENTANYL CITRATE 50 MCG: 50 INJECTION, SOLUTION INTRAMUSCULAR; INTRAVENOUS at 16:39

## 2022-09-17 RX ADMIN — SODIUM CHLORIDE, SODIUM LACTATE, POTASSIUM CHLORIDE, AND CALCIUM CHLORIDE: 600; 310; 30; 20 INJECTION, SOLUTION INTRAVENOUS at 16:39

## 2022-09-17 NOTE — OP NOTES
Operative Note    Patient: Danitza Gilliam  YOB: 1945  MRN: [id number]    Date of Procedure: 9/17/2022     Pre-Op Diagnosis: gangrene of multiple toes    Post-Op Diagnosis: Same as preoperative diagnosis. Procedure(s):  RIGHT FOOT PARTIAL SECOND AND THIRD TOE AMPUTATION, LEFT FOOT PARTIAL SECOND AND FOURTH TOE AMPUTATION    Surgeon(s):  Jarrett Hall DPM    Surgical Assistant: Surg Asst-1: Nelida Muñiz    Anesthesia: MAC WITH LOCAL    Estimated Blood Loss (mL):  Minimal    Complications: None    Specimens:   ID Type Source Tests Collected by Time Destination   1 : RIGHT FOOT SECOND TOE Preservative   Nathalie Siemens A, [facility] 9/17/2022 1717 Pathology   2 : RIGHT FOOT PARTIAL THIRD TOE Preservative   Bajani, Vihang A, [facility] 9/17/2022 1719 Pathology   3 : LEFT FOOT PARTIAL SECOND TOE Preservative   Bajani, Vihang A, [facility] 9/17/2022 1722 Pathology   4 : LEFT FOOT PARTIAL FOURTH TOE Preservative   Bajani, Vihang A, [facility] 9/17/2022 1724 Pathology   1 : LEFT SECOND TOE Bone Toe CULTURE, ANAEROBIC AND AEROBIC Bajani, Vihang A, [facility] 9/17/2022 1727 Microbiology   2 : RIGHT SECOND TOE Bone Toe CULTURE, ANAEROBIC AND AEROBIC Bajani, Vihang A, [facility] 9/17/2022 1731 Microbiology        Implants: * No implants in log *    Drains: * No LDAs found *    Findings: As noted below    Indications for procedure: Patient was admitted to hospital for gangrenous toes on bilateral foot. Patient had arterial duplex showing moderate arterial disease on left LE. Patient had angiogram of left LE by vascular surgery. Patient needed amputation of gangrenous tissue. All risks, benefits, complications of procedure discussed in detail with patient and his son over phone. No guarantee made to outcome of procedure. Discussed high possibility of proximal amputation if non-healing occurs. Patient's son voiced understanding and consent obtained over phone.     Detailed Description of Procedure: Patient brought into operating room and placed on operating table in supine position. After IV sedation from department of anesthesia local block consisting of 10 cc of 1:1 mixture of 1% lidocaine plain and 0.5% marcaine plain administered as digital block to right second, third and left second and fourth toes. Bilateral foot prepped and draped in usual sterile fashion. Attention directed to left second toe where fish mouth incision performed just proximal to area of demarcation. Incision deepened down to bone with # 10 blade and distal portion of toe held with bone clamp and disarticulated at PIPJ. Then using oscillating sagittal bone saw, midshaft osteotomy performed on proximal phalanx. Similar procedure noted also performed on right third toe as well as to left second and fourth toes. Left second toe only distal phalanx and portion of middle phalanx excised as gangrene was only localized to tip. All necrotic resected toes sent to pathology. Clean margin obtained from bilateral second toes and sent to micro. Redundant soft tissue resected as necessary. Surgical site irrigated with Irrisept solution. Good amount of bleeding seen at surgical site. Skin edges approximated with 3-0 prolene in simple interrupted fashion. Surgical sites dressed with betadine soaked adaptic and dry gauze. Patient tolerated procedure and anesthesia well. Patient transported to recovery room with stable vitals.      Electronically Signed by Eloina Puentes DPM on 9/18/2022 at 10:58 AM.

## 2022-09-17 NOTE — PROGRESS NOTES
Patient resting comfortably easily awakens  Groin is soft and intact  Now has palpable pedal pulses bilateral feet post Endo revascularization  Plavix therapy  Can follow-up with me in the office  Discussed case with patient's son and podiatry surgery, cleared for podiatry surgery

## 2022-09-17 NOTE — PROGRESS NOTES
Problem: Pressure Injury - Risk of  Goal: *Prevention of pressure injury  Description: Document Dionte Scale and appropriate interventions in the flowsheet. Outcome: Progressing Towards Goal  Note: Pressure Injury Interventions:  Sensory Interventions: Assess changes in LOC    Moisture Interventions: Absorbent underpads    Activity Interventions: Assess need for specialty bed    Mobility Interventions: Assess need for specialty bed    Nutrition Interventions: Document food/fluid/supplement intake    Friction and Shear Interventions: Foam dressings/transparent film/skin sealants                Problem: Falls - Risk of  Goal: *Absence of Falls  Description: Document Jackson Fall Risk and appropriate interventions in the flowsheet.   Outcome: Progressing Towards Goal  Note: Fall Risk Interventions:  Mobility Interventions: Bed/chair exit alarm    Mentation Interventions: Bed/chair exit alarm    Medication Interventions: Bed/chair exit alarm    Elimination Interventions: Bed/chair exit alarm    History of Falls Interventions: Bed/chair exit alarm         Problem: Nutrition Deficit  Goal: *Optimize nutritional status  Outcome: Progressing Towards Goal

## 2022-09-17 NOTE — PROGRESS NOTES
[facility] Hospitalist Group  Progress Note    Patient: Rita Hughes Age: 78 y.o. : 1945 MR#: [id number] SSN: [id number]  Date/Time: 2022    Subjective:     POD1 orbital atherectomy of left tibial peroneal artery with angiogram interpretation Drug-coated balloon angioplasty of left popliteal and tibial peroneal artery with angiogram interpretation. Orbital atherectomy of left superficial femoral artery with angiogram interpretation. Drug-coated balloon angioplasty of left superficial femoral artery with angiogram interpretation. Balloon angioplasty and stent of left external iliac artery. Patient seen and examined at bedside, he denies chest pain, cough, shortness of breath. States pain is fairly well controlled. No family at bedside. Assessment/Plan:     1. 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 S/p revascularization 22. Podiatry plans amputation this afternoon. 2.  DM2. A1c 5.8. Sliding scale insulin. 3.  Advanced age. 4.  Moderate malnutrition (09/15/22 1147)    Context:  Chronic illness     Findings of the 6 clinical characteristics of malnutrition:   Energy Intake:  No significant decrease in energy intake  Weight Loss:  20% over 1 year (-17.5% x 1 year)     Body Fat Loss:  Mild body fat loss, Buccal region   Muscle Mass Loss:  Mild muscle mass loss, Clavicles (pectoralis &deltoids)  Fluid Accumulation:  No significant fluid accumulation,     --> on supplements. Nutritionist follows. Multivitamin. 5.  Anemia normocytic normochromic  6. Delirium, chronic per son. 7. Full code. Palliative care input appreciated.  Patient is LTC at [facility].       Additional Notes:      Case discussed with:  [x]Patient  []Family  [x]Nursing  []Case Management  DVT Prophylaxis:  []Lovenox  []Hep SQ  []SCDs  []Coumadin   []On Heparin gtt    Objective:   VS: Visit Vitals  BP (!) 152/77 (BP 1 Location: Right upper arm, BP Patient Position: At rest)   Pulse 84 Temp 99.1 °F (37.3 °C)   Resp 18   Ht 6' (1.829 m)   SpO2 97%   BMI 20.34 kg/m²        Tmax/24hrs: Temp (24hrs), Av.3 °F (36.8 °C), Min:97.9 °F (36.6 °C), Max:99.1 °F (37.3 °C)    Input/Output:   Intake/Output Summary (Last 24 hours) at 2022 1151  Last data filed at 2022 2321  Gross per 24 hour   Intake --   Output 475 ml   Net -475 ml         General: Oriented x3. Lying in bed no acute distress. HEENT normocephalic atraumatic pupils equally round and reactive to light. Cardiovascular: Regular rate and rhythm  Pulmonary: Clear to auscultation bilateral anterior and infra axillary fields  GI: Scaphoid, soft, nontender nondistended nabs  Extremities: Nonpitting edema bilaterally, right greater than left. .  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 DP palpable bilaterally. Absent hair growth on dorsal feet and digits. Additional: Generalized weakness. Answering questions, following commands. Labs:    Recent Results (from the past 24 hour(s))   GLUCOSE, POC    Collection Time: 22  5:23 PM   Result Value Ref Range    Glucose (POC) 130 (H) 70 - 110 mg/dL   GLUCOSE, POC    Collection Time: 22  1:04 AM   Result Value Ref Range    Glucose (POC) 220 (H) 70 - 110 mg/dL   CBC WITH AUTOMATED DIFF    Collection Time: 22  3:13 AM   Result Value Ref Range    WBC 10.2 4.6 - 13.2 K/uL    RBC 3.83 (L) 4.35 - 5.65 M/uL    HGB 11.2 (L) 13.0 - 16.0 g/dL    HCT 33.7 (L) 36.0 - 48.0 %    MCV 88.0 78.0 - 100.0 FL    MCH 29.2 24.0 - 34.0 PG    MCHC 33.2 31.0 - 37.0 g/dL    RDW 13.0 11.6 - 14.5 %    PLATELET 464 594 - 089 K/uL    MPV 10.2 9.2 - 11.8 FL    NRBC 0.0 0  WBC    ABSOLUTE NRBC 0.00 0.00 - 0.01 K/uL    NEUTROPHILS 76 (H) 40 - 73 %    LYMPHOCYTES 12 (L) 21 - 52 %    MONOCYTES 10 3 - 10 %    EOSINOPHILS 1 0 - 5 %    BASOPHILS 0 0 - 2 %    IMMATURE GRANULOCYTES 1 (H) 0.0 - 0.5 %    ABS. NEUTROPHILS 7.7 1.8 - 8.0 K/UL    ABS. LYMPHOCYTES 1.2 0.9 - 3.6 K/UL    ABS.  MONOCYTES 1.0 0.05 - 1.2 K/UL ABS. EOSINOPHILS 0.1 0.0 - 0.4 K/UL    ABS. BASOPHILS 0.0 0.0 - 0.1 K/UL    ABS. IMM.  GRANS. 0.1 (H) 0.00 - 0.04 K/UL    DF AUTOMATED     METABOLIC PANEL, BASIC    Collection Time: 09/17/22  3:13 AM   Result Value Ref Range    Sodium 133 (L) 136 - 145 mmol/L    Potassium 4.3 3.5 - 5.5 mmol/L    Chloride 98 (L) 100 - 111 mmol/L    CO2 28 21 - 32 mmol/L    Anion gap 7 3.0 - 18 mmol/L    Glucose 106 (H) 74 - 99 mg/dL    BUN 21 (H) 7.0 - 18 MG/DL    Creatinine 1.05 0.6 - 1.3 MG/DL    BUN/Creatinine ratio 20 12 - 20      GFR est AA >60 >60 ml/min/1.73m2    GFR est non-AA >60 >60 ml/min/1.73m2    Calcium 9.3 8.5 - 10.1 MG/DL   GLUCOSE, POC    Collection Time: 09/17/22  5:12 AM   Result Value Ref Range    Glucose (POC) 154 (H) 70 - 110 mg/dL   COVID-19 RAPID TEST    Collection Time: 09/17/22  8:45 AM   Result Value Ref Range    Specimen source Nasopharyngeal      COVID-19 rapid test Not detected NOTD       Additional Data Reviewed:      Signed By: Pearl Linda MD     September 17, 2022 5:20 PM

## 2022-09-17 NOTE — PROGRESS NOTES
Progress Note    Patient: Danitza Gilliam MRN: [id number]  SSN: [id number]    YOB: 1945  Age: 78 y.o. Sex: male      Admit Date: 9/13/2022  Day of Surgery     Procedure:   Procedure(s):  ANGIOGRAPHY LOWER EXT LEFT  ATHERECTOMY PERIPHERAL ARTERY  ANGIOPLASTY PERIPHERAL ARTERY  INSERT STENT COMMON ILIAC ARTERY    Subjective:     Patient seen sleeping. Patient had angiogram of left LE earlier today by Dr. Tahira Leach. Patient is cleared for toe amputation on bilateral foot. Objective:     Visit Vitals  BP (!) 145/71 (BP 1 Location: Right upper arm, BP Patient Position: At rest)   Pulse 83   Temp 98 °F (36.7 °C)   Resp 16   Ht 6' (1.829 m)   SpO2 98%   BMI 20.34 kg/m²        Physical Exam:  Stable, dry gangrene of multiple toes on bilateral foot. No new pedal change. Assessment:     Hospital Problems  Date Reviewed: 6/9/2022            Codes Class Noted POA    Moderate protein-calorie malnutrition ([facility] [address] ICD-10-CM: E44.0  ICD-9-CM: 263.0  9/15/2022 Unknown        Gangrene ([facility] [address] ICD-10-CM: B27  ICD-9-CM: 785.4  9/13/2022 Unknown        Cellulitis ICD-10-CM: L03.90  ICD-9-CM: 682.9  9/13/2022 Unknown           Plan/Recommendations/Medical Decision Making:     - Plan for toe amputation. Will check with OR for availability. - follow up Vascular surgery recommendations.    - Medical management per primary team.

## 2022-09-17 NOTE — PROGRESS NOTES
Bedside and Verbal shift change report given to Lizeth Gonzáles RN (oncoming nurse) by Nellie George RN (offgoing nurse). Report included the following information SBAR, Kardex, Intake/Output, MAR, and Recent Results.                Wound Prevention Checklist    Patient: Montserrat Stratton (33 y.o. male)  Date: 9/17/2022  Diagnosis: Gangrene ([address] Wynema Trenton  Cellulitis [L03.90] <principal problem not specified>    Precautions: Fall       []  Heel prevention boots placed on patient    [x]  Patient turned q2h during shift    []  Lift team ordered    [x]  Patient on [place] bed/Specialty bed    [x]  Each Wound is documented during shift (Stage, Color, drainage, odor, measurements, and dressings)    [x]  Dual skin checks done at bedside during shift report with Alberto Madrigal RN

## 2022-09-17 NOTE — ROUTINE PROCESS
Received bedside report from [address] [place] [address], report included SBAR, MAR, and Kadex. Gave bedside report to Roland Burrell, report included SBAR, MAR, and Kardex.

## 2022-09-17 NOTE — ANESTHESIA PREPROCEDURE EVALUATION
Relevant Problems   RESPIRATORY SYSTEM   (+) Sleep apnea      NEUROLOGY   (+) Cluster headaches   (+) Major depressive disorder, recurrent, mild   (+) Major depressive disorder, recurrent, moderate   (+) Major depressive disorder, recurrent, unspecified      ENDOCRINE   (+) Type 2 diabetes mellitus with diabetic neuropathy, without long-term current use of insulin (HCC)       Anesthetic History   No history of anesthetic complications            Review of Systems / Medical History  Patient summary reviewed and pertinent labs reviewed    Pulmonary        Sleep apnea           Neuro/Psych         Psychiatric history     Cardiovascular    Hypertension          PAD         GI/Hepatic/Renal     GERD           Endo/Other    Diabetes: type 2    Arthritis     Other Findings              Physical Exam    Airway  Mallampati: III    Neck ROM: decreased range of motion   Mouth opening: Diminished (comment)     Cardiovascular    Rhythm: regular  Rate: normal         Dental    Dentition: Poor dentition     Pulmonary      Decreased breath sounds: bilateral           Abdominal  GI exam deferred       Other Findings            Anesthetic Plan    ASA: 3  Anesthesia type: MAC            Anesthetic plan and risks discussed with: Family

## 2022-09-17 NOTE — PROGRESS NOTES
Progress Note    Patient: Jill Ferguson MRN: [id number]  SSN: [id number]    YOB: 1945  Age: 78 y.o. Sex: male      Admit Date: 9/13/2022  1 Day Post-Op     Procedure:   Procedure(s):  ANGIOGRAPHY LOWER EXT LEFT  ATHERECTOMY PERIPHERAL ARTERY  ANGIOPLASTY PERIPHERAL ARTERY  INSERT STENT COMMON ILIAC ARTERY    Subjective:     Patient seen resting quiet and comfortably and no apparent distress. Patient denies any pain to feet. He had angiogram of left LE yesterday by Dr. Dheeraj Louis and now has palpable pedal pulses. Patient is having toe amputation of bilateral foot today. He denies any new pedal complaint. Denies N/V/C/F. Objective:     Visit Vitals  BP (!) 152/77 (BP 1 Location: Right upper arm, BP Patient Position: At rest)   Pulse 84   Temp 99.1 °F (37.3 °C)   Resp 18   Ht 6' (1.829 m)   SpO2 97%   BMI 20.34 kg/m²        Physical Exam:  Stable dry gangrene of right second, third toe and left second and fourth toes. Localized erythema of affected toes noted. No purulence seen. Palpable pedal pulses noted. Labs/Radiology Review: images and reports reviewed    Assessment:     Hospital Problems  Date Reviewed: 6/9/2022            Codes Class Noted POA    Moderate protein-calorie malnutrition ([facility] [address] ICD-10-CM: E44.0  ICD-9-CM: 263.0  9/15/2022 Unknown        Gangrene ([facility] [address] ICD-10-CM: Z07  ICD-9-CM: 785.4  9/13/2022 Unknown        Cellulitis ICD-10-CM: L03.90  ICD-9-CM: 682.9  9/13/2022 Unknown           Plan/Recommendations/Medical Decision Making:     - Plan for right foot second toe amputation, right partial third toe amputation, left second and fourth partial toe amputation. Please keep him NPO. Consent obtained from his son, Mr. Anaya Beyer. - Vascular surgery recommendations appreciated.    - Medical management per primary team.

## 2022-09-17 NOTE — PERIOP NOTES
When flushing pt IV in LAC noted fluids leaking from the IV site. Pt stated the IV hurt. Explained  to the patient that he would need a new IV. Asked pt permission to look at his arm to start looking for a vein to start a new IV. Pt refused. Pt stated he wanted Dr. Molly Pelaez notified that he would like to go home and not have the procedure performed today.  Pt

## 2022-09-18 LAB
GLUCOSE BLD STRIP.AUTO-MCNC: 141 MG/DL (ref 70–110)
GLUCOSE BLD STRIP.AUTO-MCNC: 147 MG/DL (ref 70–110)
GLUCOSE BLD STRIP.AUTO-MCNC: 163 MG/DL (ref 70–110)
GLUCOSE BLD STRIP.AUTO-MCNC: 172 MG/DL (ref 70–110)

## 2022-09-18 PROCEDURE — 74011250637 HC RX REV CODE- 250/637: Performed by: NURSE PRACTITIONER

## 2022-09-18 PROCEDURE — 74011250636 HC RX REV CODE- 250/636: Performed by: NURSE PRACTITIONER

## 2022-09-18 PROCEDURE — 74011000250 HC RX REV CODE- 250: Performed by: NURSE PRACTITIONER

## 2022-09-18 PROCEDURE — 74011250637 HC RX REV CODE- 250/637: Performed by: HOSPITALIST

## 2022-09-18 PROCEDURE — 74011250637 HC RX REV CODE- 250/637: Performed by: SURGERY

## 2022-09-18 PROCEDURE — 99232 SBSQ HOSP IP/OBS MODERATE 35: CPT | Performed by: INTERNAL MEDICINE

## 2022-09-18 PROCEDURE — 65270000046 HC RM TELEMETRY

## 2022-09-18 PROCEDURE — 82962 GLUCOSE BLOOD TEST: CPT

## 2022-09-18 PROCEDURE — 74011636637 HC RX REV CODE- 636/637: Performed by: NURSE PRACTITIONER

## 2022-09-18 RX ADMIN — CLOPIDOGREL BISULFATE 75 MG: 75 TABLET ORAL at 10:05

## 2022-09-18 RX ADMIN — ENOXAPARIN SODIUM 40 MG: 100 INJECTION SUBCUTANEOUS at 10:05

## 2022-09-18 RX ADMIN — DIVALPROEX SODIUM 250 MG: 250 TABLET, DELAYED RELEASE ORAL at 10:05

## 2022-09-18 RX ADMIN — DRONABINOL 2.5 MG: 2.5 CAPSULE ORAL at 10:05

## 2022-09-18 RX ADMIN — Medication 2 UNITS: at 00:31

## 2022-09-18 RX ADMIN — DIVALPROEX SODIUM 250 MG: 250 TABLET, DELAYED RELEASE ORAL at 18:36

## 2022-09-18 RX ADMIN — Medication 100 MG: at 10:05

## 2022-09-18 RX ADMIN — SODIUM CHLORIDE, PRESERVATIVE FREE 10 ML: 5 INJECTION INTRAVENOUS at 13:42

## 2022-09-18 RX ADMIN — SERTRALINE 50 MG: 50 TABLET, FILM COATED ORAL at 10:05

## 2022-09-18 RX ADMIN — DRONABINOL 2.5 MG: 2.5 CAPSULE ORAL at 18:36

## 2022-09-18 RX ADMIN — THERA TABS 1 TABLET: TAB at 10:05

## 2022-09-18 RX ADMIN — GABAPENTIN 300 MG: 300 CAPSULE ORAL at 18:36

## 2022-09-18 RX ADMIN — Medication 2 UNITS: at 13:41

## 2022-09-18 RX ADMIN — GABAPENTIN 300 MG: 300 CAPSULE ORAL at 10:05

## 2022-09-18 RX ADMIN — Medication 2 UNITS: at 17:35

## 2022-09-18 NOTE — ROUTINE PROCESS
Patient came back from surgery confused and calling out for help. His bandages have old blood on them. No pain compliants at this time. Patient is incontinent.

## 2022-09-18 NOTE — PROGRESS NOTES
Hospitalist Progress Note    Patient: Sang Kwok Age: 78 y.o. : 1945 MR#: [id number] SSN: [id number]  Date/Time: 2022 1:14 PM    DOA: 2022  PCP: Gabrielle Perea MD    Subjective:     He has no complaint, he asked when he can go home  He is status post right foot partial second and third toe amputation, left foot partial second and fourth toe amputation by Dr. Adis Tuckerman (22)  Intraoperative which has no growth  No pain complaint     Interval Hospital Course:      ROS:  No current fever/chills, no headache, no dizziness, no facial pain, no sinus congestion,   No swallowing pain, No chest pain, no palpitation, no shortness of breath, no abd pain,  No diarrhea, no urinary complaint, no leg pain or swelling       Assessment/Plan:   Dry gangrene of multiple toes associates with PAD and diabetes mellitus, status post right foot partial second and third toe amputation, left foot partial second and fourth toes amputation on 2022  Critical arterial stenosis of the proximal external iliac artery, critical arterial stenosis of the left superior femoral artery, critical arterial stenosis of the distal popliteal and proximal tibial peroneal trunk, S/P orbital atherectomy of left tibial peroneal artery, drug-coated balloon angioplasty of the left popliteal and tibial peroneal artery, orbital atherectomy of the left superficial femoral artery, drug-coated balloon angioplasty of the left superficial femoral artery, balloon angioplasty and stenting of the left external iliac artery, angiogram of the right femoral artery  Type 2 DM   Normocytic anemia  Advanced age  Delirium suspected associated with hospitalization, now resolved to baseline  Moderate malnutrition  Blood culture growth in 1 set, contaminated in acquisition  Hyponatremia    Spoke with his nurse to maintain wound care. Intraoperative culture follow-up.   Podiatry continues to follow  He is to follow-up with vascular surgery in the outpatient setting  He maintains on Plavix. Continue gabapentin, Zoloft, Depakote, Marinol  ISS  PT/OT follow-up  Encourage ICS  Pain control with Tylenol as needed  Nutritional support  Appreciate palliative care consult  Full code for now      Disposition planning: Placement versus going home in 1 to 2 days  Family updated (.  None)  Additional Notes:    Time spent > 30 minutes    Case discussed with:  [x]Patient  []Family  [x]Nursing  []Case Management  DVT Prophylaxis:  [x]Lovenox  []Hep SQ  []SCDs  []Coumadin   []On Heparin gtt    Signed By: Rashi Young MD     2022 1:14 PM              Objective:   VS: Visit Vitals  BP (!) 128/58 (BP 1 Location: Right upper arm, BP Patient Position: At rest)   Pulse 75   Temp 98 °F (36.7 °C)   Resp 16   Ht 6' (1.829 m)   SpO2 98%   BMI 20.34 kg/m²      Tmax/24hrs: Temp (24hrs), Av °F (36.7 °C), Min:97.8 °F (36.6 °C), Max:98.2 °F (36.8 °C)    Intake/Output Summary (Last 24 hours) at 2022 1314  Last data filed at 2022 0358  Gross per 24 hour   Intake 830 ml   Output 400 ml   Net 430 ml       Tele:   General:  Cooperative, Not in acute distress, speaks in short sentence while in bed, cachexia  HEENT: PERRL, EOMI, supple neck, no JVD, dry oral mucosa  Cardiovascular: S1S2 regular, no rub/gallop   Pulmonary: air entry bilaterally, no wheezing, no crackle  GI:  Soft, non tender, non distended, +bs, no guarding   Extremities: + Pedal edema, +distal pulses appreciated   Both feet were wrapped, wound soaked with blood.   Neuro: AOx2, moving all extremities,    Additional:       Current Facility-Administered Medications   Medication Dose Route Frequency    oxyCODONE-acetaminophen (PERCOCET) 5-325 mg per tablet 1 Tablet  1 Tablet Oral Q6H PRN    clopidogreL (PLAVIX) tablet 75 mg  75 mg Oral DAILY    thiamine HCL (B-1) tablet 100 mg  100 mg Oral DAILY    therapeutic multivitamin (THERAGRAN) tablet 1 Tablet  1 Tablet Oral DAILY    0.9% sodium chloride infusion 100 mL/hr IntraVENous CONTINUOUS    sodium chloride (NS) flush 5-40 mL  5-40 mL IntraVENous Q8H    sodium chloride (NS) flush 5-40 mL  5-40 mL IntraVENous PRN    acetaminophen (TYLENOL) tablet 650 mg  650 mg Oral Q6H PRN    Or    acetaminophen (TYLENOL) suppository 650 mg  650 mg Rectal Q6H PRN    polyethylene glycol (MIRALAX) packet 17 g  17 g Oral DAILY PRN    ondansetron (ZOFRAN ODT) tablet 4 mg  4 mg Oral Q8H PRN    Or    ondansetron (ZOFRAN) injection 4 mg  4 mg IntraVENous Q6H PRN    enoxaparin (LOVENOX) injection 40 mg  40 mg SubCUTAneous DAILY    glucose chewable tablet 16 g  4 Tablet Oral PRN    glucagon (GLUCAGEN) injection 1 mg  1 mg IntraMUSCular PRN    dextrose 10% infusion 0-250 mL  0-250 mL IntraVENous PRN    insulin lispro (HUMALOG) injection   SubCUTAneous Q6H    divalproex DR (DEPAKOTE) tablet 250 mg  250 mg Oral BID    dronabinoL (MARINOL) capsule 2.5 mg  2.5 mg Oral BID    gabapentin (NEURONTIN) capsule 300 mg  300 mg Oral BID    sertraline (ZOLOFT) tablet 50 mg  50 mg Oral DAILY            Lab/Data Review:  Labs: Results:       Chemistry Recent Labs     09/17/22 0313 09/16/22  0346   * 116*   * 132*   K 4.3 4.8   CL 98* 100   CO2 28 25   BUN 21* 13   CREA 1.05 0.87   BUCR 20 15   AGAP 7 7   CA 9.3 9.1     No results for input(s): TBIL, ALT, ALKP, TP, ALB, GLOB, AGRAT in the last 72 hours. No lab exists for component: SGOT   CBC w/Diff Recent Labs     09/17/22 0313 09/16/22  0346   WBC 10.2 10.9   RBC 3.83* 3.73*   HGB 11.2* 11.0*   HCT 33.7* 33.7*   MCV 88.0 90.3   MCH 29.2 29.5   MCHC 33.2 32.6   RDW 13.0 12.9    309   GRANS 76* 69   LYMPH 12* 14*   EOS 1 1      Coagulation No results for input(s): PTP, INR, APTT, INREXT in the last 72 hours.     Iron/Ferritin No results found for: IRON, FE, TIBC, IBCT, PSAT, FERR    BNP    Cardiac Enzymes Lab Results   Component Value Date/Time    CK 67 07/21/2022 02:35 PM        Lactic Acid    Thyroid Studies          All Micro Results Procedure Component Value Units Date/Time    CULTURE, ANAEROBIC [[id number]] Collected: 09/17/22 1800    Order Status: Sent Specimen: Toe Updated: 09/18/22 0955    CULTURE, TISSUE W Marielena Thompson [[id number]] Collected: 09/17/22 1800    Order Status: Sent Specimen: Toe Updated: 09/18/22 0955    CULTURE, ANAEROBIC [[id number]] Collected: 09/17/22 1745    Order Status: Sent Specimen: Toe Updated: 09/18/22 0955    CULTURE, TISSUE W Marielena Thompson [[id number]] Collected: 09/17/22 1745    Order Status: Sent Specimen: Toe Updated: 09/18/22 0955    COVID-19 RAPID TEST [[id number]] Collected: 09/17/22 0845    Order Status: Completed Specimen: Nasopharyngeal Updated: 09/17/22 0917     Specimen source Nasopharyngeal        COVID-19 rapid test Not detected        Comment: Rapid Abbott ID Now       Rapid NAAT:  The specimen is NEGATIVE for SARS-CoV-2, the novel coronavirus associated with COVID-19. Negative results should be treated as presumptive and, if inconsistent with clinical signs and symptoms or necessary for patient management, should be tested with an alternative molecular assay. Negative results do not preclude SARS-CoV-2 infection and should not be used as the sole basis for patient management decisions. This test has been authorized by the FDA under an Emergency Use Authorization (EUA) for use by authorized laboratories. Fact sheet for Healthcare Providers: ConventionUpdate.co.nz  Fact sheet for Patients: ConventionUpdate.co.nz       Methodology: Isothermal Nucleic Acid Amplification         CULTURE, BLOOD [[id number]]  (Abnormal) Collected: 09/13/22 2123    Order Status: Completed Specimen: Blood Updated: 09/16/22 0734     Special Requests: NO SPECIAL REQUESTS        GRAM STAIN       AEROBIC BOTTLE GRAM POSITIVE COCCI IN GROUPS                  SMEAR CALLED TO AND CORRECTLY REPEATED BY: SHAMIKA GEORGE 3N ON 15SEPT22 AT [address] [place] [address] Sw HRS TO 1396.            Culture result: STAPHYLOCOCCUS SPECIES, COAGULASE NEGATIVE GROWING IN 1 OF 2 BOTTLES DRAWN No Site Indicated          BLOOD CULTURE ID PANEL [[id number]]  (Abnormal) Collected: 09/13/22 2123    Order Status: Completed Specimen: Blood Updated: 09/15/22 1540     Acc. no. from Micro Order [id number]     Enterococcus faecalis Not detected        Enterococcus faecium Not detected        Listeria monocytogenes Not detected        Staphylococcus Detected        Staphylococcus aureus Not detected        Staph epidermidis Not detected        Staph lugdunensis Not detected        Streptococcus Not detected        Streptococcus agalactiae (Group B) Not detected        Streptococcus pneumoniae Not detected        Streptococcus pyogenes (Group A) Not detected        Acinetobacter calcoaceticus-baumanii complex Not detected        Bacteroides fragilis Not detected        Enterobacterales species Not detected        Enterobacter cloacae complex Not detected        Escherichia coli Not detected        Klebsiella aerogenes Not detected        Klebsiella oxytoca Not detected        Klebsiella pneumoniae group Not detected        Proteus Not detected        Salmonella Not detected        Serratia marcescens Not detected        Haemophilus influenzae Not detected        Neisseria meningitidis Not detected        Pseudomonas aeruginosa Not detected        Steno maltophilia Not detected        Candida albicans Not detected        Candida auris Not detected        Candida glabrata Not detected        Candida krusei Not detected        Candida parapsilosis Not detected        Candida tropicalis Not detected        Crypto neoformans/gattii Not detected        RESISTANT GENES:            Comment       False positive results may rarely occur. Correlate with clinical,epidemiologic, and other laboratory findings           Comment: Please see BCID Interpretation Guide in EPIC Links                 Images:    CT (Most Recent).       XRAY (Most Recent)      EKG No results found for this or any previous visit.      2D ECHO

## 2022-09-18 NOTE — ROUTINE PROCESS
Reinforced dressing on right foot many times through the night patient took off each and every time.

## 2022-09-18 NOTE — PROGRESS NOTES
Pt's clinicals sent to [facility] in [address] [place] [address], BSN RN  Care Management  Pager: [phone]

## 2022-09-18 NOTE — PROGRESS NOTES
Problem: Pressure Injury - Risk of  Goal: *Prevention of pressure injury  Description: Document Dionte Scale and appropriate interventions in the flowsheet. Outcome: Progressing Towards Goal  Note: Pressure Injury Interventions:  Sensory Interventions: Assess changes in LOC, Assess need for specialty bed    Moisture Interventions: Absorbent underpads, Assess need for specialty bed, Check for incontinence Q2 hours and as needed, Internal/External urinary devices    Activity Interventions: Assess need for specialty bed, Increase time out of bed, Pressure redistribution bed/mattress(bed type)    Mobility Interventions: Assess need for specialty bed, HOB 30 degrees or less, Pressure redistribution bed/mattress (bed type), PT/OT evaluation    Nutrition Interventions: Document food/fluid/supplement intake, Offer support with meals,snacks and hydration    Friction and Shear Interventions: Apply protective barrier, creams and emollients, Foam dressings/transparent film/skin sealants, HOB 30 degrees or less, Lift team/patient mobility team                Problem: Patient Education: Go to Patient Education Activity  Goal: Patient/Family Education  Outcome: Progressing Towards Goal     Problem: Falls - Risk of  Goal: *Absence of Falls  Description: Document Jackson Fall Risk and appropriate interventions in the flowsheet.   Outcome: Progressing Towards Goal  Note: Fall Risk Interventions:  Mobility Interventions: Assess mobility with egress test, Communicate number of staff needed for ambulation/transfer, OT consult for ADLs, Patient to call before getting OOB, PT Consult for mobility concerns, PT Consult for assist device competence    Mentation Interventions: Adequate sleep, hydration, pain control, Evaluate medications/consider consulting pharmacy, Door open when patient unattended, Bed/chair exit alarm    Medication Interventions: Assess postural VS orthostatic hypotension, Evaluate medications/consider consulting pharmacy, Teach patient to arise slowly, Patient to call before getting OOB    Elimination Interventions: Bed/chair exit alarm, Call light in reach, Elevated toilet seat, Stay With Me (per policy), Patient to call for help with toileting needs, Toileting schedule/hourly rounds, Toilet paper/wipes in reach, Urinal in reach    History of Falls Interventions: Bed/chair exit alarm, Door open when patient unattended, Evaluate medications/consider consulting pharmacy, Room close to nurse's station         Problem: Patient Education: Go to Patient Education Activity  Goal: Patient/Family Education  Outcome: Progressing Towards Goal     Problem: Patient Education: Go to Patient Education Activity  Goal: Patient/Family Education  Outcome: Progressing Towards Goal

## 2022-09-18 NOTE — ROUTINE PROCESS
Patient is alert and oriented to self and maybe place Put unno boots on patient he was kicking bed.  Dressings have new drainage on the right one and old drainage on the left one

## 2022-09-18 NOTE — ROUTINE PROCESS
Put unna boots on patients feet so he would stop kicking the bed and making feet bleed.  He continues to call for help

## 2022-09-18 NOTE — ROUTINE PROCESS
Bedside shift change report given to [address] [place] [address] (oncoming nurse) by Horacio Goddard (offgoing nurse). Report included the following information SBAR and Kardex.

## 2022-09-18 NOTE — PROGRESS NOTES
Problem: Pressure Injury - Risk of  Goal: *Prevention of pressure injury  Description: Document Dionte Scale and appropriate interventions in the flowsheet. Outcome: Progressing Towards Goal  Note: Pressure Injury Interventions:  Sensory Interventions: Assess changes in LOC, Assess need for specialty bed    Moisture Interventions: Absorbent underpads, Assess need for specialty bed, Check for incontinence Q2 hours and as needed, Internal/External urinary devices    Activity Interventions: Assess need for specialty bed, Increase time out of bed, Pressure redistribution bed/mattress(bed type)    Mobility Interventions: Assess need for specialty bed, HOB 30 degrees or less, Pressure redistribution bed/mattress (bed type), PT/OT evaluation    Nutrition Interventions: Document food/fluid/supplement intake, Offer support with meals,snacks and hydration    Friction and Shear Interventions: Apply protective barrier, creams and emollients, Foam dressings/transparent film/skin sealants, HOB 30 degrees or less, Lift team/patient mobility team                Problem: Patient Education: Go to Patient Education Activity  Goal: Patient/Family Education  Outcome: Progressing Towards Goal     Problem: Falls - Risk of  Goal: *Absence of Falls  Description: Document Jackson Fall Risk and appropriate interventions in the flowsheet.   Outcome: Progressing Towards Goal  Note: Fall Risk Interventions:  Mobility Interventions: Assess mobility with egress test, Communicate number of staff needed for ambulation/transfer, OT consult for ADLs, Patient to call before getting OOB, PT Consult for mobility concerns, PT Consult for assist device competence    Mentation Interventions: Adequate sleep, hydration, pain control, Evaluate medications/consider consulting pharmacy, Door open when patient unattended, Bed/chair exit alarm    Medication Interventions: Assess postural VS orthostatic hypotension, Evaluate medications/consider consulting pharmacy, Teach patient to arise slowly, Patient to call before getting OOB    Elimination Interventions: Bed/chair exit alarm, Call light in reach, Elevated toilet seat, Stay With Me (per policy), Patient to call for help with toileting needs, Toileting schedule/hourly rounds, Toilet paper/wipes in reach, Urinal in reach    History of Falls Interventions: Bed/chair exit alarm, Door open when patient unattended, Evaluate medications/consider consulting pharmacy, Room close to nurse's station         Problem: Patient Education: Go to Patient Education Activity  Goal: Patient/Family Education  Outcome: Progressing Towards Goal     Problem: Patient Education: Go to Patient Education Activity  Goal: Patient/Family Education  Outcome: Progressing Towards Goal     Problem: Nutrition Deficit  Goal: *Optimize nutritional status  Outcome: Progressing Towards Goal     Problem: Patient Education: Go to Patient Education Activity  Goal: Patient/Family Education  Outcome: Progressing Towards Goal     Problem: Patient Education: Go to Patient Education Activity  Goal: Patient/Family Education  Outcome: Progressing Towards Goal     Problem:  Moderate Sedation (Adult)  Goal: *Patent airway  Outcome: Progressing Towards Goal  Goal: *Adequate oxygenation  Outcome: Progressing Towards Goal  Goal: *Absence of aspiration  Outcome: Progressing Towards Goal  Goal: *Hemodynamically stable  Outcome: Progressing Towards Goal  Goal: *Optimal pain control at patient's stated goal  Outcome: Progressing Towards Goal  Goal: *Absence of nausea/vomiting  Outcome: Progressing Towards Goal  Goal: *Anxiety reduced or absent  Outcome: Progressing Towards Goal  Goal: *Absence of injury  Outcome: Progressing Towards Goal  Goal: *Level of consciousness returns to baseline  Outcome: Progressing Towards Goal  Goal: Interventions  Outcome: Progressing Towards Goal     Problem: Patient Education: Go to Patient Education Activity  Goal: Patient/Family Education  Outcome: Progressing Towards Goal

## 2022-09-18 NOTE — PROGRESS NOTES
Reason for Admission:  Gangrene ([address] [I96]  Cellulitis [L03.90]                 RUR Score:    16            Plan for utilizing home health:    no                       Likelihood of Readmission:   Moderate                         Do you (patient/family) have any concerns for transition/discharge?  yes. Per pt's son Vern Mckinley, he is concerned about pt's care [facility] advised him to contact [facility] of Care Plan:       Initial assessment completed with patient's son Vern Mckinley. Cognitive status of patient: disoriented. Face sheet information confirmed:  yes. The patient's son participate in his discharge plan and to receive any needed information. This patient is a long term resident of [place] [address]. Patient is not able to navigate steps as needed. Prior to hospitalization, patient was considered to be independent with ADLs/IADLS : no . If not independent,  patient needs assist with : dressing, bathing, food preparation, cooking, toileting, and grooming    Patient has a current ACP document on file: no      Healthcare Decision Maker:     Click here to complete [address] including selection of the [address] Relationship (ie \"Primary\")    The medical stretcher transport will be available to transport patient home upon discharge. The patient already has medical equipment available at the nursing facility as needed     Patient is not currently active with home health. Patient has stayed in a skilled nursing facility or rehab. Was  stay within last 60 days : yes. This patient is on dialysis :no  Currently, the discharge plan is LTC. To [facility]    The patient states that he can obtain his medications from the pharmacy, and take his medications as directed.     Patient's current insurance is Medicare, [address] [place] [address]      Care Management Interventions  PCP Verified by CM: Yes  Palliative Care Criteria Met (RRAT>21 & CHF Dx)?: No  Mode of Transport at Discharge:  Other (see comment)  Transition of Care Consult (CM Consult): Discharge Planning, Long Term Care  Support Systems: Child(clau), [place]  Confirm Follow Up Transport: Family  The Patient and/or Patient Representative was Provided with a Choice of Provider and Agrees with the Discharge Plan?: Yes  Name of the Patient Representative Who was Provided with a Choice of Provider and Agrees with the Discharge Plan: pt's son Baldo Mediate of Choice List was Provided with Basic Dialogue that Supports the Patient's Individualized Plan of Care/Goals, Treatment Preferences and Shares the Quality Data Associated with the Providers?: Yes  Discharge Location  Patient Expects to be Discharged to[de-identified] HAYLIE MasonN RN  Care Management  Pager: [phone]

## 2022-09-19 LAB
GLUCOSE BLD STRIP.AUTO-MCNC: 112 MG/DL (ref 70–110)
GLUCOSE BLD STRIP.AUTO-MCNC: 134 MG/DL (ref 70–110)
GLUCOSE BLD STRIP.AUTO-MCNC: 187 MG/DL (ref 70–110)
GLUCOSE BLD STRIP.AUTO-MCNC: 188 MG/DL (ref 70–110)

## 2022-09-19 PROCEDURE — 99232 SBSQ HOSP IP/OBS MODERATE 35: CPT | Performed by: INTERNAL MEDICINE

## 2022-09-19 PROCEDURE — 74011000258 HC RX REV CODE- 258: Performed by: INTERNAL MEDICINE

## 2022-09-19 PROCEDURE — 2709999900 HC NON-CHARGEABLE SUPPLY

## 2022-09-19 PROCEDURE — 74011250637 HC RX REV CODE- 250/637: Performed by: NURSE PRACTITIONER

## 2022-09-19 PROCEDURE — 74011250637 HC RX REV CODE- 250/637: Performed by: SURGERY

## 2022-09-19 PROCEDURE — 74011250636 HC RX REV CODE- 250/636: Performed by: INTERNAL MEDICINE

## 2022-09-19 PROCEDURE — 74011250636 HC RX REV CODE- 250/636: Performed by: NURSE PRACTITIONER

## 2022-09-19 PROCEDURE — 74011000250 HC RX REV CODE- 250: Performed by: NURSE PRACTITIONER

## 2022-09-19 PROCEDURE — 99232 SBSQ HOSP IP/OBS MODERATE 35: CPT | Performed by: NURSE PRACTITIONER

## 2022-09-19 PROCEDURE — 82962 GLUCOSE BLOOD TEST: CPT

## 2022-09-19 PROCEDURE — 74011636637 HC RX REV CODE- 636/637: Performed by: NURSE PRACTITIONER

## 2022-09-19 PROCEDURE — 74011250637 HC RX REV CODE- 250/637: Performed by: HOSPITALIST

## 2022-09-19 PROCEDURE — 65270000046 HC RM TELEMETRY

## 2022-09-19 RX ORDER — VANCOMYCIN/0.9 % SOD CHLORIDE 1.5G/250ML
1500 PLASTIC BAG, INJECTION (ML) INTRAVENOUS ONCE
Status: DISCONTINUED | OUTPATIENT
Start: 2022-09-19 | End: 2022-09-19 | Stop reason: ALTCHOICE

## 2022-09-19 RX ORDER — VANCOMYCIN HYDROCHLORIDE
1250 EVERY 24 HOURS
Status: DISCONTINUED | OUTPATIENT
Start: 2022-09-20 | End: 2022-09-19 | Stop reason: ALTCHOICE

## 2022-09-19 RX ADMIN — DRONABINOL 2.5 MG: 2.5 CAPSULE ORAL at 08:55

## 2022-09-19 RX ADMIN — SODIUM CHLORIDE, PRESERVATIVE FREE 10 ML: 5 INJECTION INTRAVENOUS at 14:10

## 2022-09-19 RX ADMIN — PIPERACILLIN AND TAZOBACTAM 4.5 G: 4; .5 INJECTION, POWDER, FOR SOLUTION INTRAVENOUS at 13:11

## 2022-09-19 RX ADMIN — SODIUM CHLORIDE, PRESERVATIVE FREE 10 ML: 5 INJECTION INTRAVENOUS at 06:50

## 2022-09-19 RX ADMIN — GABAPENTIN 300 MG: 300 CAPSULE ORAL at 17:29

## 2022-09-19 RX ADMIN — THERA TABS 1 TABLET: TAB at 08:55

## 2022-09-19 RX ADMIN — DIVALPROEX SODIUM 250 MG: 250 TABLET, DELAYED RELEASE ORAL at 08:55

## 2022-09-19 RX ADMIN — CLOPIDOGREL BISULFATE 75 MG: 75 TABLET ORAL at 08:55

## 2022-09-19 RX ADMIN — GABAPENTIN 300 MG: 300 CAPSULE ORAL at 08:55

## 2022-09-19 RX ADMIN — Medication 2 UNITS: at 12:03

## 2022-09-19 RX ADMIN — ENOXAPARIN SODIUM 40 MG: 100 INJECTION SUBCUTANEOUS at 08:55

## 2022-09-19 RX ADMIN — DRONABINOL 2.5 MG: 2.5 CAPSULE ORAL at 17:30

## 2022-09-19 RX ADMIN — Medication 2 UNITS: at 00:33

## 2022-09-19 RX ADMIN — SERTRALINE 50 MG: 50 TABLET, FILM COATED ORAL at 08:55

## 2022-09-19 RX ADMIN — DIVALPROEX SODIUM 250 MG: 250 TABLET, DELAYED RELEASE ORAL at 17:30

## 2022-09-19 RX ADMIN — SODIUM CHLORIDE, PRESERVATIVE FREE 10 ML: 5 INJECTION INTRAVENOUS at 00:34

## 2022-09-19 RX ADMIN — VANCOMYCIN HYDROCHLORIDE 1500 MG: 500 INJECTION, POWDER, LYOPHILIZED, FOR SOLUTION INTRAVENOUS at 11:55

## 2022-09-19 RX ADMIN — Medication 100 MG: at 08:55

## 2022-09-19 RX ADMIN — SODIUM CHLORIDE, PRESERVATIVE FREE 10 ML: 5 INJECTION INTRAVENOUS at 22:10

## 2022-09-19 RX ADMIN — PIPERACILLIN AND TAZOBACTAM 3.38 G: 3; .375 INJECTION, POWDER, FOR SOLUTION INTRAVENOUS at 22:09

## 2022-09-19 NOTE — PROGRESS NOTES
Hospitalist Progress Note    Patient: Karina Postal Age: 78 y.o. : 1945 MR#: [id number] SSN: [id number]  Date/Time: 2022 8:54 AM    DOA: 2022  PCP: Addison Duvall MD    Subjective:     He remains more awake and aware that he is in the hospital at [facility]. He expresses understanding that his toes were amputated. He has no pain complaint at his feet. No fever overnight   No leukocytosis   Intraoperative culture grew GPC and GNR  22 blood culture grew staph, coag neg in 1 of 2 bottles    Interval Hospital Course:  78 y.o male with HTN, arthritis, GERD, PAD, presented from Nursing home for gangrene toes of both feet. Outpt he was on keflex. Podiatry evaluated and noted PAD on arterial duplex. Vascular surgery evaluated and took him for angiogram and intervention and stent placement of left external iliac artery. He underwent right foot partial amputation of second+third toes, left foot partial amputation second+fourth toes on 22. ROS:  No current fever/chills, no headache, no dizziness, no facial pain, no sinus congestion,   No swallowing pain, No chest pain, no palpitation, no shortness of breath, no abd pain,  No diarrhea, no urinary complaint, no leg pain or swelling       Assessment/Plan:   Dry gangrene of multiple toes associates with PAD and diabetes mellitus, status post right foot partial second and third toe amputation, left foot partial second and fourth toes amputation on 2022.   Intraoperative culture grew GPC and GNR   Critical arterial stenosis of the proximal external iliac artery, critical arterial stenosis of the left superior femoral artery, critical arterial stenosis of the distal popliteal and proximal tibial peroneal trunk, S/P orbital atherectomy of left tibial peroneal artery, drug-coated balloon angioplasty of the left popliteal and tibial peroneal artery, orbital atherectomy of the left superficial femoral artery, drug-coated balloon angioplasty of the left superficial femoral artery, balloon angioplasty and stenting of the left external iliac artery, angiogram of the right femoral artery  Type 2 DM   Normocytic anemia  Advanced age  Delirium suspected associated with hospitalization, now resolved to baseline  Moderate malnutrition  Blood culture growth in 1 set, contaminated in acquisition  Hyponatremia      Spoke with ID for antibiotics treatment. Will follow up on culture finalization. Will follow up on pathology finding. Spoke with podiatry for wound care today. Called and updated his son on the goal of care today.    Will continue on Plavix per vascular recommendation   Cont Gabapentin, Zoloft, Depakote, Marinol  ISS  Nutrition consult   PT/OT   ICS  Palliative care consult appreciated       Full code     Disposition plannin days, pending cultures sensitivity/pathology  Family updated (.called ERB,[phone], updated clinical status 9:09AM)  Additional Notes:    Time spent > 30 minutes    Case discussed with:  [x]Patient  [x]Family  [x]Nursing  []Case Management  DVT Prophylaxis:  [x]Lovenox  []Hep SQ  []SCDs  []Coumadin   []On Heparin gtt    Signed By: Beatrice Cary MD     2022 8:54 AM              Objective:   VS: Visit Vitals  /67   Pulse 75   Temp 98.9 °F (37.2 °C)   Resp 16   Ht 6' (1.829 m)   SpO2 96%   BMI 20.34 kg/m²      Tmax/24hrs: Temp (24hrs), Av.7 °F (37.1 °C), Min:97.9 °F (36.6 °C), Max:99 °F (37.2 °C)    Intake/Output Summary (Last 24 hours) at 2022 0854  Last data filed at 2022 0659  Gross per 24 hour   Intake 1100 ml   Output 1350 ml   Net -250 ml       Tele:   General:  Cooperative, Not in acute distress, speaks in short sentence while in bed, cachexia  HEENT: PERRL, EOMI, supple neck, no JVD, dry oral mucosa  Cardiovascular: S1S2 regular, no rub/gallop   Pulmonary: air entry bilaterally, no wheezing, no crackle  GI:  Soft, non tender, non distended, +bs, no guarding   Extremities: + Pedal edema, +distal pulses appreciated   Both feet were wrapped, wound soaked with blood. Neuro: AOx2, moving all extremities,    Additional:       Current Facility-Administered Medications   Medication Dose Route Frequency    oxyCODONE-acetaminophen (PERCOCET) 5-325 mg per tablet 1 Tablet  1 Tablet Oral Q6H PRN    clopidogreL (PLAVIX) tablet 75 mg  75 mg Oral DAILY    thiamine HCL (B-1) tablet 100 mg  100 mg Oral DAILY    therapeutic multivitamin (THERAGRAN) tablet 1 Tablet  1 Tablet Oral DAILY    sodium chloride (NS) flush 5-40 mL  5-40 mL IntraVENous Q8H    sodium chloride (NS) flush 5-40 mL  5-40 mL IntraVENous PRN    acetaminophen (TYLENOL) tablet 650 mg  650 mg Oral Q6H PRN    Or    acetaminophen (TYLENOL) suppository 650 mg  650 mg Rectal Q6H PRN    polyethylene glycol (MIRALAX) packet 17 g  17 g Oral DAILY PRN    ondansetron (ZOFRAN ODT) tablet 4 mg  4 mg Oral Q8H PRN    Or    ondansetron (ZOFRAN) injection 4 mg  4 mg IntraVENous Q6H PRN    enoxaparin (LOVENOX) injection 40 mg  40 mg SubCUTAneous DAILY    glucose chewable tablet 16 g  4 Tablet Oral PRN    glucagon (GLUCAGEN) injection 1 mg  1 mg IntraMUSCular PRN    dextrose 10% infusion 0-250 mL  0-250 mL IntraVENous PRN    insulin lispro (HUMALOG) injection   SubCUTAneous Q6H    divalproex DR (DEPAKOTE) tablet 250 mg  250 mg Oral BID    dronabinoL (MARINOL) capsule 2.5 mg  2.5 mg Oral BID    gabapentin (NEURONTIN) capsule 300 mg  300 mg Oral BID    sertraline (ZOLOFT) tablet 50 mg  50 mg Oral DAILY            Lab/Data Review:  Labs: Results:       Chemistry Recent Labs     09/17/22 0313   *   *   K 4.3   CL 98*   CO2 28   BUN 21*   CREA 1.05   BUCR 20   AGAP 7   CA 9.3     No results for input(s): TBIL, ALT, ALKP, TP, ALB, GLOB, AGRAT in the last 72 hours.     No lab exists for component: SGOT   CBC w/Diff Recent Labs     09/17/22 0313   WBC 10.2   RBC 3.83*   HGB 11.2*   HCT 33.7*   MCV 88.0   MCH 29.2   MCHC 33.2   RDW 13.0    GRANS 76*   LYMPH 12*   EOS 1      Coagulation No results for input(s): PTP, INR, APTT, INREXT, INREXT in the last 72 hours. Iron/Ferritin No results found for: IRON, FE, TIBC, IBCT, PSAT, FERR    BNP    Cardiac Enzymes Lab Results   Component Value Date/Time    CK 67 07/21/2022 02:35 PM        Lactic Acid    Thyroid Studies          All Micro Results       Procedure Component Value Units Date/Time    CULTURE, TISSUE Aleah Alosa STAIN [[id number]] Collected: 09/17/22 1745    Order Status: Completed Specimen: Toe Updated: 09/19/22 0013     Special Requests: NO SPECIAL REQUESTS        GRAM STAIN OCCASIONAL WBCS SEEN               FEW GRAM POSITIVE COCCI IN PAIRS            FEW GRAM NEGATIVE RODS        Culture result: PENDING    CULTURE, TISSUE W Brittny Hemp [[id number]] Collected: 09/17/22 1800    Order Status: Completed Specimen: Toe Updated: 09/19/22 0012     Special Requests: NO SPECIAL REQUESTS        GRAM STAIN OCCASIONAL WBCS SEEN               1+ GRAM POSITIVE COCCI IN PAIRS            FEW GRAM NEGATIVE RODS        Culture result: PENDING    CULTURE, ANAEROBIC [[id number]] Collected: 09/17/22 1800    Order Status: Sent Specimen: Toe Updated: 09/18/22 0955    CULTURE, ANAEROBIC [[id number]] Collected: 09/17/22 1745    Order Status: Sent Specimen: Toe Updated: 09/18/22 0955    COVID-19 RAPID TEST [[id number]] Collected: 09/17/22 0845    Order Status: Completed Specimen: Nasopharyngeal Updated: 09/17/22 0917     Specimen source Nasopharyngeal        COVID-19 rapid test Not detected        Comment: Rapid Abbott ID Now       Rapid NAAT:  The specimen is NEGATIVE for SARS-CoV-2, the novel coronavirus associated with COVID-19. Negative results should be treated as presumptive and, if inconsistent with clinical signs and symptoms or necessary for patient management, should be tested with an alternative molecular assay.   Negative results do not preclude SARS-CoV-2 infection and should not be used as the sole basis for patient management decisions. This test has been authorized by the FDA under an Emergency Use Authorization (EUA) for use by authorized laboratories. Fact sheet for Healthcare Providers: ConventionUpdate.co.nz  Fact sheet for Patients: ConventionUpdate.co.nz       Methodology: Isothermal Nucleic Acid Amplification         CULTURE, BLOOD [[id number]]  (Abnormal) Collected: 09/13/22 2123    Order Status: Completed Specimen: Blood Updated: 09/16/22 0734     Special Requests: NO SPECIAL REQUESTS        GRAM STAIN       AEROBIC BOTTLE GRAM POSITIVE COCCI IN GROUPS                  SMEAR CALLED TO AND CORRECTLY REPEATED BY: SHAMIKA GEORGE 3N ON 15SEPT22 AT [address] [place] [address] Sw HRS TO [phone].            Culture result:       STAPHYLOCOCCUS SPECIES, COAGULASE NEGATIVE GROWING IN 1 OF 2 BOTTLES DRAWN No Site Indicated          BLOOD CULTURE ID PANEL [[id number]]  (Abnormal) Collected: 09/13/22 2123    Order Status: Completed Specimen: Blood Updated: 09/15/22 1540     Acc. no. from Micro Order [id number]     Enterococcus faecalis Not detected        Enterococcus faecium Not detected        Listeria monocytogenes Not detected        Staphylococcus Detected        Staphylococcus aureus Not detected        Staph epidermidis Not detected        Staph lugdunensis Not detected        Streptococcus Not detected        Streptococcus agalactiae (Group B) Not detected        Streptococcus pneumoniae Not detected        Streptococcus pyogenes (Group A) Not detected        Acinetobacter calcoaceticus-baumanii complex Not detected        Bacteroides fragilis Not detected        Enterobacterales species Not detected        Enterobacter cloacae complex Not detected        Escherichia coli Not detected        Klebsiella aerogenes Not detected        Klebsiella oxytoca Not detected        Klebsiella pneumoniae group Not detected        Proteus Not detected        Salmonella Not detected        Serratia marcescens Not detected        Haemophilus influenzae Not detected        Neisseria meningitidis Not detected        Pseudomonas aeruginosa Not detected        Steno maltophilia Not detected        Candida albicans Not detected        Candida auris Not detected        Candida glabrata Not detected        Candida krusei Not detected        Candida parapsilosis Not detected        Candida tropicalis Not detected        Crypto neoformans/gattii Not detected        RESISTANT GENES:            Comment       False positive results may rarely occur. Correlate with clinical,epidemiologic, and other laboratory findings           Comment: Please see BCID Interpretation Guide in EPIC Links                 Images:    CT (Most Recent). XRAY (Most Recent) XR Results (most recent):  Results from Hospital Encounter encounter on 09/13/22    XR FOOT RT AP/LAT    Narrative  EXAM: FOOT TWO VIEWS RIGHT    CLINICAL HISTORY/INDICATION: post op amputation due to dry gangrene    COMPARISON: Right foot 9/13/2022. TECHNIQUE: Two views obtained. FINDINGS:    There is interval amputation through the proximal aspect of the proximal phalanx  of the second digit and the mid to distal aspect of the proximal phalanx of the  third digit. . The joint spaces are maintained. Mineralization is normal. There  is no radiopaque foreign body. Impression  Satisfactory post amputation changes involving the second and the third digits  of the right foot         EKG No results found for this or any previous visit. 2D ECHO 07/29/21    ECHO ADULT COMPLETE 07/29/2021 7/29/2021    Interpretation Summary  · LV: Estimated LVEF is 60 - 65%. Visually measured ejection fraction. Normal cavity size and systolic function (ejection fraction normal). Mildly increased wall thickness. Wall motion: normal. Age-appropriate left ventricular diastolic function. · AV: There is mild to moderate aortic stenosis. Aortic valve peak gradient is 28 mmHg.  Aortic valve mean gradient is 19 mmHg. Aortic valve area is 0.9 cm2.  · AO: Mild aortic root dilatation; diameter is 3.6 cm. Signed by: Aldo Monte MD on 7/29/2021  4:47 PM            Lower ext art duplex  Interpretation Summary    There appears to be Mild Peripheral Arterial Disease noted on the Right lower extremity  And Moderate Peripheral Arterial Disease noted on the Left lower extremity  The disease begins bilaterally  In the aorta ileo segment displaying Biphasic Doppler flow  Unable to obtain toe pressures due to open wounds, non voluntary foot movement and broken PPG equipment ( work order placed)  Lower Extremity Arterial Findings    ALICE    The right resting ALICE is mildly decreased. The left resting ALICE is moderately decreased. The right common femoral artery, popliteal artery, peroneal artery and dorsalis pedis artery has biphasic waveforms. The left common femoral artery, popliteal artery, posterior tibial artery and dorsalis pedis artery has biphasic waveforms.      Upper Arterial Pressure Measurements     Right   Brachial  mmHg

## 2022-09-19 NOTE — PROGRESS NOTES
[facility]: [phone] ([phone])  [facility]: [phone]     Patient Name: Marla Grajeda  YOB: 1945    Date of consult : 9/14/2022  Follow up 9/19/2022   Reason for Consult: establish goals of care  Requesting Provider:    Primary Care Physician: Rk Jensen MD      SUMMARY:   Marla Grajeda is a 78 y.o. male with a past history of DM2,HTN, Major Depressive disorder, Degeneration of lumbar, neuropathy, who was admitted on 9/13/2022 from [facility] with a diagnosis of dry gangrene to BL toes. Current medical issues leading to Palliative Medicine involvement include: Pt with a long term life limiting chronic disease process that warrants discussions about her goals of care. CHIEF COMPLAINT: immobility     HPI/SUBJECTIVE:    Patient is a 61-year-old  male that was brought in from physical rehabilitation and patient's due to gangrene of bilateral feet. Reported by staff patient had a worsening infection in his toes. Patient has past history of cognitive impairment and some disorientation. He appears to be a poor historian of his past medical history and his social history. 9/19/2022 alert a bit disgruntled this am. He is confused. Denies pain. The patient is:   [] Verbal and participatory  [x] Non-participatory due to: Patient somewhat cognitively impaired and unable to explain why he was in rehab or what brought him to the hospital.  But appears to be completely oblivious to the gangrene in his feet. GOALS OF CARE:  Patient/Health Care Proxy Stated Goals: Prolong life      TREATMENT PREFERENCES:   Code Status: Full Code         PALLIATIVE DIAGNOSES:   Goals of care/ACP  Gangrene bilateral lower extremities  Peripheral arterial disease  Encounter for palliative medicine       PLAN:   9/19/2022 Follow up along with Ms Aliza Champion RN and LYNN Parada.  Resting in bed alert, he is confused this am, telling me to not let his \" cats out\", could not tell me he is in the hospital. Not able to discuss goals of care and discussions on advanced care planning. We called his son Marjorie Lobato, Patient is  but . According to Ernesto the separation was not pleasant. Patient also has another son Moses Nazario. Goals of care discussed with Ernesto `including benefits and burdens. He could not answer at this time, expressing his understanding however he would need to discuss with his brother and possibly patient's wife if she wished to be a part of the decision. Legally his wife is legal next of kin if she chooses to make these decisions. She could decide to remove herself from the decision and the sons would become legal next of kin and medical decision makers. Marjorie Lobato will call and discuss with wife Annabelle Rodriguez). Goals of care full code with full interventions. ( Please see below for previous notes per palliative team)   Goals of care/ACP  This NP along with Elham Don RN in to see patient at the bedside he is alert and interactive able to state his name and that he is in the hospital although unaware of exactly which hospital.  Patient relates that he is living in [place] although the listed address is [place]. He was able to name his 2 sons Fayetta Holstein and Marjorie Lobato but stated that he was sent here by his \"girlfriend\". Then he stated that he is  and lives with his wife. Patient seems completely unaware that he has any issues going on with his feet. He appears at this time to be unable to participate in his goals of care discussion as he is not able to understand the complexity of these decisions. We obtained permission to contact his son Marjorie Lobato who is listed as his emergency contact. This NP reached out and left a message for Ernesto to please call us as soon as possible.   Goals of care: Patient remains a full code with full interventions  Gangrene bilateral lower extremities  Patient seen by podiatry noted dry gangrene with eschar of right distal second and third toes, pre ulcerative lesions noted to the right sub metatarsal 5. Left foot second and fourth toe eschar noted at the distal tip of the toes. Also eschar noted to plantar medial left hallux. Podiatry is following up. PAD  Bilateral pulses nonpalpable with absent hair growth. Delayed capillary refill time no edema vascular surgical consult has been placed by podiatry awaiting their assessment  Encounter for palliative medicine  Patient has multiple comorbid health conditions with likely poor outcome post cardiac arrest.  He is unable to participate in these conversations at this time as he is unable to understand the complexity of those decisions. Palliative to provide education on the burdens and benefits of full aggressive care. Patient also has a lowered palliative performance score of between 40 and 50% he is mainly in bed with a chair to bed existence unable to do any work due to extensive disease progression he is mainly assisted in his self-care and functional ADLs and has full to some confusion level of consciousness. Initial consult note routed to primary continuity provider  Communicated plan of care with: Palliative IDT      Advance Care Planning:  [] [facility] Interdisciplinary Team has updated the ACP Navigator with [address] and Patient Capacity    Primary Decision [facility] ([address]):     Medical Interventions: Full interventions   Other Instructions:         As far as possible, the palliative care team has discussed with patient / health care proxy about goals of care / treatment preferences for patient.          HISTORY:     History obtained from: Chart review  Active Problems:    Gangrene ([address] (9/13/2022)      Cellulitis (9/13/2022)      Moderate protein-calorie malnutrition ([address] (9/15/2022)    Past Medical History:   Diagnosis Date    Arthritis     Cognitive impairment     Depression     Dorsalgia, unspecified     GERD (gastroesophageal reflux disease)     High blood pressure     Insomnia     L1 vertebral fracture (HCC)     Migraine headache     Other chronic pain     Periorbital headache     Sleep apnea     Spinal stenosis     Type 2 diabetes mellitus, without long-term current use of insulin [facility])       Past Surgical History:   Procedure Laterality Date    ENDOSCOPY, COLON, DIAGNOSTIC      HX LUMBAR DISKECTOMY  6/2001, 4/11    left L3-4 microdiskectomy with intradural rupture    HX ORTHOPAEDIC      spinal Surgery Dr. Lois Hull  9/11    spinal fusion, L3-5      Family History   Problem Relation Age of Onset    Heart Attack Mother     Cancer Mother     Heart Disease Mother     Hypertension Father     Seizures Father     Alcohol abuse Father      History reviewed, no pertinent family history.   Social History     Tobacco Use    Smoking status: Every Day     Packs/day: 1.00     Years: 51.00     Pack years: 51.00     Types: Cigarettes    Smokeless tobacco: Never   Substance Use Topics    Alcohol use: Yes     Comment: rarely     Allergies   Allergen Reactions    Codeine Not Reported This Time    Tegretol [Carbamazepine] Not Reported This Time      Current Facility-Administered Medications   Medication Dose Route Frequency    piperacillin-tazobactam (ZOSYN) 4.5 g in 0.9% sodium chloride (MBP/ADV) 100 mL MBP  4.5 g IntraVENous ONCE    Followed by    piperacillin-tazobactam (ZOSYN) 3.375 g in 0.9% sodium chloride (MBP/ADV) 100 mL MBP  3.375 g IntraVENous Q8H    oxyCODONE-acetaminophen (PERCOCET) 5-325 mg per tablet 1 Tablet  1 Tablet Oral Q6H PRN    clopidogreL (PLAVIX) tablet 75 mg  75 mg Oral DAILY    thiamine HCL (B-1) tablet 100 mg  100 mg Oral DAILY    therapeutic multivitamin (THERAGRAN) tablet 1 Tablet  1 Tablet Oral DAILY    sodium chloride (NS) flush 5-40 mL  5-40 mL IntraVENous Q8H    sodium chloride (NS) flush 5-40 mL  5-40 mL IntraVENous PRN    acetaminophen (TYLENOL) tablet 650 mg  650 mg Oral Q6H PRN    Or    acetaminophen (TYLENOL) suppository 650 mg  650 mg Rectal Q6H PRN    polyethylene glycol (MIRALAX) packet 17 g  17 g Oral DAILY PRN    ondansetron (ZOFRAN ODT) tablet 4 mg  4 mg Oral Q8H PRN    Or    ondansetron (ZOFRAN) injection 4 mg  4 mg IntraVENous Q6H PRN    enoxaparin (LOVENOX) injection 40 mg  40 mg SubCUTAneous DAILY    glucose chewable tablet 16 g  4 Tablet Oral PRN    glucagon (GLUCAGEN) injection 1 mg  1 mg IntraMUSCular PRN    dextrose 10% infusion 0-250 mL  0-250 mL IntraVENous PRN    insulin lispro (HUMALOG) injection   SubCUTAneous Q6H    divalproex DR (DEPAKOTE) tablet 250 mg  250 mg Oral BID    dronabinoL (MARINOL) capsule 2.5 mg  2.5 mg Oral BID    gabapentin (NEURONTIN) capsule 300 mg  300 mg Oral BID    sertraline (ZOLOFT) tablet 50 mg  50 mg Oral DAILY          Clinical Pain Assessment (nonverbal scale for nonverbal patients): Clinical Pain Assessment  Severity: 0          Duration: for how long has pt been experiencing pain (e.g., 2 days, 1 month, years)  Frequency: how often pain is an issue (e.g., several times per day, once every few days, constant)     FUNCTIONAL ASSESSMENT:     Palliative Performance Scale (PPS):  PPS: 50    ECOG  ECOG Status : Limited self-care     PSYCHOSOCIAL/SPIRITUAL SCREENING:      Any spiritual / Baptism concerns:  [] Yes /  [x] No    Caregiver Burnout:  [] Yes /  [] No /  [x] No Caregiver Present      Anticipatory grief assessment:   [x] Normal  / [] Maladaptive        REVIEW OF SYSTEMS:     Systems: constitutional, ears/nose/mouth/throat, respiratory, gastrointestinal, genitourinary, musculoskeletal, integumentary, neurologic, psychiatric, endocrine. Positive findings noted below. Modified ESAS Completed by: provider           Pain: 0     Nausea: 0     Dyspnea: 0           Stool Occurrence(s): 0   Positive and pertinent negative findings in ROS are noted above in HPI.   The following systems were [x] reviewed / [] unable to be reviewed as noted in HPI  Other findings are noted below. PHYSICAL EXAM:     Constitutional: alert this am, verbal a bit disgruntled in NAD   Eyes: pupils equal  ENMT: moist MM   Cardiovascular: regular rhythm  Respiratory: respirations not labored   Gastrointestinal: soft non-tender  Musculoskeletal: bandages on BLE, ( did not remove)   Skin: warm, dry  Neurologic: alert and oriented x 2, some confusion this am    Other: Wt Readings from Last 3 Encounters:   09/19/22 68 kg (150 lb)   08/11/22 68 kg (150 lb)   07/27/22 68 kg (150 lb)     Blood pressure (!) 99/57, pulse 81, temperature 97.6 °F (36.4 °C), resp. rate 18, height 6' (1.829 m), weight 68 kg (150 lb), SpO2 97 %. Pain:  Pain Scale 1: Numeric (0 - 10)  Pain Intensity 1: 0                      LAB AND IMAGING FINDINGS:     Lab Results   Component Value Date/Time    WBC 10.2 09/17/2022 03:13 AM    HGB 11.2 (L) 09/17/2022 03:13 AM    PLATELET 810 67/02/2774 03:13 AM     Lab Results   Component Value Date/Time    Sodium 133 (L) 09/17/2022 03:13 AM    Potassium 4.3 09/17/2022 03:13 AM    Chloride 98 (L) 09/17/2022 03:13 AM    CO2 28 09/17/2022 03:13 AM    BUN 21 (H) 09/17/2022 03:13 AM    Creatinine 1.05 09/17/2022 03:13 AM    Calcium 9.3 09/17/2022 03:13 AM    Magnesium 2.4 09/16/2022 03:46 AM    Phosphorus 3.4 09/15/2022 01:45 AM      Lab Results   Component Value Date/Time    Alk.  phosphatase 85 09/14/2022 03:47 AM    Protein, total 7.5 09/14/2022 03:47 AM    Albumin 2.8 (L) 09/14/2022 03:47 AM    Globulin 4.7 (H) 09/14/2022 03:47 AM     Lab Results   Component Value Date/Time    INR 1.0 09/15/2022 01:45 AM    Prothrombin time 13.5 09/15/2022 01:45 AM      No results found for: IRON, FE, TIBC, IBCT, PSAT, FERR   No results found for: PH, PCO2, PO2  No components found for: [place]   Lab Results   Component Value Date/Time    CK 67 07/21/2022 02:35 PM              Total time: 25  minutes  Counseling / coordination time, spent as noted above:   > 50% counseling / coordination:  Time spent in direct consultation with the patient, medical team, and family     Prolonged service was provided for  []30 min   []75 min in face to face time in the presence of the patient, spent as noted above. Time Start:   Time End:     Disclaimer: Sections of this note are dictated using utilizing voice recognition software, which may have resulted in some phonetic based errors in grammar and contents. Even though attempts were made to correct all the mistakes, some may have been missed, and remained in the body of the document. If questions arise, please contact our department.

## 2022-09-19 NOTE — PROGRESS NOTES
0700: Bedside shift change report given to Мария Crawford RN (oncoming nurse) by Shanell Singh RN (offgoing nurse). Report included the following information SBAR, Kardex, Intake/Output, MAR, Med Rec Status, and Cardiac Rhythm   . 1900:  Bedside shift change report given to [facility] RN (oncoming nurse) by Мария Crawford RN (offgoing nurse). Report included the following information SBAR, Kardex, Intake/Output, MAR, Recent Results, and Med Rec Status.

## 2022-09-19 NOTE — ACP (ADVANCE CARE PLANNING)
Advance Care Planning     General Advance Care Planning (ACP) Conversation      Date of Conversation: 9/19/2022    Conducted with: Patient, patient's son Hugo Smith), Harrison Doty NP (Palliative), Tanja Malhotra RN (Palliative) and this writer. Healthcare Decision Maker:     Patient does not have a formal healthcare decision maker document, on file. Patient's legal next-of-kin is his estranged wife Jose M Lara). Patient and Angie Sosa have been  for the past 6 months. The separation was not a pleasant separation, per patient's son Hernando Hernandez). Patient has two children: son Hugo Smith, IF#[phone]) and son Sharon Ballesteros, LW#[phone]). Nick Rahman is currently patient's point of contact. Patient's sons would also be patient's default healthcare decision makers, since patient's wife is not involved in his care and no contact with patient. Nick Rahman will call Angie Sosa to ensure that she does not want to make decisions for patient. Content/Action Overview: This writer, along with Harrison Doty NP and Tanja Malhotra RN, with the Palliative Care team; visited with patient to offer support. Patient was sitting up, in bed, and alert but not oriented enough to participate in any complex medical discussions or make any complex medical decisions. There was no family at the bedside, during this visit. The Palliative Care team then phoned patient's son Hernando Hernandez) to offer support and to discuss advance medical directive (AMD) and goals of care. Patient's son verified that patient is a long term resident at [address] [place] [address]. Nick Rahman was informed of Palliative's visit with patient and that patient was very confused. Nick Rahman stated that patient has been very confused lately. The Palliative Care team then asked Nick Rahman if patient had ever completed an AMD or POA. Nick Rahman stated that his father had NOT completed any of those documents.  Nick Rahman was made aware that patient's wife, technically, is his legal next-of-kin and would be the default healthcare decision maker. Jackie Cartagena stated that would not be good, given the fact that patient and his wife ended on bad terms and have not communicated with each other in a long time. Patient's two sons are the next in line for healthcare decision making. Ernesto plans to talk to Zara Bosch and determine if she would like to make medical decisions; on behalf of the patient. Jackie Cartagena will let the Palliative Care team know what Zaraantwan Bosch says. Jackie Cartagena was then asked about goals of care moving forward. He was educated on the risks and burdens of CPR. Jackie Cartagena stated that he has not had any communication with his father regarding this topic. Jackie Cartagena stated that he would need to think about it and also talk with his brother Basil De Anda). The Palliative Care team encouraged that conversation and also ask patient's estranged wife whether or not patient had informed her of his wishes. At this time, patient will remain a FULL CODE WITH FULL INTERVENTIONS. Length of Voluntary ACP Conversation in minutes:  30 minutes        Dwight Strong., [facility]  Palliative Care   HC#[phone]

## 2022-09-19 NOTE — PROGRESS NOTES
[facility] [id number] Monitoring Service - Vancomycin     Ventura Mahoney is a 78 y.o. male starting on vancomycin therapy for Bloodstream Infection. Pharmacy consulted by Dr. Elva Tom for monitoring and adjustment. Target Concentration: Goal AUC/CARYL 400-600 mg*hr/L    Additional Antimicrobials: None    Pertinent Laboratory Values:   Temp: 98 °F (36.7 °C)  Weight: 68 kg (150 lb)  Recent Labs     09/17/22  0313   CREA 1.05   BUN 21*   WBC 10.2     Estimated Creatinine Clearance: 58.5 mL/min (by C-G formula based on SCr of 1.05 mg/dL). Pertinent Cultures:  Culture Date Source Results   9/17 blood GNR, GPC   MRSA Nasal Swab: N/A.  Non-respiratory infection    Plan:  Dosing recommendations based on Bayesian software  Start vancomycin 1500mg IV x1 followed by 1250mg q24h  Anticipated AUC of 484 and trough concentration of 14 at steady state  Renal labs as indicated   Vancomycin concentration ordered for  9/21 @ 0400  Pharmacy will continue to monitor patient and adjust therapy as indicated    Thank you for the consult,  Swathi Osorio, [facility]  9/19/2022

## 2022-09-19 NOTE — CONSULTS
Infectious Disease Consultation Note        Reason: infected/gangrene toes of  right foot/left foot    Current abx Prior abx    Cephalexin 9/9-9/12 (as outpatient)  Cefazolin 9/17/22     Lines:       Assessment :    Clinical presentation c/w partially treated right foot cellulitis, dry gangrene nipple ptosis bilateral feet status post right foot partial second and third toe amputation, left foot partial second and fourth toe amputation on 9/17/2022    Intra-Op culture left second toe 9/17-gram-negative rashida, Staph aureus  Intra-Op culture right second toe 9/17-heavy gram-negative rashida, Staph aureus    Peripheral arterial disease-vascular surgery follow-up appreciated. S/P orbital atherectomy of left tibial peroneal artery, drug-coated balloon angioplasty of the left popliteal and tibial peroneal artery, orbital atherectomy of the left superficial femoral artery, drug-coated balloon angioplasty of the left superficial femoral artery, balloon angioplasty and stenting of the left external iliac artery, angiogram of the right femoral artery on 9/16/22    Bleeding noted bilateral feet surgical site on today's exam    Recommendations: Will give zosyn, vancomycin today. Will switch to culture appropriate po antibiotics in am  Follow-up Intra-Op cultures. Modify antibiotics accordingly  Follow-up podiatry/vascular surgery  recommendations    Thank you for consultation request. Above plan was discussed in details with patient, dr. Melisa Jalloh and dr Veena Beth. Please call me if any further questions or concerns. Will continue to participate in the care of this patient. HPI:  78 y.o. male presented to [facility] on 9/13/22  Rene Marsh for evaluation of gangrene toes/feet. Requested for further medical management from the family and facility. I obtained history from review of records, talking to the hospitalist.  Unknown how long his bilateral toes have been gangrene.  [facility] records no indication when the symptoms started. Started on Keflex on 9/9/22 per [facility] for cellulitis. Patient has history of cognitive impairment and disoriented. In ER bilateral foot xray performed and podiatry consulted. No erythema of the foot. No antibiotics initiated at this time. Past Medical History:   Diagnosis Date    Arthritis     Cognitive impairment     Depression     Dorsalgia, unspecified     GERD (gastroesophageal reflux disease)     High blood pressure     Insomnia     L1 vertebral fracture (HCC)     Migraine headache     Other chronic pain     Periorbital headache     Sleep apnea     Spinal stenosis     Type 2 diabetes mellitus, without long-term current use of insulin [facility])        Past Surgical History:   Procedure Laterality Date    ENDOSCOPY, COLON, DIAGNOSTIC      HX LUMBAR DISKECTOMY  6/2001, 4/11    left L3-4 microdiskectomy with intradural rupture    HX ORTHOPAEDIC      spinal Surgery Dr. Rajesh Obrien  9/11    spinal fusion, L3-5       Current Discharge Medication List        START taking these medications    Details   clopidogreL (Plavix) 75 mg tab Take 1 Tablet by mouth daily. Qty: 30 Tablet, Refills: 3           CONTINUE these medications which have NOT CHANGED    Details   divalproex DR (Depakote) 250 mg tablet Take  by mouth two (2) times a day.      gabapentin (NEURONTIN) 300 mg capsule Take 300 mg by mouth two (2) times a day. cephALEXin (Keflex) 500 mg capsule Take 500 mg by mouth two (2) times a day. celecoxib (CELEBREX) 200 mg capsule 200 mg two (2) times a day. dronabinoL (MARINOL) 2.5 mg capsule Take 2.5 mg by mouth two (2) times a day. Associated Diagnoses: Weight loss      sertraline (ZOLOFT) 50 mg tablet Take 1 Tablet by mouth daily. Qty: 90 Tablet, Refills: 2    Associated Diagnoses: Moderate episode of recurrent major depressive disorder (HCC)      SUMAtriptan (IMITREX) 100 mg tablet Take 100 mg by mouth once as needed for Migraine.       oxyCODONE (OXYIR) 5 mg capsule Take 5 mg by mouth every four (4) hours as needed for Pain.              Current Facility-Administered Medications   Medication Dose Route Frequency    oxyCODONE-acetaminophen (PERCOCET) 5-325 mg per tablet 1 Tablet  1 Tablet Oral Q6H PRN    clopidogreL (PLAVIX) tablet 75 mg  75 mg Oral DAILY    thiamine HCL (B-1) tablet 100 mg  100 mg Oral DAILY    therapeutic multivitamin (THERAGRAN) tablet 1 Tablet  1 Tablet Oral DAILY    sodium chloride (NS) flush 5-40 mL  5-40 mL IntraVENous Q8H    sodium chloride (NS) flush 5-40 mL  5-40 mL IntraVENous PRN    acetaminophen (TYLENOL) tablet 650 mg  650 mg Oral Q6H PRN    Or    acetaminophen (TYLENOL) suppository 650 mg  650 mg Rectal Q6H PRN    polyethylene glycol (MIRALAX) packet 17 g  17 g Oral DAILY PRN    ondansetron (ZOFRAN ODT) tablet 4 mg  4 mg Oral Q8H PRN    Or    ondansetron (ZOFRAN) injection 4 mg  4 mg IntraVENous Q6H PRN    enoxaparin (LOVENOX) injection 40 mg  40 mg SubCUTAneous DAILY    glucose chewable tablet 16 g  4 Tablet Oral PRN    glucagon (GLUCAGEN) injection 1 mg  1 mg IntraMUSCular PRN    dextrose 10% infusion 0-250 mL  0-250 mL IntraVENous PRN    insulin lispro (HUMALOG) injection   SubCUTAneous Q6H    divalproex DR (DEPAKOTE) tablet 250 mg  250 mg Oral BID    dronabinoL (MARINOL) capsule 2.5 mg  2.5 mg Oral BID    gabapentin (NEURONTIN) capsule 300 mg  300 mg Oral BID    sertraline (ZOLOFT) tablet 50 mg  50 mg Oral DAILY       Allergies: Codeine and Tegretol [carbamazepine]    Family History   Problem Relation Age of Onset    Heart Attack Mother     Cancer Mother     Heart Disease Mother     Hypertension Father     Seizures Father     Alcohol abuse Father      Social History     Socioeconomic History    Marital status:      Spouse name: Not on file    Number of children: Not on file    Years of education: Not on file    Highest education level: Not on file   Occupational History    Not on file   Tobacco Use    Smoking status: Every Day     Packs/day: 1.00     Years: 51.00     Pack years: 51.00     Types: Cigarettes    Smokeless tobacco: Never   Vaping Use    Vaping Use: Never used   Substance and Sexual Activity    Alcohol use: Yes     Comment: rarely    Drug use: No    Sexual activity: Yes     Partners: Female   Other Topics Concern    Not on file   Social History Narrative    Not on file     Social Determinants of Health     Financial Resource Strain: Not on file   Food Insecurity: Not on file   Transportation Needs: Not on file   Physical Activity: Not on file   Stress: Not on file   Social Connections: Not on file   Intimate Partner Violence: Not on file   Housing Stability: Not on file     Social History     Tobacco Use   Smoking Status Every Day    Packs/day: 1.00    Years: 51.00    Pack years: 51.00    Types: Cigarettes   Smokeless Tobacco Never        Temp (24hrs), Av.5 °F (36.9 °C), Min:97.6 °F (36.4 °C), Max:99 °F (37.2 °C)    Visit Vitals  BP (!) 99/57 (BP 1 Location: Left upper arm, BP Patient Position: At rest)   Pulse 81   Temp 97.6 °F (36.4 °C)   Resp 18   Ht 6' (1.829 m)   Wt 68 kg (150 lb)   SpO2 97%   BMI 20.34 kg/m²       ROS: 12 point ROS obtained in details. Pertinent positives as mentioned in HPI,   otherwise negative    Physical Exam:    General: Well developed, well nourished male laying on the bed/sitting on the  bed AAOx3 in no acute distress. General:   awake alert and oriented   HEENT:  Normocephalic, atraumatic, PERRL, EOMI, no scleral icterus or pallor; no conjunctival hemmohage;  nasal and oral mucous are moist and without evidence of lesions. No thrush. Dentition good. Neck supple, no bruits.    Lymph Nodes:   no cervical, axillary or inguinal adenopathy   Lungs:   non-labored, bilaterally clear to auscultation- no crackles wheezes rales or rhonchi   Heart:  RRR, s1 and s2; no murmurs rubs or gallops, no edema, + pedal pulses   Abdomen:  soft, non-distended, active bowel sounds, no hepatomegaly, no splenomegaly. Appropriate surgical scars for stated surgeries. Non-tender   Genitourinary:  deferred   Extremities:   no clubbing, cyanosis; no joint effusions or swelling; Full ROM of all large joints to the upper and lower extremities; muscle mass appropriate for age   Neurologic:  No gross focal sensory abnormalities; 5/5 muscle strength to upper and lower extremities. Speech appropriate. Cranial nerves intact                        Skin:  No rash or ulcers noted   Wound:       Back:  no spinal or paraspinal muscle tenderness or rigidity, no CVA tenderness     Psychiatric:  No suicidal or homicidal ideations, appropriate mood and affect         Labs: Results:   Chemistry Recent Labs     09/17/22  0313   *   *   K 4.3   CL 98*   CO2 28   BUN 21*   CREA 1.05   CA 9.3   AGAP 7   BUCR 20      CBC w/Diff Recent Labs     09/17/22  0313   WBC 10.2   RBC 3.83*   HGB 11.2*   HCT 33.7*      GRANS 76*   LYMPH 12*   EOS 1      Microbiology Recent Labs     09/17/22  1800 09/17/22  1745   CULT CULTURE IN PROGRESS,FURTHER UPDATES TO FOLLOW  HEAVY GRAM NEGATIVE RODS*  MODERATE POSSIBLE STAPHYLOCOCCUS AUREUS*  CHECKING FOR POSSIBLE OTHER ORGANISMS CULTURE IN PROGRESS,FURTHER UPDATES TO FOLLOW  HEAVY GRAM NEGATIVE RODS*  HEAVY POSSIBLE 2ND GRAM NEGATIVE BROOKS*  CHECKING FOR POSSIBLE OTHER ORGANISMS          RADIOLOGY:    All available imaging studies/reports in [facility] for this admission were reviewed      Disclaimer: Sections of this note are dictated utilizing voice recognition software, which may have resulted in some phonetic based errors in grammar and contents. Even though attempts were made to correct all the mistakes, some may have been missed, and remained in the body of the document. If questions arise, please contact our department.     Dr. Ashlie Rushing, Infectious Disease Specialist  [phone]  September 19, 2022  12:01 PM

## 2022-09-19 NOTE — DIABETES MGMT
Diabetes/ Glycemic Control Plan of Care  Recommendations:   Blood glucose this am 134 mg/dl  Continue corrective insulin coverage as needed  Will continue inpatient monitoring. Assessment:   DX:   1. Gangrene ([facility]        2. Cellulitis of toe, unspecified laterality        3. Goals of care, counseling/discussion [Z71.89 (ICD-10-CM)]        4. PAD (peripheral artery disease) ([facility] [I73.9 (ICD-10-CM)]        5. Encounter for palliative care [Z51.5 (ICD-10-CM)]        6. PVD (peripheral vascular disease) ([facility]  INVASIVE VASCULAR PROCEDURE    INVASIVE VASCULAR PROCEDURE         Fasting/ Morning blood glucose:   Lab Results   Component Value Date/Time    Glucose 106 (H) 09/17/2022 03:13 AM    Glucose (POC) 134 (H) 09/19/2022 06:43 AM     IV Fluids containing dextrose: none   Steroids:  none     Blood glucose values:    Latest Reference Range & Units 9/18/22 05:27 9/18/22 13:39 9/18/22 17:21 9/19/22 00:28 9/19/22 06:43   GLUCOSE,FAST - POC 70 - 110 mg/dL 141 (H) 172 (H) 147 (H) 188 (H) 134 (H)   (H): Data is abnormally high  Within target range (70-180mg/dL):   progressing   Current insulin orders:   Lispro corrective insulin coverage every 6 hours as needed. Total Daily Dose previous 24 hours = 6 units   Current A1c:   Lab Results   Component Value Date/Time    Hemoglobin A1c 5.8 (H) 09/13/2022 09:13 PM    Hemoglobin A1c, External 8.7 04/21/2022 12:00 AM      Equivalent  to an average Blood Glucose of 120 mg/dl for 2-3 months prior to admission  Adequate glycemic control PTA: yes   Nutrition/Diet:   Active Orders   Diet    ADULT DIET Regular; 4 carb choices (60 gm/meal)      Meal Intake:  No data found. Supplement Intake:  Patient Vitals for the past 168 hrs:   Supplement intake %   09/15/22 0345 46 - 75%     Home diabetes medications:   Key Antihyperglycemic Medications       Patient is on no antihyperglycemic meds.           Plan/Goals:   Blood glucose will be within target of 70 - 180 mg/dl within 72 hours  Reinforce dietary and medication compliance at home. Education:  [] Refer to Diabetes Education Record                       [x] Education not indicated at this time   Patient from [facility].     Davey Ryder, [address] [facility]

## 2022-09-19 NOTE — PROGRESS NOTES
Palliative Medicine     Palliative Medicine team members Vern Lr, MONICA, Erik Henderson. MSW and this writer for follow up visit with Mr. Oliver Jordan. Patient is lying in bed awake, not aware of place or situation. When this writer entered opened the bathroom door, patient stated \"Don't let the cats out\". Patient could not tell the team he was at hospital. He was not able to engage in a goals of care conversation. Patient is s/p bilateral feet Endo revascularization and Right foot partial 2nd and 3rd toe amputation, Left foot partial 2nd and 4th toe amputation. Palliative team placed call to Patient's son, Jackie Cartagena. The role of palliative medicine was explained. Per son, his father has been  from his spouse, Zara Bosch for over 6 months. He confirmed his father is a long term resident of [facility]. He shared, the patient's home was foreclosed on. Patient has one other son, Rosalinda Desai who visits but is not involved in the day to day care. The [place] of health care decision maker was explained. Palliative Team discussed goals of care, including benefits and burdens of CPR and intubation. He stated he would not know what his father's wishes would be. He would need time to speak with his brother, Rosalinda Desai and possibly Zara Bosch (wife) if she wished to be a part of the decision. Legally his wife is legal next of kin if she chooses to make these decisions. She could decide to remove herself from the decision and the sons would become legal next of kin and medical decision makers. Jackie Cartagena will call and discuss with wife Carolina Lemon). Goals of care full code with full interventions. CODE STATUS- FULL CODE WITH FULL INTERVENTIONS.      Scarlett Beasley BSN, RN, [facility]  Palliative Medicine Inpatient RN  Palliative COPE Line: [phone]

## 2022-09-19 NOTE — PROGRESS NOTES
Problem: Pressure Injury - Risk of  Goal: *Prevention of pressure injury  Description: Document Dionte Scale and appropriate interventions in the flowsheet. Outcome: Progressing Towards Goal  Note: Pressure Injury Interventions:  Sensory Interventions: Assess changes in LOC, Keep linens dry and wrinkle-free    Moisture Interventions: Internal/External urinary devices, Minimize layers    Activity Interventions: Pressure redistribution bed/mattress(bed type)    Mobility Interventions: Pressure redistribution bed/mattress (bed type), HOB 30 degrees or less    Nutrition Interventions: Document food/fluid/supplement intake    Friction and Shear Interventions: Minimize layers                Problem: Patient Education: Go to Patient Education Activity  Goal: Patient/Family Education  Outcome: Progressing Towards Goal     Problem: Falls - Risk of  Goal: *Absence of Falls  Description: Document Jackson Fall Risk and appropriate interventions in the flowsheet.   Outcome: Progressing Towards Goal  Note: Fall Risk Interventions:  Mobility Interventions: Patient to call before getting OOB, Bed/chair exit alarm    Mentation Interventions: Bed/chair exit alarm, More frequent rounding, Room close to nurse's station    Medication Interventions: Bed/chair exit alarm, Patient to call before getting OOB, Teach patient to arise slowly    Elimination Interventions: Call light in reach, Bed/chair exit alarm    History of Falls Interventions: Bed/chair exit alarm, Room close to nurse's station         Problem: Patient Education: Go to Patient Education Activity  Goal: Patient/Family Education  Outcome: Progressing Towards Goal     Problem: Patient Education: Go to Patient Education Activity  Goal: Patient/Family Education  Outcome: Progressing Towards Goal     Problem: Nutrition Deficit  Goal: *Optimize nutritional status  Outcome: Progressing Towards Goal     Problem: Patient Education: Go to Patient Education Activity  Goal: Patient/Family Education  Outcome: Progressing Towards Goal     Problem: Patient Education: Go to Patient Education Activity  Goal: Patient/Family Education  Outcome: Progressing Towards Goal     Problem:  Moderate Sedation (Adult)  Goal: *Patent airway  Outcome: Progressing Towards Goal  Goal: *Adequate oxygenation  Outcome: Progressing Towards Goal  Goal: *Absence of aspiration  Outcome: Progressing Towards Goal  Goal: *Hemodynamically stable  Outcome: Progressing Towards Goal  Goal: *Optimal pain control at patient's stated goal  Outcome: Progressing Towards Goal  Goal: *Absence of nausea/vomiting  Outcome: Progressing Towards Goal  Goal: *Anxiety reduced or absent  Outcome: Progressing Towards Goal  Goal: *Absence of injury  Outcome: Progressing Towards Goal  Goal: *Level of consciousness returns to baseline  Outcome: Progressing Towards Goal  Goal: Interventions  Outcome: Progressing Towards Goal     Problem: Patient Education: Go to Patient Education Activity  Goal: Patient/Family Education  Outcome: Progressing Towards Goal

## 2022-09-19 NOTE — DIABETES MGMT
Diabetes/ Glycemic Control Plan of Care  Recommendations:       Assessment:   DX:   1. [facility]        2. Cellulitis of toe, unspecified laterality        3. Goals of care, counseling/discussion [Z71.89 (ICD-10-CM)]        4. PAD (peripheral artery disease) ([facility] [I73.9 (ICD-10-CM)]        5. Encounter for palliative care [Z51.5 (ICD-10-CM)]        6. PVD (peripheral vascular disease) ([facility]  INVASIVE VASCULAR PROCEDURE    INVASIVE VASCULAR PROCEDURE         Fasting/ Morning blood glucose:   Lab Results   Component Value Date/Time    Glucose 106 (H) 09/17/2022 03:13 AM    Glucose (POC) 134 (H) 09/19/2022 06:43 AM     IV Fluids containing dextrose: none   Steroids:  none     Blood glucose values:    Latest Reference Range & Units 9/18/22 05:27 9/18/22 13:39 9/18/22 17:21 9/19/22 00:28 9/19/22 06:43   GLUCOSE,FAST - POC 70 - 110 mg/dL 141 (H) 172 (H) 147 (H) 188 (H) 134 (H)   (H): Data is abnormally high  Within target range (70-180mg/dL):    Current insulin orders: Total Daily Dose previous 24 hours = ***  Current A1c:   Lab Results   Component Value Date/Time    Hemoglobin A1c 5.8 (H) 09/13/2022 09:13 PM    Hemoglobin A1c, External 8.7 04/21/2022 12:00 AM      equivalent  to ave Blood Glucose of 120 mg/dl for 2-3 months prior to admission  Adequate glycemic control PTA: yes   Nutrition/Diet:   Active Orders   Diet    ADULT DIET Regular; 4 carb choices (60 gm/meal)      Meal Intake:  No data found. Supplement Intake:  Patient Vitals for the past 168 hrs:   Supplement intake %   09/15/22 0345 46 - 75%     Home diabetes medications:   Key Antihyperglycemic Medications       Patient is on no antihyperglycemic meds. Plan/Goals:   Blood glucose will be within target of 70 - 180 mg/dl within 72 hours  Reinforce dietary and medication compliance at home. Education:  [] Refer to Diabetes Education Record                       [x] Education not indicated at this time   Patient from [facility]  Ext [phone]

## 2022-09-20 LAB
ANION GAP SERPL CALC-SCNC: 8 MMOL/L (ref 3–18)
BACTERIA SPEC CULT: NORMAL
BACTERIA SPEC CULT: NORMAL
BUN SERPL-MCNC: 17 MG/DL (ref 7–18)
BUN/CREAT SERPL: 24 (ref 12–20)
CALCIUM SERPL-MCNC: 8.5 MG/DL (ref 8.5–10.1)
CHLORIDE SERPL-SCNC: 101 MMOL/L (ref 100–111)
CO2 SERPL-SCNC: 26 MMOL/L (ref 21–32)
CREAT SERPL-MCNC: 0.72 MG/DL (ref 0.6–1.3)
GLUCOSE BLD STRIP.AUTO-MCNC: 112 MG/DL (ref 70–110)
GLUCOSE BLD STRIP.AUTO-MCNC: 131 MG/DL (ref 70–110)
GLUCOSE BLD STRIP.AUTO-MCNC: 133 MG/DL (ref 70–110)
GLUCOSE BLD STRIP.AUTO-MCNC: 146 MG/DL (ref 70–110)
GLUCOSE BLD STRIP.AUTO-MCNC: 157 MG/DL (ref 70–110)
GLUCOSE SERPL-MCNC: 114 MG/DL (ref 74–99)
POTASSIUM SERPL-SCNC: 4 MMOL/L (ref 3.5–5.5)
SERVICE CMNT-IMP: NORMAL
SERVICE CMNT-IMP: NORMAL
SODIUM SERPL-SCNC: 135 MMOL/L (ref 136–145)

## 2022-09-20 PROCEDURE — 74011000258 HC RX REV CODE- 258: Performed by: INTERNAL MEDICINE

## 2022-09-20 PROCEDURE — 74011000250 HC RX REV CODE- 250: Performed by: NURSE PRACTITIONER

## 2022-09-20 PROCEDURE — 74011250636 HC RX REV CODE- 250/636: Performed by: INTERNAL MEDICINE

## 2022-09-20 PROCEDURE — 2709999900 HC NON-CHARGEABLE SUPPLY

## 2022-09-20 PROCEDURE — 74011636637 HC RX REV CODE- 636/637: Performed by: NURSE PRACTITIONER

## 2022-09-20 PROCEDURE — 74011250637 HC RX REV CODE- 250/637: Performed by: HOSPITALIST

## 2022-09-20 PROCEDURE — 74011250637 HC RX REV CODE- 250/637: Performed by: NURSE PRACTITIONER

## 2022-09-20 PROCEDURE — 99232 SBSQ HOSP IP/OBS MODERATE 35: CPT | Performed by: INTERNAL MEDICINE

## 2022-09-20 PROCEDURE — 80048 BASIC METABOLIC PNL TOTAL CA: CPT

## 2022-09-20 PROCEDURE — 82962 GLUCOSE BLOOD TEST: CPT

## 2022-09-20 PROCEDURE — 65270000046 HC RM TELEMETRY

## 2022-09-20 PROCEDURE — 36415 COLL VENOUS BLD VENIPUNCTURE: CPT

## 2022-09-20 PROCEDURE — 74011250637 HC RX REV CODE- 250/637: Performed by: SURGERY

## 2022-09-20 PROCEDURE — 74011250636 HC RX REV CODE- 250/636: Performed by: NURSE PRACTITIONER

## 2022-09-20 RX ORDER — VANCOMYCIN HYDROCHLORIDE
1250 ONCE
Status: COMPLETED | OUTPATIENT
Start: 2022-09-20 | End: 2022-09-20

## 2022-09-20 RX ADMIN — Medication 2 UNITS: at 17:37

## 2022-09-20 RX ADMIN — PIPERACILLIN AND TAZOBACTAM 3.38 G: 3; .375 INJECTION, POWDER, FOR SOLUTION INTRAVENOUS at 21:30

## 2022-09-20 RX ADMIN — SODIUM CHLORIDE, PRESERVATIVE FREE 10 ML: 5 INJECTION INTRAVENOUS at 21:30

## 2022-09-20 RX ADMIN — DRONABINOL 2.5 MG: 2.5 CAPSULE ORAL at 08:27

## 2022-09-20 RX ADMIN — SODIUM CHLORIDE, PRESERVATIVE FREE 10 ML: 5 INJECTION INTRAVENOUS at 13:42

## 2022-09-20 RX ADMIN — DRONABINOL 2.5 MG: 2.5 CAPSULE ORAL at 17:19

## 2022-09-20 RX ADMIN — VANCOMYCIN HYDROCHLORIDE 1250 MG: 500 INJECTION, POWDER, LYOPHILIZED, FOR SOLUTION INTRAVENOUS at 17:37

## 2022-09-20 RX ADMIN — DIVALPROEX SODIUM 250 MG: 250 TABLET, DELAYED RELEASE ORAL at 08:27

## 2022-09-20 RX ADMIN — Medication 100 MG: at 08:28

## 2022-09-20 RX ADMIN — THERA TABS 1 TABLET: TAB at 08:27

## 2022-09-20 RX ADMIN — PIPERACILLIN AND TAZOBACTAM 3.38 G: 3; .375 INJECTION, POWDER, FOR SOLUTION INTRAVENOUS at 13:41

## 2022-09-20 RX ADMIN — PIPERACILLIN AND TAZOBACTAM 3.38 G: 3; .375 INJECTION, POWDER, FOR SOLUTION INTRAVENOUS at 05:43

## 2022-09-20 RX ADMIN — GABAPENTIN 300 MG: 300 CAPSULE ORAL at 08:27

## 2022-09-20 RX ADMIN — GABAPENTIN 300 MG: 300 CAPSULE ORAL at 17:19

## 2022-09-20 RX ADMIN — SODIUM CHLORIDE, PRESERVATIVE FREE 10 ML: 5 INJECTION INTRAVENOUS at 05:44

## 2022-09-20 RX ADMIN — SERTRALINE 50 MG: 50 TABLET, FILM COATED ORAL at 08:27

## 2022-09-20 RX ADMIN — ENOXAPARIN SODIUM 40 MG: 100 INJECTION SUBCUTANEOUS at 08:27

## 2022-09-20 RX ADMIN — DIVALPROEX SODIUM 250 MG: 250 TABLET, DELAYED RELEASE ORAL at 17:19

## 2022-09-20 RX ADMIN — CLOPIDOGREL BISULFATE 75 MG: 75 TABLET ORAL at 08:27

## 2022-09-20 NOTE — PROGRESS NOTES
Bedside shift change report given to [address] (oncoming nurse) by Annamaria Waters RN (offgoing nurse). Report included the following information SBAR, Kardex, Intake/Output, MAR, and Cardiac Rhythm NSR .

## 2022-09-20 NOTE — PROGRESS NOTES
Problem: Pressure Injury - Risk of  Goal: *Prevention of pressure injury  Description: Document Dionte Scale and appropriate interventions in the flowsheet. Outcome: Progressing Towards Goal  Note: Pressure Injury Interventions:  Sensory Interventions: Assess changes in LOC, Avoid rigorous massage over bony prominences, Keep linens dry and wrinkle-free, Minimize linen layers, Pressure redistribution bed/mattress (bed type), Turn and reposition approx. every two hours (pillows and wedges if needed)    Moisture Interventions: Check for incontinence Q2 hours and as needed, Internal/External urinary devices    Activity Interventions: PT/OT evaluation, Pressure redistribution bed/mattress(bed type)    Mobility Interventions: HOB 30 degrees or less, Pressure redistribution bed/mattress (bed type), PT/OT evaluation, Turn and reposition approx. every two hours(pillow and wedges)    Nutrition Interventions: Document food/fluid/supplement intake    Friction and Shear Interventions: HOB 30 degrees or less                Problem: Patient Education: Go to Patient Education Activity  Goal: Patient/Family Education  Outcome: Progressing Towards Goal     Problem: Falls - Risk of  Goal: *Absence of Falls  Description: Document Jackson Fall Risk and appropriate interventions in the flowsheet.   Outcome: Progressing Towards Goal  Note: Fall Risk Interventions:  Mobility Interventions: Bed/chair exit alarm    Mentation Interventions: Bed/chair exit alarm, More frequent rounding, Reorient patient, Toileting rounds    Medication Interventions: Bed/chair exit alarm, Teach patient to arise slowly, Patient to call before getting OOB    Elimination Interventions: Bed/chair exit alarm, Call light in reach, Patient to call for help with toileting needs, Elevated toilet seat, Toileting schedule/hourly rounds    History of Falls Interventions: Bed/chair exit alarm         Problem: Patient Education: Go to Patient Education Activity  Goal: Patient/Family Education  Outcome: Progressing Towards Goal     Problem: Patient Education: Go to Patient Education Activity  Goal: Patient/Family Education  Outcome: Progressing Towards Goal     Problem: Nutrition Deficit  Goal: *Optimize nutritional status  Outcome: Progressing Towards Goal     Problem: Patient Education: Go to Patient Education Activity  Goal: Patient/Family Education  Outcome: Progressing Towards Goal     Problem: Patient Education: Go to Patient Education Activity  Goal: Patient/Family Education  Outcome: Progressing Towards Goal     Problem:  Moderate Sedation (Adult)  Goal: *Patent airway  Outcome: Progressing Towards Goal  Goal: *Adequate oxygenation  Outcome: Progressing Towards Goal  Goal: *Absence of aspiration  Outcome: Progressing Towards Goal  Goal: *Hemodynamically stable  Outcome: Progressing Towards Goal  Goal: *Optimal pain control at patient's stated goal  Outcome: Progressing Towards Goal  Goal: *Absence of nausea/vomiting  Outcome: Progressing Towards Goal  Goal: *Anxiety reduced or absent  Outcome: Progressing Towards Goal  Goal: *Absence of injury  Outcome: Progressing Towards Goal  Goal: *Level of consciousness returns to baseline  Outcome: Progressing Towards Goal  Goal: Interventions  Outcome: Progressing Towards Goal     Problem: Patient Education: Go to Patient Education Activity  Goal: Patient/Family Education  Outcome: Progressing Towards Goal

## 2022-09-20 NOTE — PROGRESS NOTES
Physician Progress Note      Marnie Hastings  CSN #:                  [id number]  :                       1945  ADMIT DATE:       2022 7:19 PM  [address] [place] DATE:  RESPONDING  PROVIDER #:        HUNTER Hearn MD          QUERY TEXT:    Pt admitted with  cellulitis. Pt noted to have DM Type 2. If possible, please document in progress notes and discharge summary the relationship, if any, between cellulitis and DM. The medical record reflects the following:  Risk Factors: diabetes mellitus, PVD    Clinical Indicators:  2022, H&P, Augusto Cole, NP:  Kemal Obrien:  Active Problems:  Gangrene. Cellulitis\"  2022, consult, Dr. 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 Berhane Caballero:  \"Dry gangrene to toes on right. Podiatry consult. Vascular consult. Arterial studies ordered. weightbear as tolerated in surgical shoe bilateral\"  9/15/2022, consult, Dr. Rojas Juárez:  \"PVD left lower extremity with gangrene. Plan for angio left leg tomorrow. \"    Treatment: vascular surgery and podiatry consults with angio planned for 2022    Thank you,  KEMI Gonzalez RN, [email or web address]  Options provided:  -- bilateral foot cellulitis associated with Diabetes  -- bilateral foot cellulitis unrelated to Diabetes  -- bilateral foot cellulitis ruled out  -- Other - I will add my own diagnosis  -- Disagree - Not applicable / Not valid  -- Disagree - Clinically unable to determine / Unknown  -- Refer to Clinical Documentation Reviewer    PROVIDER RESPONSE TEXT:    Partially treated right foot cellulitis on presentation associated with PAD and DM    Query created by:  Easton Mendez on 9/15/2022 2:10 PM      Electronically signed by:  [facility] Sandy Hearn MD 2022 8:22 AM

## 2022-09-20 NOTE — PROGRESS NOTES
Problem: Pressure Injury - Risk of  Goal: *Prevention of pressure injury  Description: Document Dionte Scale and appropriate interventions in the flowsheet. Outcome: Progressing Towards Goal  Note: Pressure Injury Interventions:  Sensory Interventions: Assess changes in LOC, Avoid rigorous massage over bony prominences, Keep linens dry and wrinkle-free, Minimize linen layers, Pressure redistribution bed/mattress (bed type), Turn and reposition approx. every two hours (pillows and wedges if needed)    Moisture Interventions: Check for incontinence Q2 hours and as needed, Internal/External urinary devices    Activity Interventions: PT/OT evaluation, Pressure redistribution bed/mattress(bed type)    Mobility Interventions: HOB 30 degrees or less, Pressure redistribution bed/mattress (bed type), PT/OT evaluation, Turn and reposition approx. every two hours(pillow and wedges)    Nutrition Interventions: Document food/fluid/supplement intake    Friction and Shear Interventions: HOB 30 degrees or less                Problem: Falls - Risk of  Goal: *Absence of Falls  Description: Document Jackson Fall Risk and appropriate interventions in the flowsheet.   Outcome: Progressing Towards Goal  Note: Fall Risk Interventions:  Mobility Interventions: Bed/chair exit alarm    Mentation Interventions: Bed/chair exit alarm, More frequent rounding, Reorient patient, Toileting rounds    Medication Interventions: Bed/chair exit alarm, Teach patient to arise slowly, Patient to call before getting OOB    Elimination Interventions: Bed/chair exit alarm, Call light in reach, Patient to call for help with toileting needs, Elevated toilet seat, Toileting schedule/hourly rounds    History of Falls Interventions: Bed/chair exit alarm         Problem: Patient Education: Go to Patient Education Activity  Goal: Patient/Family Education  Outcome: Progressing Towards Goal     Problem: Patient Education: Go to Patient Education Activity  Goal: Patient/Family Education  Outcome: Progressing Towards Goal     Problem: Nutrition Deficit  Goal: *Optimize nutritional status  Outcome: Progressing Towards Goal     Problem:  Moderate Sedation (Adult)  Goal: *Patent airway  Outcome: Progressing Towards Goal  Goal: *Absence of aspiration  Outcome: Progressing Towards Goal  Goal: *Optimal pain control at patient's stated goal  Outcome: Progressing Towards Goal  Goal: *Absence of nausea/vomiting  Outcome: Progressing Towards Goal  Goal: *Anxiety reduced or absent  Outcome: Progressing Towards Goal  Goal: *Level of consciousness returns to baseline  Outcome: Progressing Towards Goal

## 2022-09-20 NOTE — PROGRESS NOTES
Pt not seen for skilled PT due to:    [ ]  Nausea/vomiting  [ ]  Eating  [ ]  Pain  [ ]  Pt lethargic  [ ]  Off Unit  Other: Att x2- First pt eating, then patient adamantly declined, stating he doesn't need any \"vigorous activity\" right now    Will f/u later as schedule allows. Thank you.   Benja Ferraro, PT, DPT

## 2022-09-20 NOTE — PROGRESS NOTES
Infectious Disease progress Note        Reason: infected/gangrene toes of  right foot/left foot    Current abx Prior abx   Zosyn, vancomycin since 9/19/2022 Cephalexin 9/9-9/12 (as outpatient)  Cefazolin 9/17/22     Lines:       Assessment :    Clinical presentation c/w partially treated right foot cellulitis, dry gangrene nipple ptosis bilateral feet status post right foot partial second and third toe amputation, left foot partial second and fourth toe amputation on 9/17/2022    Intra-Op culture left second toe 9/17-gram-negative rods, group B strep, Staph aureus  Intra-Op culture right second toe 9/17-heavy gram-negative rashida, Staph aureus    Peripheral arterial disease-vascular surgery follow-up appreciated. S/P orbital atherectomy of left tibial peroneal artery, drug-coated balloon angioplasty of the left popliteal and tibial peroneal artery, orbital atherectomy of the left superficial femoral artery, drug-coated balloon angioplasty of the left superficial femoral artery, balloon angioplasty and stenting of the left external iliac artery, angiogram of the right femoral artery on 9/16/22    Podiatry follow-up appreciated    Recommendations:    continue zosyn, will redose vancomycin today since lab unable to provide prelim susceptibility results for staph aureus  Follow-up Intra-Op cultures. Modify antibiotics accordingly  Follow-up podiatry/vascular surgery  recommendations  Will switch to culture appropriate oral antibiotics in a.m. Above plan was discussed in details with patient, and dr Christ Sena. Please call me if any further questions or concerns. Will continue to participate in the care of this patient. HPI:    Denies increasing pain in his feet. Not very communicative.       Past Medical History:   Diagnosis Date    Arthritis     Cognitive impairment     Depression     Dorsalgia, unspecified     GERD (gastroesophageal reflux disease)     High blood pressure     Insomnia     L1 vertebral fracture (HCC) Migraine headache     Other chronic pain     Periorbital headache     Sleep apnea     Spinal stenosis     Type 2 diabetes mellitus, without long-term current use of insulin [facility])        Past Surgical History:   Procedure Laterality Date    ENDOSCOPY, COLON, DIAGNOSTIC      HX LUMBAR DISKECTOMY  6/2001, 4/11    left L3-4 microdiskectomy with intradural rupture    HX ORTHOPAEDIC      spinal Surgery Dr. Grace Junior  9/11    spinal fusion, L3-5       Current Discharge Medication List        START taking these medications    Details   clopidogreL (Plavix) 75 mg tab Take 1 Tablet by mouth daily. Qty: 30 Tablet, Refills: 3           CONTINUE these medications which have NOT CHANGED    Details   divalproex DR (Depakote) 250 mg tablet Take  by mouth two (2) times a day.      gabapentin (NEURONTIN) 300 mg capsule Take 300 mg by mouth two (2) times a day. cephALEXin (Keflex) 500 mg capsule Take 500 mg by mouth two (2) times a day. celecoxib (CELEBREX) 200 mg capsule 200 mg two (2) times a day. dronabinoL (MARINOL) 2.5 mg capsule Take 2.5 mg by mouth two (2) times a day. Associated Diagnoses: Weight loss      sertraline (ZOLOFT) 50 mg tablet Take 1 Tablet by mouth daily. Qty: 90 Tablet, Refills: 2    Associated Diagnoses: Moderate episode of recurrent major depressive disorder (HCC)      SUMAtriptan (IMITREX) 100 mg tablet Take 100 mg by mouth once as needed for Migraine. oxyCODONE (OXYIR) 5 mg capsule Take 5 mg by mouth every four (4) hours as needed for Pain.              Current Facility-Administered Medications   Medication Dose Route Frequency    piperacillin-tazobactam (ZOSYN) 3.375 g in 0.9% sodium chloride (MBP/ADV) 100 mL MBP  3.375 g IntraVENous Q8H    oxyCODONE-acetaminophen (PERCOCET) 5-325 mg per tablet 1 Tablet  1 Tablet Oral Q6H PRN    clopidogreL (PLAVIX) tablet 75 mg  75 mg Oral DAILY    thiamine HCL (B-1) tablet 100 mg  100 mg Oral DAILY therapeutic multivitamin (THERAGRAN) tablet 1 Tablet  1 Tablet Oral DAILY    sodium chloride (NS) flush 5-40 mL  5-40 mL IntraVENous Q8H    sodium chloride (NS) flush 5-40 mL  5-40 mL IntraVENous PRN    acetaminophen (TYLENOL) tablet 650 mg  650 mg Oral Q6H PRN    Or    acetaminophen (TYLENOL) suppository 650 mg  650 mg Rectal Q6H PRN    polyethylene glycol (MIRALAX) packet 17 g  17 g Oral DAILY PRN    ondansetron (ZOFRAN ODT) tablet 4 mg  4 mg Oral Q8H PRN    Or    ondansetron (ZOFRAN) injection 4 mg  4 mg IntraVENous Q6H PRN    enoxaparin (LOVENOX) injection 40 mg  40 mg SubCUTAneous DAILY    glucose chewable tablet 16 g  4 Tablet Oral PRN    glucagon (GLUCAGEN) injection 1 mg  1 mg IntraMUSCular PRN    dextrose 10% infusion 0-250 mL  0-250 mL IntraVENous PRN    insulin lispro (HUMALOG) injection   SubCUTAneous Q6H    divalproex DR (DEPAKOTE) tablet 250 mg  250 mg Oral BID    dronabinoL (MARINOL) capsule 2.5 mg  2.5 mg Oral BID    gabapentin (NEURONTIN) capsule 300 mg  300 mg Oral BID    sertraline (ZOLOFT) tablet 50 mg  50 mg Oral DAILY       Allergies: Codeine and Tegretol [carbamazepine]    Family History   Problem Relation Age of Onset    Heart Attack Mother     Cancer Mother     Heart Disease Mother     Hypertension Father     Seizures Father     Alcohol abuse Father      Social History     Socioeconomic History    Marital status:      Spouse name: Not on file    Number of children: Not on file    Years of education: Not on file    Highest education level: Not on file   Occupational History    Not on file   Tobacco Use    Smoking status: Every Day     Packs/day: 1.00     Years: 51.00     Pack years: 51.00     Types: Cigarettes    Smokeless tobacco: Never   Vaping Use    Vaping Use: Never used   Substance and Sexual Activity    Alcohol use: Yes     Comment: rarely    Drug use: No    Sexual activity: Yes     Partners: Female   Other Topics Concern    Not on file   Social History Narrative    Not on file Social Determinants of Health     Financial Resource Strain: Not on file   Food Insecurity: Not on file   Transportation Needs: Not on file   Physical Activity: Not on file   Stress: Not on file   Social Connections: Not on file   Intimate Partner Violence: Not on file   Housing Stability: Not on file     Social History     Tobacco Use   Smoking Status Every Day    Packs/day: 1.00    Years: 51.00    Pack years: 51.00    Types: Cigarettes   Smokeless Tobacco Never        Temp (24hrs), Av.5 °F (36.9 °C), Min:97.6 °F (36.4 °C), Max:100.1 °F (37.8 °C)    Visit Vitals  /66 (BP 1 Location: Left upper arm, BP Patient Position: At rest)   Pulse 75   Temp 99 °F (37.2 °C)   Resp 16   Ht 6' (1.829 m)   Wt 68 kg (150 lb)   SpO2 97%   BMI 20.34 kg/m²       ROS: Unable to obtain due to patient factors    Physical Exam:    General:  laying on bed, sleepy, not very communicative  HEENT: EOMI, supple neck, no JVD, dry oral mucosa  Cardiovascular: S1S2 regular, no rub/gallop   Pulmonary: air entry bilaterally, no wheezing, no crackle  GI:  Soft, non tender, non distended, +bs, no guarding   Extremities: + Pedal edema, no calf tenderness  Both feet were wrapped, surgical dressing bilateral feet not removed-no blood seen  Neuro: AOx2, moving all extremities,           Labs: Results:   Chemistry Recent Labs     22  0151   *   *   K 4.0      CO2 26   BUN 17   CREA 0.72   CA 8.5   AGAP 8   BUCR 24*        CBC w/Diff No results for input(s): WBC, RBC, HGB, HCT, PLT, GRANS, LYMPH, EOS, HGBEXT, HCTEXT, PLTEXT, HGBEXT, HCTEXT, PLTEXT in the last 72 hours.      Microbiology Recent Labs     22  1800 22  1745   CULT CULTURE IN PROGRESS,FURTHER UPDATES TO FOLLOW  HEAVY GRAM NEGATIVE RODS*  MODERATE POSSIBLE STAPHYLOCOCCUS AUREUS*  CHECKING FOR POSSIBLE OTHER ORGANISMS CULTURE IN PROGRESS,FURTHER UPDATES TO FOLLOW  HEAVY GRAM NEGATIVE RODS*  HEAVY POSSIBLE 2ND GRAM NEGATIVE BROOKS*  CHECKING FOR POSSIBLE OTHER ORGANISMS            RADIOLOGY:    All available imaging studies/reports in [facility] for this admission were reviewed      Disclaimer: Sections of this note are dictated utilizing voice recognition software, which may have resulted in some phonetic based errors in grammar and contents. Even though attempts were made to correct all the mistakes, some may have been missed, and remained in the body of the document. If questions arise, please contact our department.     Dr. Anais Duffy, Infectious Disease Specialist  [phone]  September 20, 2022  12:01 PM

## 2022-09-20 NOTE — PROGRESS NOTES
Comprehensive Nutrition Assessment    Type and Reason for Visit: Reassess, Consult, Wound    Nutrition Recommendations/Plan:   Continue nutrition interventions as prescribed previously   Monitor PO intake, compliance of oral supplement, weight, labs, and plan of care during admission. Malnutrition Assessment:  Malnutrition Status: Moderate malnutrition (09/15/22 1147)    Context:  Chronic illness     Findings of the 6 clinical characteristics of malnutrition:   Energy Intake:  No significant decrease in energy intake  Weight Loss:  20% over 1 year (-17.5% x 1 year)     Body Fat Loss:  Mild body fat loss, Buccal region   Muscle Mass Loss:  Mild muscle mass loss, Clavicles (pectoralis &deltoids)  Fluid Accumulation:  No significant fluid accumulation,     Strength:  Not performed     Nutrition Assessment:    Visited pt in room, laying in bed, remains displaying confusion. Followed up with RN Marcial Yates, RN reported pt tolerating current diet with varied acceptance of oral supplements. Pt s/p partial amputation of 2nd and 3rd right toes and partial amputation of 2nd and 4th left toes. Currently, Na remains low (135) but improving. Wounds noted. Discharged pending for rehab. Nutrition Related Findings:    Last BM 9/20. Output: 350mL (urine-catheter). Pertinent Medications: thiamine, MVI, miralax, zofran, lovenox, humalog, marinol. Wound Type: Multiple, Pressure injury, Surgical incision, Skin tears       Current Nutrition Intake & Therapies:  Average Meal Intake: 51-75%  Average Supplement Intake: 26-50%  ADULT ORAL NUTRITION SUPPLEMENT Lunch, Dinner; Diabetic Supplement  ADULT ORAL NUTRITION SUPPLEMENT Breakfast, Lunch; Protein Modular  ADULT DIET Regular; 4 carb choices (60 gm/meal)    Anthropometric Measures:  Height: 6' (182.9 cm)  Ideal Body Weight (IBW): 178 lbs (81 kg)  Admission Body Weight: 150 lb  Current Body Wt:  68 kg (150 lb), 84.3 % IBW.  Not specified  Current BMI (kg/m2): 20.3  Usual Body Weight: 81.6 kg (180 lb)  % Weight Change (Calculated): -16.7  Weight Adjustment: Amputation  BMI Category: Underweight (BMI less than 22) age over 72    Estimated Daily Nutrient Needs:  Energy Requirements Based On: Formula (MSJ x 1.2-1.4)  Weight Used for Energy Requirements: Current  Energy (kcal/day): 2722-0068  Weight Used for Protein Requirements: Current (0.8-1.2)  Protein (g/day): 54-82  Method Used for Fluid Requirements: 1 ml/kcal  Fluid (ml/day): 9950-5261    Nutrition Diagnosis:   Increased nutrient needs related to increased demand for energy/nutrients as evidenced by wounds  Moderate malnutrition, In context of chronic illness related to cognitive or neurological impairment, inadequate protein-energy intake as evidenced by weight loss, Criteria as identified in malnutrition assessment    Nutrition Interventions:   Food and/or Nutrient Delivery: Continue current diet, Continue oral nutrition supplement, Mineral supplement, Vitamin supplement  Nutrition Education/Counseling: Education not indicated, No recommendations at this time  Coordination of Nutrition Care: Continue to monitor while inpatient  Plan of Care discussed with: SHAMIKA Delcid    Goals:  Previous Goal Met: Progressing toward goal(s)  Goals: Meet at least 75% of estimated needs, by next RD assessment       Nutrition Monitoring and Evaluation:   Behavioral-Environmental Outcomes: None identified  Food/Nutrient Intake Outcomes: Diet advancement/tolerance, Food and nutrient intake, Supplement intake, Vitamin/mineral intake  Physical Signs/Symptoms Outcomes: Biochemical data, GI status, Weight, Nutrition focused physical findings, Meal time behavior, Hemodynamic status    Discharge Planning:    Continue current diet, Continue oral nutrition supplement    Claire Lopez MA, RDN, LD   Contact: [phone]

## 2022-09-20 NOTE — PROGRESS NOTES
Discharge planning    Spoke with April, admissions coordinator, for [facility] concerning patient returning to facility. Per April, patient can return when medically stable. CM will continue to assist with transition of care needs.        HAYLIE LazoN, RN  Pager # [phone]  Care Manager

## 2022-09-20 NOTE — PROGRESS NOTES
Hospitalist Progress Note    Patient: Onelia Jain Age: 78 y.o. : 1945 MR#: [id number] SSN: [id number]  Date/Time: 2022 1:12 PM    DOA: 2022  PCP: Mnai Webb MD    Subjective:       He asks when he can go. No overnight event  No pain complaint  He has no further bleeding from his surgical sites  Still on IV antibiotics, intraoperative cultures have not finalized       Interval Hospital Course:  78 y.o male with HTN, arthritis, GERD, PAD, presented from Nursing home for gangrene toes of both feet. Outpt he was on keflex. Podiatry evaluated and noted PAD on arterial duplex. Vascular surgery evaluated and took him for angiogram and intervention and stent placement of left external iliac artery. He underwent right foot partial amputation of second+third toes, left foot partial amputation second+fourth toes on 22. ROS:  No current fever/chills, no headache, no dizziness, no facial pain, no sinus congestion,   No swallowing pain, No chest pain, no palpitation, no shortness of breath, no abd pain,  No diarrhea, no urinary complaint, no leg pain or swelling       Assessment/Plan:   Dry gangrene of multiple toes associates with PAD and diabetes mellitus, status post right foot partial second and third toe amputation, left foot partial second and fourth toes amputation on 2022.   Intraoperative culture grew GPC and GNR   Critical arterial stenosis of the proximal external iliac artery, critical arterial stenosis of the left superior femoral artery, critical arterial stenosis of the distal popliteal and proximal tibial peroneal trunk, S/P orbital atherectomy of left tibial peroneal artery, drug-coated balloon angioplasty of the left popliteal and tibial peroneal artery, orbital atherectomy of the left superficial femoral artery, drug-coated balloon angioplasty of the left superficial femoral artery, balloon angioplasty and stenting of the left external iliac artery, angiogram of the right femoral artery  Type 2 DM   Normocytic anemia  Advanced age  Delirium suspected associated with hospitalization, now resolved to baseline  Moderate malnutrition  Blood culture growth in 1 set, contaminated in acquisition  Hyponatremia      Cont IV antibx today, spoke with ID for antibiotic selection, currently awaiting for culture sensitivity to select antibx for his treatment   Wound care. Podiatry follows   Will continue on Plavix per vascular recommendation   Cont Gabapentin, Zoloft, Depakote, Marinol  ISS  Nutrition consult   PT/OT   ICS  Palliative care consult appreciated       Full code     Disposition planning: tomorrow, pending cultures sensitivity/pathology  Family updated (.none)  Additional Notes:    Time spent > 30 minutes    Case discussed with:  [x]Patient  [x]Family  [x]Nursing  []Case Management  DVT Prophylaxis:  [x]Lovenox  []Hep SQ  []SCDs  []Coumadin   []On Heparin gtt    Signed By: Mary Lou Wong MD     2022 1:12 PM              Objective:   VS: Visit Vitals  /68 (BP 1 Location: Right upper arm, BP Patient Position: At rest)   Pulse 71   Temp 98.1 °F (36.7 °C)   Resp 16   Ht 6' (1.829 m)   Wt 68 kg (150 lb)   SpO2 99%   BMI 20.34 kg/m²      Tmax/24hrs: Temp (24hrs), Av.7 °F (37.1 °C), Min:98.1 °F (36.7 °C), Max:100.1 °F (37.8 °C)    Intake/Output Summary (Last 24 hours) at 2022 1312  Last data filed at 2022 0543  Gross per 24 hour   Intake --   Output 350 ml   Net -350 ml       Tele:   General:  Cooperative, Not in acute distress, speaks in short sentence while in bed, cachexia  HEENT: PERRL, EOMI, supple neck, no JVD, dry oral mucosa  Cardiovascular: S1S2 regular, no rub/gallop   Pulmonary: air entry bilaterally, no wheezing, no crackle  GI:  Soft, non tender, non distended, +bs, no guarding   Extremities: + Pedal edema, +distal pulses appreciated   Both feet were wrapped, wound soaked with blood.   Neuro: AOx2, moving all extremities,    Additional: Current Facility-Administered Medications   Medication Dose Route Frequency    piperacillin-tazobactam (ZOSYN) 3.375 g in 0.9% sodium chloride (MBP/ADV) 100 mL MBP  3.375 g IntraVENous Q8H    oxyCODONE-acetaminophen (PERCOCET) 5-325 mg per tablet 1 Tablet  1 Tablet Oral Q6H PRN    clopidogreL (PLAVIX) tablet 75 mg  75 mg Oral DAILY    thiamine HCL (B-1) tablet 100 mg  100 mg Oral DAILY    therapeutic multivitamin (THERAGRAN) tablet 1 Tablet  1 Tablet Oral DAILY    sodium chloride (NS) flush 5-40 mL  5-40 mL IntraVENous Q8H    sodium chloride (NS) flush 5-40 mL  5-40 mL IntraVENous PRN    acetaminophen (TYLENOL) tablet 650 mg  650 mg Oral Q6H PRN    Or    acetaminophen (TYLENOL) suppository 650 mg  650 mg Rectal Q6H PRN    polyethylene glycol (MIRALAX) packet 17 g  17 g Oral DAILY PRN    ondansetron (ZOFRAN ODT) tablet 4 mg  4 mg Oral Q8H PRN    Or    ondansetron (ZOFRAN) injection 4 mg  4 mg IntraVENous Q6H PRN    enoxaparin (LOVENOX) injection 40 mg  40 mg SubCUTAneous DAILY    glucose chewable tablet 16 g  4 Tablet Oral PRN    glucagon (GLUCAGEN) injection 1 mg  1 mg IntraMUSCular PRN    dextrose 10% infusion 0-250 mL  0-250 mL IntraVENous PRN    insulin lispro (HUMALOG) injection   SubCUTAneous Q6H    divalproex DR (DEPAKOTE) tablet 250 mg  250 mg Oral BID    dronabinoL (MARINOL) capsule 2.5 mg  2.5 mg Oral BID    gabapentin (NEURONTIN) capsule 300 mg  300 mg Oral BID    sertraline (ZOLOFT) tablet 50 mg  50 mg Oral DAILY            Lab/Data Review:  Labs: Results:       Chemistry Recent Labs     09/20/22  0151   *   *   K 4.0      CO2 26   BUN 17   CREA 0.72   BUCR 24*   AGAP 8   CA 8.5     No results for input(s): TBIL, ALT, ALKP, TP, ALB, GLOB, AGRAT in the last 72 hours. No lab exists for component: SGOT   CBC w/Diff No results for input(s): WBC, RBC, HGB, HCT, MCV, MCH, MCHC, RDW, PLT, BANDS, GRANS, LYMPH, EOS, HGBEXT, HCTEXT, PLTEXT, HGBEXT, HCTEXT, PLTEXT in the last 72 hours. Coagulation No results for input(s): PTP, INR, APTT, INREXT, INREXT in the last 72 hours. Iron/Ferritin No results found for: IRON, FE, TIBC, IBCT, PSAT, FERR    BNP    Cardiac Enzymes Lab Results   Component Value Date/Time    CK 67 07/21/2022 02:35 PM        Lactic Acid    Thyroid Studies          All Micro Results       Procedure Component Value Units Date/Time    CULTURE, ANAEROBIC [[id number]] Collected: 09/17/22 1800    Order Status: Completed Specimen: Toe Updated: 09/20/22 0932     Special Requests: NO SPECIAL REQUESTS        Culture result: NO ANAEROBES ISOLATED       CULTURE, TISSUE Karlee Inna STAIN [[id number]]  (Abnormal) Collected: 09/17/22 1800    Order Status: Completed Specimen: Toe Updated: 09/20/22 0925     Special Requests: NO SPECIAL REQUESTS        GRAM STAIN OCCASIONAL WBCS SEEN               1+ GRAM POSITIVE COCCI IN PAIRS            FEW GRAM NEGATIVE RODS        Culture result: HEAVY GRAM NEGATIVE RODS               MODERATE POSSIBLE STAPHYLOCOCCUS AUREUS                  MODERATE STREPTOCOCCI, BETA HEMOLYTIC GROUP B Penicillin and ampicillin are drugs of choice for treatment of beta-hemolytic streptococcal infections. Susceptibility testing of penicillins and beta-lactams approved by the FDA for treatment of beta-hemolytic streptococcal infections need not be performed routinely, because nonsusceptible isolates are extremely rare.  CLSI 2012      CULTURE, ANAEROBIC [[id number]] Collected: 09/17/22 1745    Order Status: Completed Specimen: Toe Updated: 09/20/22 0921     Special Requests: NO SPECIAL REQUESTS        Culture result: NO ANAEROBES ISOLATED       CULTURE, TISSUE Karlee Inna STAIN [[id number]]  (Abnormal) Collected: 09/17/22 1745    Order Status: Completed Specimen: Toe Updated: 09/20/22 0914     Special Requests: NO SPECIAL REQUESTS        GRAM STAIN OCCASIONAL WBCS SEEN               FEW GRAM POSITIVE COCCI IN PAIRS            FEW GRAM NEGATIVE RODS        Culture result: Janet Tao NEGATIVE RODS               HEAVY POSSIBLE 2ND GRAM NEGATIVE BROOKS                  HEAVY STREPTOCOCCI, BETA HEMOLYTIC GROUP B Penicillin and ampicillin are drugs of choice for treatment of beta-hemolytic streptococcal infections. Susceptibility testing of penicillins and beta-lactams approved by the FDA for treatment of beta-hemolytic streptococcal infections need not be performed routinely, because nonsusceptible isolates are extremely rare. CLSI 2012              LIGHT MIXED SKIN QUEENIE ISOLATED          COVID-19 RAPID TEST [[id number]] Collected: 09/17/22 0845    Order Status: Completed Specimen: Nasopharyngeal Updated: 09/17/22 0917     Specimen source Nasopharyngeal        COVID-19 rapid test Not detected        Comment: Rapid Abbott ID Now       Rapid NAAT:  The specimen is NEGATIVE for SARS-CoV-2, the novel coronavirus associated with COVID-19. Negative results should be treated as presumptive and, if inconsistent with clinical signs and symptoms or necessary for patient management, should be tested with an alternative molecular assay. Negative results do not preclude SARS-CoV-2 infection and should not be used as the sole basis for patient management decisions. This test has been authorized by the FDA under an Emergency Use Authorization (EUA) for use by authorized laboratories. Fact sheet for Healthcare Providers: BagFit.fi  Fact sheet for Patients: BagFit.fi       Methodology: Isothermal Nucleic Acid Amplification         CULTURE, BLOOD [[id number]]  (Abnormal) Collected: 09/13/22 2123    Order Status: Completed Specimen: Blood Updated: 09/16/22 0734     Special Requests: NO SPECIAL REQUESTS        GRAM STAIN       AEROBIC BOTTLE GRAM POSITIVE COCCI IN GROUPS                  SMEAR CALLED TO AND CORRECTLY REPEATED BY: SHAMIKA GEORGE 3N ON 15SEPT22 AT [address] [place] [address] Sw HRS TO 1396.            Culture result:       STAPHYLOCOCCUS SPECIES, COAGULASE NEGATIVE GROWING IN 1 OF 2 BOTTLES DRAWN No Site Indicated          BLOOD CULTURE ID PANEL [[id number]]  (Abnormal) Collected: 09/13/22 2123    Order Status: Completed Specimen: Blood Updated: 09/15/22 1540     Acc. no. from Micro Order [id number]     Enterococcus faecalis Not detected        Enterococcus faecium Not detected        Listeria monocytogenes Not detected        Staphylococcus Detected        Staphylococcus aureus Not detected        Staph epidermidis Not detected        Staph lugdunensis Not detected        Streptococcus Not detected        Streptococcus agalactiae (Group B) Not detected        Streptococcus pneumoniae Not detected        Streptococcus pyogenes (Group A) Not detected        Acinetobacter calcoaceticus-baumanii complex Not detected        Bacteroides fragilis Not detected        Enterobacterales species Not detected        Enterobacter cloacae complex Not detected        Escherichia coli Not detected        Klebsiella aerogenes Not detected        Klebsiella oxytoca Not detected        Klebsiella pneumoniae group Not detected        Proteus Not detected        Salmonella Not detected        Serratia marcescens Not detected        Haemophilus influenzae Not detected        Neisseria meningitidis Not detected        Pseudomonas aeruginosa Not detected        Steno maltophilia Not detected        Candida albicans Not detected        Candida auris Not detected        Candida glabrata Not detected        Candida krusei Not detected        Candida parapsilosis Not detected        Candida tropicalis Not detected        Crypto neoformans/gattii Not detected        RESISTANT GENES:            Comment       False positive results may rarely occur. Correlate with clinical,epidemiologic, and other laboratory findings           Comment: Please see BCID Interpretation Guide in EPIC Links                 Images:    CT (Most Recent).       XRAY (Most Recent) XR Results (most recent):  Results from Hospital Encounter encounter on 09/13/22    XR FOOT RT AP/LAT    Narrative  EXAM: FOOT TWO VIEWS RIGHT    CLINICAL HISTORY/INDICATION: post op amputation due to dry gangrene    COMPARISON: Right foot 9/13/2022. TECHNIQUE: Two views obtained. FINDINGS:    There is interval amputation through the proximal aspect of the proximal phalanx  of the second digit and the mid to distal aspect of the proximal phalanx of the  third digit. . The joint spaces are maintained. Mineralization is normal. There  is no radiopaque foreign body. Impression  Satisfactory post amputation changes involving the second and the third digits  of the right foot         EKG No results found for this or any previous visit. 2D ECHO 07/29/21    ECHO ADULT COMPLETE 07/29/2021 7/29/2021    Interpretation Summary  · LV: Estimated LVEF is 60 - 65%. Visually measured ejection fraction. Normal cavity size and systolic function (ejection fraction normal). Mildly increased wall thickness. Wall motion: normal. Age-appropriate left ventricular diastolic function. · AV: There is mild to moderate aortic stenosis. Aortic valve peak gradient is 28 mmHg. Aortic valve mean gradient is 19 mmHg. Aortic valve area is 0.9 cm2.  · AO: Mild aortic root dilatation; diameter is 3.6 cm. Signed by: Isiah Singh MD on 7/29/2021  4:47 PM            Lower ext art duplex  Interpretation Summary    There appears to be Mild Peripheral Arterial Disease noted on the Right lower extremity  And Moderate Peripheral Arterial Disease noted on the Left lower extremity  The disease begins bilaterally  In the aorta ileo segment displaying Biphasic Doppler flow  Unable to obtain toe pressures due to open wounds, non voluntary foot movement and broken PPG equipment ( work order placed)  Lower Extremity Arterial Findings    ALICE    The right resting ALICE is mildly decreased. The left resting ALICE is moderately decreased.  The right common femoral artery, popliteal artery, peroneal artery and dorsalis pedis artery has biphasic waveforms. The left common femoral artery, popliteal artery, posterior tibial artery and dorsalis pedis artery has biphasic waveforms.      Upper Arterial Pressure Measurements     Right   Brachial  mmHg

## 2022-09-20 NOTE — PROGRESS NOTES
Progress Note    Patient: Sang Kwok MRN: [id number]  SSN: [id number]    YOB: 1945  Age: 78 y.o. Sex: male      Admit Date: 9/13/2022  3 Days Post-Op     Procedure:   Procedure(s):  RIGHT FOOT PARTIAL SECOND TOE AMPUTATION, RIGHT FOOT PARTIAL THIRD TOE AMPUTATION, LEFT FOOT PARTIAL SECOND AND FOURTH TOE AMPUTATION    Subjective:     Patient seen resting quiet and comfortably and no apparent distress. Patient noted to dressings soaked with dry blood. Patient denies N/V/C/F. Objective:     Visit Vitals  BP (!) 145/69 (BP 1 Location: Right upper arm, BP Patient Position: At rest)   Pulse 77   Temp 98.5 °F (36.9 °C)   Resp 17   Ht 6' (1.829 m)   Wt 68 kg (150 lb)   SpO2 97%   BMI 20.34 kg/m²        Physical Exam:  Right foot partial second and third toe amputation and left foot second and fourth toe partial amputation site skin edges well approximated, skin sutures intact, no clinical signs of infection noted. No active bleeding seen. No hematoma or seroma noted. Labs/Radiology Review: images and reports reviewed    Assessment:     Hospital Problems  Date Reviewed: 6/9/2022            Codes Class Noted POA    Moderate protein-calorie malnutrition ([facility] [address] ICD-10-CM: E44.0  ICD-9-CM: 263.0  9/15/2022 Unknown        Gangrene ([facility] [address] ICD-10-CM: T83  ICD-9-CM: 785.4  9/13/2022 Unknown        Cellulitis ICD-10-CM: L03.90  ICD-9-CM: 682.9  9/13/2022 Unknown           Plan/Recommendations/Medical Decision Making:     - Post operative x-rays reviewed. - OR culture growing staph aureus, gram negative rashida. ID on board. Their rec appreciated. - Remain NWB to bilateral forefoot. - Dressings changed with betadine soaked gauze and kerlex. - Medical management per primary team.   - Patient is stable to d/c from foot standpoint. Follow up in office in 1 week.

## 2022-09-21 VITALS
OXYGEN SATURATION: 98 % | HEIGHT: 72 IN | HEART RATE: 73 BPM | RESPIRATION RATE: 17 BRPM | SYSTOLIC BLOOD PRESSURE: 120 MMHG | TEMPERATURE: 98 F | BODY MASS INDEX: 20.32 KG/M2 | WEIGHT: 150 LBS | DIASTOLIC BLOOD PRESSURE: 69 MMHG

## 2022-09-21 LAB
ANION GAP SERPL CALC-SCNC: 6 MMOL/L (ref 3–18)
BACTERIA SPEC CULT: ABNORMAL
BUN SERPL-MCNC: 20 MG/DL (ref 7–18)
BUN/CREAT SERPL: 23 (ref 12–20)
CALCIUM SERPL-MCNC: 8.6 MG/DL (ref 8.5–10.1)
CHLORIDE SERPL-SCNC: 100 MMOL/L (ref 100–111)
CO2 SERPL-SCNC: 27 MMOL/L (ref 21–32)
CREAT SERPL-MCNC: 0.87 MG/DL (ref 0.6–1.3)
ERYTHROCYTE [DISTWIDTH] IN BLOOD BY AUTOMATED COUNT: 12.8 % (ref 11.6–14.5)
GLUCOSE BLD STRIP.AUTO-MCNC: 119 MG/DL (ref 70–110)
GLUCOSE BLD STRIP.AUTO-MCNC: 162 MG/DL (ref 70–110)
GLUCOSE SERPL-MCNC: 131 MG/DL (ref 74–99)
GRAM STN SPEC: ABNORMAL
HCT VFR BLD AUTO: 27 % (ref 36–48)
HGB BLD-MCNC: 9.1 G/DL (ref 13–16)
MCH RBC QN AUTO: 29.9 PG (ref 24–34)
MCHC RBC AUTO-ENTMCNC: 33.7 G/DL (ref 31–37)
MCV RBC AUTO: 88.8 FL (ref 78–100)
NRBC # BLD: 0 K/UL (ref 0–0.01)
NRBC BLD-RTO: 0 PER 100 WBC
PLATELET # BLD AUTO: 291 K/UL (ref 135–420)
PMV BLD AUTO: 10.2 FL (ref 9.2–11.8)
POTASSIUM SERPL-SCNC: 4.2 MMOL/L (ref 3.5–5.5)
RBC # BLD AUTO: 3.04 M/UL (ref 4.35–5.65)
SERVICE CMNT-IMP: ABNORMAL
SERVICE CMNT-IMP: ABNORMAL
SODIUM SERPL-SCNC: 133 MMOL/L (ref 136–145)
WBC # BLD AUTO: 9.1 K/UL (ref 4.6–13.2)

## 2022-09-21 PROCEDURE — 74011250636 HC RX REV CODE- 250/636: Performed by: INTERNAL MEDICINE

## 2022-09-21 PROCEDURE — 74011250636 HC RX REV CODE- 250/636: Performed by: NURSE PRACTITIONER

## 2022-09-21 PROCEDURE — 82962 GLUCOSE BLOOD TEST: CPT

## 2022-09-21 PROCEDURE — 99232 SBSQ HOSP IP/OBS MODERATE 35: CPT | Performed by: NURSE PRACTITIONER

## 2022-09-21 PROCEDURE — 74011250637 HC RX REV CODE- 250/637: Performed by: NURSE PRACTITIONER

## 2022-09-21 PROCEDURE — 74011000258 HC RX REV CODE- 258: Performed by: INTERNAL MEDICINE

## 2022-09-21 PROCEDURE — 36415 COLL VENOUS BLD VENIPUNCTURE: CPT

## 2022-09-21 PROCEDURE — 74011250637 HC RX REV CODE- 250/637: Performed by: EMERGENCY MEDICINE

## 2022-09-21 PROCEDURE — 80048 BASIC METABOLIC PNL TOTAL CA: CPT

## 2022-09-21 PROCEDURE — 85027 COMPLETE CBC AUTOMATED: CPT

## 2022-09-21 PROCEDURE — 74011250637 HC RX REV CODE- 250/637: Performed by: HOSPITALIST

## 2022-09-21 PROCEDURE — 99239 HOSP IP/OBS DSCHRG MGMT >30: CPT | Performed by: EMERGENCY MEDICINE

## 2022-09-21 PROCEDURE — 74011250637 HC RX REV CODE- 250/637: Performed by: SURGERY

## 2022-09-21 PROCEDURE — 74011636637 HC RX REV CODE- 636/637: Performed by: NURSE PRACTITIONER

## 2022-09-21 PROCEDURE — 74011000250 HC RX REV CODE- 250: Performed by: NURSE PRACTITIONER

## 2022-09-21 RX ORDER — THERA TABS 400 MCG
1 TAB ORAL DAILY
Qty: 30 TABLET | Refills: 0 | Status: SHIPPED
Start: 2022-09-22

## 2022-09-21 RX ORDER — POLYETHYLENE GLYCOL 3350 17 G/17G
17 POWDER, FOR SOLUTION ORAL
Qty: 15 EACH | Refills: 0 | Status: SHIPPED | OUTPATIENT
Start: 2022-09-21

## 2022-09-21 RX ORDER — OXYCODONE AND ACETAMINOPHEN 5; 325 MG/1; MG/1
1 TABLET ORAL
Qty: 15 TABLET | Refills: 0 | Status: SHIPPED | OUTPATIENT
Start: 2022-09-21 | End: 2022-09-26

## 2022-09-21 RX ORDER — INSULIN LISPRO 100 [IU]/ML
INJECTION, SOLUTION INTRAVENOUS; SUBCUTANEOUS
Qty: 1 EACH | Refills: 0 | Status: SHIPPED
Start: 2022-09-21

## 2022-09-21 RX ORDER — LEVOFLOXACIN 500 MG/1
500 TABLET, FILM COATED ORAL EVERY 24 HOURS
Qty: 8 TABLET | Refills: 0 | Status: SHIPPED
Start: 2022-09-21 | End: 2022-09-29

## 2022-09-21 RX ORDER — AMOXICILLIN AND CLAVULANATE POTASSIUM 875; 125 MG/1; MG/1
1 TABLET, FILM COATED ORAL EVERY 12 HOURS
Qty: 16 TABLET | Refills: 0 | Status: SHIPPED
Start: 2022-09-21 | End: 2022-09-29

## 2022-09-21 RX ORDER — LEVOFLOXACIN 500 MG/1
500 TABLET, FILM COATED ORAL EVERY 24 HOURS
Status: DISCONTINUED | OUTPATIENT
Start: 2022-09-21 | End: 2022-09-22 | Stop reason: HOSPADM

## 2022-09-21 RX ORDER — AMOXICILLIN AND CLAVULANATE POTASSIUM 875; 125 MG/1; MG/1
1 TABLET, FILM COATED ORAL EVERY 12 HOURS
Status: DISCONTINUED | OUTPATIENT
Start: 2022-09-21 | End: 2022-09-22 | Stop reason: HOSPADM

## 2022-09-21 RX ADMIN — Medication 2 UNITS: at 14:04

## 2022-09-21 RX ADMIN — LEVOFLOXACIN 500 MG: 500 TABLET, FILM COATED ORAL at 12:34

## 2022-09-21 RX ADMIN — SODIUM CHLORIDE, PRESERVATIVE FREE 10 ML: 5 INJECTION INTRAVENOUS at 14:05

## 2022-09-21 RX ADMIN — AMOXICILLIN AND CLAVULANATE POTASSIUM 1 TABLET: 875; 125 TABLET, FILM COATED ORAL at 12:34

## 2022-09-21 RX ADMIN — SERTRALINE 50 MG: 50 TABLET, FILM COATED ORAL at 08:10

## 2022-09-21 RX ADMIN — CLOPIDOGREL BISULFATE 75 MG: 75 TABLET ORAL at 08:10

## 2022-09-21 RX ADMIN — SODIUM CHLORIDE, PRESERVATIVE FREE 10 ML: 5 INJECTION INTRAVENOUS at 05:53

## 2022-09-21 RX ADMIN — Medication 100 MG: at 08:10

## 2022-09-21 RX ADMIN — ENOXAPARIN SODIUM 40 MG: 100 INJECTION SUBCUTANEOUS at 08:10

## 2022-09-21 RX ADMIN — DRONABINOL 2.5 MG: 2.5 CAPSULE ORAL at 08:10

## 2022-09-21 RX ADMIN — DIVALPROEX SODIUM 250 MG: 250 TABLET, DELAYED RELEASE ORAL at 08:10

## 2022-09-21 RX ADMIN — THERA TABS 1 TABLET: TAB at 08:10

## 2022-09-21 RX ADMIN — PIPERACILLIN AND TAZOBACTAM 3.38 G: 3; .375 INJECTION, POWDER, FOR SOLUTION INTRAVENOUS at 05:48

## 2022-09-21 RX ADMIN — GABAPENTIN 300 MG: 300 CAPSULE ORAL at 08:10

## 2022-09-21 NOTE — PROGRESS NOTES
0700: Bedside shift change report given to aPul Khan RN (oncoming nurse) by Isabel Escobar RN (offgoing nurse). Report included the following information SBAR, Intake/Output, MAR, and Med Rec Status. 1542: Called facility severally to give report on pt, rehab office transferred call to unit, no one picking and call going to voice mail. 1745: Transport in to pick pt. Informed transport nurse tried severally to give report on pt but no one was picking call.

## 2022-09-21 NOTE — PROGRESS NOTES
Son Lynn Saucedo made aware of 2nd IM letter informing them of their right to appeal the discharge. Hayley Victoria information given, copy declined.

## 2022-09-21 NOTE — PROGRESS NOTES
New OT orders received with patient already on caseload. Will follow up as appropriate. Acknowledged new orders.             Thank you,   Amol Vicente MS, OTR/L

## 2022-09-21 NOTE — PROGRESS NOTES
Palliative Medicine     Palliative Medicine team members Livia Bonilla NP and this writer for follow up visit with Mr Veronica Rosales to assess his ability to complete an Advance Medical Directive. Patient was resting but awoke easily to voice and turning of light in in room. Patient did not recall meeting with us prior. He was aware he was in the hospital but was not able to focus on the conversation. Team asked if patient had a visit from his son, Mindy Vazquez. He replied \"this morning\". Team attempted to explained the hierarchy of healthcare decision makers. He replied by stating \"you keep going back between Mindy Vazquez and [facility], just talk to me\". This team members attempted to redirect Mr. Veronica Rosales but  he dismissed this writer from the room. No further attempt at AMD completion was made to avoid upsetting patient. This writer placed call to son, Mindy Vazquez. Informed him that Mr. Veronica Rosales was not able to complete an AMD at this time. Suggested he revisit the topic with the assistance of the MSW on site. He may be more open to completing the form  when he is back at the familiar setting at [facility] expressed gratitude for the follow up call. Thank you very much for the consult.      Anoop STALLINGSN, RN, [facility]  Palliative Medicine Inpatient RN  Palliative [facility] Line: [phone]

## 2022-09-21 NOTE — DISCHARGE SUMMARY
[address], [address]     DISCHARGE SUMMARY    Name: Agustin Banda MRN: [id number]   Age / Sex: 78 y.o. / male CSN: [id number]   YOB: 1945 Length of Stay: 8 days   Admit Date: 9/13/2022 Discharge Date:        PRIMARY CARE PHYSICIAN: Farrah Parra MD      DISCHARGE DIAGNOSES:    Dry gangrene of multiple toes associates with PAD and diabetes mellitus, status post right foot partial second and third toe amputation, left foot partial second and fourth toes amputation on 9-. Critical arterial stenosis of the proximal external iliac artery, critical arterial stenosis of the left superior femoral artery, critical arterial stenosis of the distal popliteal and proximal tibial peroneal trunk, S/P orbital atherectomy of left tibial peroneal artery, drug-coated balloon angioplasty of the left popliteal and tibial peroneal artery, orbital atherectomy of the left superficial femoral artery, drug-coated balloon angioplasty of the left superficial femoral artery, balloon angioplasty and stenting of the left external iliac artery, angiogram of the right femoral artery  Type 2 DM   Normocytic anemia  Advanced age  Delirium suspected associated with hospitalization, now resolved to baseline  Moderate malnutrition  Blood culture growth in 1 set, contaminated in acquisition  Hyponatremia    Patient is full code    CONSULTS CALLED: Podiatry, ID, vascular surgery, palliative care      PROCEDURES DONE: Right foot partial second and third toe amputation, left foot partial second and fourth toe amputation.    S/P orbital atherectomy of left tibial peroneal artery, drug-coated balloon angioplasty of the left popliteal and tibial peroneal artery, orbital atherectomy of the left superficial femoral artery, drug-coated balloon angioplasty of the left superficial femoral artery, balloon angioplasty and stenting of the left external iliac artery, angiogram of the right femoral artery       COURSE IN THE HOSPITAL: This is a 42-year-old male who was brought to the ED from a care facility with gangrene his toes of both feet. Patient was evaluated and was admitted. Patient was started on broad-spectrum antibiotics. Vascular surgery and podiatry were consulted. Patient underwent debridement and partial toe amputations of both feet. Patient also underwent a vascular procedure. Cultures were followed. Patient had some delirium but now is back to baseline. ID has cleared patient to be transitioned to p.o. antibiotics. Case management was involved and arrangements have been made. Patient will be transition back to care facility. MEDICATIONS ON DISCHARGE:    Current Discharge Medication List        START taking these medications    Details   amoxicillin-clavulanate (AUGMENTIN) 875-125 mg per tablet Take 1 Tablet by mouth every twelve (12) hours for 8 days. Qty: 16 Tablet, Refills: 0  Start date: 9/21/2022, End date: 9/29/2022      insulin lispro (HUMALOG) 100 unit/mL injection Check FSBS Three times daily with meals,   For sugar between 150 and 200- give 1 units SQ,   For sugar between 201 and 250- give 3 units SQ,   For sugar between 251 and 300- give 5 units SQ,   For sugar between 301 and 400- give 7 units SQ,  For sugars > 400, contact PCP  Qty: 1 Each, Refills: 0  Start date: 9/21/2022      L. acidophilus,casei,rhamnosus (BIO-K PLUS) 50 billion cell cpDR capsule Take 1 Capsule by mouth daily for 14 days. Qty: 14 Capsule, Refills: 0  Start date: 9/22/2022, End date: 10/6/2022      levoFLOXacin (LEVAQUIN) 500 mg tablet Take 1 Tablet by mouth every twenty-four (24) hours for 8 days. Qty: 8 Tablet, Refills: 0  Start date: 9/21/2022, End date: 9/29/2022      oxyCODONE-acetaminophen (PERCOCET) 5-325 mg per tablet Take 1 Tablet by mouth every six (6) hours as needed for Pain for up to 5 days. Max Daily Amount: 4 Tablets.   Qty: 15 Tablet, Refills: 0  Start date: 9/21/2022, End date: 9/26/2022    Associated Diagnoses: Cellulitis of toe, unspecified laterality      polyethylene glycol (MIRALAX) 17 gram packet Take 1 Packet by mouth daily as needed for Constipation. Qty: 15 Each, Refills: 0  Start date: 9/21/2022      therapeutic multivitamin [facility]) tablet Take 1 Tablet by mouth daily. Qty: 30 Tablet, Refills: 0  Start date: 9/22/2022      clopidogreL (Plavix) 75 mg tab Take 1 Tablet by mouth daily. Qty: 30 Tablet, Refills: 3  Start date: 9/17/2022           CONTINUE these medications which have NOT CHANGED    Details   divalproex DR (Depakote) 250 mg tablet Take  by mouth two (2) times a day.      gabapentin (NEURONTIN) 300 mg capsule Take 300 mg by mouth two (2) times a day. dronabinoL (MARINOL) 2.5 mg capsule Take 2.5 mg by mouth two (2) times a day. Associated Diagnoses: Weight loss      sertraline (ZOLOFT) 50 mg tablet Take 1 Tablet by mouth daily. Qty: 90 Tablet, Refills: 2    Associated Diagnoses: Moderate episode of recurrent major depressive disorder (HCC)           STOP taking these medications       cephALEXin (Keflex) 500 mg capsule Comments:   Reason for Stopping:         celecoxib (CELEBREX) 200 mg capsule Comments:   Reason for Stopping:         SUMAtriptan (IMITREX) 100 mg tablet Comments:   Reason for Stopping:         oxyCODONE (OXYIR) 5 mg capsule Comments:   Reason for Stopping:                 DISCHARGE VITAL SIGNS:  Visit Vitals  BP (!) 119/57 (BP 1 Location: Right upper arm, BP Patient Position: At rest)   Pulse 67   Temp 98 °F (36.7 °C)   Resp 16   Ht 6' (1.829 m)   Wt 68 kg (150 lb)   SpO2 100%   BMI 20.34 kg/m²       DISCHARGE PHYSICAL EXAMINATION:  General:  Awake, alert  Cardiovascular:  S1S2+, RRR  Pulmonary:  CTA b/l  GI:  Soft, BS+, NT, ND  Extremities: Both feet with dressing      CONDITION ON DISCHARGE: Stable.       DISPOSITION: Long-term care      FOLLOW-UP RECOMMENDATIONS:   Follow-up Information       Follow up With Specialties Details Why Contact Info    [facility] [address] [id number] [facility] [address]    Tyler Yee MD Internal Medicine Physician   [address]  [address]  [phone]              OTHER INSTRUCTIONS:  Diet- Cardiac, diabetic  Glucerna 1 can p.o. 3 times daily  Activity - as tolerated  [address]  Patient is to remain nonweightbearing to bilateral forefoot  Head of bed at 30 degrees at all times  Incentive Spirometry Q30 minutes when awake  PT, OT Evaluate and Treat  Check CBC, CMP, Mg in 3 days, results to supervising physician at facility immediately  Notify supervising physician at facility immediately if patient has no bowel movement for 2 conductive days  Continue wound care as directed  F/u with PCP in 1 week  F/u with podiatrist in 1 week  Follow-up with vascular surgeon in 10 days        TIME SPENT ON DISCHARGE ACTIVITIES: More than 35 minutes. Dragon medical dictation software was used for portions of this report. Unintended errors may occur.       Signed:  Cornelius Paige MD      9/21/2022

## 2022-09-21 NOTE — PROGRESS NOTES
Infectious Disease progress Note        Reason: infected/gangrene toes of  right foot/left foot    Current abx Prior abx   Zosyn, vancomycin since 9/19/2022 Cephalexin 9/9-9/12 (as outpatient)  Cefazolin 9/17/22     Lines:       Assessment :    Clinical presentation c/w partially treated right foot cellulitis, dry gangrene nipple ptosis bilateral feet status post right foot partial second and third toe amputation, left foot partial second and fourth toe amputation on 9/17/2022    Intra-Op culture left second toe 9/17-proteus, klebsiella, group B strep, Staph aureus  Intra-Op culture right second toe 9/17-heavy proteus, klebsiella, Staph aureus    Peripheral arterial disease-vascular surgery follow-up appreciated. S/P orbital atherectomy of left tibial peroneal artery, drug-coated balloon angioplasty of the left popliteal and tibial peroneal artery, orbital atherectomy of the left superficial femoral artery, drug-coated balloon angioplasty of the left superficial femoral artery, balloon angioplasty and stenting of the left external iliac artery, angiogram of the right femoral artery on 9/16/22    Podiatry follow-up appreciated    Recommendations:    Discontinue Zosyn, vancomycin. Start p.o. levofloxacin, Augmentin till 9/29/2022  Probiotics while on antibiotics  Follow-up podiatry/vascular surgery  recommendations  Discharge planning per primary team     Above plan was discussed in details with patient, and dr Josie Bueno. Please call me if any further questions or concerns. Will continue to participate in the care of this patient. HPI:    Denies increasing pain in his feet. Not very communicative.       Past Medical History:   Diagnosis Date    Arthritis     Cognitive impairment     Depression     Dorsalgia, unspecified     GERD (gastroesophageal reflux disease)     High blood pressure     Insomnia     L1 vertebral fracture (HCC)     Migraine headache     Other chronic pain     Periorbital headache     Sleep apnea Spinal stenosis     Type 2 diabetes mellitus, without long-term current use of insulin [facility])        Past Surgical History:   Procedure Laterality Date    ENDOSCOPY, COLON, DIAGNOSTIC      HX LUMBAR DISKECTOMY  6/2001, 4/11    left L3-4 microdiskectomy with intradural rupture    HX ORTHOPAEDIC      spinal Surgery Dr. Brandt Courser  9/11    spinal fusion, L3-5       Current Discharge Medication List        START taking these medications    Details   clopidogreL (Plavix) 75 mg tab Take 1 Tablet by mouth daily. Qty: 30 Tablet, Refills: 3           CONTINUE these medications which have NOT CHANGED    Details   divalproex DR (Depakote) 250 mg tablet Take  by mouth two (2) times a day.      gabapentin (NEURONTIN) 300 mg capsule Take 300 mg by mouth two (2) times a day. cephALEXin (Keflex) 500 mg capsule Take 500 mg by mouth two (2) times a day. celecoxib (CELEBREX) 200 mg capsule 200 mg two (2) times a day. dronabinoL (MARINOL) 2.5 mg capsule Take 2.5 mg by mouth two (2) times a day. Associated Diagnoses: Weight loss      sertraline (ZOLOFT) 50 mg tablet Take 1 Tablet by mouth daily. Qty: 90 Tablet, Refills: 2    Associated Diagnoses: Moderate episode of recurrent major depressive disorder (HCC)      SUMAtriptan (IMITREX) 100 mg tablet Take 100 mg by mouth once as needed for Migraine. oxyCODONE (OXYIR) 5 mg capsule Take 5 mg by mouth every four (4) hours as needed for Pain.              Current Facility-Administered Medications   Medication Dose Route Frequency    piperacillin-tazobactam (ZOSYN) 3.375 g in 0.9% sodium chloride (MBP/ADV) 100 mL MBP  3.375 g IntraVENous Q8H    oxyCODONE-acetaminophen (PERCOCET) 5-325 mg per tablet 1 Tablet  1 Tablet Oral Q6H PRN    clopidogreL (PLAVIX) tablet 75 mg  75 mg Oral DAILY    thiamine HCL (B-1) tablet 100 mg  100 mg Oral DAILY    therapeutic multivitamin (THERAGRAN) tablet 1 Tablet  1 Tablet Oral DAILY    sodium chloride (NS) flush 5-40 mL  5-40 mL IntraVENous Q8H    sodium chloride (NS) flush 5-40 mL  5-40 mL IntraVENous PRN    acetaminophen (TYLENOL) tablet 650 mg  650 mg Oral Q6H PRN    Or    acetaminophen (TYLENOL) suppository 650 mg  650 mg Rectal Q6H PRN    polyethylene glycol (MIRALAX) packet 17 g  17 g Oral DAILY PRN    ondansetron (ZOFRAN ODT) tablet 4 mg  4 mg Oral Q8H PRN    Or    ondansetron (ZOFRAN) injection 4 mg  4 mg IntraVENous Q6H PRN    enoxaparin (LOVENOX) injection 40 mg  40 mg SubCUTAneous DAILY    glucose chewable tablet 16 g  4 Tablet Oral PRN    glucagon (GLUCAGEN) injection 1 mg  1 mg IntraMUSCular PRN    dextrose 10% infusion 0-250 mL  0-250 mL IntraVENous PRN    insulin lispro (HUMALOG) injection   SubCUTAneous Q6H    divalproex DR (DEPAKOTE) tablet 250 mg  250 mg Oral BID    dronabinoL (MARINOL) capsule 2.5 mg  2.5 mg Oral BID    gabapentin (NEURONTIN) capsule 300 mg  300 mg Oral BID    sertraline (ZOLOFT) tablet 50 mg  50 mg Oral DAILY       Allergies: Codeine and Tegretol [carbamazepine]    Family History   Problem Relation Age of Onset    Heart Attack Mother     Cancer Mother     Heart Disease Mother     Hypertension Father     Seizures Father     Alcohol abuse Father      Social History     Socioeconomic History    Marital status:      Spouse name: Not on file    Number of children: Not on file    Years of education: Not on file    Highest education level: Not on file   Occupational History    Not on file   Tobacco Use    Smoking status: Every Day     Packs/day: 1.00     Years: 51.00     Pack years: 51.00     Types: Cigarettes    Smokeless tobacco: Never   Vaping Use    Vaping Use: Never used   Substance and Sexual Activity    Alcohol use: Yes     Comment: rarely    Drug use: No    Sexual activity: Yes     Partners: Female   Other Topics Concern    Not on file   Social History Narrative    Not on file     Social Determinants of Health     Financial Resource Strain: Not on file   Food Insecurity: Not on file   Transportation Needs: Not on file   Physical Activity: Not on file   Stress: Not on file   Social Connections: Not on file   Intimate Partner Violence: Not on file   Housing Stability: Not on file     Social History     Tobacco Use   Smoking Status Every Day    Packs/day: 1.00    Years: 51.00    Pack years: 51.00    Types: Cigarettes   Smokeless Tobacco Never        Temp (24hrs), Av.6 °F (37 °C), Min:98 °F (36.7 °C), Max:99 °F (37.2 °C)    Visit Vitals  /70 (BP 1 Location: Right upper arm, BP Patient Position: At rest)   Pulse 65   Temp 98 °F (36.7 °C)   Resp 16   Ht 6' (1.829 m)   Wt 68 kg (150 lb)   SpO2 95%   BMI 20.34 kg/m²       ROS: Unable to obtain due to patient factors    Physical Exam:    General:  laying on bed, sleepy, not very communicative  HEENT: EOMI, supple neck, no JVD, dry oral mucosa  Cardiovascular: S1S2 regular, no rub/gallop   Pulmonary: air entry bilaterally, no wheezing, no crackle  GI:  Soft, non tender, non distended, +bs, no guarding   Extremities: + Pedal edema, no calf tenderness  Both feet were wrapped, surgical dressing bilateral feet not removed-no blood seen  Neuro: AOx2, moving all extremities,           Labs: Results:   Chemistry Recent Labs     22  0027 22  0151   * 114*   * 135*   K 4.2 4.0    101   CO2 27 26   BUN 20* 17   CREA 0.87 0.72   CA 8.6 8.5   AGAP 6 8   BUCR 23* 24*        CBC w/Diff Recent Labs     22  0027   WBC 9.1   RBC 3.04*   HGB 9.1*   HCT 27.0*           Microbiology No results for input(s): CULT in the last 72 hours. RADIOLOGY:    All available imaging studies/reports in [facility] for this admission were reviewed      Disclaimer: Sections of this note are dictated utilizing voice recognition software, which may have resulted in some phonetic based errors in grammar and contents.  Even though attempts were made to correct all the mistakes, some may have been missed, and remained in the body of the document. If questions arise, please contact our department.     Dr. Brett Jhaveri, Infectious Disease Specialist  [phone]  September 21, 2022  12:01 PM

## 2022-09-21 NOTE — PROGRESS NOTES
[facility]: [phone]  [facility]: [phone]     Patient Name: Soham Glez  YOB: 1945    Date of consult : 9/14/2022  Follow up 9/19/2022   Reason for Consult: establish goals of care  Requesting Provider:    Primary Care Physician: Dilshad Bergeron MD      SUMMARY:   Soham Glez is a 78 y.o. male with a past history of DM2,HTN, Major Depressive disorder, Degeneration of lumbar, neuropathy, who was admitted on 9/13/2022 from [facility] with a diagnosis of dry gangrene to BL toes. Current medical issues leading to Palliative Medicine involvement include: Pt with a long term life limiting chronic disease process that warrants discussions about her goals of care. CHIEF COMPLAINT: immobility     HPI/SUBJECTIVE:    Patient is a 72-year-old  male that was brought in from physical rehabilitation and patient's due to gangrene of bilateral feet. Reported by staff patient had a worsening infection in his toes. Patient has past history of cognitive impairment and some disorientation. He appears to be a poor historian of his past medical history and his social history. 9/21/2022 alerts to his name, still seems a little confused. Seems comfortable. 9/19/2022 alert a bit disgruntled this am. He is confused. Denies pain. The patient is:   [] Verbal and participatory  [x] Non-participatory due to: Patient somewhat cognitively impaired and unable to explain why he was in rehab or what brought him to the hospital.  But appears to be completely oblivious to the gangrene in his feet.     GOALS OF CARE:  Patient/Health Care Proxy Stated Goals: Prolong life      TREATMENT PREFERENCES:   Code Status: Full Code         PALLIATIVE DIAGNOSES:   Goals of care/ACP  Gangrene bilateral lower extremities  Peripheral arterial disease  Encounter for palliative medicine       PLAN:   9/21/2022 follow up on  Veronica Castroa along with Ms Ronda Tanner RN. Alerts easily to his name. Seems a bit confused this am. Irritable we again attempted to complete an AMD with Mr Veronica Rosales, he did not wish to complete, in fact becoming somewhat irritable with us. His legal next of kin is wife Armida Lima they reportedly are not legally  or , however per son Mindy Vazquez are did not living together or together for any reason and the parting was not pleasant. We called Ernesto and informed unfortunately AMD could not be completed. Noted possible d/c later today. Goals of care full code with full interventions. Of note Mindy Vazquez has been able to speak with Armida Lima, no change in goals of care remains full code with full interventions. ( Please see below for previous notes per palliative team)     9/19/2022 Follow up along with Ms Ronda Tanner RN and Mr Jostin Sebastian, MSW. Resting in bed alert, he is confused this am, telling me to not let his \" cats out\", could not tell me he is in the hospital. Not able to discuss goals of care and discussions on advanced care planning. We called his son Mindy Vazquez, Patient is  but . According to Ernesto the separation was not pleasant. Patient also has another son Kacie Infante. Goals of care discussed with Ernesto `including benefits and burdens. He could not answer at this time, expressing his understanding however he would need to discuss with his brother and possibly patient's wife if she wished to be a part of the decision. Legally his wife is legal next of kin if she chooses to make these decisions. She could decide to remove herself from the decision and the sons would become legal next of kin and medical decision makers. Mindy Vazquez will call and discuss with wife Sabine Echavarria. Goals of care full code with full interventions.    ( Please see below for previous notes per palliative team)   Goals of care/ACP  This NP along with Anoop Tony RN in to see patient at the bedside he is alert and interactive able to state his name and that he is in the hospital although unaware of exactly which hospital.  Patient relates that he is living in [place] although the listed address is [place]. He was able to name his 2 sons Don Naylor and Vinod Gonzáles but stated that he was sent here by his \"girlfriend\". Then he stated that he is  and lives with his wife. Patient seems completely unaware that he has any issues going on with his feet. He appears at this time to be unable to participate in his goals of care discussion as he is not able to understand the complexity of these decisions. We obtained permission to contact his son Vinod Gonzáles who is listed as his emergency contact. This NP reached out and left a message for Ernesto to please call us as soon as possible. Goals of care: Patient remains a full code with full interventions  Gangrene bilateral lower extremities  Patient seen by podiatry noted dry gangrene with eschar of right distal second and third toes, pre ulcerative lesions noted to the right sub metatarsal 5. Left foot second and fourth toe eschar noted at the distal tip of the toes. Also eschar noted to plantar medial left hallux. Podiatry is following up. PAD  Bilateral pulses nonpalpable with absent hair growth. Delayed capillary refill time no edema vascular surgical consult has been placed by podiatry awaiting their assessment  Encounter for palliative medicine  Patient has multiple comorbid health conditions with likely poor outcome post cardiac arrest.  He is unable to participate in these conversations at this time as he is unable to understand the complexity of those decisions. Palliative to provide education on the burdens and benefits of full aggressive care.   Patient also has a lowered palliative performance score of between 40 and 50% he is mainly in bed with a chair to bed existence unable to do any work due to extensive disease progression he is mainly assisted in his self-care and functional ADLs and has full to some confusion level of consciousness. Initial consult note routed to primary continuity provider  Communicated plan of care with: Palliative IDT      Advance Care Planning:  [] The [facility] Interdisciplinary Team has updated the ACP Navigator with [address] and Patient Capacity    Primary Decision [facility] ([address]):     Medical Interventions: Full interventions   Other Instructions:         As far as possible, the palliative care team has discussed with patient / health care proxy about goals of care / treatment preferences for patient. HISTORY:     History obtained from: Chart review  Active Problems:    Gangrene ([address] (9/13/2022)      Cellulitis (9/13/2022)      Moderate protein-calorie malnutrition ([address] (9/15/2022)    Past Medical History:   Diagnosis Date    Arthritis     Cognitive impairment     Depression     Dorsalgia, unspecified     GERD (gastroesophageal reflux disease)     High blood pressure     Insomnia     L1 vertebral fracture (HCC)     Migraine headache     Other chronic pain     Periorbital headache     Sleep apnea     Spinal stenosis     Type 2 diabetes mellitus, without long-term current use of insulin [facility])       Past Surgical History:   Procedure Laterality Date    ENDOSCOPY, COLON, DIAGNOSTIC      HX LUMBAR DISKECTOMY  6/2001, 4/11    left L3-4 microdiskectomy with intradural rupture    HX ORTHOPAEDIC      spinal Surgery Dr. Deyanira Smith  9/11    spinal fusion, L3-5      Family History   Problem Relation Age of Onset    Heart Attack Mother     Cancer Mother     Heart Disease Mother     Hypertension Father     Seizures Father     Alcohol abuse Father      History reviewed, no pertinent family history. Social History     Tobacco Use    Smoking status: Every Day     Packs/day: 1.00     Years: 51.00     Pack years: 51.00     Types: Cigarettes    Smokeless tobacco: Never   Substance Use Topics    Alcohol use:  Yes Comment: rarely     Allergies   Allergen Reactions    Codeine Not Reported This Time    Tegretol [Carbamazepine] Not Reported This Time      Current Facility-Administered Medications   Medication Dose Route Frequency    levoFLOXacin (LEVAQUIN) tablet 500 mg  500 mg Oral Q24H    amoxicillin-clavulanate (AUGMENTIN) 875-125 mg per tablet 1 Tablet  1 Tablet Oral Q12H    [START ON 9/22/2022] L. acidophilus,casei,rhamnosus (BIO-K PLUS) capsule 1 Capsule  1 Capsule Oral DAILY    oxyCODONE-acetaminophen (PERCOCET) 5-325 mg per tablet 1 Tablet  1 Tablet Oral Q6H PRN    clopidogreL (PLAVIX) tablet 75 mg  75 mg Oral DAILY    thiamine HCL (B-1) tablet 100 mg  100 mg Oral DAILY    therapeutic multivitamin (THERAGRAN) tablet 1 Tablet  1 Tablet Oral DAILY    sodium chloride (NS) flush 5-40 mL  5-40 mL IntraVENous Q8H    sodium chloride (NS) flush 5-40 mL  5-40 mL IntraVENous PRN    acetaminophen (TYLENOL) tablet 650 mg  650 mg Oral Q6H PRN    Or    acetaminophen (TYLENOL) suppository 650 mg  650 mg Rectal Q6H PRN    polyethylene glycol (MIRALAX) packet 17 g  17 g Oral DAILY PRN    ondansetron (ZOFRAN ODT) tablet 4 mg  4 mg Oral Q8H PRN    Or    ondansetron (ZOFRAN) injection 4 mg  4 mg IntraVENous Q6H PRN    enoxaparin (LOVENOX) injection 40 mg  40 mg SubCUTAneous DAILY    glucose chewable tablet 16 g  4 Tablet Oral PRN    glucagon (GLUCAGEN) injection 1 mg  1 mg IntraMUSCular PRN    dextrose 10% infusion 0-250 mL  0-250 mL IntraVENous PRN    insulin lispro (HUMALOG) injection   SubCUTAneous Q6H    divalproex DR (DEPAKOTE) tablet 250 mg  250 mg Oral BID    dronabinoL (MARINOL) capsule 2.5 mg  2.5 mg Oral BID    gabapentin (NEURONTIN) capsule 300 mg  300 mg Oral BID    sertraline (ZOLOFT) tablet 50 mg  50 mg Oral DAILY          Clinical Pain Assessment (nonverbal scale for nonverbal patients): Clinical Pain Assessment  Severity: 0          Duration: for how long has pt been experiencing pain (e.g., 2 days, 1 month, years)  Frequency: how often pain is an issue (e.g., several times per day, once every few days, constant)     FUNCTIONAL ASSESSMENT:     Palliative Performance Scale (PPS):  PPS: 50    ECOG  ECOG Status : Limited self-care     PSYCHOSOCIAL/SPIRITUAL SCREENING:      Any spiritual / Mu-ism concerns:  [] Yes /  [x] No    Caregiver Burnout:  [] Yes /  [] No /  [x] No Caregiver Present      Anticipatory grief assessment:   [x] Normal  / [] Maladaptive        REVIEW OF SYSTEMS:     Systems: constitutional, ears/nose/mouth/throat, respiratory, gastrointestinal, genitourinary, musculoskeletal, integumentary, neurologic, psychiatric, endocrine. Positive findings noted below. Modified ESAS Completed by: provider           Pain: 0     Nausea: 0     Dyspnea: 0           Stool Occurrence(s): 0   Positive and pertinent negative findings in ROS are noted above in HPI. The following systems were [x] reviewed / [] unable to be reviewed as noted in HPI  Other findings are noted below. PHYSICAL EXAM:     Constitutional: chronically ill appearing male who is reclining in bed in NAD   Eyes: pupils equal   ENMT: moist MM   Cardiovascular: RRR  Respiratory: breathing not labored   Gastrointestinal: soft non-tender  Musculoskeletal: bandages on BLE, ( did not remove)   Skin: warm, dry  Neurologic: alert verbal, some brief confusion that clears     Other: Wt Readings from Last 3 Encounters:   09/19/22 68 kg (150 lb)   08/11/22 68 kg (150 lb)   07/27/22 68 kg (150 lb)     Blood pressure (!) 119/57, pulse 67, temperature 98 °F (36.7 °C), resp. rate 16, height 6' (1.829 m), weight 68 kg (150 lb), SpO2 100 %.   Pain:  Pain Scale 1: Numeric (0 - 10)  Pain Intensity 1: 0                      LAB AND IMAGING FINDINGS:     Lab Results   Component Value Date/Time    WBC 9.1 09/21/2022 12:27 AM    HGB 9.1 (L) 09/21/2022 12:27 AM    PLATELET 874 16/28/2254 12:27 AM     Lab Results   Component Value Date/Time    Sodium 133 (L) 09/21/2022 12:27 AM    Potassium 4.2 09/21/2022 12:27 AM    Chloride 100 09/21/2022 12:27 AM    CO2 27 09/21/2022 12:27 AM    BUN 20 (H) 09/21/2022 12:27 AM    Creatinine 0.87 09/21/2022 12:27 AM    Calcium 8.6 09/21/2022 12:27 AM    Magnesium 2.4 09/16/2022 03:46 AM    Phosphorus 3.4 09/15/2022 01:45 AM      Lab Results   Component Value Date/Time    Alk. phosphatase 85 09/14/2022 03:47 AM    Protein, total 7.5 09/14/2022 03:47 AM    Albumin 2.8 (L) 09/14/2022 03:47 AM    Globulin 4.7 (H) 09/14/2022 03:47 AM     Lab Results   Component Value Date/Time    INR 1.0 09/15/2022 01:45 AM    Prothrombin time 13.5 09/15/2022 01:45 AM      No results found for: IRON, FE, TIBC, IBCT, PSAT, FERR   No results found for: PH, PCO2, PO2  No components found for: [place]   Lab Results   Component Value Date/Time    CK 67 07/21/2022 02:35 PM              Total time: 25  minutes  Counseling / coordination time, spent as noted above:   > 50% counseling / coordination:  Time spent in direct consultation with the patient, medical team, and family     Prolonged service was provided for  []30 min   []75 min in face to face time in the presence of the patient, spent as noted above. Time Start:   Time End:     Disclaimer: Sections of this note are dictated using utilizing voice recognition software, which may have resulted in some phonetic based errors in grammar and contents. Even though attempts were made to correct all the mistakes, some may have been missed, and remained in the body of the document. If questions arise, please contact our department.

## 2022-09-21 NOTE — PROGRESS NOTES
1109: Attempted PT re-evaluation. Eyes closed upon entry. Patient immediately refuses PT. Educated on role of PT and benefits of mobility. Will need bilateral post-op shoes per podiatry. Will follow up as agreeable. 1351: 2nd PT attempt. Discharge orders to long term care. Provided post-op shoes to patient. Educated on role of shoes for mobility and encouraged participation with PT. Continues to refuse. Eyes closed most of session. Will follow up as agreeable.

## 2022-09-21 NOTE — PROGRESS NOTES
Problem: Pressure Injury - Risk of  Goal: *Prevention of pressure injury  Description: Document Dionte Scale and appropriate interventions in the flowsheet. Outcome: Progressing Towards Goal  Note: Pressure Injury Interventions:  Sensory Interventions: Assess changes in LOC, Assess need for specialty bed, Check visual cues for pain, Maintain/enhance activity level, Minimize linen layers, Monitor skin under medical devices, Pressure redistribution bed/mattress (bed type)    Moisture Interventions: Absorbent underpads, Apply protective barrier, creams and emollients, Internal/External urinary devices, Maintain skin hydration (lotion/cream)    Activity Interventions: Pressure redistribution bed/mattress(bed type)    Mobility Interventions: Assess need for specialty bed, HOB 30 degrees or less, Pressure redistribution bed/mattress (bed type)    Nutrition Interventions: Document food/fluid/supplement intake    Friction and Shear Interventions: Apply protective barrier, creams and emollients, HOB 30 degrees or less                Problem: Patient Education: Go to Patient Education Activity  Goal: Patient/Family Education  Outcome: Progressing Towards Goal     Problem: Falls - Risk of  Goal: *Absence of Falls  Description: Document Jackson Fall Risk and appropriate interventions in the flowsheet.   Outcome: Progressing Towards Goal  Note: Fall Risk Interventions:  Mobility Interventions: Bed/chair exit alarm, Patient to call before getting OOB    Mentation Interventions: Adequate sleep, hydration, pain control, Bed/chair exit alarm, Door open when patient unattended, Room close to nurse's station, Toileting rounds, Update white board    Medication Interventions: Bed/chair exit alarm, Patient to call before getting OOB, Teach patient to arise slowly    Elimination Interventions: Bed/chair exit alarm, Call light in reach, Toileting schedule/hourly rounds    History of Falls Interventions: Bed/chair exit alarm, Door open when patient unattended, Room close to nurse's station         Problem: Patient Education: Go to Patient Education Activity  Goal: Patient/Family Education  Outcome: Progressing Towards Goal     Problem: Patient Education: Go to Patient Education Activity  Goal: Patient/Family Education  Outcome: Progressing Towards Goal     Problem: Nutrition Deficit  Goal: *Optimize nutritional status  Outcome: Progressing Towards Goal     Problem: Patient Education: Go to Patient Education Activity  Goal: Patient/Family Education  Outcome: Progressing Towards Goal     Problem: Patient Education: Go to Patient Education Activity  Goal: Patient/Family Education  Outcome: Progressing Towards Goal     Problem:  Moderate Sedation (Adult)  Goal: *Patent airway  Outcome: Progressing Towards Goal  Goal: *Adequate oxygenation  Outcome: Progressing Towards Goal  Goal: *Absence of aspiration  Outcome: Progressing Towards Goal  Goal: *Hemodynamically stable  Outcome: Progressing Towards Goal  Goal: *Optimal pain control at patient's stated goal  Outcome: Progressing Towards Goal  Goal: *Absence of nausea/vomiting  Outcome: Progressing Towards Goal  Goal: *Anxiety reduced or absent  Outcome: Progressing Towards Goal  Goal: *Absence of injury  Outcome: Progressing Towards Goal  Goal: *Level of consciousness returns to baseline  Outcome: Progressing Towards Goal  Goal: Interventions  Outcome: Progressing Towards Goal     Problem: Patient Education: Go to Patient Education Activity  Goal: Patient/Family Education  Outcome: Progressing Towards Goal

## 2022-09-21 NOTE — PROGRESS NOTES
Transition of Care Plan to LTC    LTC Transition:  Patient will return to Long term care. Patient will transported by [facility] and expected to leave at 5 p.m.. Transportation to [facility] .  Address is [address] [place] [address], [place], [address] and phone number is [phone]  Patient will require BLS transport. 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 yes  Requested time: Next Available  PCS Faxed: yes  Insurance verified on face sheet: yes  Auth needed for transport: N/A  CM completed PCS/ Envelope and placed on chart. Spoke with Bessy Gonzalez with [facility]. Communication to Patient/Family:  Met with patient and spoke with Alirio Julien, son, via phone and they are agreeable to the transition plan. Communication to LTC  Bedside RN, Evangelist Rosales, has been notified to update the transition plan to the facility and call report  [phone]    Discharge information has been updated on the AVS.       Nursing Please include all hard scripts for controlled substances, med rec and dc summary, and AVS in packet.      Reviewed and confirmed with April with [facility] and can manage the patient care needs for the following:     Lucia Llanes with (X) only those applicable:    Medication:  [x]  Medications will be available at the facility       Documents:    [x]Discharge summary         Dietary:  [x] See d/c summary          Financial Resources:  Medicare and [address] [place] [address] Dr:    [x]Communicated Code Status ( \"Full\")    Other    SILVIA Dawson, RN  Pager # [phone]  Care Manager

## 2022-10-05 ENCOUNTER — APPOINTMENT (OUTPATIENT)
Dept: GENERAL RADIOLOGY | Age: 77
DRG: 853 | End: 2022-10-05
Attending: EMERGENCY MEDICINE
Payer: MEDICARE

## 2022-10-05 ENCOUNTER — HOSPITAL ENCOUNTER (INPATIENT)
Age: 77
LOS: 7 days | Discharge: NURSING, REHAB OR LONG-TERM CARE | DRG: 853 | End: 2022-10-13
Attending: EMERGENCY MEDICINE | Admitting: INTERNAL MEDICINE
Payer: MEDICARE

## 2022-10-05 DIAGNOSIS — R65.21 SEPSIS WITH ACUTE RENAL FAILURE AND SEPTIC SHOCK, DUE TO UNSPECIFIED ORGANISM, UNSPECIFIED ACUTE RENAL FAILURE TYPE (HCC): ICD-10-CM

## 2022-10-05 DIAGNOSIS — E87.20 LACTIC ACIDOSIS: ICD-10-CM

## 2022-10-05 DIAGNOSIS — R65.20 SEVERE SEPSIS (HCC): ICD-10-CM

## 2022-10-05 DIAGNOSIS — F33.1 MAJOR DEPRESSIVE DISORDER, RECURRENT, MODERATE (HCC): ICD-10-CM

## 2022-10-05 DIAGNOSIS — Z72.0 TOBACCO ABUSE: ICD-10-CM

## 2022-10-05 DIAGNOSIS — M86.9 TOE OSTEOMYELITIS (HCC): ICD-10-CM

## 2022-10-05 DIAGNOSIS — E11.40 TYPE 2 DIABETES MELLITUS WITH DIABETIC NEUROPATHY, WITHOUT LONG-TERM CURRENT USE OF INSULIN (HCC): ICD-10-CM

## 2022-10-05 DIAGNOSIS — R53.81 DEBILITY: ICD-10-CM

## 2022-10-05 DIAGNOSIS — E78.5 HYPERLIPIDEMIA, UNSPECIFIED HYPERLIPIDEMIA TYPE: ICD-10-CM

## 2022-10-05 DIAGNOSIS — F33.1 MODERATE EPISODE OF RECURRENT MAJOR DEPRESSIVE DISORDER (HCC): ICD-10-CM

## 2022-10-05 DIAGNOSIS — M54.50 CHRONIC MIDLINE LOW BACK PAIN WITHOUT SCIATICA: ICD-10-CM

## 2022-10-05 DIAGNOSIS — R62.7 ADULT FAILURE TO THRIVE: ICD-10-CM

## 2022-10-05 DIAGNOSIS — I96 GANGRENE (HCC): ICD-10-CM

## 2022-10-05 DIAGNOSIS — Z71.89 GOALS OF CARE, COUNSELING/DISCUSSION: ICD-10-CM

## 2022-10-05 DIAGNOSIS — J18.9 COMMUNITY ACQUIRED PNEUMONIA, UNSPECIFIED LATERALITY: ICD-10-CM

## 2022-10-05 DIAGNOSIS — I73.9 PERIPHERAL VASCULAR DISEASE (HCC): ICD-10-CM

## 2022-10-05 DIAGNOSIS — J18.9 MULTIFOCAL PNEUMONIA: ICD-10-CM

## 2022-10-05 DIAGNOSIS — N17.9 AKI (ACUTE KIDNEY INJURY) (HCC): ICD-10-CM

## 2022-10-05 DIAGNOSIS — N17.9 SEPSIS WITH ACUTE RENAL FAILURE AND SEPTIC SHOCK, DUE TO UNSPECIFIED ORGANISM, UNSPECIFIED ACUTE RENAL FAILURE TYPE (HCC): ICD-10-CM

## 2022-10-05 DIAGNOSIS — J69.0 ASPIRATION PNEUMONIA, UNSPECIFIED ASPIRATION PNEUMONIA TYPE, UNSPECIFIED LATERALITY, UNSPECIFIED PART OF LUNG (HCC): ICD-10-CM

## 2022-10-05 DIAGNOSIS — A41.9 SEPSIS WITH ACUTE RENAL FAILURE AND SEPTIC SHOCK, DUE TO UNSPECIFIED ORGANISM, UNSPECIFIED ACUTE RENAL FAILURE TYPE (HCC): ICD-10-CM

## 2022-10-05 DIAGNOSIS — J96.01 ACUTE RESPIRATORY FAILURE WITH HYPOXIA (HCC): ICD-10-CM

## 2022-10-05 DIAGNOSIS — G89.29 CHRONIC MIDLINE LOW BACK PAIN WITHOUT SCIATICA: ICD-10-CM

## 2022-10-05 DIAGNOSIS — A41.9 SEPSIS WITHOUT ACUTE ORGAN DYSFUNCTION, DUE TO UNSPECIFIED ORGANISM (HCC): Primary | ICD-10-CM

## 2022-10-05 DIAGNOSIS — A41.9 SEVERE SEPSIS (HCC): ICD-10-CM

## 2022-10-05 LAB
ALBUMIN SERPL-MCNC: 2.7 G/DL (ref 3.4–5)
ALBUMIN/GLOB SERPL: 0.5 {RATIO} (ref 0.8–1.7)
ALP SERPL-CCNC: 98 U/L (ref 45–117)
ALT SERPL-CCNC: 12 U/L (ref 16–61)
ANION GAP SERPL CALC-SCNC: 11 MMOL/L (ref 3–18)
AST SERPL-CCNC: 20 U/L (ref 10–38)
BASOPHILS # BLD: 0 K/UL (ref 0–0.1)
BASOPHILS NFR BLD: 0 % (ref 0–2)
BILIRUB SERPL-MCNC: 0.5 MG/DL (ref 0.2–1)
BUN SERPL-MCNC: 24 MG/DL (ref 7–18)
BUN/CREAT SERPL: 18 (ref 12–20)
CALCIUM SERPL-MCNC: 9.9 MG/DL (ref 8.5–10.1)
CHLORIDE SERPL-SCNC: 100 MMOL/L (ref 100–111)
CO2 SERPL-SCNC: 26 MMOL/L (ref 21–32)
CREAT SERPL-MCNC: 1.34 MG/DL (ref 0.6–1.3)
DIFFERENTIAL METHOD BLD: ABNORMAL
EOSINOPHIL # BLD: 0 K/UL (ref 0–0.4)
EOSINOPHIL NFR BLD: 0 % (ref 0–5)
ERYTHROCYTE [DISTWIDTH] IN BLOOD BY AUTOMATED COUNT: 13.3 % (ref 11.6–14.5)
GLOBULIN SER CALC-MCNC: 5.9 G/DL (ref 2–4)
GLUCOSE SERPL-MCNC: 239 MG/DL (ref 74–99)
HCT VFR BLD AUTO: 38.9 % (ref 36–48)
HGB BLD-MCNC: 12.5 G/DL (ref 13–16)
IMM GRANULOCYTES # BLD AUTO: 0.1 K/UL (ref 0–0.04)
IMM GRANULOCYTES NFR BLD AUTO: 0 % (ref 0–0.5)
LACTATE BLD-SCNC: 4.26 MMOL/L (ref 0.4–2)
LACTATE BLD-SCNC: 9.71 MMOL/L (ref 0.4–2)
LYMPHOCYTES # BLD: 1.3 K/UL (ref 0.9–3.6)
LYMPHOCYTES NFR BLD: 10 % (ref 21–52)
MCH RBC QN AUTO: 29.2 PG (ref 24–34)
MCHC RBC AUTO-ENTMCNC: 32.1 G/DL (ref 31–37)
MCV RBC AUTO: 90.9 FL (ref 78–100)
MONOCYTES # BLD: 0.3 K/UL (ref 0.05–1.2)
MONOCYTES NFR BLD: 2 % (ref 3–10)
NEUTS SEG # BLD: 11.9 K/UL (ref 1.8–8)
NEUTS SEG NFR BLD: 88 % (ref 40–73)
NRBC # BLD: 0 K/UL (ref 0–0.01)
NRBC BLD-RTO: 0 PER 100 WBC
PLATELET # BLD AUTO: 435 K/UL (ref 135–420)
PMV BLD AUTO: 10.3 FL (ref 9.2–11.8)
POTASSIUM SERPL-SCNC: 4.9 MMOL/L (ref 3.5–5.5)
PROT SERPL-MCNC: 8.6 G/DL (ref 6.4–8.2)
RBC # BLD AUTO: 4.28 M/UL (ref 4.35–5.65)
SODIUM SERPL-SCNC: 137 MMOL/L (ref 136–145)
TROPONIN-HIGH SENSITIVITY: 9 NG/L (ref 0–78)
WBC # BLD AUTO: 13.6 K/UL (ref 4.6–13.2)

## 2022-10-05 PROCEDURE — 5A09357 ASSISTANCE WITH RESPIRATORY VENTILATION, LESS THAN 24 CONSECUTIVE HOURS, CONTINUOUS POSITIVE AIRWAY PRESSURE: ICD-10-PCS | Performed by: EMERGENCY MEDICINE

## 2022-10-05 PROCEDURE — 74011250636 HC RX REV CODE- 250/636: Performed by: EMERGENCY MEDICINE

## 2022-10-05 PROCEDURE — 96365 THER/PROPH/DIAG IV INF INIT: CPT

## 2022-10-05 PROCEDURE — 93005 ELECTROCARDIOGRAM TRACING: CPT

## 2022-10-05 PROCEDURE — 84484 ASSAY OF TROPONIN QUANT: CPT

## 2022-10-05 PROCEDURE — 83605 ASSAY OF LACTIC ACID: CPT

## 2022-10-05 PROCEDURE — 87040 BLOOD CULTURE FOR BACTERIA: CPT

## 2022-10-05 PROCEDURE — 85025 COMPLETE CBC W/AUTO DIFF WBC: CPT

## 2022-10-05 PROCEDURE — 71045 X-RAY EXAM CHEST 1 VIEW: CPT

## 2022-10-05 PROCEDURE — 81001 URINALYSIS AUTO W/SCOPE: CPT

## 2022-10-05 PROCEDURE — 0202U NFCT DS 22 TRGT SARS-COV-2: CPT

## 2022-10-05 PROCEDURE — 87086 URINE CULTURE/COLONY COUNT: CPT

## 2022-10-05 PROCEDURE — 80053 COMPREHEN METABOLIC PANEL: CPT

## 2022-10-05 PROCEDURE — 96366 THER/PROPH/DIAG IV INF ADDON: CPT

## 2022-10-05 PROCEDURE — 96375 TX/PRO/DX INJ NEW DRUG ADDON: CPT

## 2022-10-05 PROCEDURE — 99285 EMERGENCY DEPT VISIT HI MDM: CPT

## 2022-10-05 PROCEDURE — 74011000258 HC RX REV CODE- 258: Performed by: EMERGENCY MEDICINE

## 2022-10-05 RX ORDER — ONDANSETRON 2 MG/ML
4 INJECTION INTRAMUSCULAR; INTRAVENOUS
Status: COMPLETED | OUTPATIENT
Start: 2022-10-05 | End: 2022-10-05

## 2022-10-05 RX ORDER — VANCOMYCIN HYDROCHLORIDE
1250 EVERY 24 HOURS
Status: DISCONTINUED | OUTPATIENT
Start: 2022-10-06 | End: 2022-10-10

## 2022-10-05 RX ADMIN — SODIUM CHLORIDE 1000 ML: 900 INJECTION, SOLUTION INTRAVENOUS at 20:12

## 2022-10-05 RX ADMIN — PIPERACILLIN AND TAZOBACTAM 4.5 G: 4; .5 INJECTION, POWDER, FOR SOLUTION INTRAVENOUS at 20:28

## 2022-10-05 RX ADMIN — ONDANSETRON 4 MG: 2 INJECTION INTRAMUSCULAR; INTRAVENOUS at 20:25

## 2022-10-05 RX ADMIN — SODIUM CHLORIDE 1000 ML: 900 INJECTION, SOLUTION INTRAVENOUS at 20:10

## 2022-10-05 RX ADMIN — VANCOMYCIN HYDROCHLORIDE 1000 MG: 1 INJECTION, POWDER, LYOPHILIZED, FOR SOLUTION INTRAVENOUS at 20:34

## 2022-10-05 RX ADMIN — SODIUM CHLORIDE 1000 ML: 900 INJECTION, SOLUTION INTRAVENOUS at 22:52

## 2022-10-05 RX ADMIN — VANCOMYCIN HYDROCHLORIDE 500 MG: 500 INJECTION, POWDER, LYOPHILIZED, FOR SOLUTION INTRAVENOUS at 22:55

## 2022-10-05 RX ADMIN — SODIUM CHLORIDE 1000 ML: 900 INJECTION, SOLUTION INTRAVENOUS at 21:15

## 2022-10-05 NOTE — ED TRIAGE NOTES
Pt presents from [facility] via [place] [address] EMS tachycardic, hypoxic, shaking, and with altered mental status. EMS states facility staff stated pt arrived today.

## 2022-10-06 ENCOUNTER — APPOINTMENT (OUTPATIENT)
Dept: CT IMAGING | Age: 77
DRG: 853 | End: 2022-10-06
Attending: NURSE PRACTITIONER
Payer: MEDICARE

## 2022-10-06 ENCOUNTER — APPOINTMENT (OUTPATIENT)
Dept: GENERAL RADIOLOGY | Age: 77
DRG: 853 | End: 2022-10-06
Attending: PHYSICIAN ASSISTANT
Payer: MEDICARE

## 2022-10-06 PROBLEM — A41.9 SEPSIS (HCC): Status: ACTIVE | Noted: 2022-10-06

## 2022-10-06 PROBLEM — J18.9 PNEUMONIA: Status: ACTIVE | Noted: 2022-10-06

## 2022-10-06 PROBLEM — J18.9 MULTIFOCAL PNEUMONIA: Status: ACTIVE | Noted: 2022-10-06

## 2022-10-06 LAB
ALBUMIN SERPL-MCNC: 2 G/DL (ref 3.4–5)
ALBUMIN/GLOB SERPL: 0.5 {RATIO} (ref 0.8–1.7)
ALP SERPL-CCNC: 69 U/L (ref 45–117)
ALT SERPL-CCNC: 13 U/L (ref 16–61)
ANION GAP SERPL CALC-SCNC: 6 MMOL/L (ref 3–18)
ANION GAP SERPL CALC-SCNC: 6 MMOL/L (ref 3–18)
APPEARANCE UR: CLEAR
ARTERIAL PATENCY WRIST A: POSITIVE
AST SERPL-CCNC: 56 U/L (ref 10–38)
ATRIAL RATE: 152 BPM
B PERT DNA SPEC QL NAA+PROBE: NOT DETECTED
BASE DEFICIT BLD-SCNC: 0.4 MMOL/L
BASOPHILS # BLD: 0.1 K/UL (ref 0–0.1)
BASOPHILS NFR BLD: 1 % (ref 0–2)
BDY SITE: ABNORMAL
BILIRUB SERPL-MCNC: 0.5 MG/DL (ref 0.2–1)
BILIRUB UR QL: NEGATIVE
BNP SERPL-MCNC: 1407 PG/ML (ref 0–1800)
BORDETELLA PARAPERTUSSIS PCR, BORPAR: NOT DETECTED
BUN SERPL-MCNC: 24 MG/DL (ref 7–18)
BUN SERPL-MCNC: 27 MG/DL (ref 7–18)
BUN/CREAT SERPL: 17 (ref 12–20)
BUN/CREAT SERPL: 22 (ref 12–20)
C PNEUM DNA SPEC QL NAA+PROBE: NOT DETECTED
CALCIUM SERPL-MCNC: 7.8 MG/DL (ref 8.5–10.1)
CALCIUM SERPL-MCNC: 8.1 MG/DL (ref 8.5–10.1)
CALCULATED P AXIS, ECG09: 91 DEGREES
CALCULATED R AXIS, ECG10: 67 DEGREES
CALCULATED T AXIS, ECG11: 92 DEGREES
CAOX CRY URNS QL MICRO: ABNORMAL
CHLORIDE SERPL-SCNC: 112 MMOL/L (ref 100–111)
CHLORIDE SERPL-SCNC: 113 MMOL/L (ref 100–111)
CO2 SERPL-SCNC: 25 MMOL/L (ref 21–32)
CO2 SERPL-SCNC: 26 MMOL/L (ref 21–32)
COLOR UR: YELLOW
CREAT SERPL-MCNC: 1.09 MG/DL (ref 0.6–1.3)
CREAT SERPL-MCNC: 1.58 MG/DL (ref 0.6–1.3)
D DIMER PPP FEU-MCNC: 5.27 UG/ML(FEU)
DIAGNOSIS, 93000: NORMAL
DIFFERENTIAL METHOD BLD: ABNORMAL
EOSINOPHIL # BLD: 0 K/UL (ref 0–0.4)
EOSINOPHIL NFR BLD: 0 % (ref 0–5)
EPITH CASTS URNS QL MICRO: ABNORMAL /LPF (ref 0–5)
ERYTHROCYTE [DISTWIDTH] IN BLOOD BY AUTOMATED COUNT: 13.8 % (ref 11.6–14.5)
FLUAV H1 2009 PAND RNA SPEC QL NAA+PROBE: NOT DETECTED
FLUAV H1 RNA SPEC QL NAA+PROBE: NOT DETECTED
FLUAV H3 RNA SPEC QL NAA+PROBE: NOT DETECTED
FLUAV SUBTYP SPEC NAA+PROBE: NOT DETECTED
FLUBV RNA SPEC QL NAA+PROBE: NOT DETECTED
GAS FLOW.O2 O2 DELIVERY SYS: ABNORMAL L/MIN
GLOBULIN SER CALC-MCNC: 4.2 G/DL (ref 2–4)
GLUCOSE BLD STRIP.AUTO-MCNC: 105 MG/DL (ref 70–110)
GLUCOSE BLD STRIP.AUTO-MCNC: 81 MG/DL (ref 70–110)
GLUCOSE BLD STRIP.AUTO-MCNC: 90 MG/DL (ref 70–110)
GLUCOSE SERPL-MCNC: 72 MG/DL (ref 74–99)
GLUCOSE SERPL-MCNC: 98 MG/DL (ref 74–99)
GLUCOSE UR STRIP.AUTO-MCNC: NEGATIVE MG/DL
HADV DNA SPEC QL NAA+PROBE: NOT DETECTED
HCO3 BLD-SCNC: 24 MMOL/L (ref 22–26)
HCOV 229E RNA SPEC QL NAA+PROBE: NOT DETECTED
HCOV HKU1 RNA SPEC QL NAA+PROBE: NOT DETECTED
HCOV NL63 RNA SPEC QL NAA+PROBE: NOT DETECTED
HCOV OC43 RNA SPEC QL NAA+PROBE: NOT DETECTED
HCT VFR BLD AUTO: 30.4 % (ref 36–48)
HGB BLD-MCNC: 9.5 G/DL (ref 13–16)
HGB UR QL STRIP: NEGATIVE
HMPV RNA SPEC QL NAA+PROBE: NOT DETECTED
HPIV1 RNA SPEC QL NAA+PROBE: NOT DETECTED
HPIV2 RNA SPEC QL NAA+PROBE: NOT DETECTED
HPIV3 RNA SPEC QL NAA+PROBE: NOT DETECTED
HPIV4 RNA SPEC QL NAA+PROBE: NOT DETECTED
IMM GRANULOCYTES # BLD AUTO: 0 K/UL
IMM GRANULOCYTES NFR BLD AUTO: 0 %
INR PPP: 1.2 (ref 0.8–1.2)
KETONES UR QL STRIP.AUTO: ABNORMAL MG/DL
L PNEUMO AG UR QL IA: NEGATIVE
LACTATE BLD-SCNC: 1.97 MMOL/L (ref 0.4–2)
LEUKOCYTE ESTERASE UR QL STRIP.AUTO: ABNORMAL
LIPASE SERPL-CCNC: 16 U/L (ref 73–393)
LYMPHOCYTES # BLD: 0 K/UL (ref 0.9–3.6)
LYMPHOCYTES NFR BLD: 0 % (ref 21–52)
M PNEUMO DNA SPEC QL NAA+PROBE: NOT DETECTED
MAGNESIUM SERPL-MCNC: 1.9 MG/DL (ref 1.6–2.6)
MCH RBC QN AUTO: 28.7 PG (ref 24–34)
MCHC RBC AUTO-ENTMCNC: 31.3 G/DL (ref 31–37)
MCV RBC AUTO: 91.8 FL (ref 78–100)
METAMYELOCYTES NFR BLD MANUAL: 4 %
MONOCYTES # BLD: 0.1 K/UL (ref 0.05–1.2)
MONOCYTES NFR BLD: 1 % (ref 3–10)
NEUTS BAND NFR BLD MANUAL: 9 % (ref 0–5)
NEUTS SEG # BLD: 12.4 K/UL (ref 1.8–8)
NEUTS SEG NFR BLD: 85 % (ref 40–73)
NITRITE UR QL STRIP.AUTO: NEGATIVE
NRBC # BLD: 0 K/UL (ref 0–0.01)
NRBC BLD-RTO: 0 PER 100 WBC
P-R INTERVAL, ECG05: 178 MS
PCO2 BLD: 37.6 MMHG (ref 35–45)
PH BLD: 7.41 [PH] (ref 7.35–7.45)
PH UR STRIP: 5.5 [PH] (ref 5–8)
PHOSPHATE SERPL-MCNC: 3.9 MG/DL (ref 2.5–4.9)
PLATELET # BLD AUTO: 241 K/UL (ref 135–420)
PLATELET COMMENTS,PCOM: ABNORMAL
PMV BLD AUTO: 10.5 FL (ref 9.2–11.8)
PO2 BLD: 66 MMHG (ref 80–100)
POTASSIUM SERPL-SCNC: 4.2 MMOL/L (ref 3.5–5.5)
POTASSIUM SERPL-SCNC: 4.4 MMOL/L (ref 3.5–5.5)
PROCALCITONIN SERPL-MCNC: 38.23 NG/ML
PROT SERPL-MCNC: 6.2 G/DL (ref 6.4–8.2)
PROT UR STRIP-MCNC: ABNORMAL MG/DL
PROTHROMBIN TIME: 15.7 SEC (ref 11.5–15.2)
Q-T INTERVAL, ECG07: 322 MS
QRS DURATION, ECG06: 72 MS
QTC CALCULATION (BEZET), ECG08: 511 MS
RBC # BLD AUTO: 3.31 M/UL (ref 4.35–5.65)
RBC #/AREA URNS HPF: ABNORMAL /HPF (ref 0–5)
RBC MORPH BLD: ABNORMAL
RSV RNA SPEC QL NAA+PROBE: NOT DETECTED
RV+EV RNA SPEC QL NAA+PROBE: NOT DETECTED
S PNEUM AG UR QL: NEGATIVE
SAO2 % BLD: 93.1 % (ref 92–97)
SARS-COV-2 PCR, COVPCR: NOT DETECTED
SERVICE CMNT-IMP: ABNORMAL
SODIUM SERPL-SCNC: 143 MMOL/L (ref 136–145)
SODIUM SERPL-SCNC: 145 MMOL/L (ref 136–145)
SP GR UR REFRACTOMETRY: 1.02 (ref 1–1.03)
SPECIMEN TYPE: ABNORMAL
TROPONIN-HIGH SENSITIVITY: 50 NG/L (ref 0–78)
TROPONIN-HIGH SENSITIVITY: 52 NG/L (ref 0–78)
TSH SERPL DL<=0.05 MIU/L-ACNC: 1.47 UIU/ML (ref 0.36–3.74)
UROBILINOGEN UR QL STRIP.AUTO: 1 EU/DL (ref 0.2–1)
VENTRICULAR RATE, ECG03: 152 BPM
WBC # BLD AUTO: 13.2 K/UL (ref 4.6–13.2)
WBC MORPH BLD: ABNORMAL
WBC URNS QL MICRO: ABNORMAL /HPF (ref 0–4)

## 2022-10-06 PROCEDURE — 82803 BLOOD GASES ANY COMBINATION: CPT

## 2022-10-06 PROCEDURE — 99291 CRITICAL CARE FIRST HOUR: CPT | Performed by: INTERNAL MEDICINE

## 2022-10-06 PROCEDURE — 71275 CT ANGIOGRAPHY CHEST: CPT

## 2022-10-06 PROCEDURE — 80053 COMPREHEN METABOLIC PANEL: CPT

## 2022-10-06 PROCEDURE — 87449 NOS EACH ORGANISM AG IA: CPT

## 2022-10-06 PROCEDURE — 84100 ASSAY OF PHOSPHORUS: CPT

## 2022-10-06 PROCEDURE — 83605 ASSAY OF LACTIC ACID: CPT

## 2022-10-06 PROCEDURE — 85025 COMPLETE CBC W/AUTO DIFF WBC: CPT

## 2022-10-06 PROCEDURE — 74011000250 HC RX REV CODE- 250: Performed by: EMERGENCY MEDICINE

## 2022-10-06 PROCEDURE — 5A0935A ASSISTANCE WITH RESPIRATORY VENTILATION, LESS THAN 24 CONSECUTIVE HOURS, HIGH NASAL FLOW/VELOCITY: ICD-10-PCS | Performed by: EMERGENCY MEDICINE

## 2022-10-06 PROCEDURE — 2709999900 HC NON-CHARGEABLE SUPPLY

## 2022-10-06 PROCEDURE — 74011000636 HC RX REV CODE- 636: Performed by: INTERNAL MEDICINE

## 2022-10-06 PROCEDURE — 36600 WITHDRAWAL OF ARTERIAL BLOOD: CPT

## 2022-10-06 PROCEDURE — 74011000250 HC RX REV CODE- 250: Performed by: NURSE PRACTITIONER

## 2022-10-06 PROCEDURE — 85379 FIBRIN DEGRADATION QUANT: CPT

## 2022-10-06 PROCEDURE — 74011250637 HC RX REV CODE- 250/637: Performed by: EMERGENCY MEDICINE

## 2022-10-06 PROCEDURE — APPSS60 APP SPLIT SHARED TIME 46-60 MINUTES: Performed by: NURSE PRACTITIONER

## 2022-10-06 PROCEDURE — 70450 CT HEAD/BRAIN W/O DYE: CPT

## 2022-10-06 PROCEDURE — 99222 1ST HOSP IP/OBS MODERATE 55: CPT | Performed by: NURSE PRACTITIONER

## 2022-10-06 PROCEDURE — 87186 SC STD MICRODIL/AGAR DIL: CPT

## 2022-10-06 PROCEDURE — 73630 X-RAY EXAM OF FOOT: CPT

## 2022-10-06 PROCEDURE — 85610 PROTHROMBIN TIME: CPT

## 2022-10-06 PROCEDURE — 74011250636 HC RX REV CODE- 250/636: Performed by: INTERNAL MEDICINE

## 2022-10-06 PROCEDURE — 87077 CULTURE AEROBIC IDENTIFY: CPT

## 2022-10-06 PROCEDURE — 84443 ASSAY THYROID STIM HORMONE: CPT

## 2022-10-06 PROCEDURE — 84145 PROCALCITONIN (PCT): CPT

## 2022-10-06 PROCEDURE — 94762 N-INVAS EAR/PLS OXIMTRY CONT: CPT

## 2022-10-06 PROCEDURE — 3E033XZ INTRODUCTION OF VASOPRESSOR INTO PERIPHERAL VEIN, PERCUTANEOUS APPROACH: ICD-10-PCS | Performed by: EMERGENCY MEDICINE

## 2022-10-06 PROCEDURE — 77030037878 HC DRSG MEPILEX >48IN BORD MOLN -B

## 2022-10-06 PROCEDURE — 83880 ASSAY OF NATRIURETIC PEPTIDE: CPT

## 2022-10-06 PROCEDURE — 96375 TX/PRO/DX INJ NEW DRUG ADDON: CPT

## 2022-10-06 PROCEDURE — 94660 CPAP INITIATION&MGMT: CPT

## 2022-10-06 PROCEDURE — 83735 ASSAY OF MAGNESIUM: CPT

## 2022-10-06 PROCEDURE — 74011250636 HC RX REV CODE- 250/636: Performed by: NURSE PRACTITIONER

## 2022-10-06 PROCEDURE — 87205 SMEAR GRAM STAIN: CPT

## 2022-10-06 PROCEDURE — 65610000006 HC RM INTENSIVE CARE

## 2022-10-06 PROCEDURE — 74011000258 HC RX REV CODE- 258: Performed by: EMERGENCY MEDICINE

## 2022-10-06 PROCEDURE — 77030040392 HC DRSG OPTIFOAM MDII -A

## 2022-10-06 PROCEDURE — 74011250637 HC RX REV CODE- 250/637: Performed by: NURSE PRACTITIONER

## 2022-10-06 PROCEDURE — 82962 GLUCOSE BLOOD TEST: CPT

## 2022-10-06 PROCEDURE — 74011250636 HC RX REV CODE- 250/636: Performed by: EMERGENCY MEDICINE

## 2022-10-06 PROCEDURE — 36415 COLL VENOUS BLD VENIPUNCTURE: CPT

## 2022-10-06 PROCEDURE — 84484 ASSAY OF TROPONIN QUANT: CPT

## 2022-10-06 PROCEDURE — 83690 ASSAY OF LIPASE: CPT

## 2022-10-06 RX ORDER — DEXTROSE MONOHYDRATE 100 MG/ML
0-250 INJECTION, SOLUTION INTRAVENOUS AS NEEDED
Status: DISCONTINUED | OUTPATIENT
Start: 2022-10-06 | End: 2022-10-13 | Stop reason: HOSPADM

## 2022-10-06 RX ORDER — ACETAMINOPHEN 325 MG/1
650 TABLET ORAL
Status: DISCONTINUED | OUTPATIENT
Start: 2022-10-06 | End: 2022-10-13 | Stop reason: HOSPADM

## 2022-10-06 RX ORDER — POLYETHYLENE GLYCOL 3350 17 G/17G
17 POWDER, FOR SOLUTION ORAL DAILY PRN
Status: DISCONTINUED | OUTPATIENT
Start: 2022-10-06 | End: 2022-10-13 | Stop reason: HOSPADM

## 2022-10-06 RX ORDER — ONDANSETRON 4 MG/1
4 TABLET, ORALLY DISINTEGRATING ORAL
Status: DISCONTINUED | OUTPATIENT
Start: 2022-10-06 | End: 2022-10-13 | Stop reason: HOSPADM

## 2022-10-06 RX ORDER — LEVOFLOXACIN 5 MG/ML
750 INJECTION, SOLUTION INTRAVENOUS
Status: DISCONTINUED | OUTPATIENT
Start: 2022-10-06 | End: 2022-10-07

## 2022-10-06 RX ORDER — INSULIN LISPRO 100 [IU]/ML
INJECTION, SOLUTION INTRAVENOUS; SUBCUTANEOUS EVERY 6 HOURS
Status: DISCONTINUED | OUTPATIENT
Start: 2022-10-06 | End: 2022-10-10

## 2022-10-06 RX ORDER — MAGNESIUM SULFATE 100 %
4 CRYSTALS MISCELLANEOUS AS NEEDED
Status: DISCONTINUED | OUTPATIENT
Start: 2022-10-06 | End: 2022-10-13 | Stop reason: HOSPADM

## 2022-10-06 RX ORDER — ENOXAPARIN SODIUM 100 MG/ML
40 INJECTION SUBCUTANEOUS DAILY
Status: DISCONTINUED | OUTPATIENT
Start: 2022-10-06 | End: 2022-10-09

## 2022-10-06 RX ORDER — IBUPROFEN 200 MG
1 TABLET ORAL DAILY
Status: DISCONTINUED | OUTPATIENT
Start: 2022-10-06 | End: 2022-10-13 | Stop reason: HOSPADM

## 2022-10-06 RX ORDER — ONDANSETRON 2 MG/ML
4 INJECTION INTRAMUSCULAR; INTRAVENOUS
Status: DISCONTINUED | OUTPATIENT
Start: 2022-10-06 | End: 2022-10-13 | Stop reason: HOSPADM

## 2022-10-06 RX ORDER — IPRATROPIUM BROMIDE AND ALBUTEROL SULFATE 2.5; .5 MG/3ML; MG/3ML
3 SOLUTION RESPIRATORY (INHALATION)
Status: DISCONTINUED | OUTPATIENT
Start: 2022-10-06 | End: 2022-10-13 | Stop reason: HOSPADM

## 2022-10-06 RX ORDER — NOREPINEPHRINE BITARTRATE/D5W 8 MG/250ML
.5-5 PLASTIC BAG, INJECTION (ML) INTRAVENOUS
Status: DISCONTINUED | OUTPATIENT
Start: 2022-10-06 | End: 2022-10-08

## 2022-10-06 RX ORDER — SODIUM CHLORIDE 9 MG/ML
150 INJECTION, SOLUTION INTRAVENOUS CONTINUOUS
Status: DISCONTINUED | OUTPATIENT
Start: 2022-10-06 | End: 2022-10-06

## 2022-10-06 RX ADMIN — FAMOTIDINE 20 MG: 10 INJECTION, SOLUTION INTRAVENOUS at 18:22

## 2022-10-06 RX ADMIN — SODIUM CHLORIDE 150 ML/HR: 9 INJECTION, SOLUTION INTRAVENOUS at 02:01

## 2022-10-06 RX ADMIN — PIPERACILLIN AND TAZOBACTAM 3.38 G: 3; .375 INJECTION, POWDER, FOR SOLUTION INTRAVENOUS at 04:35

## 2022-10-06 RX ADMIN — VANCOMYCIN HYDROCHLORIDE 1250 MG: 10 INJECTION, POWDER, LYOPHILIZED, FOR SOLUTION INTRAVENOUS at 16:29

## 2022-10-06 RX ADMIN — NOREPINEPHRINE BITARTRATE 4 MCG/MIN: 1 INJECTION, SOLUTION, CONCENTRATE INTRAVENOUS at 02:14

## 2022-10-06 RX ADMIN — SODIUM CHLORIDE, SODIUM GLUCONATE, SODIUM ACETATE, POTASSIUM CHLORIDE AND MAGNESIUM CHLORIDE 75 ML/HR: 526; 502; 368; 37; 30 INJECTION, SOLUTION INTRAVENOUS at 06:17

## 2022-10-06 RX ADMIN — NOREPINEPHRINE BITARTRATE 4 MCG/MIN: 1 INJECTION, SOLUTION, CONCENTRATE INTRAVENOUS at 06:16

## 2022-10-06 RX ADMIN — PIPERACILLIN AND TAZOBACTAM 3.38 G: 3; .375 INJECTION, POWDER, FOR SOLUTION INTRAVENOUS at 11:45

## 2022-10-06 RX ADMIN — NOREPINEPHRINE BITARTRATE 5 MCG/MIN: 1 INJECTION, SOLUTION, CONCENTRATE INTRAVENOUS at 02:25

## 2022-10-06 RX ADMIN — PIPERACILLIN AND TAZOBACTAM 3.38 G: 3; .375 INJECTION, POWDER, FOR SOLUTION INTRAVENOUS at 19:56

## 2022-10-06 RX ADMIN — FAMOTIDINE 20 MG: 10 INJECTION, SOLUTION INTRAVENOUS at 08:29

## 2022-10-06 RX ADMIN — ACETAMINOPHEN 975 MG: 325 SUPPOSITORY RECTAL at 00:05

## 2022-10-06 RX ADMIN — ENOXAPARIN SODIUM 40 MG: 100 INJECTION SUBCUTANEOUS at 08:29

## 2022-10-06 RX ADMIN — LEVOFLOXACIN 750 MG: 750 INJECTION, SOLUTION INTRAVENOUS at 16:14

## 2022-10-06 RX ADMIN — IOPAMIDOL 78 ML: 755 INJECTION, SOLUTION INTRAVENOUS at 12:10

## 2022-10-06 NOTE — PROGRESS NOTES
[facility] Pharmacokinetic Monitoring Service - Vancomycin    Indication: DM foot infection  Goal AUC/CARYL: 400-600 mg*hr/L  Day of Therapy: 2  Additional Antimicrobials: pip-tazo    Pertinent Laboratory Values: Wt Readings from Last 1 Encounters:   10/05/22 68 kg (150 lb)     Temp Readings from Last 1 Encounters:   10/06/22 100.2 °F (37.9 °C)     No components found for: PROCAL  Estimated Creatinine Clearance: 56.3 mL/min (based on SCr of 1.09 mg/dL). Recent Labs     10/05/22  1940 10/03/22  1245   WBC 13.6* 10.3     Pertinent Cultures:  Culture Date Source Results   10/5 blood NGTD   MRSA Nasal Swab: N/A. Non-respiratory infection.     Assessment:  Date/Time Current Dose Concentration (mg/L) Timing of Concentration (h) AUC   10/5 1,000 mg x1  500 mg x1 - - -   10/6 1,250 mg q24h - - -   Note: Serum concentrations collected for AUC dosing may appear elevated if collected in close proximity to the dose administered, this is not necessarily an indication of toxicity    Plan:  Continue current dose of 1,250 mg q24h  Ordered a level for 10/7 with AM labs  Pharmacy will continue to monitor patient and adjust therapy as indicated    Thank you for the consult,  MILDRED Kimbrough  10/6/2022

## 2022-10-06 NOTE — ACP (ADVANCE CARE PLANNING)
Advance Care Planning     General Advance Care Planning (ACP) Conversation    Palliative Medicine  Visited patient along with Palliative team member Claudine Cisneros NP. Patient lying in bed, no response to verbal or tactile stimuli. Several staff members at bedside. Patient is know to our team as we did see 9/14/22 and 9/19/22. He expressed at that time he did not want our team to reach out to family and declined to complete any paperwork (AMD/POST). Presented to the ED 10/5/22 form [employer] with AMS and hypoxia. PMH: HTN, DM, Anemia, PCM. Pt does not have an Advance Directive on file in EMR. He is still legally , although . Has 2 adult sons. Call placed to son Zayra Najera at [phone], received voice mail and able to leave message to please return our call. Call placed to son Samuel Rivero at [phone]. Samuel Rivero confirmed that patient is legally  to [facility], but she does not want to be involved. Explained the hierarchy of medical decision making in the state of [place], that the wife is his legal NOK, however, she can transfer that responsibility if she so chooses. He was able to provide our team with her phone number. Call placed to [facility] at [phone], left message to please return our call. 10/6/22 11:45 am Received call back from son Zayra Najera, he also confirmed information given by his brother Samuel Rivero. Zayra Najera was agreeable to reach out to patient's wife to have a 3 way conference call. Liseth Sagastume joined the conversation, update provided by Palliative NP. The wife stated, Brandt Stuart, do not want to be responsible for [employer]. \" \"I give that responsibility to his sons Zayra Najera and Samuel Rivero, to make all medical decisions\". Statement was witnessed by Palliative NP, NGOC Pierre. Zayra Najera did inform our team that they have talked about goals of care and do not want their father to be resuscitated or intubated.  They are agreeable to come and meet with our team, the earliest time would be Monday 10/10/22, Turner Pitch will check with Lenny Lennon and let our department know the exact time for the meeting. Family informed that patient's code status will be changed to DNR/DNI at this time. ICU staff made aware. ACP documents you currently have include:  [] Advance Directive or Living Will  [] Durable Do Not Resuscitate  [] Physician Orders for Scope of Treatment (POST)  [] Medical Power of   [x] Other-None    Thank you for the Palliative Medicine consult and allowing us to participate in the care of Mr. Sven Miner. Will continue to monitor and provide support.     CODE STATUS: DNR/DNI    Connor Rolon RN  Palliative Medicine Inpatient RN  [facility]  Palliative COPE Line: [phone] ([phone])

## 2022-10-06 NOTE — H&P
[facility]  Pulmonary, Critical Care, and Sleep Medicine    Name: Adam Reza MRN: [id number]   : 1945 Hospital: [address] [place] [address] Dr   Date: 10/6/2022        Critical Care History and Physical      IMPRESSION:   Sepsis- leukocytosis, hypotension, tachycardia, lactic acidosis, secondary to Multifocal PNA +/- gangrenous BLE (less likely) requiring vasopressors   CAP/Aspiration- Multifocal PNA, extensive infiltrates noted on chest x-ray   Acute Hypoxic Respiratory Failure- on Vaoptherm   Acute Encephalopathy- likely toxic/metabolic in nature superimposed on baseline dementia   PVD/DM II- with Hx gangrene multiple toes- s/p right foot partial second toe amputation, right foot partial third toe amputation, left foot partial second and fourth toe amputation 22 by Podiatry and Critical arterial stenosis of the proximal external iliac artery, left superior femoral artery, distal popliteal and proximal tibial peroneal trunk, S/P orbital atherectomy of left tibial peroneal artery, drug-coated balloon angioplasty of the left popliteal and tibial peroneal artery, orbital atherectomy of the left superficial femoral artery, drug-coated balloon angioplasty of the left superficial femoral artery, balloon angioplasty and stenting of the left external iliac artery, angiogram of the right femoral artery 22  JAMIL- likely prerenal in nature, now resolved   [place]  Hyponatremia, mild   Lactic Acidosis- resolved   Hypertension  Hyperlipidemia  Anemia  Lumbar spondylosis with chronic back pain- s/p T12-L3 laminectomy, L1-3 TLIF, ISAK L3-5, instrumentation from T10-pelvis performed by Dr. Roberta Gardner on 4/15/2022  Tobacco abuse   Sleep apnea-does not wear CPAP  Depression   Protein calorie malnutrition   Hx SAH from 2022      Patient Active Problem List   Diagnosis Code    Thoracic or lumbosacral neuritis or radiculitis, unspecified [id number]    Degeneration of lumbar or lumbosacral intervertebral disc M51.37    Lumbago M54.50    Hyperlipemia E78.5    Sleep apnea G47.30    Cluster headaches G44.009    Type 2 diabetes mellitus with diabetic neuropathy, without long-term current use of insulin (HCC) E11.40    Chronic midline low back pain without sciatica M54.50, G89.29    Erectile dysfunction due to arterial insufficiency N52.01    Major depressive disorder, recurrent, mild F33.0    Major depressive disorder, recurrent, moderate F33.1    Major depressive disorder, recurrent, unspecified F33.9    Gangrene ([facility]) I96    Cellulitis L03.90    Moderate protein-calorie malnutrition (HCC) E44.0    Pneumonia J18.9    Sepsis ([facility]) A41.9    Multifocal pneumonia J18.9        RECOMMENDATIONS:   Neuro: Q4h neuro checks, obtain CT head   Pulm: Supplemental O2 via Vapotherm, titrate flow for goal SPO2> 90% Bronchodilators, pulmonary hygiene care, Aspiration precautions, Keep HOB >30 degrees; Obtain CTA PE protocol. Patient is hypoxic and tachycardic   CVS : Cont Levophed. Actively titrate vasopressors aim MAP >65mmHg, Check cardiac panel, ECHO results. Fluids: Normosol @ 75 ml/hr   GI: SUP, Trend LFTs, Zofran PRN for N/V, Diet/NPO-SLP once mentation improves   Renal:  Trend Renal indices, Strict Is/Os, lemus placed for retention    Hem/Onc: Monitor for s/o active bleeding. I/D:Sepsis bundle per hospital protocol, Blood, Sputum, and Urine cultures drawn and will be followed. Lactic acid ordered- initial and repeat Q4hrs till normalized. Antibiotics:Vanco/Zosyn Trend WBCs and temperature curve. RVP negative, obtain procal   Endocrine: Q6 glucoses, SSI. Check TSH level  Metabolic:  Daily BMP, mag, phos. Trend lytes, replace as needed. Musc/Skin: no acute issues, wound care to BLE; Likely need to reconsult Vascular and Podiatry to BLE/feet   Full Code  Palliative Care consult placed      Best practice : APPLICABLE TO PATIENT    Glycemic control  I[facility] ICU bundles:    Lemus Bundle Followed    [facility] Vent patients- N/A   Stress ulcer prophylaxis. Pepcid  DVT prophylaxis. Lovenox  Need for Lines, lemus assessed. Palliative care evaluation. Restraints need. Restraint evaluation   Restraints not needed           Subjective/History: This patient has been seen and evaluated at the request of Dr. Stevie Doyle for sepsis in the setting of multifocal PNA requiring Levophed gtt . 10/06/22    Patient is a 78 y.o. male with a significant PMHx of PVD, hx gangrene b/l feet/toes s/p amputations and endovascular intervention from Podiatry and Vascular surgery 9/2022, HTN, DM II, Depression, tobacco abuse and chronic back pain in the setting of lumbar spondylosis s/p T12-L3 laminectomy, L1-3 TLIF, ISAK L3-5, instrumentation from T10-pelvis performed by Dr. Stephen Romano on 4/15/2022 presented to [facility] ED from [facility] with acute encephalopathy, diaphoresis,hypoxia, SOB, hypotension, and tachycardia. Extremities were noted to be cyantoic. Further work-up demonstrated multifocal PNA on chest x-ray with extensive infiltrates. Patient was resuscitated with IVF, however, BP refractory to fluids, so patient was started on Levophed via PIV. Sepsis protocol initiated. Patient was pan cultured and RVP was obtained, which was negative. The patient was started on broad spectrum abx, Vanco and Zosyn and maintenance IVF. Lab work demonstrated a mild hyponatremia, mild JAMIL, and lactic acid of 9.71. WBC 13.6, H&H 125/38.9. Patient was placed on a non-rebreather for his hypoxia. ABG revealed PO2 66, the remaining unremarkable. On arrival, patient is non-verbal, not following commands, and with resting tremor. Unsure of baseline as patient has hx of dementia. Patient was on NRB 15L with O2 saturation 91%. He was placed on Vapotherm on arrival, slightly tachypneic, but comfortable. Multiple gangrenous toes and amputations with sutures still in place. Some areas of foot look pale/cyanotic. Patient looks malnourished and ill.  He was incontinent of stool with incontinence dermatitis present and and a sacral ulcer. B/l heels boggy with DTI's. Remains on low-dose Levophed @ 3 mcg infusing into a PIV. The patient is critically ill and can not provide additional history due to Unable to comprehend. Past Medical History:   Diagnosis Date    Arthritis     Cognitive impairment     Depression     Dorsalgia, unspecified     GERD (gastroesophageal reflux disease)     High blood pressure     Insomnia     L1 vertebral fracture (HCC)     Migraine headache     Other chronic pain     Periorbital headache     Sleep apnea     Spinal stenosis     Type 2 diabetes mellitus, without long-term current use of insulin [facility])         Past Surgical History:   Procedure Laterality Date    ENDOSCOPY, COLON, DIAGNOSTIC      HX LUMBAR DISKECTOMY  6/2001, 4/11    left L3-4 microdiskectomy with intradural rupture    HX ORTHOPAEDIC      spinal Surgery Dr. Juan José Cortes  9/11    spinal fusion, L3-5        Prior to Admission medications    Medication Sig Start Date End Date Taking? Authorizing Provider   insulin lispro (HUMALOG) 100 unit/mL injection Check FSBS Three times daily with meals,   For sugar between 150 and 200- give 1 units SQ,   For sugar between 201 and 250- give 3 units SQ,   For sugar between 251 and 300- give 5 units SQ,   For sugar between 301 and 400- give 7 units SQ,  For sugars > 400, contact PCP 9/21/22   Neftali Maravilla MD   L. acidophilus,casei,rhamnosus (BIO-K PLUS) 50 billion cell cpDR capsule Take 1 Capsule by mouth daily for 14 days. 9/22/22 10/6/22  Neftali Maravilla MD   polyethylene glycol (MIRALAX) 17 gram packet Take 1 Packet by mouth daily as needed for Constipation. 9/21/22   Neftali Maravilla MD   therapeutic multivitamin [facility]) tablet Take 1 Tablet by mouth daily. 9/22/22   Neftali Maravilla MD   clopidogreL (Plavix) 75 mg tab Take 1 Tablet by mouth daily.  9/17/22   Roberto Dahl MD   divalproex DR (Depakote) 250 mg tablet Take  by mouth two (2) times a day. Provider, Historical   gabapentin (NEURONTIN) 300 mg capsule Take 300 mg by mouth two (2) times a day. Provider, Historical   dronabinoL (MARINOL) 2.5 mg capsule Take 2.5 mg by mouth two (2) times a day. 6/2/22   Provider, Historical   sertraline (ZOLOFT) 50 mg tablet Take 1 Tablet by mouth daily. 6/9/22   Monse Ruiz MD       Current Facility-Administered Medications   Medication Dose Route Frequency    0.9% sodium chloride infusion  150 mL/hr IntraVENous CONTINUOUS    NOREPINephrine (LEVOPHED) 8 mg in 5% dextrose 250mL (32 mcg/mL) infusion  0.5-50 mcg/min IntraVENous TITRATE    enoxaparin (LOVENOX) injection 40 mg  40 mg SubCUTAneous DAILY    famotidine (PF) (PEPCID) 20 mg in 0.9% sodium chloride 10 mL injection  20 mg IntraVENous BID    nicotine (NICODERM CQ) 14 mg/24 hr patch 1 Patch  1 Patch TransDERmal DAILY    insulin lispro (HUMALOG) injection   SubCUTAneous Q6H    electrolyte-A infusion  75 mL/hr IntraVENous CONTINUOUS    piperacillin-tazobactam (ZOSYN) 3.375 g in 0.9% sodium chloride (MBP/ADV) 100 mL MBP  3.375 g IntraVENous Q8H    vancomycin (VANCOCIN) 1250 mg in  ml infusion  1,250 mg IntraVENous Q24H       Allergies   Allergen Reactions    Codeine Not Reported This Time    Tegretol [Carbamazepine] Not Reported This Time        Social History     Tobacco Use    Smoking status: Every Day     Packs/day: 1.00     Years: 51.00     Pack years: 51.00     Types: Cigarettes    Smokeless tobacco: Never   Substance Use Topics    Alcohol use: Yes     Comment: rarely        Family History   Problem Relation Age of Onset    Heart Attack Mother     Cancer Mother     Heart Disease Mother     Hypertension Father     Seizures Father     Alcohol abuse Father           Review of Systems:  Review of systems not obtained due to patient factors.     Objective:   Vital Signs:    Visit Vitals  /73   Pulse (!) 125   Temp (!) 101.8 °F (38.8 °C) Resp 29   Ht 6' (1.829 m) Comment: estimated   Wt 68 kg (150 lb)   SpO2 91%   BMI 20.34 kg/m²       O2 Device: Non-rebreather mask   O2 Flow Rate (L/min): 15 l/min   Temp (24hrs), Av.1 °F (38.4 °C), Min:98.1 °F (36.7 °C), Max:102.8 °F (39.3 °C)       Intake/Output:   Last shift:      10/05 190 - 10/06 07  In:  [I.V.:]  Out: -   Last 3 shifts: No intake/output data recorded. Intake/Output Summary (Last 24 hours) at 10/6/2022 0603  Last data filed at 10/5/2022 2251  Gross per 24 hour   Intake 2000 ml   Output --   Net 2000 ml           Physical Exam:     General:  Alert, but non-verbal, ill and malnourished    Head:  Normocephalic, without obvious abnormality, atraumatic. Eyes:  Conjunctivae/corneas clear. PERRL,   Nose: Nares normal. Septum midline. Mucosa dry. No drainage or sinus tenderness. Throat: Lips, mucosa, and tongue normal. Teeth and gums of poor dentition, missing teeth    Neck: Supple, symmetrical, trachea midline, no adenopathy, thyroid: no enlargment/tenderness/nodules, no carotid bruit and no JVD. Back:   Symmetric, no curvature. Lungs:   Diminished b/l    Chest wall:  No tenderness or deformity. Heart:  Tachycardic, S1, S2 normal, no murmur, click, rub or gallop. Abdomen:   Flat, Soft, non-tender. Bowel sounds normal. No masses,  No organomegaly. Extremities: BLE cool to touch, +gangrenous toes b/l with b/l amputations, no edema.  B/l feet appear dusky/cyanotic    Pulses: Weak pedal pulses b/l    Skin: Incontinence dermatitis to buttocks, tammie-area, sacral ulcer, gangrenous right foot partial second toe amputation, right foot partial third toe amputation, left foot partial second and fourth toe amputation (sutures remain in place), b/l heels boggy with DTI's   Lymph nodes:  Cervical, supraclavicular, and axillary nodes normal.   Neurologic: Non-verbal, no command following, no tracking or following, resting tremor (has dementia, but unsure of baseline) Devices:  Fur and Mask     Data:     Recent Results (from the past 24 hour(s))   CBC WITH AUTOMATED DIFF    Collection Time: 10/05/22  7:40 PM   Result Value Ref Range    WBC 13.6 (H) 4.6 - 13.2 K/uL    RBC 4.28 (L) 4.35 - 5.65 M/uL    HGB 12.5 (L) 13.0 - 16.0 g/dL    HCT 38.9 36.0 - 48.0 %    MCV 90.9 78.0 - 100.0 FL    MCH 29.2 24.0 - 34.0 PG    MCHC 32.1 31.0 - 37.0 g/dL    RDW 13.3 11.6 - 14.5 %    PLATELET 964 (H) 632 - 420 K/uL    MPV 10.3 9.2 - 11.8 FL    NRBC 0.0 0  WBC    ABSOLUTE NRBC 0.00 0.00 - 0.01 K/uL    NEUTROPHILS 88 (H) 40 - 73 %    LYMPHOCYTES 10 (L) 21 - 52 %    MONOCYTES 2 (L) 3 - 10 %    EOSINOPHILS 0 0 - 5 %    BASOPHILS 0 0 - 2 %    IMMATURE GRANULOCYTES 0 0.0 - 0.5 %    ABS. NEUTROPHILS 11.9 (H) 1.8 - 8.0 K/UL    ABS. LYMPHOCYTES 1.3 0.9 - 3.6 K/UL    ABS. MONOCYTES 0.3 0.05 - 1.2 K/UL    ABS. EOSINOPHILS 0.0 0.0 - 0.4 K/UL    ABS. BASOPHILS 0.0 0.0 - 0.1 K/UL    ABS. IMM. GRANS. 0.1 (H) 0.00 - 0.04 K/UL    DF AUTOMATED     METABOLIC PANEL, COMPREHENSIVE    Collection Time: 10/05/22  7:40 PM   Result Value Ref Range    Sodium 137 136 - 145 mmol/L    Potassium 4.9 3.5 - 5.5 mmol/L    Chloride 100 100 - 111 mmol/L    CO2 26 21 - 32 mmol/L    Anion gap 11 3.0 - 18 mmol/L    Glucose 239 (H) 74 - 99 mg/dL    BUN 24 (H) 7.0 - 18 MG/DL    Creatinine 1.34 (H) 0.6 - 1.3 MG/DL    BUN/Creatinine ratio 18 12 - 20      eGFR 55 (L) >60 ml/min/1.73m2    Calcium 9.9 8.5 - 10.1 MG/DL    Bilirubin, total 0.5 0.2 - 1.0 MG/DL    ALT (SGPT) 12 (L) 16 - 61 U/L    AST (SGOT) 20 10 - 38 U/L    Alk.  phosphatase 98 45 - 117 U/L    Protein, total 8.6 (H) 6.4 - 8.2 g/dL    Albumin 2.7 (L) 3.4 - 5.0 g/dL    Globulin 5.9 (H) 2.0 - 4.0 g/dL    A-G Ratio 0.5 (L) 0.8 - 1.7     TROPONIN-HIGH SENSITIVITY    Collection Time: 10/05/22  7:40 PM   Result Value Ref Range    Troponin-High Sensitivity 9 0 - 78 ng/L   POC LACTIC ACID    Collection Time: 10/05/22  7:54 PM   Result Value Ref Range    Lactic Acid (POC) 9.71 (HH) 0.40 - 2.00 mmol/L   EKG, 12 LEAD, INITIAL    Collection Time: 10/05/22  8:06 PM   Result Value Ref Range    Ventricular Rate 152 BPM    Atrial Rate 152 BPM    P-R Interval 178 ms    QRS Duration 72 ms    Q-T Interval 322 ms    QTC Calculation (Bezet) 511 ms    Calculated P Axis 91 degrees    Calculated R Axis 67 degrees    Calculated T Axis 92 degrees    Diagnosis       Sinus tachycardia  ST & T wave abnormality, consider inferolateral ischemia  Abnormal ECG  When compared with ECG of 11-AUG-2022 16:56,  Vent.  rate has increased BY  88 BPM  Criteria for Septal infarct are no longer present  ST now depressed in Anterolateral leads  T wave inversion now evident in Inferior leads  T wave inversion now evident in Lateral leads     POC LACTIC ACID    Collection Time: 10/05/22 10:03 PM   Result Value Ref Range    Lactic Acid (POC) 4.26 (HH) 0.40 - 2.00 mmol/L   RESPIRATORY VIRUS PANEL W/COVID-19, PCR    Collection Time: 10/05/22 11:50 PM    Specimen: Nasopharyngeal   Result Value Ref Range    Adenovirus Not detected NOTD      Coronavirus 229E Not detected NOTD      Coronavirus HKU1 Not detected NOTD      Coronavirus CVNL63 Not detected NOTD      Coronavirus OC43 Not detected NOTD      SARS-CoV-2, PCR Not detected NOTD      Metapneumovirus Not detected NOTD      Rhinovirus and Enterovirus Not detected NOTD      Influenza A Not detected NOTD      Influenza A, subtype H1 Not detected NOTD      Influenza A, subtype H3 Not detected NOTD      INFLUENZA A H1N1 PCR Not detected NOTD      Influenza B Not detected NOTD      Parainfluenza 1 Not detected NOTD      Parainfluenza 2 Not detected NOTD      Parainfluenza 3 Not detected NOTD      Parainfluenza virus 4 Not detected NOTD      RSV by PCR Not detected NOTD      B. parapertussis, PCR Not detected NOTD      Bordetella pertussis - PCR Not detected NOTD      Chlamydophila pneumoniae DNA, QL, PCR Not detected NOTD      Mycoplasma pneumoniae DNA, QL, PCR Not detected NOTD     POC LACTIC ACID    Collection Time: 10/06/22 12:04 AM   Result Value Ref Range    Lactic Acid (POC) 1.97 0.40 - 5.39 mmol/L   METABOLIC PANEL, BASIC    Collection Time: 10/06/22 12:10 AM   Result Value Ref Range    Sodium 143 136 - 145 mmol/L    Potassium 4.2 3.5 - 5.5 mmol/L    Chloride 112 (H) 100 - 111 mmol/L    CO2 25 21 - 32 mmol/L    Anion gap 6 3.0 - 18 mmol/L    Glucose 98 74 - 99 mg/dL    BUN 24 (H) 7.0 - 18 MG/DL    Creatinine 1.09 0.6 - 1.3 MG/DL    BUN/Creatinine ratio 22 (H) 12 - 20      eGFR >60 >60 ml/min/1.73m2    Calcium 7.8 (L) 8.5 - 10.1 MG/DL   NT-PRO BNP    Collection Time: 10/06/22 12:10 AM   Result Value Ref Range    NT pro-BNP 1,407 0 - 1,800 PG/ML   BLOOD GAS, ARTERIAL POC    Collection Time: 10/06/22  1:25 AM   Result Value Ref Range    Device: Non rebreather      pH (POC) 7.41 7.35 - 7.45      pCO2 (POC) 37.6 35.0 - 45.0 MMHG    pO2 (POC) 66 (L) 80 - 100 MMHG    HCO3 (POC) 24.0 22 - 26 MMOL/L    sO2 (POC) 93.1 92 - 97 %    Base deficit (POC) 0.4 mmol/L    Allens test (POC) Positive      Site RIGHT RADIAL      Specimen type (POC) ARTERIAL      Performed by Waqar Desai            Recent Labs     10/06/22  0125   HCO3I 24.0   PCO2I 37.6   PHI 7.41   PO2I 66*       Telemetry: Sinus Tachycardia     Imaging:  I have personally reviewed the patients radiographs and have reviewed the reports:    XR Results (most recent):  Results from Hospital Encounter encounter on 10/05/22    XR CHEST PORT    Narrative  Examination: Portable AP chest    History: Hypoxia and fever    Comparison: July 21, 2022    Findings: Extensive multifocal airspace disease has developed. There is no  pleural effusion. No pneumothorax. Heart size is normal. Atherosclerosis of  aortic arch. Postsurgical changes to the spine. Impression  1. Interval development of extensive multifocal pneumonia. Radiographic  follow-up to document clearing is recommended.     CT Results (most recent):  Results from [facility] encounter on 08/11/22    CT HEAD WO CONT    Narrative  Head CT without contrast    HISTORY: Increased confusion  COMPARISON: CT head 7/27/2022    Technique: CT images of the head were obtained. All CT scans at this facility  are performed using dose optimization technique as appropriate to the performed  exam, to include automated exposure control, adjustment of the mA and/or kV  according to patient's size (Including appropriate matching for site-specific  examinations), or use of iterative reconstruction technique. FINDINGS:  No evidence of acute intracranial hemorrhage. Redemonstration of bilateral  subdural hygromas measuring up to 8mm at the level of the frontal lobes  bilaterally, slightly progressed from prior where it measures 6 mm. Minimal mass  effect on the frontal lobes cortices. No shift of midline structures. Underlying  mild cerebral volume loss. No hydrocephalus. No evidence of acute infarct. Minimal periventricular and deep white matter low attenuation, nonspecific, but  most consistent with chronic small vessel ischemic disease. Intact osseous structures. The visualized paranasal air sinuses are aerated. Impression  1. No evidence of acute intracranial hemorrhage. Redemonstration of small  bilateral subdural hygromas with interval 2 mm increased in size bilaterally. Minor associated mass effect on the frontal cortices. 2. No evidence of acute infarct. 3. Mild cerebral volume loss. Mild burden of chronic white matter microvascular  disease.                    LENORA TIME: 60 min   Domenic Cavazos NP  10/06/22  Pulmonary, Critical Care Medicine  [facility]

## 2022-10-06 NOTE — PROGRESS NOTES
attended the interdisciplinary rounds for Kateryna Joyner, who is a 78 y. o.,male. Patients Primary Language is: [place]. According to the patients EMR Protestant Affiliation is: Unknown. The reason the Patient came to the hospital is:   Patient Active Problem List    Diagnosis Date Noted    Pneumonia 10/06/2022    Sepsis ([facility] 10/06/2022    Multifocal pneumonia 10/06/2022    Moderate protein-calorie malnutrition ([address] 09/15/2022    Gangrene ([facility] 09/13/2022    Cellulitis 09/13/2022    Major depressive disorder, recurrent, mild 06/09/2022    Major depressive disorder, recurrent, moderate 06/09/2022    Major depressive disorder, recurrent, unspecified 06/09/2022    Type 2 diabetes mellitus with diabetic neuropathy, without long-term current use of insulin ([facility] 10/01/2018    Chronic midline low back pain without sciatica 10/01/2018    Erectile dysfunction due to arterial insufficiency 10/01/2018    Hyperlipemia     Sleep apnea     Cluster headaches     Thoracic or lumbosacral neuritis or radiculitis, unspecified     Degeneration of lumbar or lumbosacral intervertebral disc     Lumbago       Plan:  Chaplains will continue to follow and will provide pastoral care on an as needed/requested basis.  recommends bedside caregivers page  on duty if patient shows signs of acute spiritual or emotional distress.     [address] [facility]  Board Certified [address] [facility]   [phone]

## 2022-10-06 NOTE — CONSULTS
[facility]: [phone])  [facility]: [phone]     Patient Name: Dee Yousif  YOB: 1945    Date of consult : 10/6/22  Reason for Consult: establish goals of care  Requesting Provider: Riki Rodarte, [address] [place] [address]   Primary Care Physician: Marine Epperson MD      SUMMARY:   Dee Yousif is a 78 y.o. male with a past history of DM2, HTN, Major Depressive disorder, Degeneration of lumbar spine, Neuropathy, Gangrene of bilateral lower extremities, PAD, who was admitted on 10/5/2022 from SNF with a diagnosis of Sepsis. Current medical issues leading to Palliative Medicine involvement include: Pt with a long term life limiting chronic disease process that warrants discussions about her goals of care. .    CHIEF COMPLAINT: AMS and sepsis    HPI/SUBJECTIVE:    Pt is known to our services from a prior admission. He was placed in SNF post hospitalization and was brought in through the ED for hypoxia, shaking and AMS. Pt taken to the ICU for acute respiratory failure. The patient is:   [] Verbal and participatory  [x] Non-participatory due to:     GOALS OF CARE:  Patient/Health Care Proxy Stated Goals: Prolong life      TREATMENT PREFERENCES:   Code Status: DNR         PALLIATIVE DIAGNOSES:   Goals of Care/AMD  Sepsis  Acute hypoxic respiratory failure  Debility       PLAN:   Goals of care/ACP  10/6/22: This NP along with Mayuri Brady RN in to see the patient at the bedside. He is minimally interactive and was having a procedure at that time. He is not at this time able to participate in a goals of care discussion and we are contacting his family to determine his legal NOK. Pt is  but has been  for many years. We were able to track down and speak with Fortino Stephen who abdicates that responsibility.  She is aware as are the two sons that by the [place] hierarchy of decision making his two sons become his surrogate decision makers. We discussed with them the burdens and benefits of CPR and intubation and they both are in agreement that they do not want these things done to their father. They plan to come in tomorrow to complete a POST. They are in agreement to change the code status now in the system to DNR/DNI I have updated his orders and notified the ICU staff. Goals of care: DNR/DNI  2. Sepsis  Pt with a significant history of gangrene of both toes and feet. He has multiple toe amputations and areas of necrosis. Unknown etiology of this current sepsis but is being followed by Infection Control Physician  3. Acute hypoxic respiratory failure  Acute hypoxic respiratory failure contributed to possible healthcare associated pneumonia vs aspiration. 4.   Debility  Pt with low level of function at this time with a Palliative performance score of around 40-30%. He is mostly bed bound, total care to mainly assisted, with drowsy level of consciousness. 5.  Initial consult note routed to primary continuity provider  6. Communicated plan of care with: Palliative IDT      Advance Care Planning:  [] The [facility] Interdisciplinary Team has updated the ACP Navigator with [address] and Patient Capacity    Primary Decision Maker ([address]):     Medical Interventions: Limited additional interventions   Other Instructions:         As far as possible, the palliative care team has discussed with patient / health care proxy about goals of care / treatment preferences for patient.          HISTORY:     History obtained from: Chart review   Active Problems:    Pneumonia (10/6/2022)      Sepsis ([facility] [address] (10/6/2022)      Multifocal pneumonia (10/6/2022)    Past Medical History:   Diagnosis Date    Arthritis     Cognitive impairment     Depression     Dorsalgia, unspecified     GERD (gastroesophageal reflux disease)     High blood pressure     Insomnia     L1 vertebral fracture (HCC)     Migraine headache     Other chronic pain Periorbital headache     Sleep apnea     Spinal stenosis     Type 2 diabetes mellitus, without long-term current use of insulin [facility])       Past Surgical History:   Procedure Laterality Date    ENDOSCOPY, COLON, DIAGNOSTIC      HX LUMBAR DISKECTOMY  6/2001, 4/11    left L3-4 microdiskectomy with intradural rupture    HX ORTHOPAEDIC      spinal Surgery Dr. Aneita Bamberger  9/11    spinal fusion, L3-5      Family History   Problem Relation Age of Onset    Heart Attack Mother     Cancer Mother     Heart Disease Mother     Hypertension Father     Seizures Father     Alcohol abuse Father      History reviewed, no pertinent family history.   Social History     Tobacco Use    Smoking status: Every Day     Packs/day: 1.00     Years: 51.00     Pack years: 51.00     Types: Cigarettes    Smokeless tobacco: Never   Substance Use Topics    Alcohol use: Yes     Comment: rarely     Allergies   Allergen Reactions    Codeine Not Reported This Time    Tegretol [Carbamazepine] Not Reported This Time      Current Facility-Administered Medications   Medication Dose Route Frequency    0.9% sodium chloride infusion  150 mL/hr IntraVENous CONTINUOUS    NOREPINephrine (LEVOPHED) 8 mg in 5% dextrose 250mL (32 mcg/mL) infusion  0.5-50 mcg/min IntraVENous TITRATE    acetaminophen (TYLENOL) tablet 650 mg  650 mg Oral Q6H PRN    Or    acetaminophen (TYLENOL) suppository 650 mg  650 mg Rectal Q6H PRN    polyethylene glycol (MIRALAX) packet 17 g  17 g Oral DAILY PRN    ondansetron (ZOFRAN ODT) tablet 4 mg  4 mg Oral Q8H PRN    Or    ondansetron (ZOFRAN) injection 4 mg  4 mg IntraVENous Q6H PRN    enoxaparin (LOVENOX) injection 40 mg  40 mg SubCUTAneous DAILY    famotidine (PF) (PEPCID) 20 mg in 0.9% sodium chloride 10 mL injection  20 mg IntraVENous BID    nicotine (NICODERM CQ) 14 mg/24 hr patch 1 Patch  1 Patch TransDERmal DAILY    insulin lispro (HUMALOG) injection   SubCUTAneous Q6H    glucose chewable tablet 16 g  4 Tablet Oral PRN    glucagon (GLUCAGEN) injection 1 mg  1 mg IntraMUSCular PRN    dextrose 10% infusion 0-250 mL  0-250 mL IntraVENous PRN    albuterol-ipratropium (DUO-NEB) 2.5 MG-0.5 MG/3 ML  3 mL Nebulization Q6H PRN    levoFLOXacin (LEVAQUIN) 750 mg in D5W IVPB  750 mg IntraVENous Q48H    piperacillin-tazobactam (ZOSYN) 3.375 g in 0.9% sodium chloride (MBP/ADV) 100 mL MBP  3.375 g IntraVENous Q8H    vancomycin (VANCOCIN) 1250 mg in  ml infusion  1,250 mg IntraVENous Q24H          Clinical Pain Assessment (nonverbal scale for nonverbal patients): Activity (Movement): Restless, excessive activity and/or withdrawal reflexes    Duration: for how long has pt been experiencing pain (e.g., 2 days, 1 month, years)  Frequency: how often pain is an issue (e.g., several times per day, once every few days, constant)     FUNCTIONAL ASSESSMENT:     Palliative Performance Scale (PPS):  PPS: 30    ECOG  ECOG Status : Completely disabled     PSYCHOSOCIAL/SPIRITUAL SCREENING:      Any spiritual / Yazidi concerns:  [] Yes /  [x] No    Caregiver Burnout:  [] Yes /  [] No /  [x] No Caregiver Present      Anticipatory grief assessment:   [x] Normal  / [] Maladaptive        REVIEW OF SYSTEMS:     Systems: constitutional, ears/nose/mouth/throat, respiratory, gastrointestinal, genitourinary, musculoskeletal, integumentary, neurologic, psychiatric, endocrine. Positive findings noted below. Modified ESAS Completed by: provider                       Dyspnea: 4           Stool Occurrence(s): 1   Positive and pertinent negative findings in ROS are noted above in HPI. The following systems were [x] reviewed / [] unable to be reviewed as noted in HPI  Other findings are noted below.      PHYSICAL EXAM:     Constitutional: minimally responsive   Eyes: pupils equal, anicteric  ENMT: no nasal discharge, moist mucous membranes  Cardiovascular: regular rhythm, distal pulses intact  Respiratory: breathing somewhat labored, symmetric  Gastrointestinal: soft non-tender, +bowel sounds  Musculoskeletal: no deformity, no tenderness to palpation  Skin: warm, dry  Neurologic: not following commands,or moving all extremities      Other: Wt Readings from Last 3 Encounters:   10/05/22 68 kg (150 lb)   09/19/22 68 kg (150 lb)   08/11/22 68 kg (150 lb)     Blood pressure (!) 101/50, pulse (!) 115, temperature 100.1 °F (37.8 °C), resp. rate 29, height 6' (1.829 m), weight 68 kg (150 lb), SpO2 99 %. Pain:  Pain Scale 1: Adult Nonverbal Pain Scale  Pain Intensity 1: 0                      LAB AND IMAGING FINDINGS:     Lab Results   Component Value Date/Time    WBC 13.2 10/06/2022 10:57 AM    HGB 9.5 (L) 10/06/2022 10:57 AM    PLATELET 564 88/02/5866 10:57 AM     Lab Results   Component Value Date/Time    Sodium 145 10/06/2022 10:57 AM    Potassium 4.4 10/06/2022 10:57 AM    Chloride 113 (H) 10/06/2022 10:57 AM    CO2 26 10/06/2022 10:57 AM    BUN 27 (H) 10/06/2022 10:57 AM    Creatinine 1.58 (H) 10/06/2022 10:57 AM    Calcium 8.1 (L) 10/06/2022 10:57 AM    Magnesium 1.9 10/06/2022 10:57 AM    Phosphorus 3.9 10/06/2022 10:57 AM      Lab Results   Component Value Date/Time    Alk.  phosphatase 69 10/06/2022 10:57 AM    Protein, total 6.2 (L) 10/06/2022 10:57 AM    Albumin 2.0 (L) 10/06/2022 10:57 AM    Globulin 4.2 (H) 10/06/2022 10:57 AM     Lab Results   Component Value Date/Time    INR 1.2 10/06/2022 10:57 AM    Prothrombin time 15.7 (H) 10/06/2022 10:57 AM      No results found for: IRON, FE, TIBC, IBCT, PSAT, FERR   No results found for: PH, PCO2, PO2  No components found for: [place]   Lab Results   Component Value Date/Time    CK 67 07/21/2022 02:35 PM              Total time: 50 minutes   Counseling / coordination time, spent as noted above:   > 50% counseling / coordination:  Time spent in direct consultation with the patient, medical team, and family     Prolonged service was provided for  []30 min   []75 min in face to face time in the presence of the patient, spent as noted above. Time Start:   Time End:     Disclaimer: Sections of this note are dictated using utilizing voice recognition software, which may have resulted in some phonetic based errors in grammar and contents. Even though attempts were made to correct all the mistakes, some may have been missed, and remained in the body of the document. If questions arise, please contact our department.

## 2022-10-06 NOTE — PALLIATIVE CARE
[facility]  Good Help to Those in Need  [phone]    Consult Note  Patient Name: Matt Willis  YOB: 1945  Age: 78 y.o. Date of Visit: 10/06/22  Facility of Care: [facility]  Patient Room: 313/01     Attending: Abelardo Gilford, DO   Diagnosis: Sepsis ([address] [A41.9]  Pneumonia [J18.9]  Multifocal pneumonia [J18.9]    Spoke with patient's two sons and his current wife Georgia Reyes. They are  but not . She specifically stated that she abdicates her responsibility to make any medical decisions for the patient and wants his two sons to take that role. They are both in agreement and we explained that they are the [facility] and by the decision tree set up by the [facility] would be the next in line to take this role. Both brothers agree that they do not want the patient resuscitated or intubated and have verbalized in front of our Palliative team including Frederick Zhu RN that they wish to make him a DNR/DNI. I have entered these orders in the system and spoken with the ICU team directly. We will meet with them on Monday 10/10  to do paperwork and talk about the next steps. This is the first they are available to meet with our team. They understand that they may get follow up phone calls from us or the ICU team if their father further decompensates. The patient at this time is minimally responsive and unable to make any goals of care decisions. Thank you for the opportunity to assist in the care of this patient and their family. Please contact me at [phone] if you have any further questions.      WAGNER RonC  Palliative Medicine   [facility], [address] [facility]

## 2022-10-06 NOTE — INTERDISCIPLINARY ROUNDS
[address] [place] [address] [facility]  Pulmonary, Critical Care, and Sleep Medicine  Interdisciplinary and Ventilator Weaning Rounds    Patient discussed in morning walking rounds and interdisciplinary rounds. ICU day: 10/6    Overnight events:   Doesn't follow commands fully but opens eyes to voice  101.8 T-max  Sinu tach    Assessments and best practice:  Ventilator  N/A  Sedation  None  Other pertinent drips  levophed and plasmalyte  Lines noted  Lemus Catheter  Critical labs assessed  Yes  Antibiotics  Zosyn and Vancomycin  Medications reviewed  Yes  Pending imaging  CT head and CT chest, abd, pelvis  Pending send out labs  Urine cultures  Routine labs   Pending Procedures  None  Glycemic control  Stress ulcer prophylaxis. Pepcid  DVT prophylaxis. SCDs  Need for Lines, lemus assessed.   Yes  Restraint Reevaluation   - Restraints not needed    Family contact/MPOA:   Family updated     Palliative consult within 3 days of admission to ICU-  Ethics Guidance: 21 days      Daily Plans:  Head CT and CTA today  Podiatry consult  D/C plasmalyte  Bolus fluids  X-ray both feed  Culture DTI    LENORA time 15 minutes        Veronica Tello PA-C  10/06/22  Pulmonary, Critical Care Medicine  [address] [place] [address] [facility]

## 2022-10-06 NOTE — PROGRESS NOTES
Problem: Falls - Risk of  Goal: *Absence of Falls  Description: Document Dennie Oak Fall Risk and appropriate interventions in the flowsheet. 10/6/2022 1733 by Benji Cuevas RN  Outcome: Progressing Towards Goal  Note: Fall Risk Interventions:       Mentation Interventions: Adequate sleep, hydration, pain control, Bed/chair exit alarm, Door open when patient unattended, More frequent rounding, Reorient patient, Toileting rounds    Medication Interventions: Bed/chair exit alarm, Evaluate medications/consider consulting pharmacy    Elimination Interventions: Toileting schedule/hourly rounds      10/6/2022 1732 by Benji Cuevas RN  Outcome: Progressing Towards Goal  Note: Fall Risk Interventions:       Mentation Interventions: Adequate sleep, hydration, pain control, Bed/chair exit alarm, Door open when patient unattended, More frequent rounding, Reorient patient, Toileting rounds    Medication Interventions: Bed/chair exit alarm, Evaluate medications/consider consulting pharmacy    Elimination Interventions: Toileting schedule/hourly rounds       Problem: Pain  Goal: *Control of Pain  10/6/2022 1733 by Benji Cuevas RN  Outcome: Progressing Towards Goal  10/6/2022 1732 by Benji Cuevas RN  Outcome: Progressing Towards Goal     Problem: Pressure Injury - Risk of  Goal: *Prevention of pressure injury  Description: Document Dionte Scale and appropriate interventions in the flowsheet. Outcome: Progressing Towards Goal  Note: Pressure Injury Interventions:  Sensory Interventions: Assess changes in LOC, Minimize linen layers, Pressure redistribution bed/mattress (bed type), Turn and reposition approx.  every two hours (pillows and wedges if needed)    Moisture Interventions: Absorbent underpads, Check for incontinence Q2 hours and as needed, Internal/External urinary devices, Maintain skin hydration (lotion/cream), Minimize layers    Activity Interventions: Pressure redistribution bed/mattress(bed type)    Mobility Interventions: Pressure redistribution bed/mattress (bed type), Turn and reposition approx.  every two hours(pillow and wedges)    Nutrition Interventions: Document food/fluid/supplement intake    Friction and Shear Interventions: Apply protective barrier, creams and emollients, Lift sheet, Transferring/repositioning devices

## 2022-10-06 NOTE — ED PROVIDER NOTES
EMERGENCY DEPARTMENT HISTORY AND PHYSICAL EXAM    8:16 PM      Date: 10/5/2022  Patient Name: Rodney De La Garza    History of Presenting Illness     Chief Complaint   Patient presents with    Altered mental status         History Provided By: paramedics    Location/Duration/Severity/Modifying factors       Patient is a 81-year-old male who was admitted to  a nursing home 3 months ago. Recently he  had bilateral toe amputations. He was in the hospital for a while and discharged back to nursing home 1 months ago. 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 Paramedics were called and brought him to the emergency room. Upon arrival to emergency room patient was tachycardia, hypoxia, and extremities cyanotic.     PCP: Gloria Herron MD    Current Facility-Administered Medications   Medication Dose Route Frequency Provider Last Rate Last Admin    sodium chloride 0.9 % bolus infusion 1,000 mL  1,000 mL IntraVENous ONCE Hernan Stevens MD 1,000 mL/hr at 10/05/22 2012 1,000 mL at 10/05/22 2012    sodium chloride 0.9 % bolus infusion 1,000 mL  1,000 mL IntraVENous ONCE Hernan Stevens MD 1,000 mL/hr at 10/05/22 2010 1,000 mL at 10/05/22 2010    piperacillin-tazobactam (ZOSYN) 4.5 g in 0.9% sodium chloride (MBP/ADV) 100 mL MBP  4.5 g IntraVENous Q6H Hernan Stevens MD        vancomycin (VANCOCIN) 1,000 mg in 0.9% sodium chloride 250 mL (VIAL-MATE)  1,000 mg IntraVENous Ernesto Jesus MD         Current Outpatient Medications   Medication Sig Dispense Refill    insulin lispro (HUMALOG) 100 unit/mL injection Check FSBS Three times daily with meals,   For sugar between 150 and 200- give 1 units SQ,   For sugar between 201 and 250- give 3 units SQ,   For sugar between 251 and 300- give 5 units SQ,   For sugar between 301 and 400- give 7 units SQ,  For sugars > 400, contact PCP 1 Each 0    L. acidophilus,casei,rhamnosus (BIO-K PLUS) 50 billion cell cpDR capsule Take 1 Capsule by mouth daily for 14 days. 14 Capsule 0    polyethylene glycol (MIRALAX) 17 gram packet Take 1 Packet by mouth daily as needed for Constipation. 15 Each 0    therapeutic multivitamin (THERAGRAN) tablet Take 1 Tablet by mouth daily. 30 Tablet 0    clopidogreL (Plavix) 75 mg tab Take 1 Tablet by mouth daily. 30 Tablet 3    divalproex DR (Depakote) 250 mg tablet Take  by mouth two (2) times a day.      gabapentin (NEURONTIN) 300 mg capsule Take 300 mg by mouth two (2) times a day. dronabinoL (MARINOL) 2.5 mg capsule Take 2.5 mg by mouth two (2) times a day. sertraline (ZOLOFT) 50 mg tablet Take 1 Tablet by mouth daily.  80 Tablet 2       Past History     Past Medical History:  Past Medical History:   Diagnosis Date    Arthritis     Cognitive impairment     Depression     Dorsalgia, unspecified     GERD (gastroesophageal reflux disease)     High blood pressure     Insomnia     L1 vertebral fracture (HCC)     Migraine headache     Other chronic pain     Periorbital headache     Sleep apnea     Spinal stenosis     Type 2 diabetes mellitus, without long-term current use of insulin (HCC)        Past Surgical History:  Past Surgical History:   Procedure Laterality Date    ENDOSCOPY, COLON, DIAGNOSTIC      HX LUMBAR DISKECTOMY  6/2001, 4/11    left L3-4 microdiskectomy with intradural rupture    HX ORTHOPAEDIC      spinal Surgery Dr. Fay Diaz  9/11    spinal fusion, L3-5       Family History:  Family History   Problem Relation Age of Onset    Heart Attack Mother     Cancer Mother     Heart Disease Mother     Hypertension Father     Seizures Father     Alcohol abuse Father        Social History:  Social History     Tobacco Use    Smoking status: Every Day     Packs/day: 1.00     Years: 51.00     Pack years: 51.00     Types: Cigarettes    Smokeless tobacco: Never   Vaping Use    Vaping Use: Never used   Substance Use Topics Alcohol use: Yes     Comment: rarely    Drug use: No       Allergies: Allergies   Allergen Reactions    Codeine Not Reported This Time    Tegretol [Carbamazepine] Not Reported This Time         Review of Systems     Review of Systems    Cannot perform secondary to patient being confurse. Physical Exam   Visit Vitals  BP (!) 134/109 (BP 1 Location: Left upper arm, BP Patient Position: Supine;Lying)   Pulse (!) 151   Temp 98.1 °F (36.7 °C)   Resp (!) 35   Ht 6' (1.829 m) Comment: estimated   Wt 68 kg (150 lb)   SpO2 92%   BMI 20.34 kg/m²         Physical Exam  Vitals and nursing note reviewed. Constitutional:       General: He is in acute distress. Appearance: He is well-developed. He is ill-appearing, toxic-appearing and diaphoretic. HENT:      Head: Normocephalic. Eyes:      Conjunctiva/sclera: Conjunctivae normal.      Pupils: Pupils are equal, round, and reactive to light. Cardiovascular:      Rate and Rhythm: Regular rhythm. Tachycardia present. Heart sounds: Normal heart sounds. No murmur heard. Pulmonary:      Effort: Pulmonary effort is normal. No respiratory distress. Breath sounds: Rhonchi present. No wheezing or rales. Chest:      Chest wall: No tenderness. Abdominal:      General: Bowel sounds are normal. There is no distension. Palpations: Abdomen is soft. Tenderness: There is no abdominal tenderness. There is no rebound. Musculoskeletal:         General: No tenderness. Normal range of motion. Cervical back: Normal range of motion and neck supple. Comments: LEFT AND RIGHT FOOT: (+) amputation of toes   Skin:     General: Skin is warm. Findings: No rash. Neurological:      Mental Status: He is oriented to person, place, and time. Cranial Nerves: No cranial nerve deficit. Motor: No abnormal muscle tone.       Coordination: Coordination normal.      Gait: Abnormal gait: .critcal.   Psychiatric:         Behavior: Behavior normal. Thought Content: Thought content normal.         Judgment: Judgment normal.         Diagnostic Study Results     Labs -  Recent Results (from the past 12 hour(s))   CBC WITH AUTOMATED DIFF    Collection Time: 10/05/22  7:40 PM   Result Value Ref Range    WBC 13.6 (H) 4.6 - 13.2 K/uL    RBC 4.28 (L) 4.35 - 5.65 M/uL    HGB 12.5 (L) 13.0 - 16.0 g/dL    HCT 38.9 36.0 - 48.0 %    MCV 90.9 78.0 - 100.0 FL    MCH 29.2 24.0 - 34.0 PG    MCHC 32.1 31.0 - 37.0 g/dL    RDW 13.3 11.6 - 14.5 %    PLATELET 159 (H) 776 - 420 K/uL    MPV 10.3 9.2 - 11.8 FL    NRBC 0.0 0  WBC    ABSOLUTE NRBC 0.00 0.00 - 0.01 K/uL    NEUTROPHILS 88 (H) 40 - 73 %    LYMPHOCYTES 10 (L) 21 - 52 %    MONOCYTES 2 (L) 3 - 10 %    EOSINOPHILS 0 0 - 5 %    BASOPHILS 0 0 - 2 %    IMMATURE GRANULOCYTES 0 0.0 - 0.5 %    ABS. NEUTROPHILS 11.9 (H) 1.8 - 8.0 K/UL    ABS. LYMPHOCYTES 1.3 0.9 - 3.6 K/UL    ABS. MONOCYTES 0.3 0.05 - 1.2 K/UL    ABS. EOSINOPHILS 0.0 0.0 - 0.4 K/UL    ABS. BASOPHILS 0.0 0.0 - 0.1 K/UL    ABS. IMM. GRANS. 0.1 (H) 0.00 - 0.04 K/UL    DF AUTOMATED     POC LACTIC ACID    Collection Time: 10/05/22  7:54 PM   Result Value Ref Range    Lactic Acid (POC) 9.71 (HH) 0.40 - 2.00 mmol/L   EKG, 12 LEAD, INITIAL    Collection Time: 10/05/22  8:06 PM   Result Value Ref Range    Ventricular Rate 152 BPM    Atrial Rate 152 BPM    P-R Interval 178 ms    QRS Duration 72 ms    Q-T Interval 322 ms    QTC Calculation (Bezet) 511 ms    Calculated P Axis 91 degrees    Calculated R Axis 67 degrees    Calculated T Axis 92 degrees    Diagnosis       Sinus tachycardia  ST & T wave abnormality, consider inferolateral ischemia  Abnormal ECG  When compared with ECG of 11-AUG-2022 16:56,  Vent.  rate has increased BY  88 BPM  Criteria for Septal infarct are no longer present  ST now depressed in Anterolateral leads  T wave inversion now evident in Inferior leads  T wave inversion now evident in Lateral leads         Radiologic Studies - interpreted by me      Medical Decision Making   I am the first provider for this patient. I reviewed the vital signs, available nursing notes, past medical history, past surgical history, family history and social history. Vital Signs-Reviewed the patient's vital signs. EKG: interpreted by me    Records Reviewed:      ED Course: Progress Notes, Reevaluation, and Consults:    ED Course as of 10/06/22 0556   Thu Oct 06, 2022   0247 URINALYSIS W/ RFLX MICROSCOPIC [KB]   0249 CULTURE, URINE [KB]      ED Course User Index  [KB] Whit Lisa MD     Provider Notes (Medical Decision Making): MDM    Procedures    Critical Care Time: greater then 35 minutes    Patient was confused for fever when he came in by paramedics. Lactate was greater than 9. Sepsis protocol instituted. Patient treated with IV fluids and started on IV antibiotics. Patient responded initially and patient pressure dropped. Patient started on IV Levophed. Consultation: Case discussed with Dr. Jonathan Mac. She accepted patient for ICU admission. Diagnosis     Clinical Impression: pneumonia, sepsis    Disposition: admit to ICU    Follow-up Information    None          Patient's Medications   Start Taking    No medications on file   Continue Taking    CLOPIDOGREL (PLAVIX) 75 MG TAB    Take 1 Tablet by mouth daily. DIVALPROEX DR (DEPAKOTE) 250 MG TABLET    Take  by mouth two (2) times a day. DRONABINOL (MARINOL) 2.5 MG CAPSULE    Take 2.5 mg by mouth two (2) times a day. GABAPENTIN (NEURONTIN) 300 MG CAPSULE    Take 300 mg by mouth two (2) times a day.     INSULIN LISPRO (HUMALOG) 100 UNIT/ML INJECTION    Check FSBS Three times daily with meals,   For sugar between 150 and 200- give 1 units SQ,   For sugar between 201 and 250- give 3 units SQ,   For sugar between 251 and 300- give 5 units SQ,   For sugar between 301 and 400- give 7 units SQ,  For sugars > 400, contact PCP    L. ACIDOPHILUS,CASEI,RHAMNOSUS (BIO-K PLUS) 50 BILLION CELL CPDR CAPSULE    Take 1 Capsule by mouth daily for 14 days. POLYETHYLENE GLYCOL (MIRALAX) 17 GRAM PACKET    Take 1 Packet by mouth daily as needed for Constipation. SERTRALINE (ZOLOFT) 50 MG TABLET    Take 1 Tablet by mouth daily. THERAPEUTIC MULTIVITAMIN (THERAGRAN) TABLET    Take 1 Tablet by mouth daily. These Medications have changed    No medications on file   Stop Taking    No medications on file     Disclaimer: Sections of this note are dictated using utilizing voice recognition software. Minor typographical errors may be present. If questions arise, please do not hesitate to contact me or call our department. Provider Notes (Medical Decision Making): MDM     Procedures     Critical Care Time: greater then 35 minutes     Patient was confused for fever when he came in by paramedics. Lactate was greater than 9. Sepsis protocol instituted. Patient treated with IV fluids and started on IV antibiotics. Patient responded initially and patient pressure dropped. Patient started on IV Levophed. Consultation: Case discussed with Dr. Evans Loomis. She accepted patient for ICU admission. I spent 60 minutes of critical  care time on this patient that does not include any separately biliable procedure time.       Diagnosis      Clinical Impression: pneumonia, sepsis     Disposition: admit to ICU     Follow-up Information    None

## 2022-10-06 NOTE — PROGRESS NOTES
10/06/22 0545   Oxygen Therapy   O2 Sat (%) 94 %   Pulse via Oximetry 125 beats per minute   O2 Device Heated; Hi flow nasal cannula   Skin Assessment Clean, dry, & intact   Skin Protection for O2 Device No   O2 Flow Rate (L/min) 35 l/min   O2 Temperature 91.4 °F (33 °C)   FIO2 (%) 100 %       Received patient from [address] on [address]. Placed on [facility] with above settings. RN, RT, and NP at bedside.

## 2022-10-06 NOTE — WOUND CARE
Physical Exam  Patient: Juan Velasquez   Room #: 313  Date:  10/6/22    SHELL: [id number]    Situation: Wound Care Consult for \"wound care\"    Background:    PMH:   Past Medical History:   Diagnosis Date    Arthritis      Cognitive impairment      Depression      Dorsalgia, unspecified      GERD (gastroesophageal reflux disease)      High blood pressure      Insomnia      L1 vertebral fracture (HCC)      Migraine headache      Other chronic pain      Periorbital headache      Sleep apnea      Spinal stenosis      Type 2 diabetes mellitus, without long-term current use of insulin ([facility] [address]                 Past Surgical History:   Procedure Laterality Date    ENDOSCOPY, COLON, DIAGNOSTIC        HX LUMBAR DISKECTOMY   6/2001, 4/11     left L3-4 microdiskectomy with intradural rupture    HX ORTHOPAEDIC         spinal Surgery Dr. Ghazala Gibson   9/11     spinal fusion, L3-5        Current Dx:   Patient Active Problem List   Diagnosis Code    Thoracic or lumbosacral neuritis or radiculitis, unspecified [id number]    Degeneration of lumbar or lumbosacral intervertebral disc M51.37    Lumbago M54.50    Hyperlipemia E78.5    Sleep apnea G47.30    Cluster headaches G44.009    Type 2 diabetes mellitus with diabetic neuropathy, without long-term current use of insulin (HCC) E11.40    Chronic midline low back pain without sciatica M54.50, G89.29    Erectile dysfunction due to arterial insufficiency N52.01    Major depressive disorder, recurrent, mild F33.0    Major depressive disorder, recurrent, moderate F33.1    Major depressive disorder, recurrent, unspecified F33.9    Gangrene (HCC) I96    Cellulitis L03.90    Moderate protein-calorie malnutrition (HCC) E44.0    Pneumonia J18.9    Sepsis ([facility]) A41.9    Multifocal pneumonia J18.9       Dionte Score: 12/23   BMI: 20.34   Preventive measures in place: Heel prevention boots, Silicone dressing to heels and sacrum, and Pressure redistribution mattress, lemus catheter    Assessment:   Patient found supine. Patient is Disoriented and Sleepy. Wound(s) Description:         Wound #1   Location: Sacrum  Stage/Etiology: Pressure  Suspected deep tissue injury  Characteristics: ulcerated Wound bed is purple/maroon clean, necrotic. Abby-wound skin is erythema  Drainage: Sanguinous and Scant   Measurements: [id number]  Photo:         Wound #2  Location: Foot, Right  Stage/Etiology: Arterial    Characteristics: poorly perfused. Wound bed is purple/maroon clean. Abby-wound skin is discoloration and pale  Drainage: None   Measurements: multiple incisions and arterial ulcers  Photo:       Wound #3  Location: Foot, Left  Stage/Etiology: Arterial    Characteristics: ulcerated Wound bed is dusky and purple/maroon clean, necrotic. Abby-wound skin is clear/intact and pale  Drainage: None   Measurements: multiple amputations and arterial ulcers  Photo:     Wound # 4  Location: Heel, Right  Stage/Etiology: Pressure  Unstageable  Characteristics: shows no evidence of infection, separation, or keloid formation Wound bed is eschar clean. Abby-wound skin is clear/intact and erythema  Drainage: None   Measurements: 2x6cm  Photo:         Wound # 5  Location: Right, clavicle  Stage/Etiology: Traumatic    Characteristics: shows no evidence of infection, separation, or keloid formation Wound bed is granulating clean. Abby-wound skin is clear/intact  Drainage: Sanguinous and Scant   Measurements: 1x1cm  Photo:         POA wounds #: 1-5      Recommendations:    Wound care orders as follows: Silicone dressing to sacrum, right clavicle and bilateral heels. Change every 3 days and prn soilage. Other orders: Leave bilateral feet open in heel prevention boots. Supplies Used: Sacrum silicone         Care discussed with primary nurse, Dinesh Padgett RN. Care turned over to nursing staff at this time. Mahnaz STALLINGSN, RN, [address] [place] [address],[address], [address] [place] [address] Dept  [phone]

## 2022-10-06 NOTE — DIABETES MGMT
Diabetes/ Glycemic Control Plan of Care  Recommendations:   Monitor glycemic control for need for insulin advancement. Assessment:   DX:   1. Sepsis without acute organ dysfunction, due to unspecified organism    2. Hyperlipidemia, unspecified hyperlipidemia type    3. Multifocal pneumonia    4. Chronic midline low back pain without sciatica    5. Gangrene    6. Major depressive disorder, recurrent, moderate    7. Type 2 diabetes mellitus with diabetic neuropathy, without long-term current use of insulin    8. Peripheral vascular disease    9. Acute respiratory failure with hypoxia    10. Aspiration pneumonia, unspecified aspiration pneumonia type, unspecified laterality, unspecified part of lung   Fasting/ Morning blood glucose:   Lab Results   Component Value Date/Time    Glucose 98 10/06/2022 12:10 AM    Glucose (POC) 90 10/06/2022 06:21 AM     IV Fluids containing dextrose: none  Steroids:  n/a  Blood glucose values:      Within target range (70-180mg/dL):  YES  Current insulin orders: corrective lispro  Total Daily Dose previous 24 hours = 0  Current A1c:   Lab Results   Component Value Date/Time    Hemoglobin A1c 5.8 (H) 09/13/2022 09:13 PM    Hemoglobin A1c, External 8.7 04/21/2022 12:00 AM      equivalent  to ave Blood Glucose of 114 mg/dl for 2-3 months prior to admission  Adequate glycemic control PTA: YES  Nutrition/Diet:   Active Orders   Diet    DIET NPO      MHome diabetes medications:      insulin lispro (HUMALOG) 100 unit/mL injection Check FSBS Three times daily with meals,   For sugar between 150 and 200- give 1 units SQ,   For sugar between 201 and 250- give 3 units SQ,   For sugar between 251 and 300- give 5 units SQ,   For sugar between 301 and 400- give 7 units SQ,  For sugars > 400, contact PCP     Plan/Goals:   Blood glucose will be within target of 70 - 180 mg/dl within 72 hours     Education:  [] Refer to Diabetes Education Record                       [x] Education not indicated at this time - from [facility]    Saint Copes, RD [id number]

## 2022-10-06 NOTE — CONSULTS
Infectious Disease Consultation Note        Reason:septic shock    Current abx Prior abx   Piperacillin/tazobactam, vancomycin since 10/5/22      Lines:       Assessment :   78 y.o. male with a significant PMHx of PVD, hx gangrene b/l feet/toes s/p amputations and endovascular intervention from Podiatry and Vascular surgery 9/2022, HTN, DM II, Depression, tobacco abuse and chronic back pain in the setting of lumbar spondylosis s/p T12-L3 laminectomy, L1-3 TLIF, ISAK L3-5, instrumentation from T10-pelvis performed by Dr. Donald Baker on 4/15/2022 presented to [facility] ED from [facility] on 10/5/22 with acute encephalopathy, diaphoresis,hypoxia, SOB, hypotension, and tachycardia. Hospitalization at [facility] 9/2022 for  partially treated right foot cellulitis, dry gangrene of tip of toes bilateral feet status post right foot partial second and third toe amputation, left foot partial second and fourth toe amputation on 9/17/2022     Intra-Op culture left second toe 9/17-proteus, klebsiella, group B strep, Staph aureus  Intra-Op culture right second toe 9/17-heavy proteus, klebsiella, Staph aureus    Clinical presentation consistent with septic shock (present on admission) -exact etiology not entirely clear at this time. It could be secondary to multifocal pneumonia- ? Healthcare associated pneumonia/aspiration pneumonia versus left foot cellulitis    Increased erythema/warmth left foot suggestive of left foot cellulitis. Concurrent severe peripheral artery disease will likely mask the full clinical presentation    Acute hypoxic respiratory failure-could be secondary to healthcare associated pneumonia/aspiration pneumonia. Agree with ruling out pulm embolism looking at the elevated D-dimer     Peripheral arterial disease-. S/P orbital atherectomy of left tibial peroneal artery, drug-coated balloon angioplasty of the left popliteal and tibial peroneal artery, orbital atherectomy of the left superficial femoral artery, drug-coated balloon angioplasty of the left superficial femoral artery, balloon angioplasty and stenting of the left external iliac artery, angiogram of the right femoral artery on 9/16/22     Acute kidney injury-likely due to sepsis    Significantly elevated procalcitonin 38 on 10/6/2022 concerning for gram-negative sepsis     Recommendations:     continue Zosyn, vancomycin. Start IV levofloxacin to cover for multidrug-resistant gram-negative still further information obtained  Follow-up results of CTA chest   Follow-up podiatry/vascular surgery  recommendations  Continue wound care sacral decubiti  Follow-up blood cultures, urine cultures and modify antibiotics accordingly  Titrate pressors as tolerated  Wean oxygen as tolerated       Thank you for consultation request. Above plan was discussed in details with ICU team and dr Sabra Desir. Please call me if any further questions or concerns. Will continue to participate in the care of this patient. HPI:   78 y.o. male with a significant PMHx of PVD, hx gangrene b/l feet/toes s/p amputations and endovascular intervention from Podiatry and Vascular surgery 9/2022, HTN, DM II, Depression, tobacco abuse and chronic back pain in the setting of lumbar spondylosis s/p T12-L3 laminectomy, L1-3 TLIF, ISAK L3-5, instrumentation from T10-pelvis performed by Dr. Gagnon Party on 4/15/2022 presented to [facility] ED from [facility] on 10/5/22 with acute encephalopathy, diaphoresis,hypoxia, SOB, hypotension, and tachycardia. Further work-up demonstrated multifocal PNA on chest x-ray with extensive infiltrates. Patient was resuscitated with IVF, however, BP refractory to fluids, so patient was started on Levophed via PIV. Sepsis protocol initiated. Patient was pan cultured and RVP was obtained, which was negative. The patient was started on broad spectrum abx, Vanco and Zosyn and maintenance IVF.  Lab work demonstrated a mild hyponatremia, mild JAMIL, and lactic acid of 9.71. WBC 13.6, H&H 125/38.9. Patient was placed on a non-rebreather for his hypoxia. ABG revealed PO2 66, the remaining unremarkable. Patient remains on pressors at this time. Currently on high flow oxygen. I have been consulted for further recommendations. The patient is critically ill and can not provide additional history due to Unable to comprehend        Past Medical History:   Diagnosis Date    Arthritis     Cognitive impairment     Depression     Dorsalgia, unspecified     GERD (gastroesophageal reflux disease)     High blood pressure     Insomnia     L1 vertebral fracture (HCC)     Migraine headache     Other chronic pain     Periorbital headache     Sleep apnea     Spinal stenosis     Type 2 diabetes mellitus, without long-term current use of insulin ([facility] [address]        Past Surgical History:   Procedure Laterality Date    ENDOSCOPY, COLON, DIAGNOSTIC      HX LUMBAR DISKECTOMY  6/2001, 4/11    left L3-4 microdiskectomy with intradural rupture    HX ORTHOPAEDIC      spinal Surgery Dr. Dakota Cisse  9/11    spinal fusion, L3-5       Current Discharge Medication List        CONTINUE these medications which have NOT CHANGED    Details   insulin lispro (HUMALOG) 100 unit/mL injection Check FSBS Three times daily with meals,   For sugar between 150 and 200- give 1 units SQ,   For sugar between 201 and 250- give 3 units SQ,   For sugar between 251 and 300- give 5 units SQ,   For sugar between 301 and 400- give 7 units SQ,  For sugars > 400, contact PCP  Qty: 1 Each, Refills: 0      L. acidophilus,casei,rhamnosus (BIO-K PLUS) 50 billion cell cpDR capsule Take 1 Capsule by mouth daily for 14 days. Qty: 14 Capsule, Refills: 0      polyethylene glycol (MIRALAX) 17 gram packet Take 1 Packet by mouth daily as needed for Constipation. Qty: 15 Each, Refills: 0      therapeutic multivitamin (THERAGRAN) tablet Take 1 Tablet by mouth daily.   Qty: 30 Tablet, Refills: 0 clopidogreL (Plavix) 75 mg tab Take 1 Tablet by mouth daily. Qty: 30 Tablet, Refills: 3      divalproex DR (Depakote) 250 mg tablet Take  by mouth two (2) times a day.      gabapentin (NEURONTIN) 300 mg capsule Take 300 mg by mouth two (2) times a day. dronabinoL (MARINOL) 2.5 mg capsule Take 2.5 mg by mouth two (2) times a day. Associated Diagnoses: Weight loss      sertraline (ZOLOFT) 50 mg tablet Take 1 Tablet by mouth daily. Qty: 90 Tablet, Refills: 2    Associated Diagnoses:  Moderate episode of recurrent major depressive disorder (HCC)             Current Facility-Administered Medications   Medication Dose Route Frequency    0.9% sodium chloride infusion  150 mL/hr IntraVENous CONTINUOUS    NOREPINephrine (LEVOPHED) 8 mg in 5% dextrose 250mL (32 mcg/mL) infusion  0.5-50 mcg/min IntraVENous TITRATE    acetaminophen (TYLENOL) tablet 650 mg  650 mg Oral Q6H PRN    Or    acetaminophen (TYLENOL) suppository 650 mg  650 mg Rectal Q6H PRN    polyethylene glycol (MIRALAX) packet 17 g  17 g Oral DAILY PRN    ondansetron (ZOFRAN ODT) tablet 4 mg  4 mg Oral Q8H PRN    Or    ondansetron (ZOFRAN) injection 4 mg  4 mg IntraVENous Q6H PRN    enoxaparin (LOVENOX) injection 40 mg  40 mg SubCUTAneous DAILY    famotidine (PF) (PEPCID) 20 mg in 0.9% sodium chloride 10 mL injection  20 mg IntraVENous BID    nicotine (NICODERM CQ) 14 mg/24 hr patch 1 Patch  1 Patch TransDERmal DAILY    insulin lispro (HUMALOG) injection   SubCUTAneous Q6H    glucose chewable tablet 16 g  4 Tablet Oral PRN    glucagon (GLUCAGEN) injection 1 mg  1 mg IntraMUSCular PRN    dextrose 10% infusion 0-250 mL  0-250 mL IntraVENous PRN    albuterol-ipratropium (DUO-NEB) 2.5 MG-0.5 MG/3 ML  3 mL Nebulization Q6H PRN    iopamidoL (ISOVUE-370) 76 % injection  mL   mL IntraVENous RAD ONCE    piperacillin-tazobactam (ZOSYN) 3.375 g in 0.9% sodium chloride (MBP/ADV) 100 mL MBP  3.375 g IntraVENous Q8H    vancomycin (VANCOCIN) 1250 mg in NS 250 ml infusion  1,250 mg IntraVENous Q24H       Allergies: Codeine and Tegretol [carbamazepine]    Family History   Problem Relation Age of Onset    Heart Attack Mother     Cancer Mother     Heart Disease Mother     Hypertension Father     Seizures Father     Alcohol abuse Father      Social History     Socioeconomic History    Marital status:      Spouse name: Not on file    Number of children: Not on file    Years of education: Not on file    Highest education level: Not on file   Occupational History    Not on file   Tobacco Use    Smoking status: Every Day     Packs/day: 1.00     Years: 51.00     Pack years: 51.00     Types: Cigarettes    Smokeless tobacco: Never   Vaping Use    Vaping Use: Never used   Substance and Sexual Activity    Alcohol use: Yes     Comment: rarely    Drug use: No    Sexual activity: Yes     Partners: Female   Other Topics Concern    Not on file   Social History Narrative    Not on file     Social Determinants of Health     Financial Resource Strain: Not on file   Food Insecurity: Not on file   Transportation Needs: Not on file   Physical Activity: Not on file   Stress: Not on file   Social Connections: Not on file   Intimate Partner Violence: Not on file   Housing Stability: Not on file     Social History     Tobacco Use   Smoking Status Every Day    Packs/day: 1.00    Years: 51.00    Pack years: 51.00    Types: Cigarettes   Smokeless Tobacco Never        Temp (24hrs), Av °F (38.3 °C), Min:98.1 °F (36.7 °C), Max:102.8 °F (39.3 °C)    Visit Vitals  BP 95/74   Pulse (!) 118   Temp 100.2 °F (37.9 °C)   Resp 28   Ht 6' (1.829 m) Comment: estimated   Wt 68 kg (150 lb)   SpO2 100%   BMI 20.34 kg/m²       ROS: 12 point ROS obtained in details.  Pertinent positives as mentioned in HPI,   otherwise negative    Physical Exam:    General:  laying on bed, sleepy, not very communicative  HEENT: EOMI, supple neck, no JVD, dry oral mucosa  Cardiovascular: S1S2 regular, no rub/gallop Pulmonary: air entry bilaterally, no wheezing, no crackle  GI:  Soft, non tender, non distended, +bs, no guarding   Extremities: + Pedal edema, no calf tenderness, tip of multiple toes with dry necrosis, increased warmth left foot  Neuro: AOx2, moving all extremities  Skin: Dry gangrene of the tip of multiple toes bilateral feet, mild erythema left foot, deep tissue injury with some necrosis in the sacrum-no purulent drainage       Labs: Results:   Chemistry Recent Labs     10/06/22  1057 10/06/22  0010 10/05/22  1940 10/03/22  1245   GLU 72* 98 239* 181*    143 137 139   K 4.4 4.2 4.9 4.3   * 112* 100 96*   CO2 26 25 26 30   BUN 27* 24* 24* 25*   CREA 1.58* 1.09 1.34* 1.20   CA 8.1* 7.8* 9.9 9.7   AGAP 6 6 11 12   BUCR 17 22* 18  --    AP 69  --  98 92   TP 6.2*  --  8.6* 7.3   ALB 2.0*  --  2.7* 2.8*   GLOB 4.2*  --  5.9*  --    AGRAT 0.5*  --  0.5*  --       CBC w/Diff Recent Labs     10/06/22  1057 10/05/22  1940 10/03/22  1245   WBC 13.2 13.6* 10.3   RBC 3.31* 4.28* 3.87   HGB 9.5* 12.5* 11.3*   HCT 30.4* 38.9 35.4*    435* 354   GRANS PENDING 88* 82.5*   LYMPH PENDING 10* 10.8*   EOS PENDING 0 0.6      Microbiology Recent Labs     10/05/22  2004 10/05/22  1940   CULT NO GROWTH AFTER 10 HOURS NO GROWTH AFTER 10 HOURS          RADIOLOGY:    All available imaging studies/reports in [facility] care for this admission were reviewed      Disclaimer: Sections of this note are dictated utilizing voice recognition software, which may have resulted in some phonetic based errors in grammar and contents. Even though attempts were made to correct all the mistakes, some may have been missed, and remained in the body of the document. If questions arise, please contact our department.     Dr. Jailene Reese, Infectious Disease Specialist  [phone]  October 6, 2022  11:52 AM

## 2022-10-06 NOTE — PROGRESS NOTES
0700: Bedside and Verbal shift change report given to Judith Chaney, RN and Adriel Manrique RN (oncoming nurse) by Gregg Ruff RN (offgoing nurse). Report included the following information SBAR, Kardex, MAR, Recent Results, and Cardiac Rhythm Sinus Tachy . 0800: Upon assessment, pt still confused, drowsy, responds to voice. Vitals stable. No signs of pt in pain, no facial grimacing. Pt resting quietly.

## 2022-10-06 NOTE — PROGRESS NOTES
[facility] Update:  Call placed to Ilya Rajput, patient's son, but no answer. Call placed to patient's other son, Nino Haider whom I spoke with. He was unaware that his father was readmitted tonight as he was not contacted by the nursing home. We discussed his fathers admission to the hospital, which is likely related to sepsis secondary to multifocal PNA +/- gangrenous BLE (less likely) and that he requires vasopressor support. I informed him that there was concern for altered mental status by the nursing home and inquired about his baseline. He states that his father does not speak much, but he believes he would be able to follow simple commands if prompted. He also stated that ever since a surgery he had in April (likely his back surgery) that he has not been the same and has gone downhill. He states that his father does not each or drink much.  He states that he visits his father every weekend because he works out of town and he will be back into town tomorrow around 2-3 PM and will come visit his father in the hospital.       Nica Bowie, AGACNP-BC  10/06/22  Pulmonary, [address] [facility]

## 2022-10-06 NOTE — PROGRESS NOTES
[facility] [facility] Pharmacokinetic Monitoring Service - Vancomycin     Adam Reza is a 78 y.o. male starting on vancomycin therapy for Diabetic Foot Infection. Pharmacy consulted by Dr. Janey Ayala for monitoring and adjustment. Target Concentration: Goal AUC/CARYL 400-600 mg*hr/L    Additional Antimicrobials: Piperacillin/Tazobactam    Pertinent Laboratory Values:   Temp: 98.1 °F (36.7 °C)  Weight: 68 kg (150 lb)  Recent Labs     10/05/22  1940 10/03/22  1245   CREA  --  1.20   BUN  --  25*   WBC 13.6* 10.3     Estimated Creatinine Clearance: 51.2 mL/min (by C-G formula based on SCr of 1.2 mg/dL). Pertinent Cultures:  Culture Date Source Results   10/5 Blood  Pending   MRSA Nasal Swab: N/A. Non-respiratory infection    Plan:  Dosing recommendations based on Bayesian software  Give loading dose of Vancomycin 1500 mg (given as 1000 mg + 500 mg at HVED) followed by Vancomycin 1250 mg q24h   Anticipated AUC of 539 and trough concentration of 16.1 at steady state  BMP for tomorrow AM   No level ordered at this time   Pharmacy will continue to monitor patient and adjust therapy as indicated    Thank you for the consult,  Natalie Madison.  MILDRED New  10/5/2022

## 2022-10-07 ENCOUNTER — ANESTHESIA (OUTPATIENT)
Dept: SURGERY | Age: 77
DRG: 853 | End: 2022-10-07
Payer: MEDICARE

## 2022-10-07 ENCOUNTER — APPOINTMENT (OUTPATIENT)
Dept: NON INVASIVE DIAGNOSTICS | Age: 77
DRG: 853 | End: 2022-10-07
Attending: NURSE PRACTITIONER
Payer: MEDICARE

## 2022-10-07 ENCOUNTER — ANESTHESIA EVENT (OUTPATIENT)
Dept: SURGERY | Age: 77
DRG: 853 | End: 2022-10-07
Payer: MEDICARE

## 2022-10-07 LAB
ANION GAP SERPL CALC-SCNC: 8 MMOL/L (ref 3–18)
BACTERIA SPEC CULT: NORMAL
BASOPHILS # BLD: 0 K/UL (ref 0–0.1)
BASOPHILS NFR BLD: 0 % (ref 0–2)
BUN SERPL-MCNC: 29 MG/DL (ref 7–18)
BUN/CREAT SERPL: 24 (ref 12–20)
CALCIUM SERPL-MCNC: 7.6 MG/DL (ref 8.5–10.1)
CHLORIDE SERPL-SCNC: 114 MMOL/L (ref 100–111)
CO2 SERPL-SCNC: 25 MMOL/L (ref 21–32)
CREAT SERPL-MCNC: 1.21 MG/DL (ref 0.6–1.3)
DIFFERENTIAL METHOD BLD: ABNORMAL
EOSINOPHIL # BLD: 0 K/UL (ref 0–0.4)
EOSINOPHIL NFR BLD: 0 % (ref 0–5)
ERYTHROCYTE [DISTWIDTH] IN BLOOD BY AUTOMATED COUNT: 14 % (ref 11.6–14.5)
GLUCOSE BLD STRIP.AUTO-MCNC: 114 MG/DL (ref 70–110)
GLUCOSE BLD STRIP.AUTO-MCNC: 115 MG/DL (ref 70–110)
GLUCOSE BLD STRIP.AUTO-MCNC: 127 MG/DL (ref 70–110)
GLUCOSE BLD STRIP.AUTO-MCNC: 139 MG/DL (ref 70–110)
GLUCOSE BLD STRIP.AUTO-MCNC: 152 MG/DL (ref 70–110)
GLUCOSE SERPL-MCNC: 106 MG/DL (ref 74–99)
HCT VFR BLD AUTO: 27.4 % (ref 36–48)
HGB BLD-MCNC: 8.8 G/DL (ref 13–16)
IMM GRANULOCYTES # BLD AUTO: 0 K/UL
IMM GRANULOCYTES NFR BLD AUTO: 0 %
LYMPHOCYTES # BLD: 0.1 K/UL (ref 0.9–3.6)
LYMPHOCYTES NFR BLD: 1 % (ref 21–52)
MAGNESIUM SERPL-MCNC: 2.1 MG/DL (ref 1.6–2.6)
MCH RBC QN AUTO: 29.2 PG (ref 24–34)
MCHC RBC AUTO-ENTMCNC: 32.1 G/DL (ref 31–37)
MCV RBC AUTO: 91 FL (ref 78–100)
METAMYELOCYTES NFR BLD MANUAL: 3 %
MONOCYTES # BLD: 0.4 K/UL (ref 0.05–1.2)
MONOCYTES NFR BLD: 3 % (ref 3–10)
NEUTS BAND NFR BLD MANUAL: 38 % (ref 0–5)
NEUTS SEG # BLD: 13.6 K/UL (ref 1.8–8)
NEUTS SEG NFR BLD: 55 % (ref 40–73)
NRBC # BLD: 0 K/UL (ref 0–0.01)
NRBC BLD-RTO: 0 PER 100 WBC
PHOSPHATE SERPL-MCNC: 3.3 MG/DL (ref 2.5–4.9)
PLATELET # BLD AUTO: 204 K/UL (ref 135–420)
PLATELET COMMENTS,PCOM: ABNORMAL
PMV BLD AUTO: 10.7 FL (ref 9.2–11.8)
POTASSIUM SERPL-SCNC: 3.6 MMOL/L (ref 3.5–5.5)
RBC # BLD AUTO: 3.01 M/UL (ref 4.35–5.65)
RBC MORPH BLD: ABNORMAL
RBC MORPH BLD: ABNORMAL
SERVICE CMNT-IMP: NORMAL
SODIUM SERPL-SCNC: 147 MMOL/L (ref 136–145)
VANCOMYCIN SERPL-MCNC: 14.6 UG/ML (ref 5–40)
WBC # BLD AUTO: 14.6 K/UL (ref 4.6–13.2)
WBC MORPH BLD: ABNORMAL

## 2022-10-07 PROCEDURE — 77030013708 HC HNDPC SUC IRR PULS STRY –B: Performed by: PODIATRIST

## 2022-10-07 PROCEDURE — 77030006773 HC BLD SAW OSC BRSM -A: Performed by: PODIATRIST

## 2022-10-07 PROCEDURE — 74011250637 HC RX REV CODE- 250/637: Performed by: NURSE PRACTITIONER

## 2022-10-07 PROCEDURE — 74011000250 HC RX REV CODE- 250: Performed by: NURSE PRACTITIONER

## 2022-10-07 PROCEDURE — 0Y6N0ZF DETACHMENT AT LEFT FOOT, PARTIAL 5TH RAY, OPEN APPROACH: ICD-10-PCS | Performed by: PODIATRIST

## 2022-10-07 PROCEDURE — 77030011265 HC ELECTRD BLD HEX COVD -A: Performed by: PODIATRIST

## 2022-10-07 PROCEDURE — 85025 COMPLETE CBC W/AUTO DIFF WBC: CPT

## 2022-10-07 PROCEDURE — 87176 TISSUE HOMOGENIZATION CULTR: CPT

## 2022-10-07 PROCEDURE — 99291 CRITICAL CARE FIRST HOUR: CPT | Performed by: INTERNAL MEDICINE

## 2022-10-07 PROCEDURE — 82962 GLUCOSE BLOOD TEST: CPT

## 2022-10-07 PROCEDURE — 74011636637 HC RX REV CODE- 636/637: Performed by: NURSE PRACTITIONER

## 2022-10-07 PROCEDURE — 65610000006 HC RM INTENSIVE CARE

## 2022-10-07 PROCEDURE — 77010033678 HC OXYGEN DAILY

## 2022-10-07 PROCEDURE — 94762 N-INVAS EAR/PLS OXIMTRY CONT: CPT

## 2022-10-07 PROCEDURE — 83735 ASSAY OF MAGNESIUM: CPT

## 2022-10-07 PROCEDURE — 2709999900 HC NON-CHARGEABLE SUPPLY: Performed by: PODIATRIST

## 2022-10-07 PROCEDURE — 0Y6N0Z9 DETACHMENT AT LEFT FOOT, PARTIAL 1ST RAY, OPEN APPROACH: ICD-10-PCS | Performed by: PODIATRIST

## 2022-10-07 PROCEDURE — 36415 COLL VENOUS BLD VENIPUNCTURE: CPT

## 2022-10-07 PROCEDURE — P9045 ALBUMIN (HUMAN), 5%, 250 ML: HCPCS | Performed by: PHYSICIAN ASSISTANT

## 2022-10-07 PROCEDURE — 88307 TISSUE EXAM BY PATHOLOGIST: CPT

## 2022-10-07 PROCEDURE — 76010000149 HC OR TIME 1 TO 1.5 HR: Performed by: PODIATRIST

## 2022-10-07 PROCEDURE — 74011250636 HC RX REV CODE- 250/636: Performed by: NURSE PRACTITIONER

## 2022-10-07 PROCEDURE — 0Y6N0ZC DETACHMENT AT LEFT FOOT, PARTIAL 3RD RAY, OPEN APPROACH: ICD-10-PCS | Performed by: PODIATRIST

## 2022-10-07 PROCEDURE — 74011250636 HC RX REV CODE- 250/636: Performed by: EMERGENCY MEDICINE

## 2022-10-07 PROCEDURE — 87205 SMEAR GRAM STAIN: CPT

## 2022-10-07 PROCEDURE — 74011250636 HC RX REV CODE- 250/636: Performed by: INTERNAL MEDICINE

## 2022-10-07 PROCEDURE — 74011000258 HC RX REV CODE- 258: Performed by: EMERGENCY MEDICINE

## 2022-10-07 PROCEDURE — 84100 ASSAY OF PHOSPHORUS: CPT

## 2022-10-07 PROCEDURE — 74011250636 HC RX REV CODE- 250/636: Performed by: NURSE ANESTHETIST, CERTIFIED REGISTERED

## 2022-10-07 PROCEDURE — 87186 SC STD MICRODIL/AGAR DIL: CPT

## 2022-10-07 PROCEDURE — 01480 ANES OPEN PX LOWER L/A/F NOS: CPT | Performed by: STUDENT IN AN ORGANIZED HEALTH CARE EDUCATION/TRAINING PROGRAM

## 2022-10-07 PROCEDURE — 74011000250 HC RX REV CODE- 250: Performed by: EMERGENCY MEDICINE

## 2022-10-07 PROCEDURE — 88311 DECALCIFY TISSUE: CPT

## 2022-10-07 PROCEDURE — 74011250636 HC RX REV CODE- 250/636: Performed by: PHYSICIAN ASSISTANT

## 2022-10-07 PROCEDURE — 77030018836 HC SOL IRR NACL ICUM -A: Performed by: PODIATRIST

## 2022-10-07 PROCEDURE — 0Y6N0ZD DETACHMENT AT LEFT FOOT, PARTIAL 4TH RAY, OPEN APPROACH: ICD-10-PCS | Performed by: PODIATRIST

## 2022-10-07 PROCEDURE — 76060000033 HC ANESTHESIA 1 TO 1.5 HR: Performed by: PODIATRIST

## 2022-10-07 PROCEDURE — 87077 CULTURE AEROBIC IDENTIFY: CPT

## 2022-10-07 PROCEDURE — 0Y6N0ZB DETACHMENT AT LEFT FOOT, PARTIAL 2ND RAY, OPEN APPROACH: ICD-10-PCS | Performed by: PODIATRIST

## 2022-10-07 PROCEDURE — 77030002986 HC SUT PROL J&J -A: Performed by: PODIATRIST

## 2022-10-07 PROCEDURE — 74011000250 HC RX REV CODE- 250: Performed by: PODIATRIST

## 2022-10-07 PROCEDURE — 77030000032 HC CUF TRNQT ZIMM -B: Performed by: PODIATRIST

## 2022-10-07 PROCEDURE — 87075 CULTR BACTERIA EXCEPT BLOOD: CPT

## 2022-10-07 PROCEDURE — 74011250636 HC RX REV CODE- 250/636: Performed by: REGISTERED NURSE

## 2022-10-07 PROCEDURE — 2709999900 HC NON-CHARGEABLE SUPPLY

## 2022-10-07 PROCEDURE — 80048 BASIC METABOLIC PNL TOTAL CA: CPT

## 2022-10-07 PROCEDURE — P9047 ALBUMIN (HUMAN), 25%, 50ML: HCPCS | Performed by: INTERNAL MEDICINE

## 2022-10-07 PROCEDURE — 74011000250 HC RX REV CODE- 250: Performed by: NURSE ANESTHETIST, CERTIFIED REGISTERED

## 2022-10-07 PROCEDURE — 80202 ASSAY OF VANCOMYCIN: CPT

## 2022-10-07 PROCEDURE — 99100 ANES PT EXTEME AGE<1 YR&>70: CPT | Performed by: STUDENT IN AN ORGANIZED HEALTH CARE EDUCATION/TRAINING PROGRAM

## 2022-10-07 PROCEDURE — 88305 TISSUE EXAM BY PATHOLOGIST: CPT

## 2022-10-07 RX ORDER — PROPOFOL 10 MG/ML
VIAL (ML) INTRAVENOUS
Status: DISCONTINUED | OUTPATIENT
Start: 2022-10-07 | End: 2022-10-07 | Stop reason: HOSPADM

## 2022-10-07 RX ORDER — CLOPIDOGREL BISULFATE 75 MG/1
75 TABLET ORAL DAILY
Status: DISCONTINUED | OUTPATIENT
Start: 2022-10-07 | End: 2022-10-13 | Stop reason: HOSPADM

## 2022-10-07 RX ORDER — LEVOFLOXACIN 5 MG/ML
750 INJECTION, SOLUTION INTRAVENOUS EVERY 24 HOURS
Status: DISCONTINUED | OUTPATIENT
Start: 2022-10-07 | End: 2022-10-10

## 2022-10-07 RX ORDER — ALBUMIN HUMAN 50 G/1000ML
25 SOLUTION INTRAVENOUS ONCE
Status: COMPLETED | OUTPATIENT
Start: 2022-10-07 | End: 2022-10-07

## 2022-10-07 RX ORDER — POTASSIUM CHLORIDE 7.45 MG/ML
10 INJECTION INTRAVENOUS
Status: COMPLETED | OUTPATIENT
Start: 2022-10-07 | End: 2022-10-07

## 2022-10-07 RX ORDER — ALBUMIN HUMAN 250 G/1000ML
25 SOLUTION INTRAVENOUS EVERY 6 HOURS
Status: COMPLETED | OUTPATIENT
Start: 2022-10-07 | End: 2022-10-09

## 2022-10-07 RX ORDER — LIDOCAINE HYDROCHLORIDE 20 MG/ML
INJECTION, SOLUTION EPIDURAL; INFILTRATION; INTRACAUDAL; PERINEURAL AS NEEDED
Status: DISCONTINUED | OUTPATIENT
Start: 2022-10-07 | End: 2022-10-07 | Stop reason: HOSPADM

## 2022-10-07 RX ADMIN — POTASSIUM CHLORIDE 10 MEQ: 7.45 INJECTION INTRAVENOUS at 09:00

## 2022-10-07 RX ADMIN — PROPOFOL 50 MCG/KG/MIN: 10 INJECTION, EMULSION INTRAVENOUS at 15:20

## 2022-10-07 RX ADMIN — VANCOMYCIN HYDROCHLORIDE 1250 MG: 10 INJECTION, POWDER, LYOPHILIZED, FOR SOLUTION INTRAVENOUS at 16:50

## 2022-10-07 RX ADMIN — FAMOTIDINE 20 MG: 10 INJECTION, SOLUTION INTRAVENOUS at 09:15

## 2022-10-07 RX ADMIN — PIPERACILLIN AND TAZOBACTAM 3.38 G: 3; .375 INJECTION, POWDER, FOR SOLUTION INTRAVENOUS at 03:02

## 2022-10-07 RX ADMIN — ALBUMIN (HUMAN) 25 G: 0.25 INJECTION, SOLUTION INTRAVENOUS at 23:02

## 2022-10-07 RX ADMIN — ALBUMIN (HUMAN) 25 G: 12.5 SOLUTION INTRAVENOUS at 09:14

## 2022-10-07 RX ADMIN — LEVOFLOXACIN 750 MG: 5 INJECTION, SOLUTION INTRAVENOUS at 16:38

## 2022-10-07 RX ADMIN — NOREPINEPHRINE BITARTRATE 4 MCG/MIN: 1 INJECTION, SOLUTION, CONCENTRATE INTRAVENOUS at 13:58

## 2022-10-07 RX ADMIN — PIPERACILLIN AND TAZOBACTAM 3.38 G: 3; .375 INJECTION, POWDER, FOR SOLUTION INTRAVENOUS at 18:44

## 2022-10-07 RX ADMIN — POTASSIUM CHLORIDE 10 MEQ: 7.45 INJECTION INTRAVENOUS at 08:19

## 2022-10-07 RX ADMIN — Medication 2 UNITS: at 18:02

## 2022-10-07 RX ADMIN — LIDOCAINE HYDROCHLORIDE 40 MG: 20 INJECTION, SOLUTION EPIDURAL; INFILTRATION; INTRACAUDAL; PERINEURAL at 15:20

## 2022-10-07 RX ADMIN — ENOXAPARIN SODIUM 40 MG: 100 INJECTION SUBCUTANEOUS at 09:15

## 2022-10-07 RX ADMIN — POTASSIUM CHLORIDE 10 MEQ: 7.45 INJECTION INTRAVENOUS at 10:23

## 2022-10-07 RX ADMIN — POTASSIUM CHLORIDE 10 MEQ: 7.45 INJECTION INTRAVENOUS at 06:21

## 2022-10-07 RX ADMIN — ALBUMIN (HUMAN) 25 G: 0.25 INJECTION, SOLUTION INTRAVENOUS at 16:36

## 2022-10-07 RX ADMIN — PIPERACILLIN AND TAZOBACTAM 3.38 G: 3; .375 INJECTION, POWDER, FOR SOLUTION INTRAVENOUS at 11:53

## 2022-10-07 RX ADMIN — ALBUMIN (HUMAN) 25 G: 0.25 INJECTION, SOLUTION INTRAVENOUS at 18:44

## 2022-10-07 RX ADMIN — FAMOTIDINE 20 MG: 10 INJECTION, SOLUTION INTRAVENOUS at 18:44

## 2022-10-07 NOTE — CONSULTS
Podiatry History and Physical    Subjective:         Date of Consultation:  October 7, 2022    Patient is a 78 y.o.  male who is being seen for bilateral toe gangrene. Patient found to be septic. Patient had partial amputation of right second, third and left second and fourth toe amputation. He was never able to follow up in office. Patient is confused and unable to get history.      Patient Active Problem List    Diagnosis Date Noted    Pneumonia 10/06/2022    Sepsis ([address] 10/06/2022    Multifocal pneumonia 10/06/2022    Moderate protein-calorie malnutrition ([address] 09/15/2022    Gangrene ([address] 09/13/2022    Cellulitis 09/13/2022    Major depressive disorder, recurrent, mild 06/09/2022    Major depressive disorder, recurrent, moderate 06/09/2022    Major depressive disorder, recurrent, unspecified 06/09/2022    Type 2 diabetes mellitus with diabetic neuropathy, without long-term current use of insulin ([facility] 10/01/2018    Chronic midline low back pain without sciatica 10/01/2018    Erectile dysfunction due to arterial insufficiency 10/01/2018    Hyperlipemia     Sleep apnea     Cluster headaches     Thoracic or lumbosacral neuritis or radiculitis, unspecified     Degeneration of lumbar or lumbosacral intervertebral disc     Lumbago      Past Medical History:   Diagnosis Date    Arthritis     Cognitive impairment     Depression     Dorsalgia, unspecified     GERD (gastroesophageal reflux disease)     High blood pressure     Insomnia     L1 vertebral fracture (HCC)     Migraine headache     Other chronic pain     Periorbital headache     Sleep apnea     Spinal stenosis     Type 2 diabetes mellitus, without long-term current use of insulin ([facility]       Family History   Problem Relation Age of Onset    Heart Attack Mother     Cancer Mother     Heart Disease Mother     Hypertension Father     Seizures Father     Alcohol abuse Father       Social History     Tobacco Use    Smoking status: Every Day     Packs/day: 1.00     Years: 51.00     Pack years: 51.00     Types: Cigarettes    Smokeless tobacco: Never   Substance Use Topics    Alcohol use: Yes     Comment: rarely     Past Surgical History:   Procedure Laterality Date    ENDOSCOPY, COLON, DIAGNOSTIC      HX LUMBAR DISKECTOMY  6/2001, 4/11    left L3-4 microdiskectomy with intradural rupture    HX ORTHOPAEDIC      spinal Surgery Dr. Sparkle Valdivia  9/11    spinal fusion, L3-5      Prior to Admission medications    Medication Sig Start Date End Date Taking? Authorizing Provider   insulin lispro (HUMALOG) 100 unit/mL injection Check FSBS Three times daily with meals,   For sugar between 150 and 200- give 1 units SQ,   For sugar between 201 and 250- give 3 units SQ,   For sugar between 251 and 300- give 5 units SQ,   For sugar between 301 and 400- give 7 units SQ,  For sugars > 400, contact PCP 9/21/22   Charlee Amaral MD   L. acidophilus,casei,rhamnosus (BIO-K PLUS) 50 billion cell cpDR capsule Take 1 Capsule by mouth daily for 14 days. 9/22/22 10/6/22  Charlee Amaral MD   polyethylene glycol (MIRALAX) 17 gram packet Take 1 Packet by mouth daily as needed for Constipation. 9/21/22   Charlee Amaral MD   therapeutic multivitamin [facility]) tablet Take 1 Tablet by mouth daily. 9/22/22   Charlee Amaral MD   clopidogreL (Plavix) 75 mg tab Take 1 Tablet by mouth daily. 9/17/22   Josleo Echols MD   divalproex DR (DEPAKOTE) 250 mg tablet Take  by mouth two (2) times a day. Provider, Historical   gabapentin (NEURONTIN) 300 mg capsule Take 300 mg by mouth two (2) times a day. Provider, Historical   dronabinoL (MARINOL) 2.5 mg capsule Take 2.5 mg by mouth two (2) times a day. 6/2/22   Provider, Historical   sertraline (ZOLOFT) 50 mg tablet Take 1 Tablet by mouth daily.  6/9/22   Leopoldo Seitz, MD     Allergies   Allergen Reactions    Codeine Not Reported This Time    Tegretol [Carbamazepine] Not Reported This Time        Review of Systems:  A comprehensive review of systems was negative except for that written in the HPI. Objective:     Patient Vitals for the past 8 hrs:   BP Temp Pulse Resp SpO2   10/07/22 0000 (!) 88/61 97.8 °F (36.6 °C) (!) 113 23 100 %   10/06/22 2345 110/77 -- (!) 114 14 100 %   10/06/22 2330 105/77 -- (!) 113 24 99 %   10/06/22 2315 103/62 -- (!) 115 25 99 %   10/06/22 2300 102/80 -- (!) 116 24 99 %   10/06/22 2245 (!) 86/61 -- (!) 115 25 100 %   10/06/22 2230 93/79 -- (!) 116 23 99 %   10/06/22 2215 107/74 -- (!) 118 24 99 %   10/06/22 2200 104/62 -- (!) 117 20 97 %   10/06/22 2145 (!) 94/53 -- (!) 118 20 99 %   10/06/22 2130 94/67 -- (!) 119 25 99 %   10/06/22 2115 (!) 79/35 -- (!) 117 26 100 %   10/06/22 2100 (!) 86/65 -- (!) 115 27 97 %   10/06/22 2045 (!) 77/58 -- (!) 116 24 99 %   10/06/22 2030 93/62 -- (!) 112 24 97 %   10/06/22 2015 (!) 76/45 -- (!) 114 27 99 %   10/06/22 2010 -- -- -- -- 99 %   10/06/22 2000 (!) 63/54 98.2 °F (36.8 °C) (!) 110 24 99 %   10/06/22 1900 (!) 96/54 -- (!) 111 24 97 %   10/06/22 1800 (!) 103/55 -- (!) 109 25 98 %   10/06/22 1737 -- -- -- -- 99 %   10/06/22 1700 (!) 122/94 -- (!) 115 20 98 %     Temp (24hrs), Av.8 °F (37.7 °C), Min:97.8 °F (36.6 °C), Max:101.8 °F (38.8 °C)    Bilateral VICENTE:      Vascular: DP pulses are non-palpable bilateral. PT pulses are barely palpable. Absent hair growth noticed on the dorsal foot and digits. Capillary refill time is delayed to distal digits bilateral. No edema around bilateral LE. Neurological: Protective sensation is diminished to bilateral foot upon testing with a Semmis Sterling 5.07 monofilament. Sharp/dull sensation is diminished to the fore foot. Achilles and patellar deep tendon reflexes are within normal limits bilateral.  Dermatological: Skin shows signs of mild atrophy with diffuse dry xerosis. Dry gangrene with eschar of right distal fourth toe, remaining stumps of second and third toes.  Left foot hallux and second toes noted to have dry gangrene. Fourth toe noted to be necrotic. Warmth to touch to left foot. No malodor noted. No purulence noted. Mild localized erythema and edema noted to left forefoot. Musculoskeletal: Muscle Strength is 5/5 for all extrinsic muscle groups of the foot bilateral. First MTPJ and Ankle range of motion is mildly limited with no pain or crepitus. Data Review:   Recent Results (from the past 24 hour(s))   BLOOD GAS, ARTERIAL POC    Collection Time: 10/06/22  1:25 AM   Result Value Ref Range    Device: Non rebreather      pH (POC) 7.41 7.35 - 7.45      pCO2 (POC) 37.6 35.0 - 45.0 MMHG    pO2 (POC) 66 (L) 80 - 100 MMHG    HCO3 (POC) 24.0 22 - 26 MMOL/L    sO2 (POC) 93.1 92 - 97 %    Base deficit (POC) 0.4 mmol/L    Allens test (POC) Positive      Site RIGHT RADIAL      Specimen type (POC) ARTERIAL      Performed by Rakesh Max    LEGIONELLA PNEUMOPHILA AG, URINE    Collection Time: 10/06/22  5:51 AM    Specimen: Urine, random   Result Value Ref Range    Legionella Ag, urine Negative NEG     STREP PNEUMO AG, URINE    Collection Time: 10/06/22  5:51 AM    Specimen: Urine, random   Result Value Ref Range    Strep pneumo Ag, urine Negative NEG     GLUCOSE, POC    Collection Time: 10/06/22  6:21 AM   Result Value Ref Range    Glucose (POC) 90 70 - 569 mg/dL   METABOLIC PANEL, COMPREHENSIVE    Collection Time: 10/06/22 10:57 AM   Result Value Ref Range    Sodium 145 136 - 145 mmol/L    Potassium 4.4 3.5 - 5.5 mmol/L    Chloride 113 (H) 100 - 111 mmol/L    CO2 26 21 - 32 mmol/L    Anion gap 6 3.0 - 18 mmol/L    Glucose 72 (L) 74 - 99 mg/dL    BUN 27 (H) 7.0 - 18 MG/DL    Creatinine 1.58 (H) 0.6 - 1.3 MG/DL    BUN/Creatinine ratio 17 12 - 20      eGFR 45 (L) >60 ml/min/1.73m2    Calcium 8.1 (L) 8.5 - 10.1 MG/DL    Bilirubin, total 0.5 0.2 - 1.0 MG/DL    ALT (SGPT) 13 (L) 16 - 61 U/L    AST (SGOT) 56 (H) 10 - 38 U/L    Alk.  phosphatase 69 45 - 117 U/L    Protein, total 6.2 (L) 6.4 - 8.2 g/dL Albumin 2.0 (L) 3.4 - 5.0 g/dL    Globulin 4.2 (H) 2.0 - 4.0 g/dL    A-G Ratio 0.5 (L) 0.8 - 1.7     MAGNESIUM    Collection Time: 10/06/22 10:57 AM   Result Value Ref Range    Magnesium 1.9 1.6 - 2.6 mg/dL   PHOSPHORUS    Collection Time: 10/06/22 10:57 AM   Result Value Ref Range    Phosphorus 3.9 2.5 - 4.9 MG/DL   CBC WITH AUTOMATED DIFF    Collection Time: 10/06/22 10:57 AM   Result Value Ref Range    WBC 13.2 4.6 - 13.2 K/uL    RBC 3.31 (L) 4.35 - 5.65 M/uL    HGB 9.5 (L) 13.0 - 16.0 g/dL    HCT 30.4 (L) 36.0 - 48.0 %    MCV 91.8 78.0 - 100.0 FL    MCH 28.7 24.0 - 34.0 PG    MCHC 31.3 31.0 - 37.0 g/dL    RDW 13.8 11.6 - 14.5 %    PLATELET 953 682 - 727 K/uL    MPV 10.5 9.2 - 11.8 FL    NRBC 0.0 0  WBC    ABSOLUTE NRBC 0.00 0.00 - 0.01 K/uL    NEUTROPHILS 85 (H) 40 - 73 %    BAND NEUTROPHILS 9 (H) 0 - 5 %    LYMPHOCYTES 0 (L) 21 - 52 %    MONOCYTES 1 (L) 3 - 10 %    EOSINOPHILS 0 0 - 5 %    BASOPHILS 1 0 - 2 %    METAMYELOCYTES 4 (H) 0 %    IMMATURE GRANULOCYTES 0 %    ABS. NEUTROPHILS 12.4 (H) 1.8 - 8.0 K/UL    ABS. LYMPHOCYTES 0.0 (L) 0.9 - 3.6 K/UL    ABS. MONOCYTES 0.1 0.05 - 1.2 K/UL    ABS. EOSINOPHILS 0.0 0.0 - 0.4 K/UL    ABS. BASOPHILS 0.1 0.0 - 0.1 K/UL    ABS. IMM.  GRANS. 0.0 K/UL    DF MANUAL      PLATELET COMMENTS ADEQUATE PLATELETS      RBC COMMENTS NORMOCYTIC, NORMOCHROMIC      WBC COMMENTS VACUOLATED POLYS     PROTHROMBIN TIME + INR    Collection Time: 10/06/22 10:57 AM   Result Value Ref Range    Prothrombin time 15.7 (H) 11.5 - 15.2 sec    INR 1.2 0.8 - 1.2     TSH 3RD GENERATION    Collection Time: 10/06/22 10:57 AM   Result Value Ref Range    TSH 1.47 0.36 - 3.74 uIU/mL   PROCALCITONIN    Collection Time: 10/06/22 10:57 AM   Result Value Ref Range    Procalcitonin 38.23 ng/mL   D DIMER    Collection Time: 10/06/22 10:57 AM   Result Value Ref Range    D DIMER 5.27 (H) <0.46 ug/ml(FEU)   TROPONIN-HIGH SENSITIVITY    Collection Time: 10/06/22 10:57 AM   Result Value Ref Range Troponin-High Sensitivity 52 0 - 78 ng/L   LIPASE    Collection Time: 10/06/22 10:57 AM   Result Value Ref Range    Lipase 16 (L) 73 - 393 U/L   TROPONIN-HIGH SENSITIVITY    Collection Time: 10/06/22 11:45 AM   Result Value Ref Range    Troponin-High Sensitivity 50 0 - 78 ng/L   GLUCOSE, POC    Collection Time: 10/06/22 12:43 PM   Result Value Ref Range    Glucose (POC) 81 70 - 110 mg/dL   GLUCOSE, POC    Collection Time: 10/06/22  6:01 PM   Result Value Ref Range    Glucose (POC) 105 70 - 110 mg/dL   GLUCOSE, POC    Collection Time: 10/07/22 12:02 AM   Result Value Ref Range    Glucose (POC) 115 (H) 70 - 110 mg/dL         Impression:     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 Recommendation:     - x-ray of bilateral foot reviewed. Right foot:  -Status post second and third ray amputation at proximal phalanx level, but  perhaps slightly increased lucency at the second proximal phalanx bony stump,  which may reflect osteomyelitis. See additional details above. Left foot:  -Second and fourth ray amputation site is above. Evidence of soft tissue gas at  the fourth ray amputation stump, suspicious for gas-forming infection and/or  gangrene. No definite radiographic evidence of osteomyelitis. Of note, MRI is more sensitive for detecting osteomyelitis. - Ordered MRI of left foot. - Patient will need TMA on left foot due to gas gangrene with cellulitis and septicemia. Patient noted to have severe small vessel disease of digits. Vascular surgery, Dr. Oksana Dancer consulted. - Plan for procedure tomorrow. Please keep him NPO after midnight. - Remain NWB to left forefoot. - Will hold off on any intervention on right foot at this time as it is dry, stable gangrene. - Medical management per primary team.     - Thank you for opportunity to participate in Mr. Garcia's care.

## 2022-10-07 NOTE — PROGRESS NOTES
[address] [facility] Pharmacokinetic Monitoring Service - Vancomycin    Indication: DM foot infection  Goal AUC/CARYL: 400-600 mg*hr/L  Day of Therapy: 3  Additional Antimicrobials: pip-tazo    Pertinent Laboratory Values: Wt Readings from Last 1 Encounters:   10/05/22 68 kg (150 lb)     Temp Readings from Last 1 Encounters:   10/07/22 98 °F (36.7 °C)     No components found for: PROCAL  Estimated Creatinine Clearance: 50.7 mL/min (based on SCr of 1.21 mg/dL). Recent Labs     10/07/22  0325 10/06/22  1057   WBC 14.6* 13.2     Pertinent Cultures:  Culture Date Source Results   10/5 blood NGTD   MRSA Nasal Swab: N/A. Non-respiratory infection.     Assessment:  Date/Time Current Dose Concentration (mg/L) Timing of Concentration (h) AUC   10/5 1,000 mg x1  500 mg x1 - - -   10/6 1,250 mg q24h - - -   10/7 1,250 mg q24h 14.6 11 509   Note: Serum concentrations collected for AUC dosing may appear elevated if collected in close proximity to the dose administered, this is not necessarily an indication of toxicity    Plan:  Continue current dose of 1,250 mg q24h  No levels at this time  Pharmacy will continue to monitor patient and adjust therapy as indicated    Thank you for the consult,  Esteban Titus, PharmD, BCPS  10/7/2022

## 2022-10-07 NOTE — PROGRESS NOTES
[facility]. Pulmonary, Critical Care, and Sleep Medicine    Name: Aly Alcala MRN: [id number]   : 1945 Hospital: [address] [place] [address] Dr   Date: 10/7/2022  Admission Date: 10/5/2022     Chart and notes reviewed. Data reviewed. I have evaluated all findings. [x]I have reviewed the flowsheet and previous days notes. []The patient is unable to give any meaningful history or review of systems because the patient is:  []Intubated []Sedated   []Unresponsive      [x]The patient is critically ill on      []Mechanical ventilation [x]Pressors   []BiPAP []         Interval HPI: 78year old male with a past medical history of PVD, gangrene of b/l feet, spinal stenosis, DM, HTN, dementia, admitted to the unit with acute hypoxic respiratory failure requiring HFNC, septic shock likely 2/2 multifocal PNA vs L foot cellulitis, acute on chronic encephalopathy, and prerenal JAMIL. Pt still requiring low dose levophed and has been weaned off HFNC. Subjective 10/07/22  Hospital Day: 2  Vent Day: N/A  Overnight events: No acute events noted overnight  Mentation/Activity: confused  Respiratory/ Secretions: stable  Hemodynamics: stable on low dose levophed  Urine output, bowel: adequate UOP  Diet: NPO  Need for procedures: TMA L foot     -Midline placement  -MRI L foot pending              ROS:Review of systems not obtained due to patient factors. Events and notes from last 24 hours reviewed.      Patient Active Problem List   Diagnosis Code    Thoracic or lumbosacral neuritis or radiculitis, unspecified [id number]    Degeneration of lumbar or lumbosacral intervertebral disc M51.37    Lumbago M54.50    Hyperlipemia E78.5    Sleep apnea G47.30    Cluster headaches G44.009    Type 2 diabetes mellitus with diabetic neuropathy, without long-term current use of insulin (HCC) E11.40    Chronic midline low back pain without sciatica M54.50, G89.29    Erectile dysfunction due to arterial insufficiency N52.01    Major depressive disorder, recurrent, mild F33.0    Major depressive disorder, recurrent, moderate F33.1    Major depressive disorder, recurrent, unspecified F33.9    Gangrene (HCC) I96    Cellulitis L03.90    Moderate protein-calorie malnutrition (HCC) E44.0    Pneumonia J18.9    Sepsis (HCC) A41.9    Multifocal pneumonia J18.9       Vital Signs:  Visit Vitals  BP (!) 89/49   Pulse (!) 109   Temp 98.5 °F (36.9 °C)   Resp 26   Ht 6' (1.829 m)   Wt 64.5 kg (142 lb 3.2 oz) Comment: Bed scale   SpO2 96%   BMI 19.29 kg/m²       O2 Device: Nasal cannula   O2 Flow Rate (L/min): 2 l/min   Temp (24hrs), Av.7 °F (37.1 °C), Min:97.8 °F (36.6 °C), Max:100.1 °F (37.8 °C)       Intake/Output:   Last shift:      10/07 0701 - 10/07 1900  In: -   Out: 300 [Urine:300]  Last 3 shifts: 10/05 1901 - 10/07 07  In: 3163 [I.V.:3163]  Out: 2085 [Urine:2085]    Intake/Output Summary (Last 24 hours) at 10/7/2022 1058  Last data filed at 10/7/2022 0915  Gross per 24 hour   Intake 1130.79 ml   Output 2085 ml   Net -954.21 ml          Current Facility-Administered Medications   Medication Dose Route Frequency    levoFLOXacin (LEVAQUIN) 750 mg in D5W IVPB  750 mg IntraVENous Q24H    [Held by provider] clopidogreL (PLAVIX) tablet 75 mg  75 mg Oral DAILY    albumin human 25% (BUMINATE) solution 25 g  25 g IntraVENous Q6H    NOREPINephrine (LEVOPHED) 8 mg in 5% dextrose 250mL (32 mcg/mL) infusion  0.5-50 mcg/min IntraVENous TITRATE    [Held by provider] enoxaparin (LOVENOX) injection 40 mg  40 mg SubCUTAneous DAILY    famotidine (PF) (PEPCID) 20 mg in 0.9% sodium chloride 10 mL injection  20 mg IntraVENous BID    nicotine (NICODERM CQ) 14 mg/24 hr patch 1 Patch  1 Patch TransDERmal DAILY    insulin lispro (HUMALOG) injection   SubCUTAneous Q6H    piperacillin-tazobactam (ZOSYN) 3.375 g in 0.9% sodium chloride (MBP/ADV) 100 mL MBP  3.375 g IntraVENous Q8H    vancomycin (VANCOCIN) 1250 mg in  ml infusion  1,250 mg IntraVENous Q24H         Telemetry: [x]Sinus []A-flutter []Paced    []A-fib []Multiple PVCs                  Physical Exam:      General: NAD  HEENT:  Anicteric sclerae; pink palpebral conjunctivae; mucosa moist  Resp:  Symmetrical chest expansion, no accessory muscle use; good airway entry; no rales/ wheezing/ rhonchi noted  CV:  S1, S2 present; regular rate and rhythm  GI:  Abdomen soft, non-tender; (+) active bowel sounds  Extremities:  +2 pulses on all extremities; no edema/ cyanosis/ clubbing noted   Skin:  Warm; no rashes/ lesions noted, normal turgor/cap refill   Neurologic:  Non-focal  Devices:  No NGT/OGT, Central line/ PICC, ETT/tracheostomy, chest tube      DATA:  MAR reviewed and pertinent medications noted or modified as needed    Labs:  Recent Labs     10/07/22  0325 10/06/22  1057 10/05/22  1940   WBC 14.6* 13.2 13.6*   HGB 8.8* 9.5* 12.5*   HCT 27.4* 30.4* 38.9    241 435*     Recent Labs     10/07/22  0325 10/06/22  1057 10/06/22  0010 10/05/22  1940   * 145 143 137   K 3.6 4.4 4.2 4.9   * 113* 112* 100   CO2 25 26 25 26   * 72* 98 239*   BUN 29* 27* 24* 24*   CREA 1.21 1.58* 1.09 1.34*   CA 7.6* 8.1* 7.8* 9.9   MG 2.1 1.9  --   --    PHOS 3.3 3.9  --   --    ALB  --  2.0*  --  2.7*   ALT  --  13*  --  12*   INR  --  1.2  --   --      No results for input(s): PH, PCO2, PO2, HCO3, FIO2 in the last 72 hours. Recent Labs     10/06/22  0125   HCO3I 24.0   PCO2I 37.6   PHI 7.41   PO2I 66*       Imaging:  [x]   I have personally reviewed the patients radiographs and reports  XR Results (most recent):  XR Results (most recent):  Results from Hospital Encounter encounter on 10/05/22    XR FOOT LT MIN 3 V    Narrative  EXAMINATION:    Right foot 3 views  Left foot 3 views    INDICATION: Diabetes, infection/inflammation    COMPARISON: 9/17/2022    FINDINGS:    Right foot: 3 views obtained. Osteopenia limits evaluation of bony detail.   Second and third ray amputations near base of proximal phalanges. Perhaps  slightly increased lucency at the second proximal phalanx amputation stump. Evidence of soft tissue swelling at the lesser toes, including the amputation  stumps. No acute fracture identified. Left foot: 3 views obtained. Osteopenia limits of aeration of bony detail. Second ray amputation at base of middle phalanx and fourth ray amputation at PIP  level. Evidence of soft tissue gas at the fourth ray amputation stump. No  definite aggressive osseous destruction. No acute osseous findings. Evidence of  soft tissue swelling at the toes. Small Achilles enthesophyte. Impression  Right foot:  -Status post second and third ray amputation at proximal phalanx level, but  perhaps slightly increased lucency at the second proximal phalanx bony stump,  which may reflect osteomyelitis. See additional details above. Left foot:  -Second and fourth ray amputation site is above. Evidence of soft tissue gas at  the fourth ray amputation stump, suspicious for gas-forming infection and/or  gangrene. No definite radiographic evidence of osteomyelitis. Of note, MRI is more sensitive for detecting osteomyelitis. CT Results (most recent):  Results from Hospital Encounter encounter on 10/05/22    CTA CHEST W OR W WO CONT    Narrative  CT chest with contrast for PE    HISTORY: Tachycardia, hypoxia, pulmonary infiltration on chest radiographs    COMPARISON: Chest portable 10/5/22. TECHNIQUE: Dynamic spiral scan through the chest is obtained from the thoracic  inlet to the diaphragm after dynamic nonionic IV contrast administration  per PE  protocol. Coronal and sagittal MIP computer reconstructions are also obtained  for better visualization of the integrity of pulmonary arteries in 3D dimension,  particularly for lobar/interlobar arterial branches and to minimize radiation  dose.     All CT scans at this facility performed using dose optimization techniques as  appreciated to a performed exam, to include automated exposure control,  adjustment of the mA and or KU according to patient size (including appropriate  matching for site specific examination), or use of iterative reconstruction  technique. FINDINGS:    PULMONARY ARTERIES: The contrast bolus is adequate. The right and left mainstem  pulmonary arteries and their branches appear patent without convincing evidence  of intraluminal filling defect identified to suggest pulmonary embolism. No  significant pulmonary arterial dilatation. AORTA AND VASCULAR STRUCTURES: The thoracic aorta is normal in caliber but not  well opacified by contrast for further evaluation. No aneurysmal dilatation. Advanced atherosclerotic calcified and noncalcified plaques are identified in  the arch and descending aorta. . Moderate atherosclerotic disease also noted  involving the proximal left subclavian artery. CARDIOVASCULAR ASSESSMENT:  - HEART: The heart is normal in size. No pericardial effusion.  - RV/LV (normal < 0.9) : Normal.  - VENTRICULAR SEPTAL BOWING: Not seen. - There is no contrast reflux visualized in IVC and hepatic veins. LUNG PARENCHYMA: Large sublobar consolidation with minimal air bronchogram  identified in the left lower lobe and posterior right lower lobe. Multiple  airspace nodular densities also demonstrated in bilateral lower lobes and left  upper lobe. Patent central airway. IMAGED THYROID: Heterogeneous enhancement with potential hypodense nodules in  posterior left lobe. MEDIASTINUM: Mild mediastinal and hilar adenopathy, the largest measures 1.3 x  3.4 cm in subcarina region. PLEURAL SPACES AND CHEST WALL: Small bilateral pleural fluid. VISUALIZED UPPER ABDOMEN: Moderate thickening of bilateral adrenal with mild  enhancement as indeterminate finding. It measures 1.8 x 2.1 cm on the right and  1.9 x 2.5 cm on the left.  There are 2 adjacent hyperdense nodules in the upper  pole of left the kidney, probably representing nonobstructive calculi, 5 x 7 mm  and 6 x 8 mm respectively. OSSEOUS STRUCTURES: Unremarkable. Impression  1. No convincing CT evidence of pulmonary embolism. 2.  Consolidation pneumonia in posterior lungs, more pronounced on the left. 3. Mild mediastinal and hilar adenopathy. 4. Very small lateral pleural effusions. 5. Significant atherosclerotic aortic disease. 6. Questionable hypodense nodules in the posterior left thyroid lobe. Recommend  thyroid ultrasound correlation. Thank you for your referral.       07/29/21    ECHO ADULT COMPLETE 07/29/2021 7/29/2021    Interpretation Summary  · LV: Estimated LVEF is 60 - 65%. Visually measured ejection fraction. Normal cavity size and systolic function (ejection fraction normal). Mildly increased wall thickness. Wall motion: normal. Age-appropriate left ventricular diastolic function. · AV: There is mild to moderate aortic stenosis. Aortic valve peak gradient is 28 mmHg. Aortic valve mean gradient is 19 mmHg. Aortic valve area is 0.9 cm2.  · AO: Mild aortic root dilatation; diameter is 3.6 cm.     Signed by: Reida Kayser, MD on 7/29/2021  4:47 PM       IMPRESSION:   Severe Sepsis- leukocytosis, hypotension, tachycardia, lactic acidosis, secondary to Multifocal PNA +/- L foot cellulitis, requiring vasopressors   CAP/Aspiration- Multifocal PNA, extensive infiltrates noted on chest x-ray   Acute Hypoxic Respiratory Failure- weaned off vapotherm   Acute on chronic Encephalopathy- likely toxic/metabolic in nature superimposed on baseline dementia   PVD/DM II- with Hx gangrene multiple toes- s/p right foot partial second toe amputation, right foot partial third toe amputation, left foot partial second and fourth toe amputation 9/17/22 by Podiatry and Critical arterial stenosis of the proximal external iliac artery, left superior femoral artery, distal popliteal and proximal tibial peroneal trunk, S/P orbital atherectomy of left tibial peroneal artery, drug-coated balloon angioplasty of the left popliteal and tibial peroneal artery, orbital atherectomy of the left superficial femoral artery, drug-coated balloon angioplasty of the left superficial femoral artery, balloon angioplasty and stenting of the left external iliac artery, angiogram of the right femoral artery 9/16/22  JAMIL- likely prerenal in nature, now resolved   [place]  Hyponatremia, mild   Lactic Acidosis- resolved   Hypertension  Hyperlipidemia  Anemia  Lumbar spondylosis with chronic back pain- s/p T12-L3 laminectomy, L1-3 TLIF, ISAK L3-5, instrumentation from T10-pelvis performed by Dr. Brayan Landers on 4/15/2022  Tobacco abuse   Sleep apnea-does not wear CPAP  Depression   Protein calorie malnutrition   Adult failure to thrive  Hx SAH from fall 7/2022      Patient Active Problem List   Diagnosis Code    Thoracic or lumbosacral neuritis or radiculitis, unspecified [id number]    Degeneration of lumbar or lumbosacral intervertebral disc M51.37    Lumbago M54.50    Hyperlipemia E78.5    Sleep apnea G47.30    Cluster headaches G44.009    Type 2 diabetes mellitus with diabetic neuropathy, without long-term current use of insulin (HCC) E11.40    Chronic midline low back pain without sciatica M54.50, G89.29    Erectile dysfunction due to arterial insufficiency N52.01    Major depressive disorder, recurrent, mild F33.0    Major depressive disorder, recurrent, moderate F33.1    Major depressive disorder, recurrent, unspecified F33.9    Gangrene ([facility] [address] I96    Cellulitis L03.90    Moderate protein-calorie malnutrition (HCC) E44.0    Pneumonia J18.9    Sepsis (HCC) A41.9    Multifocal pneumonia J18.9        RECOMMENDATIONS:   Neuro: Q4h neuro checks  Pulm: Supplemental O2 via NC, titrate flow for goal SPO2> 90% Bronchodilators, pulmonary hygiene care, Aspiration precautions, Keep HOB >30 degrees  CVS : Cont Levophed. Actively titrate vasopressors aim MAP >65mmHg, Check cardiac panel, ECHO results.    GI: SUP, Trend LFTs, Zofran PRN for N/V, NPO-SLP once mentation improves   Renal:  Trend Renal indices, Strict Is/Os, lemus placed for retention    Hem/Onc: Monitor for s/o active bleeding. I/D:Sepsis bundle per hospital protocol, Blood, Sputum, and Urine cultures drawn and will be followed. Lactic acid ordered- initial and repeat Q4hrs till normalized. Antibiotics:Vanco/Zosyn Trend WBCs and temperature curve. RVP negative, obtain procal   Endocrine: Q6 glucoses, SSI. Check TSH level  Metabolic:  Daily BMP, mag, phos. Trend lytes, replace as needed. Musc/Skin: no acute issues, wound care to BLE; Vascular and Podiatry following  Full Code  Palliative Care consult placed      Best practice :    Glycemic control  IHI ICU bundles: [place] Bundle Followed    Stress ulcer prophylaxis. Pepcid  DVT prophylaxis. Lovenox  Need for Lines, lemus assessed. Palliative care evaluation. Restraints not needed          LENORA time - 20 minutes  Spencer Bergeron PA-C   10/07/22  Pulmonary, Critical Care Medicine  [facility]         Attending Note:  I saw and evaluated the patient, performing all elements of service personally. I discussed the findings, assessment and plan with the PA and agree with the PA's findings and plan as documented in the PA's note. All edits and changes made above or are mentioned in my attending note which was independently assessed as well as written. Total of 44 min critical care time spent at bedside (personally) during the course of care providing evaluation,management and care decisions and ordering appropriate treatment related to critical care problems exclusive of procedures.   I performed the substantive portion of this split shared encounter (greater than 50% of time)  The reason for providing this level of medical care for this critically ill patient was due a critical illness that impaired one or more vital organ systems such that there was a high probability of imminent or life threatening deterioration in the patients condition. This care involved high complexity decision making to assess, manipulate, and support vital system functions, to treat this degree vital organ system failure and to prevent further life threatening deterioration of the patients condition. This time was independent of other practitioners. 29-year-old male with a past medical history of PVD, gangrene of bilateral feet, spinal stenosis, diabetes, hypertension, dementia, presented to [facility] with acute hypoxic respiratory failure, was on high flow nasal cannula. Patient developed septic shock due to multifocal pneumonia as well as left foot cellulitis. Patient was found to have prerenal azotemia with JAMIL. Patient placed on Levophed, weaning off slowly. Patient remains on broad-spectrum antibiotics per ID. Podiatry was consulted, underwent foot debridement by Dr. Edith Oquendo, consulted vascular surgery, Dr. Freddy Cuba recommends Plavix and will continue to follow. Patient has dementia, remains encephalopathic, likely acute on chronic, likely due to toxic/metabolic encephalopathy. Continue supportive care.     Acute prognosis is guarded, long-term prognosis is very poor        Kevin Conner MD/MPH     Pulmonary, Critical Care Medicine  [facility]

## 2022-10-07 NOTE — PROGRESS NOTES
10/06/22 2010   Oxygen Therapy   O2 Sat (%) 99 %   Pulse via Oximetry 114 beats per minute   O2 Device Hi flow nasal cannula   O2 Flow Rate (L/min) 25 l/min   O2 Temperature 91.4 °F (33 °C)   FIO2 (%) 40 %

## 2022-10-07 NOTE — PROGRESS NOTES
Dressing on left foot saturated in blood. Steady blood flow coming from surgical site. Dr. Pearl Pedraza notified. Orders to place ABD with reinforced 4x4s wrapped in ace bandage. New dressing applied according to orders. No new blood seen from bandage.

## 2022-10-07 NOTE — PROGRESS NOTES
attended the interdisciplinary rounds for Naomi Walker, who is a 78 y. o.,male. Patients Primary Language is: [place]. According to the patients EMR Mor[employer] Affiliation is: Unknown. The reason the Patient came to the hospital is:   Patient Active Problem List    Diagnosis Date Noted    Pneumonia 10/06/2022    Sepsis ([facility] [address] 10/06/2022    Multifocal pneumonia 10/06/2022    Moderate protein-calorie malnutrition ([address] 09/15/2022    Gangrene ([facility] [address] 09/13/2022    Cellulitis 09/13/2022    Major depressive disorder, recurrent, mild 06/09/2022    Major depressive disorder, recurrent, moderate 06/09/2022    Major depressive disorder, recurrent, unspecified 06/09/2022    Type 2 diabetes mellitus with diabetic neuropathy, without long-term current use of insulin ([facility] [address] 10/01/2018    Chronic midline low back pain without sciatica 10/01/2018    Erectile dysfunction due to arterial insufficiency 10/01/2018    Hyperlipemia     Sleep apnea     Cluster headaches     Thoracic or lumbosacral neuritis or radiculitis, unspecified     Degeneration of lumbar or lumbosacral intervertebral disc     Lumbago           Plan:  Chaplains will continue to follow and will provide pastoral care on an as needed/requested basis.  recommends bedside caregivers page  on duty if patient shows signs of acute spiritual or emotional distress.     [address] [facility]aSaint [facility]   [phone]

## 2022-10-07 NOTE — CONSULTS
Dee Yousif    Chief Complaint   Patient presents with    Altered mental status       History and Physical    19-year-old male admitted to the hospital been treated for sepsis. Seen by podiatry surgery regarding gangrene of toes. Patient had recent left iliac stent and atherectomy of multiple sites left leg in September. I do not see that he is on Plavix he was prescribed this. He is responsive but confused and a little agitated. Has cognitive impairment history hypertension PAD and diabetes.     Past Medical History:   Diagnosis Date    Arthritis     Cognitive impairment     Depression     Dorsalgia, unspecified     GERD (gastroesophageal reflux disease)     High blood pressure     Insomnia     L1 vertebral fracture (HCC)     Migraine headache     Other chronic pain     Periorbital headache     Sleep apnea     Spinal stenosis     Type 2 diabetes mellitus, without long-term current use of insulin ([facility] [address]      Patient Active Problem List   Diagnosis Code    Thoracic or lumbosacral neuritis or radiculitis, unspecified [id number]    Degeneration of lumbar or lumbosacral intervertebral disc M51.37    Lumbago M54.50    Hyperlipemia E78.5    Sleep apnea G47.30    Cluster headaches G44.009    Type 2 diabetes mellitus with diabetic neuropathy, without long-term current use of insulin ([facility]) E11.40    Chronic midline low back pain without sciatica M54.50, G89.29    Erectile dysfunction due to arterial insufficiency N52.01    Major depressive disorder, recurrent, mild F33.0    Major depressive disorder, recurrent, moderate F33.1    Major depressive disorder, recurrent, unspecified F33.9    Gangrene ([address] I96    Cellulitis L03.90    Moderate protein-calorie malnutrition (HCC) E44.0    Pneumonia J18.9    Sepsis ([address] A41.9    Multifocal pneumonia J18.9     Past Surgical History:   Procedure Laterality Date    ENDOSCOPY, COLON, DIAGNOSTIC      HX LUMBAR DISKECTOMY  6/2001, 4/11    left L3-4 microdiskectomy with intradural rupture    HX ORTHOPAEDIC      spinal Surgery Dr. Aime Vee  9/11    spinal fusion, L3-5     Current Facility-Administered Medications   Medication Dose Route Frequency Provider Last Rate Last Admin    potassium chloride 10 mEq in 100 ml IVPB  10 mEq IntraVENous Q1H CapChilo hurley  mL/hr at 10/07/22 0819 10 mEq at 10/07/22 0819    levoFLOXacin (LEVAQUIN) 750 mg in D5W IVPB  750 mg IntraVENous Q24H AsahNash T, DO        NOREPINephrine (LEVOPHED) 8 mg in 5% dextrose 250mL (32 mcg/mL) infusion  0.5-50 mcg/min IntraVENous TITRATE Randy SWANN MD 7.5 mL/hr at 10/07/22 0600 4 mcg/min at 10/07/22 0600    acetaminophen (TYLENOL) tablet 650 mg  650 mg Oral Q6H PRN [place], MONICA        Or    acetaminophen (TYLENOL) suppository 650 mg  650 mg Rectal Q6H PRN [place], NP        polyethylene glycol (MIRALAX) packet 17 g  17 g Oral DAILY PRN Ivin Ratel C, NP        ondansetron (ZOFRAN ODT) tablet 4 mg  4 mg Oral Q8H PRN Ivin Ratel C, NP        Or    ondansetron (ZOFRAN) injection 4 mg  4 mg IntraVENous Q6H PRN Hulon Siemens, Tiffany C, NP        enoxaparin (LOVENOX) injection 40 mg  40 mg SubCUTAneous DAILY Ivin Ratel C, NP   40 mg at 10/07/22 0915    famotidine (PF) (PEPCID) 20 mg in 0.9% sodium chloride 10 mL injection  20 mg IntraVENous BID Hulon Siemens, Tiffany C, NP   20 mg at 10/07/22 0915    nicotine (NICODERM CQ) 14 mg/24 hr patch 1 Patch  1 Patch TransDERmal DAILY Ivin Ratel C, NP   1 Patch at 10/07/22 0915    insulin lispro (HUMALOG) injection   SubCUTAneous Q6H Debora Sahni NP        glucose chewable tablet 16 g  4 Tablet Oral PRN Ivin Ratel C, NP        glucagon (GLUCAGEN) injection 1 mg  1 mg IntraMUSCular PRN Ivin Ratel C, NP        dextrose 10% infusion 0-250 mL  0-250 mL IntraVENous PRN Sahni, Debora C, NP        albuterol-ipratropium (DUO-NEB) 2.5 MG-0.5 MG/3 ML  3 mL Nebulization Q6H PRN Julio Jennings NP piperacillin-tazobactam (ZOSYN) 3.375 g in 0.9% sodium chloride (MBP/ADV) 100 mL MBP  3.375 g IntraVENous Q8H Sarthak Farooq MD 25 mL/hr at 10/07/22 0302 3.375 g at 10/07/22 0302    vancomycin (VANCOCIN) 1250 mg in  ml infusion  1,250 mg IntraVENous Q24H Sarthak Farooq MD   IV Completed at 10/06/22 1800     Allergies   Allergen Reactions    Codeine Not Reported This Time    Tegretol [Carbamazepine] Not Reported This Time       Review of Systems    A full review of systems was completed times ten organ systems and was deemed negative unless otherwise mentioned in the HPI. Physical   Visit Vitals  BP (!) 89/49   Pulse (!) 109   Temp 98.5 °F (36.9 °C)   Resp 26   Ht 6' (1.829 m) Comment: estimated   Wt 68 kg (150 lb)   SpO2 96%   BMI 20.34 kg/m²       Sits up and is responsive, somewhat agitated  No appearance of distress  No facial symmetry  Neck no JVD  Chest coarse breath sounds throughout  Cardiac is regular  Abdomen soft no guarding nondistended  Right lower extremity has easily palpable pulses femoral popliteal and pedal location. Gangrene of toes notable at distal tips and dry  Left lower extremity has palpable femoral and popliteal and dopplerable pedal pulses dry gangrene noted as well    Impression/Plan:   Post Endovascular revascularization left leg  Remains intact  Discussed with treatment team, will start Plavix      Ashley Arreola MD    PLEASE NOTE:  This document has been produced using voice recognition software. Unrecognized errors in transcription may be present.

## 2022-10-07 NOTE — ANESTHESIA PREPROCEDURE EVALUATION
Relevant Problems   RESPIRATORY SYSTEM   (+) Multifocal pneumonia   (+) Pneumonia   (+) Sleep apnea      NEUROLOGY   (+) Cluster headaches   (+) Major depressive disorder, recurrent, mild   (+) Major depressive disorder, recurrent, moderate   (+) Major depressive disorder, recurrent, unspecified      ENDOCRINE   (+) Type 2 diabetes mellitus with diabetic neuropathy, without long-term current use of insulin (HCC)       Anesthetic History   No history of anesthetic complications            Review of Systems / Medical History  Patient summary reviewed, nursing notes reviewed and pertinent labs reviewed    Pulmonary        Sleep apnea: CPAP           Neuro/Psych         Psychiatric history     Cardiovascular    Hypertension: well controlled                   GI/Hepatic/Renal     GERD: well controlled           Endo/Other    Diabetes: well controlled, type 2    Arthritis     Other Findings   Comments: Admit with acute hypoxic respiratory failure, initially requiring HFNC, now on NC; b/l foot infection, septic on levophed, here for L TMA         Physical Exam    Airway  Mallampati: III  TM Distance: 4 - 6 cm  Neck ROM: normal range of motion   Mouth opening: Normal     Cardiovascular    Rhythm: regular  Rate: normal         Dental    Dentition: Poor dentition     Pulmonary  Breath sounds clear to auscultation               Abdominal  GI exam deferred       Other Findings            Anesthetic Plan    ASA: 4  Anesthesia type: MAC          Induction: Intravenous  Anesthetic plan and risks discussed with: Family      Phone consent with pt's son Kelly Braun, who is medical decision maker at this point given pt's delirium

## 2022-10-07 NOTE — PERIOP NOTES
TRANSFER - OUT REPORT:    Verbal report given to paul jorgensen rn on Dilma Cave  being transferred to [facility] (unit) for routine post - op       Report consisted of patients Situation, Background, Assessment and   Recommendations(SBAR). Information from the following report(s) OR Summary was reviewed with the receiving nurse. Lines:   Peripheral IV 10/05/22 Right Antecubital (Active)   Site Assessment Clean, dry, & intact 10/07/22 1200   Phlebitis Assessment 0 10/07/22 1200   Infiltration Assessment 0 10/07/22 1200   Dressing Status Clean, dry, & intact 10/07/22 1200   Dressing Type Transparent;Tape 10/07/22 1200   Hub Color/Line Status Pink; Infusing 10/07/22 1200   Action Taken Open ports on tubing capped 10/07/22 1200   Alcohol Cap Used Yes 10/07/22 1200       Peripheral IV 39/77/36 Left;Upper Basilic (Active)   Site Assessment Clean, dry, & intact 10/07/22 1200   Phlebitis Assessment 0 10/07/22 1200   Infiltration Assessment 0 10/07/22 1200   Dressing Status Clean, dry, & intact 10/07/22 1200   Dressing Type Transparent;Tape 10/07/22 1200   Hub Color/Line Status Pink; Infusing 10/07/22 1200   Action Taken Open ports on tubing capped 10/07/22 1200   Alcohol Cap Used Yes 10/07/22 1200       Peripheral IV 10/06/22 Posterior;Right Forearm (Active)   Site Assessment Clean, dry, & intact 10/07/22 1200   Phlebitis Assessment 0 10/07/22 1200   Infiltration Assessment 0 10/07/22 1200   Dressing Status Clean, dry, & intact 10/07/22 1200   Dressing Type Transparent;Tape 10/07/22 1200   Hub Color/Line Status Blue; Infusing 10/07/22 1200   Action Taken Open ports on tubing capped 10/07/22 1200   Alcohol Cap Used Yes 10/07/22 1200        Opportunity for questions and clarification was provided.       Patient transported with:   Monitor  O2 @ 4 liters  Registered Nurse, CRNA

## 2022-10-07 NOTE — DIABETES MGMT
Diabetes/ Glycemic Control Plan of Care  Recommendations:   Monitor glycemic control for need for insulin advancement. Assessment:   DX:   1. Sepsis without acute organ dysfunction, due to unspecified organism    2. Hyperlipidemia, unspecified hyperlipidemia type    3. Multifocal pneumonia    4. Chronic midline low back pain without sciatica    5. Gangrene    6. Major depressive disorder, recurrent, moderate    7. Type 2 diabetes mellitus with diabetic neuropathy, without long-term current use of insulin    8. Peripheral vascular disease    9. Acute respiratory failure with hypoxia    10.  Aspiration pneumonia, unspecified aspiration pneumonia type, unspecified laterality, unspecified part of lung   Fasting/ Morning blood glucose:   Lab Results   Component Value Date/Time    Glucose 106 (H) 10/07/2022 03:25 AM    Glucose (POC) 139 (H) 10/07/2022 11:20 AM   Within target range (70-180mg/dL):  YES  Current insulin orders: corrective lispro  Total Daily Dose previous 24 hours = 0  Current A1c:   Lab Results   Component Value Date/Time    Hemoglobin A1c 5.8 (H) 09/13/2022 09:13 PM    Hemoglobin A1c, External 8.7 04/21/2022 12:00 AM      equivalent  to ave Blood Glucose of 114 mg/dl for 2-3 months prior to admission  Adequate glycemic control PTA: YES  Nutrition/Diet:   Active Orders   Diet    DIET NPO      MHome diabetes medications:      insulin lispro (HUMALOG) 100 unit/mL injection Check FSBS Three times daily with meals,   For sugar between 150 and 200- give 1 units SQ,   For sugar between 201 and 250- give 3 units SQ,   For sugar between 251 and 300- give 5 units SQ,   For sugar between 301 and 400- give 7 units SQ,  For sugars > 400, contact PCP     Plan/Goals:   Blood glucose will be within target of 70 - 180 mg/dl within 72 hours     Education:  [] Refer to Diabetes Education Record                       [x] Education not indicated at this time - from SNF    Radha Hernandez RD BC-ADM

## 2022-10-07 NOTE — PROGRESS NOTES
0700: Bedside and Verbal shift change report given to Arabella Bergman RN and Tatyana Bassett RN (oncoming nurse) by Scott Zamora RN (offgoing nurse). Report included the following information SBAR, Kardex, Intake/Output, Recent Results, Cardiac Rhythm Sinus Tachy, and Alarm Parameters . 0800: Pt is more alert, only oriented to self. Still confused. Resting quietly. 1000: IDRs discussed patient getting a midline placed, plavix held, surgery on left foot today    1400: Spoke with pt's son Adán Onofre about surgery today. He gave consent and spoke with Dr. Rosie Hudson. 1430: Vascular access team in room with pt to place midline. 1505: pt off the floor, headed to the OR. Vitals stable    1630: pt back on floor, pt resting quietly. 1900: Bedside and Verbal shift change report given to [facility] Amy, RN (oncoming nurse) by Radha Westfall and Tatyana Bassett RN (offgoing nurse). Report included the following information SBAR, Kardex, OR Summary, Intake/Output, MAR, Recent Results, and Cardiac Rhythm Sinus Tachy .

## 2022-10-07 NOTE — PROGRESS NOTES
Infectious Disease progress Note        Reason:septic shock    Current abx Prior abx   Piperacillin/tazobactam, vancomycin since 10/5/22      Lines:       Assessment :   78 y.o. male with a significant PMHx of PVD, hx gangrene b/l feet/toes s/p amputations and endovascular intervention from Podiatry and Vascular surgery 9/2022, HTN, DM II, Depression, tobacco abuse and chronic back pain in the setting of lumbar spondylosis s/p T12-L3 laminectomy, L1-3 TLIF, ISAK L3-5, instrumentation from T10-pelvis performed by Dr. Brenda Smith on 4/15/2022 presented to [facility] ED from [facility] on 10/5/22 with acute encephalopathy, diaphoresis,hypoxia, SOB, hypotension, and tachycardia. Hospitalization at [facility] 9/2022 for  partially treated right foot cellulitis, dry gangrene of tip of toes bilateral feet status post right foot partial second and third toe amputation, left foot partial second and fourth toe amputation on 9/17/2022     Intra-Op culture left second toe 9/17-proteus, klebsiella, group B strep, Staph aureus  Intra-Op culture right second toe 9/17-heavy proteus, klebsiella, Staph aureus    Clinical presentation consistent with septic shock (present on admission)  secondary to  left foot cellulitis,  multifocal pneumonia- ? Healthcare associated pneumonia, dry gangrene bilateral toes. Increased erythema/warmth left foot suggestive of left foot cellulitis. Concurrent severe peripheral artery disease will likely mask the full clinical presentation-improved erythema/warmth left foot on today's exam    Acute hypoxic respiratory failure-could be secondary to healthcare associated pneumonia/aspiration pneumonia. Agree with ruling out pulm embolism looking at the elevated D-dimer     Peripheral arterial disease-. S/P orbital atherectomy of left tibial peroneal artery, drug-coated balloon angioplasty of the left popliteal and tibial peroneal artery, orbital atherectomy of the left superficial femoral artery, drug-coated balloon angioplasty of the left superficial femoral artery, balloon angioplasty and stenting of the left external iliac artery, angiogram of the right femoral artery on 9/16/22     Acute kidney injury-likely due to sepsis    Significantly elevated procalcitonin 38 on 10/6/2022 concerning for gram-negative sepsis    Altered mental status-likely metabolic encephalopathy. Improving    Persistent hypotension. Currently on Levophed at 4 mics. Improved mentation. Plans for family meeting to discuss goals of care noted     Recommendations:     continue Zosyn, vancomycin, IV levofloxacin to cover for multidrug-resistant gram-negative still further information obtained  Follow-up wound cultures, modify antibiotics accordingly  Follow-up podiatry recommendations regarding left foot transmetatarsal amputation pending family's decision  Continue wound care sacral decubiti  Titrate pressors as tolerated  Wean oxygen as tolerated  Monitor procalcitonin, cbc, clinical status         Above plan was discussed in details with ICU team . Please call me if any further questions or concerns. Will continue to participate in the care of this patient.       HPI:       The patient is critically ill and can not provide additional history due to Unable to comprehend        Past Medical History:   Diagnosis Date    Arthritis     Cognitive impairment     Depression     Dorsalgia, unspecified     GERD (gastroesophageal reflux disease)     High blood pressure     Insomnia     L1 vertebral fracture (HCC)     Migraine headache     Other chronic pain     Periorbital headache     Sleep apnea     Spinal stenosis     Type 2 diabetes mellitus, without long-term current use of insulin [facility])        Past Surgical History:   Procedure Laterality Date    ENDOSCOPY, COLON, DIAGNOSTIC      HX LUMBAR DISKECTOMY  6/2001, 4/11    left L3-4 microdiskectomy with intradural rupture    HX ORTHOPAEDIC      spinal Surgery Dr. Melani Puga 2011 NEUROLOGICAL PROCEDURE UNLISTED  9/11    spinal fusion, L3-5       Current Discharge Medication List        CONTINUE these medications which have NOT CHANGED    Details   insulin lispro (HUMALOG) 100 unit/mL injection Check FSBS Three times daily with meals,   For sugar between 150 and 200- give 1 units SQ,   For sugar between 201 and 250- give 3 units SQ,   For sugar between 251 and 300- give 5 units SQ,   For sugar between 301 and 400- give 7 units SQ,  For sugars > 400, contact PCP  Qty: 1 Each, Refills: 0      polyethylene glycol (MIRALAX) 17 gram packet Take 1 Packet by mouth daily as needed for Constipation. Qty: 15 Each, Refills: 0      therapeutic multivitamin (THERAGRAN) tablet Take 1 Tablet by mouth daily. Qty: 30 Tablet, Refills: 0      clopidogreL (Plavix) 75 mg tab Take 1 Tablet by mouth daily. Qty: 30 Tablet, Refills: 3      divalproex DR (DEPAKOTE) 250 mg tablet Take  by mouth two (2) times a day.      gabapentin (NEURONTIN) 300 mg capsule Take 300 mg by mouth two (2) times a day. dronabinoL (MARINOL) 2.5 mg capsule Take 2.5 mg by mouth two (2) times a day. Associated Diagnoses: Weight loss      sertraline (ZOLOFT) 50 mg tablet Take 1 Tablet by mouth daily. Qty: 90 Tablet, Refills: 2    Associated Diagnoses:  Moderate episode of recurrent major depressive disorder (HCC)           STOP taking these medications       L. acidophilus,casei,rhamnosus (BIO-K PLUS) 50 billion cell cpDR capsule Comments:   Reason for Stopping:               Current Facility-Administered Medications   Medication Dose Route Frequency    potassium chloride 10 mEq in 100 ml IVPB  10 mEq IntraVENous Q1H    levoFLOXacin (LEVAQUIN) 750 mg in D5W IVPB  750 mg IntraVENous Q24H    NOREPINephrine (LEVOPHED) 8 mg in 5% dextrose 250mL (32 mcg/mL) infusion  0.5-50 mcg/min IntraVENous TITRATE    acetaminophen (TYLENOL) tablet 650 mg  650 mg Oral Q6H PRN    Or    acetaminophen (TYLENOL) suppository 650 mg  650 mg Rectal Q6H PRN polyethylene glycol (MIRALAX) packet 17 g  17 g Oral DAILY PRN    ondansetron (ZOFRAN ODT) tablet 4 mg  4 mg Oral Q8H PRN    Or    ondansetron (ZOFRAN) injection 4 mg  4 mg IntraVENous Q6H PRN    enoxaparin (LOVENOX) injection 40 mg  40 mg SubCUTAneous DAILY    famotidine (PF) (PEPCID) 20 mg in 0.9% sodium chloride 10 mL injection  20 mg IntraVENous BID    nicotine (NICODERM CQ) 14 mg/24 hr patch 1 Patch  1 Patch TransDERmal DAILY    insulin lispro (HUMALOG) injection   SubCUTAneous Q6H    glucose chewable tablet 16 g  4 Tablet Oral PRN    glucagon (GLUCAGEN) injection 1 mg  1 mg IntraMUSCular PRN    dextrose 10% infusion 0-250 mL  0-250 mL IntraVENous PRN    albuterol-ipratropium (DUO-NEB) 2.5 MG-0.5 MG/3 ML  3 mL Nebulization Q6H PRN    piperacillin-tazobactam (ZOSYN) 3.375 g in 0.9% sodium chloride (MBP/ADV) 100 mL MBP  3.375 g IntraVENous Q8H    vancomycin (VANCOCIN) 1250 mg in  ml infusion  1,250 mg IntraVENous Q24H       Allergies: Codeine and Tegretol [carbamazepine]    Family History   Problem Relation Age of Onset    Heart Attack Mother     Cancer Mother     Heart Disease Mother     Hypertension Father     Seizures Father     Alcohol abuse Father      Social History     Socioeconomic History    Marital status:      Spouse name: Not on file    Number of children: Not on file    Years of education: Not on file    Highest education level: Not on file   Occupational History    Not on file   Tobacco Use    Smoking status: Every Day     Packs/day: 1.00     Years: 51.00     Pack years: 51.00     Types: Cigarettes    Smokeless tobacco: Never   Vaping Use    Vaping Use: Never used   Substance and Sexual Activity    Alcohol use: Yes     Comment: rarely    Drug use: No    Sexual activity: Yes     Partners: Female   Other Topics Concern    Not on file   Social History Narrative    Not on file     Social Determinants of Health     Financial Resource Strain: Not on file   Food Insecurity: Not on file Transportation Needs: Not on file   Physical Activity: Not on file   Stress: Not on file   Social Connections: Not on file   Intimate Partner Violence: Not on file   Housing Stability: Not on file     Social History     Tobacco Use   Smoking Status Every Day    Packs/day: 1.00    Years: 51.00    Pack years: 51.00    Types: Cigarettes   Smokeless Tobacco Never        Temp (24hrs), Av.9 °F (37.2 °C), Min:97.8 °F (36.6 °C), Max:100.2 °F (37.9 °C)    Visit Vitals  BP (!) 89/49   Pulse (!) 109   Temp 98 °F (36.7 °C)   Resp 26   Ht 6' (1.829 m) Comment: estimated   Wt 68 kg (150 lb)   SpO2 94%   BMI 20.34 kg/m²       ROS: Unable to obtain due to patient factors    Physical Exam:    General:  laying on bed, sleepy, not very communicative  HEENT: EOMI, supple neck, no JVD, dry oral mucosa  Cardiovascular: S1S2 regular, no rub/gallop   Pulmonary: air entry bilaterally, no wheezing, no crackle  GI:  Soft, non tender, non distended, +bs, no guarding   Extremities: + Pedal edema, no calf tenderness, tip of multiple toes with dry necrosis, increased warmth left foot  Neuro: AOx2, moving all extremities  Skin: Dry gangrene of the tip of multiple toes bilateral feet, mild erythema left foot, deep tissue injury with some necrosis in the sacrum-no purulent drainage       Labs: Results:   Chemistry Recent Labs     10/07/22  0325 10/06/22  1057 10/06/22  0010 10/05/22  1940   * 72* 98 239*   * 145 143 137   K 3.6 4.4 4.2 4.9   * 113* 112* 100   CO2 25 26 25 26   BUN 29* 27* 24* 24*   CREA 1.21 1.58* 1.09 1.34*   CA 7.6* 8.1* 7.8* 9.9   AGAP 8 6 6 11   BUCR 24* 17 22* 18   AP  --  69  --  98   TP  --  6.2*  --  8.6*   ALB  --  2.0*  --  2.7*   GLOB  --  4.2*  --  5.9*   AGRAT  --  0.5*  --  0.5*        CBC w/Diff Recent Labs     10/07/22  0325 10/06/22  1057 10/05/22  1940   WBC 14.6* 13.2 13.6*   RBC 3.01* 3.31* 4.28*   HGB 8.8* 9.5* 12.5*   HCT 27.4* 30.4* 38.9    241 435*   GRANS 55 85* 88*   LYMPH 1* 0* 10*   EOS 0 0 0        Microbiology Recent Labs     10/06/22  1255 10/05/22  2004 10/05/22  1940   CULT PENDING NO GROWTH 2 DAYS NO GROWTH 2 DAYS            RADIOLOGY:    All available imaging studies/reports in [facility] for this admission were reviewed    Total time spent >35 minutes. High complexity decision making was performed during the evaluation of this patient at high risk for decompensation with multiple organ involvement     Above mentioned total time spent on reviewing the case/medical record/data/notes/EMR/patient examination/documentation/coordinating care with nurse/consultants, exclusive of procedures with complex decision making performed and > 50% time spent in face to face evaluation. Disclaimer: Sections of this note are dictated utilizing voice recognition software, which may have resulted in some phonetic based errors in grammar and contents. Even though attempts were made to correct all the mistakes, some may have been missed, and remained in the body of the document. If questions arise, please contact our department.     Dr. Laura Obregon, Infectious Disease Specialist  [phone]  October 7, 2022  11:52 AM

## 2022-10-07 NOTE — ANESTHESIA POSTPROCEDURE EVALUATION
Procedure(s):  Left Foot Transmetatarsal Amputation.     MAC    Anesthesia Post Evaluation      Multimodal analgesia: multimodal analgesia used between 6 hours prior to anesthesia start to PACU discharge  Patient location during evaluation: PACU  Patient participation: complete - patient participated  Level of consciousness: sleepy but conscious  Pain management: adequate  Airway patency: patent  Anesthetic complications: no  Cardiovascular status: acceptable  Respiratory status: acceptable  Hydration status: acceptable  Post anesthesia nausea and vomiting:  controlled  Final Post Anesthesia Temperature Assessment:  Normothermia (36.0-37.5 degrees C)      INITIAL Post-op Vital signs:   Vitals Value Taken Time   BP 90/75 10/07/22 1631   Temp     Pulse 99 10/07/22 1631   Resp 20 10/07/22 1631   SpO2 97 % 10/07/22 1631

## 2022-10-07 NOTE — PERIOP NOTES
Pt's ankle ID bracelet was removed from left ankle in OR setting. Placed in sterile container and sent with patient.

## 2022-10-07 NOTE — BRIEF OP NOTE
Brief Postoperative Note    Patient: Moira Strickland  YOB: 1945  MRN: [id number]    Date of Procedure: 10/7/2022     Pre-Op Diagnosis: Dry gangrene of multiple toes with cellulitis of left foot, PAD    Post-Op Diagnosis: Same as preoperative diagnosis.       Procedure(s):  Left Foot Transmetatarsal Amputation    Surgeon(s):  Irma Omalley DPM    Surgical Assistant: Surg Asst-1: Vanessa Hill    Anesthesia: MAC with Local    Estimated Blood Loss (mL): Minimal    Complications: None    Specimens:   ID Type Source Tests Collected by Time Destination   1 : Left Fore Foot Preservative Foot, left  Irma Omalley, TROY 10/7/2022 1548 Pathology   2 : Left Fourth Metatarsal clean margins Preservative Foot, left  Nedra Puente, TROY 10/7/2022 1556 Pathology   1 : Left Fourth Metatarsal clean margins Wound Foot, left CULTURE, ANAEROBIC, CULTURE, WOUND W Trevor Montero, TROY 10/7/2022 1544 Microbiology        Implants: * No implants in log *    Drains: * No LDAs found *    Findings: As dictated in Op note    Electronically Signed by Opal Argueta DPM on 10/7/2022 at 4:31 PM

## 2022-10-07 NOTE — PROGRESS NOTES
NUTRITION follow-up/Plan of care    RECOMMENDATIONS:   1. Cardiac, Mechanical Soft  2. Ensure [place] daily  2. Monitor weight and PO intake  3. RD to follow    GOALS:   1. Met/Ongoing: PO intake meets >75% of protein/calorie needs by 7/12  2. Ongoing: Maintain weight (+/- 1-2 lb) by 7/12    ASSESSMENT:   Weight status is classified as normal per BMI of 25.4. PO intake is good. Recives Ensure [place] daily for additional calories/protein. Labs reviewed. . Nutrition recommendations listed. RD to follow. Nutrition Risk:  []   High []  Moderate [x] Low    SUBJECTIVE/OBJECTIVE:    Reviewed in care plan meeting today, increased sleeping noted by staff. PO intake remains good. Information Obtained From:   [x] Chart Review  [x] Patient  [] Family/Caregiver  [] Nurse/Physician   [] Patient Rounds/Interdisciplinary Meeting    Diet: Cardiac  No data found. Medications: [x] Reviewed   No diagnosis found.   Past Medical History:   Diagnosis Date    BPH (benign prostatic hyperplasia)     COPD (chronic obstructive pulmonary disease) ([facility]     History of blood clots     Hypercholesterolemia     Hypertension     Hypertensive cardiovascular disease     Hypothyroidism     Infection and inflammatory reaction due to indwelling urinary catheter, subsequent encounter     Peripheral vascular disease ([facility]     Pneumonia     Urinary retention     UTI (urinary tract infection)      Labs:    Lab Results   Component Value Date/Time    Sodium 142 06/21/2017 06:34 AM    Potassium 3.7 06/21/2017 06:34 AM    Chloride 94 06/21/2017 06:34 AM    CO2 42 06/21/2017 06:34 AM    Anion gap 6 06/21/2017 06:34 AM    Glucose 161 06/21/2017 06:34 AM    BUN 69 06/21/2017 06:34 AM    Creatinine 1.83 06/21/2017 06:34 AM    Calcium 8.2 06/21/2017 06:34 AM    Magnesium 2.6 02/01/2016 02:10 PM    Phosphorus 2.5 02/22/2011 03:40 AM    Albumin 2.8 06/13/2017 12:20 PM     Anthropometrics: BMI (calculated): 25.4   Last 3 Recorded Weights in this Problem: Falls - Risk of  Goal: *Absence of Falls  Description: Document Veronica Durham Fall Risk and appropriate interventions in the flowsheet. Outcome: Progressing Towards Goal  Note: Fall Risk Interventions:       Mentation Interventions: Adequate sleep, hydration, pain control, Bed/chair exit alarm, Door open when patient unattended, More frequent rounding, Reorient patient, Toileting rounds, Room close to nurse's station, Evaluate medications/consider consulting pharmacy    Medication Interventions: Bed/chair exit alarm, Evaluate medications/consider consulting pharmacy    Elimination Interventions: Bed/chair exit alarm, Toileting schedule/hourly rounds              Problem: Pain  Goal: *Control of Pain  Outcome: Progressing Towards Goal     Problem: Pressure Injury - Risk of  Goal: *Prevention of pressure injury  Description: Document Dionte Scale and appropriate interventions in the flowsheet. Outcome: Progressing Towards Goal  Note: Pressure Injury Interventions:  Sensory Interventions: Assess changes in LOC, Keep linens dry and wrinkle-free, Maintain/enhance activity level, Minimize linen layers, Pressure redistribution bed/mattress (bed type), Turn and reposition approx. every two hours (pillows and wedges if needed)    Moisture Interventions: Absorbent underpads, Apply protective barrier, creams and emollients, Check for incontinence Q2 hours and as needed, Internal/External urinary devices, Minimize layers    Activity Interventions: Pressure redistribution bed/mattress(bed type)    Mobility Interventions: Pressure redistribution bed/mattress (bed type), Turn and reposition approx.  every two hours(pillow and wedges)    Nutrition Interventions: Document food/fluid/supplement intake    Friction and Shear Interventions: Apply protective barrier, creams and emollients, Transferring/repositioning devices, Minimize layers, Lift sheet                Problem: Injury - Risk of, Adverse Drug Event  Goal: *Absence of Encounter    06/27/17 2211   Weight: 73.5 kg (162 lb)      Ht Readings from Last 1 Encounters:   06/27/17 5' 7\" (1.702 m)     []  Weight Loss  []  Weight Gain  []  Weight Stable   [x]  New wt n/a on record     Estimated Nutrition Needs:       Calories: 1700  kcal Based on:   [x] Actual BW    Protein:   30 V Based on:   [x] Actual BW    Fluid:       0846 OLI Based on:   [x] Actual BW       Nutrition Problems Identified:   [] Suboptimal PO intake   [] Food Allergies  [] Difficulty chewing/swallowing/poor dentition  [] Constipation/Diarrhea   [] Nausea/Vomiting   [x] None  [] Other:     Plan:   [x] Therapeutic Diet  []  Obtained/adjusted food preferences/tolerances and/or snacks options   [x]  Supplements   [] Occupational therapy following for feeding techniques  []  HS snack added   []  Modify diet texture   []  Modify diet for food allergies   []  Educate patient   []  Assist with menu selection   [x]  Monitor PO intake on meal rounds   [x]  Continue inpatient monitoring and intervention   [x]  Participated in discharge planning/Interdisciplinary rounds/Team meetings   []  Other:     Education Needs:   [] Not appropriate for teaching at this time due to:   [x] Identified and addressed    Nutrition Monitoring and Evaluation:   [x] Continue inpatient monitoring and interventions    [] Other:     Mena Frederick  Pager: [phone] adverse drug events  Outcome: Progressing Towards Goal  Goal: *Absence of medication errors  Outcome: Progressing Towards Goal  Goal: *Knowledge of prescribed medications  Outcome: Progressing Towards Goal

## 2022-10-07 NOTE — INTERDISCIPLINARY ROUNDS
[facility]  Pulmonary, Critical Care, and Sleep Medicine  Interdisciplinary and Ventilator Weaning Rounds     Patient discussed in morning walking rounds and interdisciplinary rounds. ICU day: 10/6     Overnight events:   Confused overnight     Assessments and best practice:  Ventilator  N/A  Sedation  None  Other pertinent drips  levophed  Lines noted  Lemus Catheter  Critical labs assessed  Yes  Antibiotics  Zosyn and Vancomycin  Medications reviewed  Yes  Pending imaging  None  Pending send out labs  Urine cultures  Routine labs   Pending Procedures  None  Glycemic control  Stress ulcer prophylaxis. Pepcid  DVT prophylaxis. SCDs  Need for Lines, lemus assessed. Yes  Restraint Reevaluation   - Restraints not needed     Family contact/MPOA:   Family updated      Palliative consult within 3 days of admission to ICU-  Ethics Guidance: 21 days        Daily Plans:   Albumin q6h  Midline placement by vascular team  MRI L foot pending  Hold plavix for possible OR today     LENORA time 15 minutes           Dave Sierra PA-C  10/07/22  Pulmonary, Critical Care Medicine  [facility]

## 2022-10-07 NOTE — PROGRESS NOTES
MRI Screening form needs to be filled out and faxed to [phone] MRI can be scheduled. If unable to obtain information from pt, MPOA needs to be contacted.  If pt is claustro or will need pain meds, please have ordered in advance in order to facilitate exam.

## 2022-10-07 NOTE — PROGRESS NOTES
Comprehensive Nutrition Assessment    Type and Reason for Visit: Initial, NPO/clear liquid    Nutrition Recommendations/Plan:   Advance PO diet as tolerated to 2gm Na Restriction - consider consulting SLP when medically feasible for appropriate PO diet consistency. Order oral supplement based on diet advancement. If unable to advance PO diet, plan to obtain enteral access per MD.  Rec Jevity 1.5, starting @ 20 ml/hr and advance as tolerated by 10 ml q8h to goal rate of 55 ml/hr +  ml q6h for hydration or per MD (provides 1980 kcal, 84g protein, 1403 ml free water, 1720% RDIs)      Malnutrition Assessment:  Malnutrition Status:  Severe malnutrition (10/07/22 1106)    Context:  Chronic illness     Findings of the 6 clinical characteristics of malnutrition:   Energy Intake:     Weight Loss:  Greater than 5% over 1 month     Body Fat Loss:   ,     Muscle Mass Loss:  Severe muscle mass loss, Clavicles (pectoralis &deltoids), Temples (temporalis), Scapula (trapezius)  Fluid Accumulation:  No significant fluid accumulation,     Strength:  Not performed     Nutrition History and Allergies: PMHx: DM2, HTN, Major Depressive disorder, Degeneration of lumbar spine, Neuropathy, Gangrene of bilateral lower extremities, PAD. Recent bilateral toe amputations. Wt hx: 182 lb (8/13/21), 150 lb (9/19/22). Bed scale this date 64.5 kg. Wt loss of 5.3% x <1 month, -21.9% x 14 months per EMR. NKFA. Per RD note 9/2022: pt w/ intake ~75% and varied acceptance of oral supplements. Unable to obtain information from pt. Nutrition Assessment:    Admitted due to hypoxia, tachycardia, and altered mental status. Per Palliative Care: pt DNR/DNI. Discussed care during interdisciplinary rounds. Plan for possible OR today. Per MD, SLP to eval when medically feasible and if fails, to obtain enteral access for nutrition/meds. Pt has been NPO since admit.     Nutrition Related Findings:    Pertinent Meds: pepcid, vanco, levo  Pertinent Labs: Na 147 H (trending up); 9/13/22: Hgb A1C 5.8 Wound Type: Multiple, Unstageable, Pressure injury, Deep tissue injury    Current Nutrition Intake & Therapies:  Average Meal Intake: NPO  Average Supplement Intake: None ordered  DIET NPO    Anthropometric Measures:  Height: 6' (182.9 cm)  Ideal Body Weight (IBW): 178 lbs (81 kg)  Admission Body Weight: 142 lb 3.2 oz (64.5 kg)  Current Body Wt:  64.5 kg (142 lb 3.2 oz), 79.9 % IBW. Current BMI (kg/m2): 19.3    Estimated Daily Nutrient Needs:  Energy Requirements Based On: Formula  Weight Used for Energy Requirements: Current  Energy (kcal/day): 9128-2672 (MSJ 1.2-1.4)  Weight Used for Protein Requirements: Current  Protein (g/day): 77-97 (1.2-1.5 g/day)  Method Used for Fluid Requirements: 1 ml/kcal  Fluid (ml/day): 5059-0482    Nutrition Diagnosis:   Severe malnutrition, In context of chronic illness related to inadequate protein-energy intake, catabolic illness as evidenced by weight loss greater than or equal to 5% in 1 month, severe muscle loss    Nutrition Interventions:   Food and/or Nutrient Delivery: Continue NPO     Coordination of Nutrition Care: Interdisciplinary rounds  Plan of Care discussed with: interdisciplinary team    Goals:     Goals: Meet at least 75% of estimated needs, by next RD assessment       Nutrition Monitoring and Evaluation:      Food/Nutrient Intake Outcomes: Diet advancement/tolerance  Physical Signs/Symptoms Outcomes: Biochemical data, Hemodynamic status, Weight, GI status    Discharge Planning:     Too soon to determine    Ginger Yost [address], [address]   Contact: [phone]

## 2022-10-07 NOTE — PROGRESS NOTES
10/6/22:  1930: Assumed care of patient after report. Drip verified. Orders reviewed. 10/7/22:  5191: Bedside and Verbal shift change report given to Ila Riley RN (oncoming nurse) by Ellie Henley RN (offgoing nurse).  Report included the following information SBAR, Intake/Output, MAR, Recent Results, and Cardiac Rhythm ST .

## 2022-10-08 ENCOUNTER — APPOINTMENT (OUTPATIENT)
Dept: NON INVASIVE DIAGNOSTICS | Age: 77
DRG: 853 | End: 2022-10-08
Attending: NURSE PRACTITIONER
Payer: MEDICARE

## 2022-10-08 LAB
ANION GAP SERPL CALC-SCNC: 6 MMOL/L (ref 3–18)
ANION GAP SERPL CALC-SCNC: 7 MMOL/L (ref 3–18)
BACTERIA SPEC CULT: NORMAL
BACTERIA SPEC CULT: NORMAL
BASOPHILS # BLD: 0 K/UL (ref 0–0.1)
BASOPHILS NFR BLD: 0 % (ref 0–2)
BUN SERPL-MCNC: 25 MG/DL (ref 7–18)
BUN SERPL-MCNC: 26 MG/DL (ref 7–18)
BUN/CREAT SERPL: 28 (ref 12–20)
BUN/CREAT SERPL: 30 (ref 12–20)
CALCIUM SERPL-MCNC: 8.5 MG/DL (ref 8.5–10.1)
CALCIUM SERPL-MCNC: 8.7 MG/DL (ref 8.5–10.1)
CHLORIDE SERPL-SCNC: 114 MMOL/L (ref 100–111)
CHLORIDE SERPL-SCNC: 115 MMOL/L (ref 100–111)
CO2 SERPL-SCNC: 26 MMOL/L (ref 21–32)
CO2 SERPL-SCNC: 27 MMOL/L (ref 21–32)
CREAT SERPL-MCNC: 0.84 MG/DL (ref 0.6–1.3)
CREAT SERPL-MCNC: 0.93 MG/DL (ref 0.6–1.3)
DIFFERENTIAL METHOD BLD: ABNORMAL
ECHO AO ASC DIAM: 3.1 CM
ECHO AO ASCENDING AORTA INDEX: 1.64 CM/M2
ECHO AO ROOT DIAM: 3.3 CM
ECHO AO ROOT INDEX: 1.75 CM/M2
ECHO AV AREA PEAK VELOCITY: 0.9 CM2
ECHO AV AREA VTI: 1 CM2
ECHO AV AREA/BSA PEAK VELOCITY: 0.5 CM2/M2
ECHO AV AREA/BSA VTI: 0.5 CM2/M2
ECHO AV MEAN GRADIENT: 22 MMHG
ECHO AV MEAN VELOCITY: 2.2 M/S
ECHO AV PEAK GRADIENT: 33 MMHG
ECHO AV PEAK VELOCITY: 2.9 M/S
ECHO AV VELOCITY RATIO: 0.28
ECHO AV VTI: 76.6 CM
ECHO LA DIAMETER INDEX: 1.48 CM/M2
ECHO LA DIAMETER: 2.8 CM
ECHO LA TO AORTIC ROOT RATIO: 0.85
ECHO LA VOL 2C: 54 ML (ref 18–58)
ECHO LA VOL 4C: 63 ML (ref 18–58)
ECHO LA VOLUME AREA LENGTH: 66 ML
ECHO LA VOLUME INDEX A2C: 29 ML/M2 (ref 16–34)
ECHO LA VOLUME INDEX A4C: 33 ML/M2 (ref 16–34)
ECHO LA VOLUME INDEX AREA LENGTH: 35 ML/M2 (ref 16–34)
ECHO LV E' LATERAL VELOCITY: 9 CM/S
ECHO LV FRACTIONAL SHORTENING: 24 % (ref 28–44)
ECHO LV INTERNAL DIMENSION DIASTOLE INDEX: 2.38 CM/M2
ECHO LV INTERNAL DIMENSION DIASTOLIC: 4.5 CM (ref 4.2–5.9)
ECHO LV INTERNAL DIMENSION SYSTOLIC INDEX: 1.8 CM/M2
ECHO LV INTERNAL DIMENSION SYSTOLIC: 3.4 CM
ECHO LV IVSD: 1.3 CM (ref 0.6–1)
ECHO LV MASS 2D: 261.9 G (ref 88–224)
ECHO LV MASS INDEX 2D: 138.6 G/M2 (ref 49–115)
ECHO LV POSTERIOR WALL DIASTOLIC: 1.6 CM (ref 0.6–1)
ECHO LV RELATIVE WALL THICKNESS RATIO: 0.71
ECHO LVOT AREA: 3.1 CM2
ECHO LVOT AV VTI INDEX: 0.32
ECHO LVOT DIAM: 2 CM
ECHO LVOT MEAN GRADIENT: 2 MMHG
ECHO LVOT PEAK GRADIENT: 3 MMHG
ECHO LVOT PEAK VELOCITY: 0.8 M/S
ECHO LVOT STROKE VOLUME INDEX: 40.7 ML/M2
ECHO LVOT SV: 76.9 ML
ECHO LVOT VTI: 24.5 CM
ECHO RV FREE WALL PEAK S': 12 CM/S
ECHO RV TAPSE: 2.4 CM (ref 1.7–?)
ECHO TV REGURGITANT MAX VELOCITY: 1.88 M/S
ECHO TV REGURGITANT PEAK GRADIENT: 14 MMHG
EOSINOPHIL # BLD: 0.1 K/UL (ref 0–0.4)
EOSINOPHIL NFR BLD: 1 % (ref 0–5)
ERYTHROCYTE [DISTWIDTH] IN BLOOD BY AUTOMATED COUNT: 14 % (ref 11.6–14.5)
GLUCOSE BLD STRIP.AUTO-MCNC: 113 MG/DL (ref 70–110)
GLUCOSE BLD STRIP.AUTO-MCNC: 116 MG/DL (ref 70–110)
GLUCOSE BLD STRIP.AUTO-MCNC: 118 MG/DL (ref 70–110)
GLUCOSE BLD STRIP.AUTO-MCNC: 121 MG/DL (ref 70–110)
GLUCOSE BLD STRIP.AUTO-MCNC: 156 MG/DL (ref 70–110)
GLUCOSE SERPL-MCNC: 108 MG/DL (ref 74–99)
GLUCOSE SERPL-MCNC: 113 MG/DL (ref 74–99)
HCT VFR BLD AUTO: 21.1 % (ref 36–48)
HCT VFR BLD AUTO: 22.4 % (ref 36–48)
HGB BLD-MCNC: 6.7 G/DL (ref 13–16)
HGB BLD-MCNC: 6.9 G/DL (ref 13–16)
HGB BLD-MCNC: 6.9 G/DL (ref 13–16)
HISTORY CHECKED?,CKHIST: NORMAL
HISTORY CHECKED?,CKHIST: NORMAL
IMM GRANULOCYTES # BLD AUTO: 0 K/UL
IMM GRANULOCYTES NFR BLD AUTO: 0 %
LYMPHOCYTES # BLD: 0.3 K/UL (ref 0.9–3.6)
LYMPHOCYTES NFR BLD: 3 % (ref 21–52)
MAGNESIUM SERPL-MCNC: 2.2 MG/DL (ref 1.6–2.6)
MAGNESIUM SERPL-MCNC: 2.4 MG/DL (ref 1.6–2.6)
MCH RBC QN AUTO: 27.8 PG (ref 24–34)
MCHC RBC AUTO-ENTMCNC: 30.8 G/DL (ref 31–37)
MCV RBC AUTO: 90.3 FL (ref 78–100)
MONOCYTES # BLD: 0 K/UL (ref 0.05–1.2)
MONOCYTES NFR BLD: 0 % (ref 3–10)
NEUTS BAND NFR BLD MANUAL: 16 % (ref 0–5)
NEUTS SEG # BLD: 8.9 K/UL (ref 1.8–8)
NEUTS SEG NFR BLD: 80 % (ref 40–73)
NRBC # BLD: 0 K/UL (ref 0–0.01)
NRBC BLD-RTO: 0 PER 100 WBC
PHOSPHATE SERPL-MCNC: 2 MG/DL (ref 2.5–4.9)
PLATELET # BLD AUTO: 144 K/UL (ref 135–420)
PLATELET COMMENTS,PCOM: ABNORMAL
PMV BLD AUTO: 10.6 FL (ref 9.2–11.8)
POTASSIUM SERPL-SCNC: 3 MMOL/L (ref 3.5–5.5)
POTASSIUM SERPL-SCNC: 3.7 MMOL/L (ref 3.5–5.5)
RBC # BLD AUTO: 2.48 M/UL (ref 4.35–5.65)
RBC MORPH BLD: ABNORMAL
SERVICE CMNT-IMP: NORMAL
SODIUM SERPL-SCNC: 147 MMOL/L (ref 136–145)
SODIUM SERPL-SCNC: 148 MMOL/L (ref 136–145)
WBC # BLD AUTO: 9.3 K/UL (ref 4.6–13.2)
WBC MORPH BLD: ABNORMAL

## 2022-10-08 PROCEDURE — 83735 ASSAY OF MAGNESIUM: CPT

## 2022-10-08 PROCEDURE — 74011000250 HC RX REV CODE- 250: Performed by: NURSE PRACTITIONER

## 2022-10-08 PROCEDURE — 92610 EVALUATE SWALLOWING FUNCTION: CPT

## 2022-10-08 PROCEDURE — 2709999900 HC NON-CHARGEABLE SUPPLY

## 2022-10-08 PROCEDURE — 93306 TTE W/DOPPLER COMPLETE: CPT

## 2022-10-08 PROCEDURE — 93306 TTE W/DOPPLER COMPLETE: CPT | Performed by: INTERNAL MEDICINE

## 2022-10-08 PROCEDURE — 99233 SBSQ HOSP IP/OBS HIGH 50: CPT | Performed by: PHYSICIAN ASSISTANT

## 2022-10-08 PROCEDURE — 80048 BASIC METABOLIC PNL TOTAL CA: CPT

## 2022-10-08 PROCEDURE — 85025 COMPLETE CBC W/AUTO DIFF WBC: CPT

## 2022-10-08 PROCEDURE — 74011000258 HC RX REV CODE- 258: Performed by: EMERGENCY MEDICINE

## 2022-10-08 PROCEDURE — 74011250636 HC RX REV CODE- 250/636: Performed by: REGISTERED NURSE

## 2022-10-08 PROCEDURE — 36415 COLL VENOUS BLD VENIPUNCTURE: CPT

## 2022-10-08 PROCEDURE — 84100 ASSAY OF PHOSPHORUS: CPT

## 2022-10-08 PROCEDURE — 65270000029 HC RM PRIVATE

## 2022-10-08 PROCEDURE — 86923 COMPATIBILITY TEST ELECTRIC: CPT

## 2022-10-08 PROCEDURE — 74011000250 HC RX REV CODE- 250: Performed by: REGISTERED NURSE

## 2022-10-08 PROCEDURE — 77010033678 HC OXYGEN DAILY

## 2022-10-08 PROCEDURE — 74011250636 HC RX REV CODE- 250/636: Performed by: EMERGENCY MEDICINE

## 2022-10-08 PROCEDURE — 85014 HEMATOCRIT: CPT

## 2022-10-08 PROCEDURE — 74011250636 HC RX REV CODE- 250/636: Performed by: NURSE PRACTITIONER

## 2022-10-08 PROCEDURE — 74011250636 HC RX REV CODE- 250/636: Performed by: INTERNAL MEDICINE

## 2022-10-08 PROCEDURE — P9016 RBC LEUKOCYTES REDUCED: HCPCS

## 2022-10-08 PROCEDURE — 94762 N-INVAS EAR/PLS OXIMTRY CONT: CPT

## 2022-10-08 PROCEDURE — 86900 BLOOD TYPING SEROLOGIC ABO: CPT

## 2022-10-08 PROCEDURE — P9047 ALBUMIN (HUMAN), 25%, 50ML: HCPCS | Performed by: INTERNAL MEDICINE

## 2022-10-08 PROCEDURE — 74011636637 HC RX REV CODE- 636/637: Performed by: NURSE PRACTITIONER

## 2022-10-08 PROCEDURE — 36430 TRANSFUSION BLD/BLD COMPNT: CPT

## 2022-10-08 PROCEDURE — 74011000250 HC RX REV CODE- 250: Performed by: PHYSICIAN ASSISTANT

## 2022-10-08 PROCEDURE — 74011250637 HC RX REV CODE- 250/637: Performed by: NURSE PRACTITIONER

## 2022-10-08 PROCEDURE — 99291 CRITICAL CARE FIRST HOUR: CPT | Performed by: INTERNAL MEDICINE

## 2022-10-08 PROCEDURE — 85018 HEMOGLOBIN: CPT

## 2022-10-08 PROCEDURE — 82962 GLUCOSE BLOOD TEST: CPT

## 2022-10-08 RX ORDER — LORAZEPAM 2 MG/ML
2 INJECTION INTRAMUSCULAR ONCE
Status: DISCONTINUED | OUTPATIENT
Start: 2022-10-08 | End: 2022-10-08

## 2022-10-08 RX ORDER — POTASSIUM CHLORIDE 7.45 MG/ML
10 INJECTION INTRAVENOUS
Status: COMPLETED | OUTPATIENT
Start: 2022-10-08 | End: 2022-10-08

## 2022-10-08 RX ORDER — HYDROMORPHONE HYDROCHLORIDE 2 MG/ML
2 INJECTION, SOLUTION INTRAMUSCULAR; INTRAVENOUS; SUBCUTANEOUS ONCE
Status: DISCONTINUED | OUTPATIENT
Start: 2022-10-08 | End: 2022-10-08

## 2022-10-08 RX ORDER — SODIUM CHLORIDE 9 MG/ML
250 INJECTION, SOLUTION INTRAVENOUS AS NEEDED
Status: DISCONTINUED | OUTPATIENT
Start: 2022-10-08 | End: 2022-10-13 | Stop reason: HOSPADM

## 2022-10-08 RX ORDER — DEXTROSE MONOHYDRATE AND SODIUM CHLORIDE 5; .45 G/100ML; G/100ML
75 INJECTION, SOLUTION INTRAVENOUS CONTINUOUS
Status: DISCONTINUED | OUTPATIENT
Start: 2022-10-08 | End: 2022-10-09

## 2022-10-08 RX ADMIN — ALBUMIN (HUMAN) 25 G: 0.25 INJECTION, SOLUTION INTRAVENOUS at 17:06

## 2022-10-08 RX ADMIN — DEXTROSE AND SODIUM CHLORIDE 75 ML/HR: 5; 450 INJECTION, SOLUTION INTRAVENOUS at 16:52

## 2022-10-08 RX ADMIN — ALBUMIN (HUMAN) 25 G: 0.25 INJECTION, SOLUTION INTRAVENOUS at 12:54

## 2022-10-08 RX ADMIN — PIPERACILLIN AND TAZOBACTAM 3.38 G: 3; .375 INJECTION, POWDER, FOR SOLUTION INTRAVENOUS at 03:29

## 2022-10-08 RX ADMIN — ALBUMIN (HUMAN) 25 G: 0.25 INJECTION, SOLUTION INTRAVENOUS at 05:06

## 2022-10-08 RX ADMIN — FAMOTIDINE 20 MG: 10 INJECTION, SOLUTION INTRAVENOUS at 08:21

## 2022-10-08 RX ADMIN — POTASSIUM CHLORIDE 10 MEQ: 7.45 INJECTION INTRAVENOUS at 08:19

## 2022-10-08 RX ADMIN — POTASSIUM CHLORIDE 10 MEQ: 7.45 INJECTION INTRAVENOUS at 10:00

## 2022-10-08 RX ADMIN — ENOXAPARIN SODIUM 40 MG: 100 INJECTION SUBCUTANEOUS at 12:47

## 2022-10-08 RX ADMIN — ONDANSETRON 4 MG: 2 INJECTION INTRAMUSCULAR; INTRAVENOUS at 03:17

## 2022-10-08 RX ADMIN — POTASSIUM PHOSPHATE, MONOBASIC AND POTASSIUM PHOSPHATE, DIBASIC: 224; 236 INJECTION, SOLUTION, CONCENTRATE INTRAVENOUS at 13:56

## 2022-10-08 RX ADMIN — ALBUMIN (HUMAN) 25 G: 0.25 INJECTION, SOLUTION INTRAVENOUS at 23:06

## 2022-10-08 RX ADMIN — POTASSIUM CHLORIDE 10 MEQ: 7.45 INJECTION INTRAVENOUS at 11:51

## 2022-10-08 RX ADMIN — POTASSIUM CHLORIDE 10 MEQ: 7.45 INJECTION INTRAVENOUS at 05:37

## 2022-10-08 RX ADMIN — POTASSIUM CHLORIDE 10 MEQ: 7.45 INJECTION INTRAVENOUS at 06:40

## 2022-10-08 RX ADMIN — VANCOMYCIN HYDROCHLORIDE 1250 MG: 10 INJECTION, POWDER, LYOPHILIZED, FOR SOLUTION INTRAVENOUS at 16:53

## 2022-10-08 RX ADMIN — POTASSIUM CHLORIDE 10 MEQ: 7.45 INJECTION INTRAVENOUS at 08:21

## 2022-10-08 RX ADMIN — FAMOTIDINE 20 MG: 10 INJECTION, SOLUTION INTRAVENOUS at 17:07

## 2022-10-08 RX ADMIN — LEVOFLOXACIN 750 MG: 5 INJECTION, SOLUTION INTRAVENOUS at 19:08

## 2022-10-08 RX ADMIN — PIPERACILLIN AND TAZOBACTAM 3.38 G: 3; .375 INJECTION, POWDER, FOR SOLUTION INTRAVENOUS at 20:22

## 2022-10-08 RX ADMIN — PIPERACILLIN AND TAZOBACTAM 3.38 G: 3; .375 INJECTION, POWDER, FOR SOLUTION INTRAVENOUS at 12:17

## 2022-10-08 RX ADMIN — Medication 2 UNITS: at 23:52

## 2022-10-08 NOTE — PROGRESS NOTES
Problem: Dysphagia (Adult)  Description: Patient will:  1. Tolerate PO trials with 0 s/s overt distress in 4/5 trials. 2. Utilize compensatory swallow strategies/maneuvers (decrease bite/sip, size/rate, alt. liq/sol) with min cues in 4/5 trials. 3. Perform oral-motor/laryngeal exercises to increase oropharyngeal swallow function with min cues. 4. Complete an objective swallow study (i.e., MBSS) to assess swallow integrity, r/o aspiration, and determine of safest LRD, min A.    Rec:     NPO, ice chips/ swabs for comfort; oral care 3x/daily  Aspiration precautions  HOB >45 during po intake, remain >30 for 30-45 minutes after po   Non-oral meds  Outcome: Progressing Towards Goal      SPEECH LANGUAGE PATHOLOGY BEDSIDE SWALLOW   EVALUATION & TREATMENT     Patient: Leigh Rodriguez (24 y.o. male)  Date: 10/8/2022  Primary Diagnosis: Sepsis ([facility] [A41.9]  Pneumonia [J18.9]  Multifocal pneumonia [J18.9]  Procedure(s) (LRB):  Left Foot Transmetatarsal Amputation (Left) 1 Day Post-Op   Precautions: Aspiration     PLOF: as per H&P    ASSESSMENT :  Based on the objective data described below, the patient presents with moderate-severe oropharyngeal dysphagia. Patient seen for bedside swallow evaluation per MD orders following admission for septic shock. Patient is A/Ox1. Vocal quality c/b low volume; fatigue. Reduced intelligibility, confusion noted. OM examination significant for poor, limited dentition. Reduced labial seal. Suspect reduced lingual strength/ROM; however, patient unwilling to cooperate [facility].  Oral hygiene poor. Trials ice chips, thin liquid, and puree given. Pooling of applesauce in oral cavity - absent lingual propulsion/swallow initiation requiring suction. Applesauce and thick secretions suctioned from oral cavity. Limited ice chip and thin liquid trials. Immediate and delayed cough noted with thin liquid trials.   Recommend patient remain NPO; ice chips/swabs for comfort; no oral meds; oral care 3x/daily. Aspiration precautions. ST to follow-up next day to re-assess swallow. SENA RN. Patient will benefit from skilled intervention to address the above impairments. Patient's rehabilitation potential is considered to be Fair  Factors which may influence rehabilitation potential include:   []            None noted  [x]            Mental ability/status  [x]            Medical condition  []            Home/family situation and support systems  [x]            Safety awareness  []            Pain tolerance/management  []            Other:      PLAN :  Recommendations and Planned Interventions:  See above  Frequency/Duration: Patient will be followed by speech-language pathology 3 times a week to address goals. Discharge Recommendations: To Be Determined     SUBJECTIVE:   Patient stated \"you are trying to give me thick water.     OBJECTIVE:     Past Medical History:   Diagnosis Date    Arthritis     Cognitive impairment     Depression     Dorsalgia, unspecified     GERD (gastroesophageal reflux disease)     High blood pressure     Insomnia     L1 vertebral fracture (HCC)     Migraine headache     Other chronic pain     Periorbital headache     Sleep apnea     Spinal stenosis     Type 2 diabetes mellitus, without long-term current use of insulin [facility])      Past Surgical History:   Procedure Laterality Date    ENDOSCOPY, COLON, DIAGNOSTIC      HX LUMBAR DISKECTOMY  6/2001, 4/11    left L3-4 microdiskectomy with intradural rupture    HX ORTHOPAEDIC      spinal Surgery Dr. Reece Daley  9/11    spinal fusion, L3-5     Home Situation:   Home Situation  Home Environment: Skilled nursing facility  One/Two Story Residence: One story  Living Alone: No  Support Systems: Child(clau)  Patient Expects to be Discharged to[de-identified] Skilled nursing facility  Current DME Used/Available at Home: Other (comment) (unknown)    Diet prior to admission: per H&P  Current Diet:  NPO Cognitive and Communication Status:  Neurologic State: Alert, Confused  Orientation Level: Oriented to person  Cognition: Decreased command following, Poor safety awareness     Perseveration: No perseveration noted  Safety/Judgement: Decreased insight into deficits  Oral Assessment:  Oral Assessment  Labial: Decreased seal  Dentition: Natural;Poor  Oral Hygiene:  (poor)  Lingual: Other (comment) (unable to assess due to patial refusal)  Velum: Unable to visualize  Mandible: No impairment  Gag Reflex:  (did not test)  P.O. Trials:  Patient Position:  (HOB >45 degrees)  Vocal quality prior to P.O.: Low volume  Consistency Presented: Ice chips; Thin liquid;Puree  How Presented: SLP-fed/presented;Spoon; Successive swallows;Straw     Bolus Acceptance: No impairment  Bolus Formation/Control: Impaired  Type of Impairment: Mastication;Piecemeal;Spillage  Propulsion: Delayed (# of seconds); Discoordination  Oral Residue: Anterior;Lingual;Greater than 50% of bolus  Initiation of Swallow: Delayed (# of seconds)  Laryngeal Elevation: Decreased;Weak  Aspiration Signs/Symptoms: Change vocal quality;Weak cough  Pharyngeal Phase Characteristics: Easily fatigued ;Effortful swallow; Suspected pharyngeal residue;Poor endurance  Effective Modifications: None  Cues for Modifications: Moderate       Oral Phase Severity: Moderate-severe  Pharyngeal Phase Severity : Moderate    PAIN:  Pain level pre-treatment: none reported   Pain level post-treatment: none reported    After treatment:   []            Patient left in no apparent distress sitting up in chair  [x]            Patient left in no apparent distress in bed  [x]            Call bell left within reach  [x]            Nursing notified  []            Family present  []            Caregiver present  []            Bed alarm activated    COMMUNICATION/EDUCATION:   [x]            Aspiration precautions; swallow safety; compensatory techniques.   []            Patient/family have participated as able in goal setting and plan of care. []            Patient/family agree to work toward stated goals and plan of care. []            Patient understands intent and goals of therapy; neutral about participation. [x]            Patient unable to participate in goal setting/plan of care; educ ongoing with interdisciplinary staff  []         Posted safety precautions in patient's room.     Thank you for this referral.  VENICE Rogers  Time Calculation: 15 mins

## 2022-10-08 NOTE — PROGRESS NOTES
RN report given at [phone], waiting for pt transport. Pt vss, on 2lnc oxygen s/p left toe amputations. IVF d5,1/2ns at 75ml hour with ivabx and electrolyte repletion per rt arm single lumen midline. Pt has lemus cath  cdi and had 2 large soft brown stools today. Cleaned and changed linens. Personal belongings w pt labeled and bagged    Lab called w positive mrsa wound culture, doctor notified.        Will transfer to 4th floor

## 2022-10-08 NOTE — PROGRESS NOTES
[facility]. Pulmonary, Critical Care, and Sleep Medicine    Name: Og Lopes MRN: [id number]   : 1945 Hospital: [facility]   Date: 10/8/2022  Admission Date: 10/5/2022     Chart and notes reviewed. Data reviewed. I have evaluated all findings. [x]I have reviewed the flowsheet and previous days notes. [x]The patient is unable to give any meaningful history or review of systems because the patient is:  []Intubated []Sedated   []Unresponsive      [x]The patient is critically ill on      []Mechanical ventilation []Pressors   []BiPAP []         Interval HPI: 78year old male with a past medical history of PVD, gangrene of b/l feet, spinal stenosis, DM, HTN, dementia, admitted to the unit with acute hypoxic respiratory failure requiring HFNC, septic shock likely 2/2 multifocal PNA vs L foot cellulitis, acute on chronic encephalopathy, and prerenal JAMIL. Pt required low dose levophed and HFNC. Both successfully weaned    Pt arousable but agitated. States \"where's my wife\" and \"let me sleep\". Appears confused. Subjective 10/08/22  Hospital Day: 10/5  [facility] Day: N/A  Overnight events: No acute events noted overnight  Mentation/Activity: confused  Respiratory/ Secretions: stable  Hemodynamics: stable  Urine output, bowel: adequate UOP  Diet: NPO  Need for procedures: None              ROS:Review of systems not obtained due to patient factors. Events and notes from last 24 hours reviewed.      Patient Active Problem List   Diagnosis Code    Thoracic or lumbosacral neuritis or radiculitis, unspecified [id number]    Degeneration of lumbar or lumbosacral intervertebral disc M51.37    Lumbago M54.50    Hyperlipemia E78.5    Sleep apnea G47.30    Cluster headaches G44.009    Type 2 diabetes mellitus with diabetic neuropathy, without long-term current use of insulin (HCC) E11.40    Chronic midline low back pain without sciatica M54.50, G89.29    Erectile dysfunction due to arterial insufficiency N52.01    Major depressive disorder, recurrent, mild F33.0    Major depressive disorder, recurrent, moderate F33.1    Major depressive disorder, recurrent, unspecified F33.9    Gangrene (HCC) I96    Cellulitis L03.90    Moderate protein-calorie malnutrition (HCC) E44.0    Pneumonia J18.9    Sepsis (HCC) A41.9    Multifocal pneumonia J18.9       Vital Signs:  Visit Vitals  /68   Pulse 75   Temp 97.4 °F (36.3 °C)   Resp 22   Ht 6' (1.829 m)   Wt 64.5 kg (142 lb 3.2 oz) Comment: Bed scale   SpO2 96%   BMI 19.29 kg/m²       O2 Device: Nasal cannula   O2 Flow Rate (L/min): 2 l/min   Temp (24hrs), Av.8 °F (36.6 °C), Min:97.4 °F (36.3 °C), Max:98.3 °F (36.8 °C)       Intake/Output:   Last shift:      No intake/output data recorded.   Last 3 shifts: 10/06 1901 - 10/08 0700  In: 832.9 [I.V.:832.9]  Out: 1263 [Urine:3335]    Intake/Output Summary (Last 24 hours) at 10/8/2022 0902  Last data filed at 10/8/2022 0640  Gross per 24 hour   Intake 402.85 ml   Output 2070 ml   Net -1667.15 ml            Current Facility-Administered Medications   Medication Dose Route Frequency    potassium chloride 10 mEq in 100 ml IVPB  10 mEq IntraVENous Q1H    potassium phosphate 30 mmol in 0.9% sodium chloride 250 mL infusion   IntraVENous ONCE    levoFLOXacin (LEVAQUIN) 750 mg in D5W IVPB  750 mg IntraVENous Q24H    [Held by provider] clopidogreL (PLAVIX) tablet 75 mg  75 mg Oral DAILY    albumin human 25% (BUMINATE) solution 25 g  25 g IntraVENous Q6H    NOREPINephrine (LEVOPHED) 8 mg in 5% dextrose 250mL (32 mcg/mL) infusion  0.5-50 mcg/min IntraVENous TITRATE    [Held by provider] enoxaparin (LOVENOX) injection 40 mg  40 mg SubCUTAneous DAILY    famotidine (PF) (PEPCID) 20 mg in 0.9% sodium chloride 10 mL injection  20 mg IntraVENous BID    nicotine (NICODERM CQ) 14 mg/24 hr patch 1 Patch  1 Patch TransDERmal DAILY    insulin lispro (HUMALOG) injection   SubCUTAneous Q6H    piperacillin-tazobactam (ZOSYN) 3.375 g in 0.9% sodium chloride (MBP/ADV) 100 mL MBP  3.375 g IntraVENous Q8H    vancomycin (VANCOCIN) 1250 mg in  ml infusion  1,250 mg IntraVENous Q24H         Telemetry: [x]Sinus []A-flutter []Paced    []A-fib []Multiple PVCs                  Physical Exam:      General: NAD  HEENT:  Anicteric sclerae; pink palpebral conjunctivae; mucosa moist  Resp:  Symmetrical chest expansion, no accessory muscle use; good airway entry; no rales/ wheezing/ rhonchi noted  CV:  S1, S2 present; regular rate and rhythm  GI:  Abdomen soft, non-tender; (+) active bowel sounds  Extremities: R toes s/p partial amputation with gangrenous changes. L foot s/p OR. Thin extremities. no edema/ cyanosis/ clubbing noted   Skin:  Warm; no rashes/ lesions noted, normal turgor/cap refill   Neurologic:  Non-focal  Devices:  No NGT/OGT, Central line/ PICC, ETT/tracheostomy, chest tube      DATA:  MAR reviewed and pertinent medications noted or modified as needed    Labs:  Recent Labs     10/08/22  0258 10/07/22  0325 10/06/22  1057   WBC 9.3 14.6* 13.2   HGB 6.9* 8.8* 9.5*   HCT 22.4* 27.4* 30.4*    204 241       Recent Labs     10/08/22  0258 10/07/22  0325 10/06/22  1057 10/06/22  0010 10/05/22  1940   * 147* 145   < > 137   K 3.0* 3.6 4.4   < > 4.9   * 114* 113*   < > 100   CO2 27 25 26   < > 26   * 106* 72*   < > 239*   BUN 26* 29* 27*   < > 24*   CREA 0.93 1.21 1.58*   < > 1.34*   CA 8.7 7.6* 8.1*   < > 9.9   MG 2.4 2.1 1.9  --   --    PHOS 2.0* 3.3 3.9  --   --    ALB  --   --  2.0*  --  2.7*   ALT  --   --  13*  --  12*   INR  --   --  1.2  --   --     < > = values in this interval not displayed. No results for input(s): PH, PCO2, PO2, HCO3, FIO2 in the last 72 hours.   Recent Labs     10/06/22  0125   HCO3I 24.0   PCO2I 37.6   PHI 7.41   PO2I 66*         Imaging:  [x]   I have personally reviewed the patients radiographs and reports  XR Results (most recent):  XR Results (most recent):  Results from Hospital Encounter encounter on 10/05/22    XR FOOT LT MIN 3 V    Narrative  EXAMINATION:    Right foot 3 views  Left foot 3 views    INDICATION: Diabetes, infection/inflammation    COMPARISON: 9/17/2022    FINDINGS:    Right foot: 3 views obtained. Osteopenia limits evaluation of bony detail. Second and third ray amputations near base of proximal phalanges. Perhaps  slightly increased lucency at the second proximal phalanx amputation stump. Evidence of soft tissue swelling at the lesser toes, including the amputation  stumps. No acute fracture identified. Left foot: 3 views obtained. Osteopenia limits of aeration of bony detail. Second ray amputation at base of middle phalanx and fourth ray amputation at PIP  level. Evidence of soft tissue gas at the fourth ray amputation stump. No  definite aggressive osseous destruction. No acute osseous findings. Evidence of  soft tissue swelling at the toes. Small Achilles enthesophyte. Impression  Right foot:  -Status post second and third ray amputation at proximal phalanx level, but  perhaps slightly increased lucency at the second proximal phalanx bony stump,  which may reflect osteomyelitis. See additional details above. Left foot:  -Second and fourth ray amputation site is above. Evidence of soft tissue gas at  the fourth ray amputation stump, suspicious for gas-forming infection and/or  gangrene. No definite radiographic evidence of osteomyelitis. Of note, MRI is more sensitive for detecting osteomyelitis. CT Results (most recent):  Results from Hospital Encounter encounter on 10/05/22    CTA CHEST W OR W WO CONT    Narrative  CT chest with contrast for PE    HISTORY: Tachycardia, hypoxia, pulmonary infiltration on chest radiographs    COMPARISON: Chest portable 10/5/22. TECHNIQUE: Dynamic spiral scan through the chest is obtained from the thoracic  inlet to the diaphragm after dynamic nonionic IV contrast administration  per PE  protocol.  Coronal and sagittal MIP computer reconstructions are also obtained  for better visualization of the integrity of pulmonary arteries in 3D dimension,  particularly for lobar/interlobar arterial branches and to minimize radiation  dose. All CT scans at this facility performed using dose optimization techniques as  appreciated to a performed exam, to include automated exposure control,  adjustment of the mA and or KU according to patient size (including appropriate  matching for site specific examination), or use of iterative reconstruction  technique. FINDINGS:    PULMONARY ARTERIES: The contrast bolus is adequate. The right and left mainstem  pulmonary arteries and their branches appear patent without convincing evidence  of intraluminal filling defect identified to suggest pulmonary embolism. No  significant pulmonary arterial dilatation. AORTA AND VASCULAR STRUCTURES: The thoracic aorta is normal in caliber but not  well opacified by contrast for further evaluation. No aneurysmal dilatation. Advanced atherosclerotic calcified and noncalcified plaques are identified in  the arch and descending aorta. . Moderate atherosclerotic disease also noted  involving the proximal left subclavian artery. CARDIOVASCULAR ASSESSMENT:  - HEART: The heart is normal in size. No pericardial effusion.  - RV/LV (normal < 0.9) : Normal.  - VENTRICULAR SEPTAL BOWING: Not seen. - There is no contrast reflux visualized in IVC and hepatic veins. LUNG PARENCHYMA: Large sublobar consolidation with minimal air bronchogram  identified in the left lower lobe and posterior right lower lobe. Multiple  airspace nodular densities also demonstrated in bilateral lower lobes and left  upper lobe. Patent central airway. IMAGED THYROID: Heterogeneous enhancement with potential hypodense nodules in  posterior left lobe. MEDIASTINUM: Mild mediastinal and hilar adenopathy, the largest measures 1.3 x  3.4 cm in subcarina region.     PLEURAL SPACES AND CHEST WALL: Small bilateral pleural fluid. VISUALIZED UPPER ABDOMEN: Moderate thickening of bilateral adrenal with mild  enhancement as indeterminate finding. It measures 1.8 x 2.1 cm on the right and  1.9 x 2.5 cm on the left. There are 2 adjacent hyperdense nodules in the upper  pole of left the kidney, probably representing nonobstructive calculi, 5 x 7 mm  and 6 x 8 mm respectively. OSSEOUS STRUCTURES: Unremarkable. Impression  1. No convincing CT evidence of pulmonary embolism. 2.  Consolidation pneumonia in posterior lungs, more pronounced on the left. 3. Mild mediastinal and hilar adenopathy. 4. Very small lateral pleural effusions. 5. Significant atherosclerotic aortic disease. 6. Questionable hypodense nodules in the posterior left thyroid lobe. Recommend  thyroid ultrasound correlation. Thank you for your referral.       07/29/21    ECHO ADULT COMPLETE 07/29/2021 7/29/2021    Interpretation Summary  · LV: Estimated LVEF is 60 - 65%. Visually measured ejection fraction. Normal cavity size and systolic function (ejection fraction normal). Mildly increased wall thickness. Wall motion: normal. Age-appropriate left ventricular diastolic function. · AV: There is mild to moderate aortic stenosis. Aortic valve peak gradient is 28 mmHg. Aortic valve mean gradient is 19 mmHg. Aortic valve area is 0.9 cm2.  · AO: Mild aortic root dilatation; diameter is 3.6 cm.     Signed by: Sherif Anderson MD on 7/29/2021  4:47 PM       IMPRESSION:   Severe Sepsis- leukocytosis, hypotension, tachycardia, lactic acidosis, secondary to Multifocal PNA +/- L foot cellulitis  - Zosyn, vancomycin, IV levofloxacin to cover for multidrug-resistant gram-negative   CAP/Aspiration- Multifocal PNA, extensive infiltrates noted on chest x-ray   Acute Hypoxic Respiratory Failure- weaned off vapotherm, On 2L satting 95%  Acute on chronic Encephalopathy- likely toxic/metabolic in nature superimposed on baseline dementia PVD/DM II- with Hx gangrene multiple toes- s/p right foot partial second toe amputation, right foot partial third toe amputation, left foot partial second and fourth toe amputation 9/17/22 by Podiatry and Critical arterial stenosis of the proximal external iliac artery, left superior femoral artery, distal popliteal and proximal tibial peroneal trunk, S/P orbital atherectomy of left tibial peroneal artery, drug-coated balloon angioplasty of the left popliteal and tibial peroneal artery, orbital atherectomy of the left superficial femoral artery, drug-coated balloon angioplasty of the left superficial femoral artery, balloon angioplasty and stenting of the left external iliac artery, angiogram of the right femoral artery 9/16/22  JAMIL- likely prerenal in nature, now resolved   [facility]  Hyponatremia, mild   Lactic Acidosis- resolved   Hypertension  Hyperlipidemia  Acute on chronic anemia:  Lumbar spondylosis with chronic back pain- s/p T12-L3 laminectomy, L1-3 TLIF, ISAK L3-5, instrumentation from T10-pelvis performed by Dr. Brenda Smith on 4/15/2022  Tobacco abuse   Sleep apnea-does not wear CPAP  Depression   Protein calorie malnutrition   Adult failure to thrive  Hx SAH from fall 7/2022      Patient Active Problem List   Diagnosis Code    Thoracic or lumbosacral neuritis or radiculitis, unspecified [id number]    Degeneration of lumbar or lumbosacral intervertebral disc M51.37    Lumbago M54.50    Hyperlipemia E78.5    Sleep apnea G47.30    Cluster headaches G44.009    Type 2 diabetes mellitus with diabetic neuropathy, without long-term current use of insulin (HCC) E11.40    Chronic midline low back pain without sciatica M54.50, G89.29    Erectile dysfunction due to arterial insufficiency N52.01    Major depressive disorder, recurrent, mild F33.0    Major depressive disorder, recurrent, moderate F33.1    Major depressive disorder, recurrent, unspecified F33.9    Gangrene ([address] I96    Cellulitis L03.90    Moderate protein-calorie malnutrition ([facility] [address] E44.0    Pneumonia J18.9    Sepsis ([facility] [address] A41.9    Multifocal pneumonia J18.9        RECOMMENDATIONS:   Neuro: Q4h neuro checks, baseline dementia  Pulm: Supplemental O2 via NC, titrate flow for goal SPO2> 90% Bronchodilators, pulmonary hygiene care, Aspiration precautions, Keep HOB >30 degrees  CVS : MAP >65mmHg. No pressors at this time. Plavix and Lovenox held. GI: SUP w/ pepcid,  Zofran PRN for N/V, NPO-swallow eval pending  Renal:  Trend Renal indices, Strict Is/Os, lemus placed for retention    Hem/Onc: Hgb 6.9. Giving 1 U  I/D: Staph and yeast in cultures. Vanc/Zosyn/Levaquin. Lactic 9.71 originally. ID following. Trend WBCs and temperature curve. RVP negative. Endocrine: Q6 glucoses, SSI. Metabolic:  Daily BMP, mag, phos. Trend lytes, replace as needed. Musc/Skin: wound care to BLE; Vascular and Podiatry following  Full Code  Palliative Care consult placed     Dispo: Transfer out     Best practice :    Glycemic control  IHI ICU bundles: Lemus Bundle Followed    Stress ulcer prophylaxis. Pepcid  DVT prophylaxis. Lovenox + plavix hold for anemia  Need for Lines, lemus assessed. Palliative care evaluation. Restraints not needed      Rom Ace MD   10/08/22  PGY-1  [facility] Emergency Med        I saw and evaluated the patient, performing the key elements of the service. I discussed the findings, assessment and plan with the resident and agree with the resident's findings and plan as documented in the resident's note. Total of 30 min critical care time spent at bedside (personally) during the course of care providing evaluation,management and care decisions and ordering appropriate treatment related to critical care problems exclusive of procedures.   The reason for providing this level of medical care for this critically ill patient was due a critical illness that impaired one or more vital organ systems such that there was a high probability of imminent or life threatening deterioration in the patients condition. This care involved high complexity decision making to assess, manipulate, and support vital system functions, to treat this degree vital organ system failure and to prevent further life threatening deterioration of the patients condition. This time was independent of other practitioners. 60-year-old male with a past medical history of PVD, gangrene of bilateral feet, spinal stenosis, diabetes, hypertension, dementia, presented to [facility] with acute hypoxic respiratory failure, was on high flow nasal cannula. Patient developed septic shock due to multifocal pneumonia as well as left foot cellulitis. Patient was found to have prerenal azotemia with JAMIL. Patient placed on Levophed, weaning off slowly. Patient remains on broad-spectrum antibiotics per ID. Podiatry was consulted, underwent foot debridement by Dr. Volodymyr Alvarez yesterday. He consulted vascular surgery, Dr. Irene Estrada recommends Plavix and will continue to follow. Patient has dementia, remains encephalopathic, likely acute on chronic, likely due to toxic/metabolic encephalopathy. Continue supportive care.      Acute prognosis is guarded, long-term prognosis is very poor        Consuelo Ventura MD/MPH     Pulmonary, Critical Care Medicine  [facility]

## 2022-10-08 NOTE — OP NOTES
Operative Note    Patient: Aly Alcala  YOB: 1945  MRN: [id number]    Date of Procedure: 10/7/2022     Pre-Op Diagnosis: Dry gangrene of left hallux, second, fourth and fifth toes, cellulitis of left foot with sepsis, PAD    Post-Op Diagnosis: Same as preoperative diagnosis. Procedure(s):  Left Foot Transmetatarsal Amputation    Surgeon(s):  Aric Hoff DPM    Surgical Assistant: Surg Asst-1: Kelly NICK    Anesthesia: MAC WITH LOCAL    Estimated Blood Loss (mL):  Minimal    Complications: None    Specimens:   ID Type Source Tests Collected by Time Destination   1 : Left Fore Foot Preservative Foot, left  Aric Hoff DPM 10/7/2022 1548 Pathology   2 : Left Fourth Metatarsal clean margins Preservative Foot, left  Nedra Puente DPM 10/7/2022 1556 Pathology   1 : Left Fourth Metatarsal clean margins Wound Foot, left CULTURE, ANAEROBIC, CULTURE, WOUND W GRAM STAIN Nedra Puente DPM 10/7/2022 1544 Microbiology        Implants: * No implants in log *    Drains: * No LDAs found *    Findings: As noted below    Indications for procedure: Patient is a 79 y/o male consulted for dry gangrene of multiple toes on bilateral foot with cellulitis of left foot with sepsis. Patient is also being followed by vascular surgeon. Patient has severe small vessel disease causing gangrene of toes with recurring infection. Patient needed TMA to prevent worsening of infection and give it chance to heal. All risks, benefits, complications of procedure discussed in detail with patient. Discussed that if non-healing occurs he will need higher level amputation. Possible complications discussed including but not limited to delayed healing, non-healing, requirement of additional surgery including more proximal amputation, loss of limb or death. No guarantee made to outcome of procedure.  Patient and his son voiced verbal understanding and consent was obtained on phone with nursing staff as witness. Detailed Description of Procedure: The patient was brought to the operating room and placed on the operating table in the supine position. After adequate induction of anesthesia, the left LE was prepped and draped in a sterile fashion to level of knee. It was decided because of the extent of gangrene in presence of severe PAD, to proceed with a transmetatarsal amputation. Attention directed to dorsum of left foot and an incision was made at the base of  great toe into healthy tissue and continued out laterally on the dorsal surface of the foot just proximal to the web spaces and underneath the plantar aspect of the foot in a similar fashion. The incision was continued through the soft tissue down to the shafts of the metatarsal bones. Using an oscillating saw, first, second, third, fourth, and fifth metatarsal shafts were divided and entire forefoot sent to pathology. Clean margin sent from fourth metatarsal stump to micro/path. The amputation was completed to the plantar flap leaving a healthy flap intact. The specimen was passed off the field. There was noted to be no purulence after procedure. The tissue that was transected was healthy with decent bleeding. Surgical site irrigated with Irrisept solution. After hemostasis had been obtained, the plantar flap was brought in approximation with the dorsal incision using interrupted 3-0 prolene. Care was taken not to place the flap under severe tension in order not to jeopardize the vascular supply. The surrounding tissue was all healthy. A clean, sterile, dry dressing was applied, and the patient was transferred to recovery in stable condition having tolerated the procedure well.     Electronically Signed by Kendra Faria DPM on 10/8/2022 at 12:40 PM

## 2022-10-08 NOTE — PROGRESS NOTES
Pt resting in bed , alert but confused. Bilateral feet, pulses palpable,  dressing dry and intact. Pt unable to clearly articulate if in pain, but does have  grimacing when moving pt -, anything that causes surgical site to move. 0100 turned levo off, will continue to moitor- Pt blood pressure mostly systolic in 097'X-800 range. 0400 Q 2 hour repostiiong, pt awake 90 % of night. 0545  Pt has order 1 unit PRBC per Carey Andres NP.- Consent obtained from pts son over telephone with 2 RN witnessing.

## 2022-10-08 NOTE — PROGRESS NOTES
Progress Note    Patient: Vasile Soto MRN: [id number]  SSN: [id number]    YOB: 1945  Age: 78 y.o. Sex: male      Admit Date: 10/5/2022  1 Day Post-Op     Procedure:   Procedure(s):  Left Foot Transmetatarsal Amputation    Subjective:     Patient seen resting quiet and comfortably and no apparent distress. Patient denies any pedal complaint. Patient is awake but not responding to verbal commands. Dressings of left foot appear dry and intact. He had bleeding through dressings yesterday after surgery which was reinforced by nursing staff. Objective:     Visit Vitals  /66   Pulse 94   Temp 97.8 °F (36.6 °C)   Resp 20   Ht 6' (1.829 m)   Wt 68 kg (150 lb)   SpO2 98%   BMI 20.34 kg/m²        Physical Exam:  Left foot TMA site stable, skin edges well coapted, skin sutures intact. No active bleeding noted. No hematoma or seroma noted. Flaps appear viable. Right foot stable dry gangrene of second, third toe amputation stumps and fourth toe. Dusky appearance also noted of right great toe. Right submet 5 ulcer noted with eschar, mild erythema surrounding eschar noted. Labs/Radiology Review: images and reports reviewed    Assessment:     Hospital Problems  Date Reviewed: 6/9/2022            Codes Class Noted POA    Pneumonia ICD-10-CM: J18.9  ICD-9-CM: 899  10/6/2022 Unknown        Sepsis ([facility] [address] ICD-10-CM: A41.9  ICD-9-CM: 038.9, 995.91  10/6/2022 Unknown        Multifocal pneumonia ICD-10-CM: J18.9  ICD-9-CM: 430  10/6/2022 Unknown           Plan/Recommendations/Medical Decision Making:     - Dressings changed to left foot TMA site with betadine soaked adaptic and dry gauze. Will need to change dressings every 3 days.   - Right foot appears stable present, however dusky appearance f right great toe and dry gangrene is concerning. No acute findings noted on x-rays. May need TMA on right side in future. - Continue antibiotics per ID recommendation. Clean margin obtained from surgery.    - Medical management per primary team.

## 2022-10-08 NOTE — PROGRESS NOTES
[facility] Hospitalist Group  Progress Note    Patient: Veronica Snow Age: 78 y.o. : 1945 MR#: [id number] SSN: [id number]  Date: 10/8/2022         Assessment/Plan:         Septic Shock (POA): due to multifocal PNA +/- left foot cellulitis. Initial procal 38. Lactic acidosis resolved. Shock resolved. Appreciate ID assistance  Bcx 10/5 ngtd  IV abx per ID  Patient was started on scheduled albumin in the ICU presumably for pressure support. Will continue for now as pressures are still soft. Recommend midodrine when able to take PO. Bilateral foot dry gangrene, left foot cellulitis (POA): s/p TMA of left foot 10/7/22. Appreciate podiatry, VS assistance   MRI left foot pending  Wound care per podiatry/wound care RN  F/up intraop cultures  Acute hypoxic resp failure (POA): due to PNA. CTA negative for PE. Strep/legionella negative. RVP negative. Resolved. Now stable on room air. Acute on chronic metabolic encephalopathy (POA): due to above with underlying dementia. Improving back to baseline  fall precautions, PT/OT  SLP recs NPO  Palliative care following. Sons are now primary decision makers. DNR. Family meeting Monday 10/10/22. Acute on chronic Anemia: post op anemia s/p procedure 10/7  Monitor H&H. Receiving 1 U PRBCs today for hgb 6.9. Recheck H&H later this afternoon. No obvious bleeding noted. Plavix and lovenox held  Check hemoccult  JAMIL: multifactorial- prerenal and sepsis. Resolved  Hypernatremia  Will start IV fluids: D5 1/2NS  Hypokalemia  Replaced, monitor  PAD: s/p right second/third toe amputations and left second/fourth toe amputations 22. Critical arterial stenosis of multiple vessels of the LLE s/p atherectomy and drug-coated balloon angioplasty.  S/p stenting of the left external iliac artery 22  Was started on plavix by VS, but currently not cleared for PO intake  DM  ssi, monitor q6 hour  Sacral decubitus ulcer (POA) - suspected deep tissue injury  Follow wound care recommendations  Lumbar spondylosis with chronic back pain- s/p T12-L3 laminectomy, L1-3 TLIF, ISAK L3-5, instrumentation from T10-pelvis performed by Dr. Dudley Chin on 4/15/2022  Tobacco abuse   Sleep apnea-does not wear CPAP  Depression   Protein calorie malnutrition   Hx SAH from fall 7/2022           DVT Prophylaxis:  [x]Lovenox  []Hep SQ  []SCDs  []Coumadin   []On Heparin gtt []PO anticoagulant    Anticipated discharge: >2 days    Subjective:     Pt s/e @ bedside. No major events overnight. Pt confused with incoherent speech. NAD. Discussed with primary RN. Hospital Course:     Patient is a 78 y.o. male with a significant PMHx of PVD, hx gangrene b/l feet/toes s/p amputations and endovascular intervention from Podiatry and Vascular surgery 9/2022, HTN, DM II, Depression, tobacco abuse and chronic back pain in the setting of lumbar spondylosis s/p T12-L3 laminectomy, L1-3 TLIF, ISAK L3-5, instrumentation from T10-pelvis performed by Dr. Dudley Chin on 4/15/2022 presented to [facility] ED [facility] with acute encephalopathy, diaphoresis,hypoxia, SOB, hypotension, and tachycardia. Extremities were noted to be cyantoic. Apparently, patient was admitted to facility approximately 3 months ago after recovery from bilateral toe amputations. Further work-up demonstrated multifocal PNA on chest x-ray with extensive infiltrates. Patient was resuscitated with IVF, however, BP refractory to fluids, so patient was started on Levophed via PIV. Sepsis protocol initiated. Patient was pan cultured and RVP was obtained, which was negative. The patient was started on broad spectrum abx, Vanco and Zosyn and maintenance IVF. Lab work demonstrated a mild hyponatremia, mild JAMIL, and lactic acid of 9.71. WBC 13.6, H&H 125/38.9. Patient was placed on a non-rebreather for his hypoxia. ABG revealed PO2 66, the remaining unremarkable.       On arrival, patient is non-verbal, not following commands, and with resting tremor. Unsure of baseline as patient has hx of dementia. Patient was on NRB 15L with O2 saturation 91%. He was placed on Vapotherm on arrival, slightly tachypneic, but comfortable. Multiple gangrenous toes and amputations with sutures still in place. Some areas of foot look pale/cyanotic. Patient looks malnourished and ill. He was incontinent of stool with incontinence dermatitis present and and a sacral ulcer. B/l heels boggy with DTI's. Objective:   VS: Visit Vitals  /68   Pulse 83   Temp 97.8 °F (36.6 °C)   Resp 22   Ht 6' (1.829 m)   Wt 68 kg (150 lb)   SpO2 99%   BMI 20.34 kg/m²      Tmax/24hrs: Temp (24hrs), Av.7 °F (36.5 °C), Min:97.4 °F (36.3 °C), Max:97.8 °F (36.6 °C)    Intake/Output Summary (Last 24 hours) at 10/8/2022 1441  Last data filed at 10/8/2022 1427  Gross per 24 hour   Intake 2049.8 ml   Output 1920 ml   Net 129.8 ml       General Appearance: NAD, pleasantly confused  HENT: normocephalic/atraumatic, moist mucus membranes  Neck: No JVD, supple  Lungs: CTA with normal respiratory effort  CV: RRR, no m/r/g  Abdomen: soft, non-tender, normal bowel sounds  Extremities: no peripheral edema, post op dressing to left foot is c/d/i  Neuro: No focal deficits, motor/sensory intact  Skin: Normal color, intact, right foot with ecchymotic/necrotic skin covering 2nd/3rd amputation site and 4th toe.       Current Facility-Administered Medications   Medication Dose Route Frequency    0.9% sodium chloride infusion 250 mL  250 mL IntraVENous PRN    potassium phosphate 30 mmol in 0.9% sodium chloride 250 mL infusion   IntraVENous ONCE    0.9% sodium chloride infusion 250 mL  250 mL IntraVENous PRN    dextrose 5 % - 0.45% NaCl infusion  75 mL/hr IntraVENous CONTINUOUS    levoFLOXacin (LEVAQUIN) 750 mg in D5W IVPB  750 mg IntraVENous Q24H    [Held by provider] clopidogreL (PLAVIX) tablet 75 mg  75 mg Oral DAILY    albumin human 25% (BUMINATE) solution 25 g  25 g IntraVENous Q6H    acetaminophen (TYLENOL) tablet 650 mg  650 mg Oral Q6H PRN    Or    acetaminophen (TYLENOL) suppository 650 mg  650 mg Rectal Q6H PRN    polyethylene glycol (MIRALAX) packet 17 g  17 g Oral DAILY PRN    ondansetron (ZOFRAN ODT) tablet 4 mg  4 mg Oral Q8H PRN    Or    ondansetron (ZOFRAN) injection 4 mg  4 mg IntraVENous Q6H PRN    [Held by provider] enoxaparin (LOVENOX) injection 40 mg  40 mg SubCUTAneous DAILY    famotidine (PF) (PEPCID) 20 mg in 0.9% sodium chloride 10 mL injection  20 mg IntraVENous BID    nicotine (NICODERM CQ) 14 mg/24 hr patch 1 Patch  1 Patch TransDERmal DAILY    insulin lispro (HUMALOG) injection   SubCUTAneous Q6H    glucose chewable tablet 16 g  4 Tablet Oral PRN    glucagon (GLUCAGEN) injection 1 mg  1 mg IntraMUSCular PRN    dextrose 10% infusion 0-250 mL  0-250 mL IntraVENous PRN    albuterol-ipratropium (DUO-NEB) 2.5 MG-0.5 MG/3 ML  3 mL Nebulization Q6H PRN    piperacillin-tazobactam (ZOSYN) 3.375 g in 0.9% sodium chloride (MBP/ADV) 100 mL MBP  3.375 g IntraVENous Q8H    vancomycin (VANCOCIN) 1250 mg in  ml infusion  1,250 mg IntraVENous Q24H        Labs:    Recent Results (from the past 24 hour(s))   CULTURE, WOUND W GRAM STAIN    Collection Time: 10/07/22  3:44 PM    Specimen: Foot, left   Result Value Ref Range    Special Requests: LEFT FOOT METATARSAL CLEAN MARGIN     GRAM STAIN RARE WBCS SEEN      GRAM STAIN NO ORGANISMS SEEN      Culture result: PENDING    GLUCOSE, POC    Collection Time: 10/07/22  5:15 PM   Result Value Ref Range    Glucose (POC) 152 (H) 70 - 110 mg/dL   GLUCOSE, POC    Collection Time: 10/07/22 10:58 PM   Result Value Ref Range    Glucose (POC) 114 (H) 70 - 784 mg/dL   METABOLIC PANEL, BASIC    Collection Time: 10/08/22  2:58 AM   Result Value Ref Range    Sodium 148 (H) 136 - 145 mmol/L    Potassium 3.0 (L) 3.5 - 5.5 mmol/L    Chloride 114 (H) 100 - 111 mmol/L    CO2 27 21 - 32 mmol/L    Anion gap 7 3.0 - 18 mmol/L    Glucose 108 (H) 74 - 99 mg/dL    BUN 26 (H) 7.0 - 18 MG/DL    Creatinine 0.93 0.6 - 1.3 MG/DL    BUN/Creatinine ratio 28 (H) 12 - 20      eGFR >60 >60 ml/min/1.73m2    Calcium 8.7 8.5 - 10.1 MG/DL   MAGNESIUM    Collection Time: 10/08/22  2:58 AM   Result Value Ref Range    Magnesium 2.4 1.6 - 2.6 mg/dL   PHOSPHORUS    Collection Time: 10/08/22  2:58 AM   Result Value Ref Range    Phosphorus 2.0 (L) 2.5 - 4.9 MG/DL   CBC WITH AUTOMATED DIFF    Collection Time: 10/08/22  2:58 AM   Result Value Ref Range    WBC 9.3 4.6 - 13.2 K/uL    RBC 2.48 (L) 4.35 - 5.65 M/uL    HGB 6.9 (L) 13.0 - 16.0 g/dL    HCT 22.4 (L) 36.0 - 48.0 %    MCV 90.3 78.0 - 100.0 FL    MCH 27.8 24.0 - 34.0 PG    MCHC 30.8 (L) 31.0 - 37.0 g/dL    RDW 14.0 11.6 - 14.5 %    PLATELET 285 872 - 108 K/uL    MPV 10.6 9.2 - 11.8 FL    NRBC 0.0 0  WBC    ABSOLUTE NRBC 0.00 0.00 - 0.01 K/uL    NEUTROPHILS 80 (H) 40 - 73 %    BAND NEUTROPHILS 16 (H) 0 - 5 %    LYMPHOCYTES 3 (L) 21 - 52 %    MONOCYTES 0 (L) 3 - 10 %    EOSINOPHILS 1 0 - 5 %    BASOPHILS 0 0 - 2 %    IMMATURE GRANULOCYTES 0 %    ABS. NEUTROPHILS 8.9 (H) 1.8 - 8.0 K/UL    ABS. LYMPHOCYTES 0.3 (L) 0.9 - 3.6 K/UL    ABS. MONOCYTES 0.0 (L) 0.05 - 1.2 K/UL    ABS. EOSINOPHILS 0.1 0.0 - 0.4 K/UL    ABS. BASOPHILS 0.0 0.0 - 0.1 K/UL    ABS. IMM. GRANS. 0.0 K/UL    DF MANUAL      PLATELET COMMENTS ADEQUATE PLATELETS      RBC COMMENTS ANISOCYTOSIS  1+        WBC COMMENTS TOXIC GRANULATION     RBC, ALLOCATE    Collection Time: 10/08/22  4:30 AM   Result Value Ref Range    HISTORY CHECKED?  Historical check performed    TYPE & SCREEN    Collection Time: 10/08/22  5:30 AM   Result Value Ref Range    Crossmatch Expiration 10/11/2022,2359     ABO/Rh(D) A POSITIVE     Antibody screen NEG     CALLED TO: [facility], 0645, 10/8/22  BY 4768     Unit number [id number]     Blood component type  LR     Unit division 00     Status of unit ISSUED     Crossmatch result Compatible    GLUCOSE, POC    Collection Time: 10/08/22 5:31 AM   Result Value Ref Range    Glucose (POC) 113 (H) 70 - 110 mg/dL   GLUCOSE, POC    Collection Time: 10/08/22  8:48 AM   Result Value Ref Range    Glucose (POC) 116 (H) 70 - 110 mg/dL   ECHO ADULT COMPLETE    Collection Time: 10/08/22  9:30 AM   Result Value Ref Range    IVSd 1.3 (A) 0.6 - 1.0 cm    LVIDd 4.5 4.2 - 5.9 cm    LVIDs 3.4 cm    LVOT Diameter 2.0 cm    LVPWd 1.6 (A) 0.6 - 1.0 cm    LVOT Peak Gradient 3 mmHg    LVOT Mean Gradient 2 mmHg    LVOT SV 76.9 ml    LVOT Peak Velocity 0.8 m/s    LVOT VTI 24.5 cm    LA Diameter 2.8 cm    LA Volume A/L 66 mL    LA Volume 2C 54 18 - 58 mL    LA Volume 4C 63 (A) 18 - 58 mL    AV Area by Peak Velocity 0.9 cm2    AV Area by VTI 1.0 cm2    AV Peak Gradient 33 mmHg    AV Mean Gradient 22 mmHg    AV Peak Velocity 2.9 m/s    AV Mean Velocity 2.2 m/s    AV VTI 76.6 cm    LV E' Lateral Velocity 9 cm/s    TR Peak Gradient 14 mmHg    TR Max Velocity 1.88 m/s    Ascending Aorta 3.1 cm    Aortic Root 3.3 cm    Fractional Shortening 2D 24 28 - 44 %    LVIDd Index 2.38 cm/m2    LVIDs Index 1.80 cm/m2    LV RWT Ratio 0.71     LV Mass 2D 261.9 (A) 88 - 224 g    LV Mass 2D Index 138.6 (A) 49 - 115 g/m2    LA Volume Index A/L 35 16 - 34 mL/m2    LVOT Stroke Volume Index 40.7 mL/m2    LVOT Area 3.1 cm2    LA Volume Index 2C 29 16 - 34 mL/m2    LA Volume Index 4C 33 16 - 34 mL/m2    LA Size Index 1.48 cm/m2    LA/AO Root Ratio 0.85     Ao Root Index 1.75 cm/m2    Ascending Aorta Index 1.64 cm/m2    AV Velocity Ratio 0.28     LVOT:AV VTI Index 0.32     ZOILA/BSA VTI 0.5 cm2/m2    ZOILA/BSA Peak Velocity 0.5 cm2/m2    TAPSE 2.4 1.7 cm    RV Free Wall Peak S' 12 cm/s   GLUCOSE, POC    Collection Time: 10/08/22 12:06 PM   Result Value Ref Range    Glucose (POC) 118 (H) 70 - 110 mg/dL       Imaging:  ECHO ADULT COMPLETE    Result Date: 10/8/2022    Patient flat on back throughout exam   Normal right ventricular size and function   Left Ventricle: Low normal left ventricular systolic function with a visually estimated EF of 50 - 55%. Left ventricle size is normal. Mildly increased wall thickness. Normal wall motion. Aortic Valve: Moderately calcified cusp. Moderate stenosis of the aortic valve with a peak velocity 2.9m/s, peak gradient 33mmHg, mean gradient 22mmHg, ZOILA by cont. equation 1.0cm2. DVI=.32, trace AR.    Estimated pulmonary artery pressure of 17mmHg      Time spent reviewing records, independently interpreting results, obtaining history from patient or caregiver, performing physical exam, ordering tests and medications, communicating with specialists, documenting in the chart, and coordinating overall care is 40 minutes    Signed By: Lachelle Walker PA-C  [address] [facility]  Office:  [phone]  Pager: [phone]         October 8, 2022 2:41 PM

## 2022-10-09 LAB
ANION GAP SERPL CALC-SCNC: 6 MMOL/L (ref 3–18)
BACTERIA SPEC CULT: ABNORMAL
BASOPHILS # BLD: 0 K/UL (ref 0–0.1)
BASOPHILS NFR BLD: 0 % (ref 0–2)
BUN SERPL-MCNC: 21 MG/DL (ref 7–18)
BUN/CREAT SERPL: 33 (ref 12–20)
CALCIUM SERPL-MCNC: 8.4 MG/DL (ref 8.5–10.1)
CHLORIDE SERPL-SCNC: 120 MMOL/L (ref 100–111)
CO2 SERPL-SCNC: 27 MMOL/L (ref 21–32)
CREAT SERPL-MCNC: 0.64 MG/DL (ref 0.6–1.3)
DIFFERENTIAL METHOD BLD: ABNORMAL
EOSINOPHIL # BLD: 0.1 K/UL (ref 0–0.4)
EOSINOPHIL NFR BLD: 1 % (ref 0–5)
ERYTHROCYTE [DISTWIDTH] IN BLOOD BY AUTOMATED COUNT: 14.6 % (ref 11.6–14.5)
GLUCOSE BLD STRIP.AUTO-MCNC: 135 MG/DL (ref 70–110)
GLUCOSE BLD STRIP.AUTO-MCNC: 151 MG/DL (ref 70–110)
GLUCOSE BLD STRIP.AUTO-MCNC: 174 MG/DL (ref 70–110)
GLUCOSE SERPL-MCNC: 124 MG/DL (ref 74–99)
GRAM STN SPEC: ABNORMAL
GRAM STN SPEC: ABNORMAL
HCT VFR BLD AUTO: 23.5 % (ref 36–48)
HGB BLD-MCNC: 7.8 G/DL (ref 13–16)
IMM GRANULOCYTES # BLD AUTO: 0.1 K/UL (ref 0–0.04)
IMM GRANULOCYTES NFR BLD AUTO: 1 % (ref 0–0.5)
LYMPHOCYTES # BLD: 0.6 K/UL (ref 0.9–3.6)
LYMPHOCYTES NFR BLD: 9 % (ref 21–52)
MAGNESIUM SERPL-MCNC: 2.3 MG/DL (ref 1.6–2.6)
MCH RBC QN AUTO: 29.7 PG (ref 24–34)
MCHC RBC AUTO-ENTMCNC: 33.2 G/DL (ref 31–37)
MCV RBC AUTO: 89.4 FL (ref 78–100)
MONOCYTES # BLD: 0.3 K/UL (ref 0.05–1.2)
MONOCYTES NFR BLD: 4 % (ref 3–10)
NEUTS SEG # BLD: 5.9 K/UL (ref 1.8–8)
NEUTS SEG NFR BLD: 85 % (ref 40–73)
NRBC # BLD: 0 K/UL (ref 0–0.01)
NRBC BLD-RTO: 0 PER 100 WBC
PHOSPHATE SERPL-MCNC: 2 MG/DL (ref 2.5–4.9)
PLATELET # BLD AUTO: 125 K/UL (ref 135–420)
PMV BLD AUTO: 10.8 FL (ref 9.2–11.8)
POTASSIUM SERPL-SCNC: 3.2 MMOL/L (ref 3.5–5.5)
RBC # BLD AUTO: 2.63 M/UL (ref 4.35–5.65)
SERVICE CMNT-IMP: ABNORMAL
SODIUM SERPL-SCNC: 153 MMOL/L (ref 136–145)
WBC # BLD AUTO: 6.9 K/UL (ref 4.6–13.2)

## 2022-10-09 PROCEDURE — 30233N1 TRANSFUSION OF NONAUTOLOGOUS RED BLOOD CELLS INTO PERIPHERAL VEIN, PERCUTANEOUS APPROACH: ICD-10-PCS | Performed by: INTERNAL MEDICINE

## 2022-10-09 PROCEDURE — 77010033678 HC OXYGEN DAILY

## 2022-10-09 PROCEDURE — 74011250636 HC RX REV CODE- 250/636: Performed by: EMERGENCY MEDICINE

## 2022-10-09 PROCEDURE — 2709999900 HC NON-CHARGEABLE SUPPLY

## 2022-10-09 PROCEDURE — 74011000250 HC RX REV CODE- 250: Performed by: PHYSICIAN ASSISTANT

## 2022-10-09 PROCEDURE — 74011250637 HC RX REV CODE- 250/637: Performed by: NURSE PRACTITIONER

## 2022-10-09 PROCEDURE — 74011000250 HC RX REV CODE- 250: Performed by: NURSE PRACTITIONER

## 2022-10-09 PROCEDURE — P9016 RBC LEUKOCYTES REDUCED: HCPCS

## 2022-10-09 PROCEDURE — 74011636637 HC RX REV CODE- 636/637: Performed by: NURSE PRACTITIONER

## 2022-10-09 PROCEDURE — 74011000250 HC RX REV CODE- 250: Performed by: INTERNAL MEDICINE

## 2022-10-09 PROCEDURE — 83735 ASSAY OF MAGNESIUM: CPT

## 2022-10-09 PROCEDURE — 74011250636 HC RX REV CODE- 250/636: Performed by: INTERNAL MEDICINE

## 2022-10-09 PROCEDURE — 74011000258 HC RX REV CODE- 258: Performed by: EMERGENCY MEDICINE

## 2022-10-09 PROCEDURE — 85025 COMPLETE CBC W/AUTO DIFF WBC: CPT

## 2022-10-09 PROCEDURE — 36415 COLL VENOUS BLD VENIPUNCTURE: CPT

## 2022-10-09 PROCEDURE — 36430 TRANSFUSION BLD/BLD COMPNT: CPT

## 2022-10-09 PROCEDURE — 65270000029 HC RM PRIVATE

## 2022-10-09 PROCEDURE — 84100 ASSAY OF PHOSPHORUS: CPT

## 2022-10-09 PROCEDURE — 80048 BASIC METABOLIC PNL TOTAL CA: CPT

## 2022-10-09 PROCEDURE — 82962 GLUCOSE BLOOD TEST: CPT

## 2022-10-09 PROCEDURE — 74011000258 HC RX REV CODE- 258: Performed by: INTERNAL MEDICINE

## 2022-10-09 PROCEDURE — 99233 SBSQ HOSP IP/OBS HIGH 50: CPT | Performed by: INTERNAL MEDICINE

## 2022-10-09 PROCEDURE — P9047 ALBUMIN (HUMAN), 25%, 50ML: HCPCS | Performed by: INTERNAL MEDICINE

## 2022-10-09 PROCEDURE — 74011250636 HC RX REV CODE- 250/636: Performed by: NURSE PRACTITIONER

## 2022-10-09 RX ORDER — DEXTROSE MONOHYDRATE 50 MG/ML
100 INJECTION, SOLUTION INTRAVENOUS CONTINUOUS
Status: DISPENSED | OUTPATIENT
Start: 2022-10-09 | End: 2022-10-11

## 2022-10-09 RX ADMIN — FAMOTIDINE 20 MG: 10 INJECTION, SOLUTION INTRAVENOUS at 08:15

## 2022-10-09 RX ADMIN — Medication 2 UNITS: at 12:31

## 2022-10-09 RX ADMIN — DEXTROSE MONOHYDRATE 100 ML/HR: 50 INJECTION, SOLUTION INTRAVENOUS at 21:42

## 2022-10-09 RX ADMIN — PIPERACILLIN AND TAZOBACTAM 3.38 G: 3; .375 INJECTION, POWDER, FOR SOLUTION INTRAVENOUS at 20:33

## 2022-10-09 RX ADMIN — VANCOMYCIN HYDROCHLORIDE 1250 MG: 10 INJECTION, POWDER, LYOPHILIZED, FOR SOLUTION INTRAVENOUS at 17:22

## 2022-10-09 RX ADMIN — LEVOFLOXACIN 750 MG: 5 INJECTION, SOLUTION INTRAVENOUS at 17:02

## 2022-10-09 RX ADMIN — PIPERACILLIN AND TAZOBACTAM 3.38 G: 3; .375 INJECTION, POWDER, FOR SOLUTION INTRAVENOUS at 03:05

## 2022-10-09 RX ADMIN — POTASSIUM PHOSPHATE, MONOBASIC POTASSIUM PHOSPHATE, DIBASIC: 224; 236 INJECTION, SOLUTION, CONCENTRATE INTRAVENOUS at 11:52

## 2022-10-09 RX ADMIN — FAMOTIDINE 20 MG: 10 INJECTION, SOLUTION INTRAVENOUS at 17:02

## 2022-10-09 RX ADMIN — DEXTROSE AND SODIUM CHLORIDE 75 ML/HR: 5; 450 INJECTION, SOLUTION INTRAVENOUS at 06:18

## 2022-10-09 RX ADMIN — DEXTROSE MONOHYDRATE 100 ML/HR: 50 INJECTION, SOLUTION INTRAVENOUS at 10:42

## 2022-10-09 RX ADMIN — PIPERACILLIN AND TAZOBACTAM 3.38 G: 3; .375 INJECTION, POWDER, FOR SOLUTION INTRAVENOUS at 10:41

## 2022-10-09 RX ADMIN — ALBUMIN (HUMAN) 25 G: 0.25 INJECTION, SOLUTION INTRAVENOUS at 05:12

## 2022-10-09 NOTE — PROGRESS NOTES
Hospitalist Progress Note    Patient: Denisse Valverde Age: 78 y.o. : 1945 MR#: [id number]: [id number]  Date/Time: 10/9/2022 11:07 AM    DOA: 10/5/2022  PCP: Nav Witt MD    Subjective:     He expresses that he is doing fine and he wished to be left alone   No pain with the exception of his leg  No fever  Elevated Na  On IV zosyn, vancomycin, Levofloxacin  Left TMA  Hgb 7.8 s/p 2 units PRBC     Interval Hospital Course:        ROS: limited due to not wanting to participate  No chills, no headache, no facial pain, no cough   No swallowing pain, No chest pain, no shortness of breath, no abd pain, no urinary complaint, ++ leg pain or swelling       Assessment/Plan:   Sepsis shock POA, resolved  Left foot cellulitis, status post imaging of the left foot, right foot dry gangrene.   This is likely diabetic foot avulsion with severe PAD  Severe PAD  Sacral decubitus ulcer  JAMIL, associated with sepsis, resolved  Hypernatremia, lack of free water, could be worsened due to salt loading and Zosyn  Hypokalemia  Acute on chronic anemia associated with post surgery blood loss, status post 2 units PRBC  Acute hypoxic respiratory failure  Pneumonia, bacterial  Obstructive sleep apnea, not on CPAP  Tobacco abuse  Acute on chronic metabolic encephalopathy, worsening with current sepsis  Severe protein malnutrition    Continue IV antibiotics, culture follow-up, wound care per podiatry  Continue with D5W to help alleviate hypernatremia, will speak with ID to assess the need to change Zosyn to decrease salt loading   Wound care  Avoid nephrotoxic medication  Continue oxygen supplement  Speech to follow to assess a swallowing, if he is unable to maintain oral intake, then alternative means of feeding should be examined  Pepcid  ISS  Continue to monitor Hgb and transfuse to keep hemoglobin >7    DM        Disposition planning: Placement with family meeting on Monday  Family updated (.  None.)  Additional Notes:    Time spent > 35 minutes    Case discussed with:  [x]Patient  []Family  [x]Nursing  []Case Management  DVT Prophylaxis:  []Lovenox  []Hep SQ  [x]SCDs  []Coumadin   []On Heparin gtt    Signed By: Xavier Alvarado MD     2022 11:07 AM              Objective:   VS: Visit Vitals  /66 (BP 1 Location: Left upper arm, BP Patient Position: Semi fowlers)   Pulse 73   Temp 97.1 °F (36.2 °C)   Resp 20   Ht 6' (1.829 m)   Wt 68 kg (150 lb)   SpO2 98%   BMI 20.34 kg/m²      Tmax/24hrs: Temp (24hrs), Av.6 °F (36.4 °C), Min:97.1 °F (36.2 °C), Max:97.9 °F (36.6 °C)    Intake/Output Summary (Last 24 hours) at 10/9/2022 1107  Last data filed at 10/9/2022 0305  Gross per 24 hour   Intake 2871.65 ml   Output 1550 ml   Net 1321.65 ml       Tele:   General:  Cooperative, Not in acute distress, speaks in short sentence while in bed, confused  HEENT: PERRL, EOMI, supple neck, no JVD, dry oral mucosa  Cardiovascular: S1S2 regular, no rub/gallop   Pulmonary: air entry bilaterally, no wheezing, + crackle  GI:  Soft, non tender, non distended, +bs, no guarding   Extremities:  trace pedal edema, +distal pulses appreciated   Left wound in place, no bleeding, right foot with dry gangrene   Neuro: AOx1    Additional:       Current Facility-Administered Medications   Medication Dose Route Frequency    dextrose 5% infusion  100 mL/hr IntraVENous CONTINUOUS    potassium phosphate 20 mmol in dextrose 5% 250 mL infusion   IntraVENous ONCE    0.9% sodium chloride infusion 250 mL  250 mL IntraVENous PRN    0.9% sodium chloride infusion 250 mL  250 mL IntraVENous PRN    0.9% sodium chloride infusion 250 mL  250 mL IntraVENous PRN    levoFLOXacin (LEVAQUIN) 750 mg in D5W IVPB  750 mg IntraVENous Q24H    [Held by provider] clopidogreL (PLAVIX) tablet 75 mg  75 mg Oral DAILY    acetaminophen (TYLENOL) tablet 650 mg  650 mg Oral Q6H PRN    Or    acetaminophen (TYLENOL) suppository 650 mg  650 mg Rectal Q6H PRN    polyethylene glycol (MIRALAX) packet 17 g  17 g Oral DAILY PRN    ondansetron (ZOFRAN ODT) tablet 4 mg  4 mg Oral Q8H PRN    Or    ondansetron (ZOFRAN) injection 4 mg  4 mg IntraVENous Q6H PRN    famotidine (PF) (PEPCID) 20 mg in 0.9% sodium chloride 10 mL injection  20 mg IntraVENous BID    nicotine (NICODERM CQ) 14 mg/24 hr patch 1 Patch  1 Patch TransDERmal DAILY    insulin lispro (HUMALOG) injection   SubCUTAneous Q6H    glucose chewable tablet 16 g  4 Tablet Oral PRN    glucagon (GLUCAGEN) injection 1 mg  1 mg IntraMUSCular PRN    dextrose 10% infusion 0-250 mL  0-250 mL IntraVENous PRN    albuterol-ipratropium (DUO-NEB) 2.5 MG-0.5 MG/3 ML  3 mL Nebulization Q6H PRN    piperacillin-tazobactam (ZOSYN) 3.375 g in 0.9% sodium chloride (MBP/ADV) 100 mL MBP  3.375 g IntraVENous Q8H    vancomycin (VANCOCIN) 1250 mg in  ml infusion  1,250 mg IntraVENous Q24H            Lab/Data Review:  Labs: Results:       Chemistry Recent Labs     10/09/22  0345 10/08/22  1440 10/08/22  0258 10/07/22  0325   * 113* 108* 106*   * 147* 148* 147*   K 3.2* 3.7 3.0* 3.6   * 115* 114* 114*   CO2 27 26 27 25   BUN 21* 25* 26* 29*   CREA 0.64 0.84 0.93 1.21   BUCR 33* 30* 28* 24*   AGAP 6 6 7 8   CA 8.4* 8.5 8.7 7.6*   PHOS 2.0*  --  2.0* 3.3     No results for input(s): TBIL, ALT, ALKP, TP, ALB, GLOB, AGRAT in the last 72 hours. No lab exists for component: SGOT   CBC w/Diff Recent Labs     10/09/22  0345 10/08/22  1700 10/08/22  1440 10/08/22  0258 10/07/22  0325   WBC 6.9  --   --  9.3 14.6*   RBC 2.63*  --   --  2.48* 3.01*   HGB 7.8* 6.7* 6.9* 6.9* 8.8*   HCT 23.5* 21.1*  --  22.4* 27.4*   MCV 89.4  --   --  90.3 91.0   MCH 29.7  --   --  27.8 29.2   MCHC 33.2  --   --  30.8* 32.1   RDW 14.6*  --   --  14.0 14.0   *  --   --  144 204   BANDS  --   --   --  16* 38*   GRANS 85*  --   --  80* 55   LYMPH 9*  --   --  3* 1*   EOS 1  --   --  1 0      Coagulation No results for input(s): PTP, INR, APTT, INREXT in the last 72 hours. Iron/Ferritin No results found for: IRON, FE, TIBC, IBCT, PSAT, FERR    BNP    Cardiac Enzymes Lab Results   Component Value Date/Time    CK 67 07/21/2022 02:35 PM        Lactic Acid    Thyroid Studies          All Micro Results       Procedure Component Value Units Date/Time    CULTURE, Izabella Or STAIN [[id number]]  (Abnormal)  (Susceptibility) Collected: 10/06/22 1255    Order Status: Completed Specimen: Wound from Buttock Updated: 10/09/22 1054     Special Requests: NO SPECIAL REQUESTS        GRAM STAIN RARE EPITHELIAL         RARE BUDDING YEAST        Culture result:       MODERATE  Preliminary report of Methicillin Resistant Staphylococcus aureus by antigen detection. Confirmation and Sensitivities to follow. MODERATE YEAST, (APPARENT CANDIDA ALBICANS)                  MODERATE ENTEROCOCCUS FAECALIS            (NOTE) MRSA BY PRELIMINARY ANTIGEN CALLED TO Irasema Bashir RN AT 1900 10/8/22. LWY    CULTURE, BLOOD [[id number]] Collected: 10/05/22 1940    Order Status: Completed Specimen: Blood Updated: 10/09/22 0649     Special Requests: NO SPECIAL REQUESTS        Culture result: NO GROWTH 4 DAYS       CULTURE, BLOOD [[id number]] Collected: 10/05/22 2004    Order Status: Completed Specimen: Blood Updated: 10/09/22 0649     Special Requests: NO SPECIAL REQUESTS        Culture result: NO GROWTH 4 DAYS       CULTURE, ANAEROBIC [[id number]] Collected: 10/07/22 1544    Order Status: No result Updated: 10/08/22 1046    CULTURE, MRSA [[id number]] Collected: 10/07/22 0325    Order Status: Completed Specimen: Nasal from Nares Updated: 10/08/22 0824     Special Requests: NO SPECIAL REQUESTS        Culture result: MRSA NOT PRESENT               Screening of patient nares for MRSA is for surveillance purposes and, if positive, to facilitate isolation considerations in high risk settings. It is not intended for automatic decolonization interventions per se as regimens are not sufficiently effective to warrant routine use. CULTURE, Rosalinda Henle STAIN [[id number]] Collected: 10/07/22 1544    Order Status: Completed Specimen: Foot, left Updated: 10/07/22 1720     Special Requests: LEFT FOOT METATARSAL CLEAN MARGIN     GRAM STAIN RARE WBCS SEEN         NO ORGANISMS SEEN        Culture result: PENDING    CULTURE, URINE [[id number]] Collected: 10/05/22 0551    Order Status: Completed Specimen: Urine from Clean catch Updated: 10/07/22 1021     Special Requests: NO SPECIAL REQUESTS        Culture result: No growth (<1,000 CFU/ML)       LEGIONELLA PNEUMOPHILA AG, URINE [[id number]] Collected: 10/06/22 0551    Order Status: Completed Specimen: Urine, random Updated: 10/06/22 0959     Legionella Ag, urine Negative       STREP PNEUMO AG, URINE [[id number]] Collected: 10/06/22 0551    Order Status: Completed Specimen: Urine, random Updated: 10/06/22 0959     Strep pneumo Ag, urine Negative       CULTURE, RESPIRATORY/SPUTUM/BRONCH Laurian Pierre STAIN [[id number]] Collected: 10/06/22 0600    Order Status: Canceled Specimen: Sputum     RESPIRATORY VIRUS PANEL W/COVID-19, PCR [[id number]] Collected: 10/05/22 2350    Order Status: Completed Specimen: Nasopharyngeal Updated: 10/06/22 0145     Adenovirus Not detected        Coronavirus 229E Not detected        Coronavirus HKU1 Not detected        Coronavirus CVNL63 Not detected        Coronavirus OC43 Not detected        SARS-CoV-2, PCR Not detected        Metapneumovirus Not detected        Rhinovirus and Enterovirus Not detected        Influenza A Not detected        Influenza A, subtype H1 Not detected        Influenza A, subtype H3 Not detected        INFLUENZA A H1N1 PCR Not detected        Influenza B Not detected        Parainfluenza 1 Not detected        Parainfluenza 2 Not detected        Parainfluenza 3 Not detected        Parainfluenza virus 4 Not detected        RSV by PCR Not detected        B. parapertussis, PCR Not detected        Bordetella pertussis - PCR Not detected        Chlamydophila pneumoniae DNA, QL, PCR Not detected        Mycoplasma pneumoniae DNA, QL, PCR Not detected       COVID-19 RAPID TEST [[id number]] Collected: 10/06/22 0000    Order Status: Canceled     INFLUENZA A & B AG (RAPID TEST) [[id number]] Collected: 10/06/22 0000    Order Status: Canceled Specimen: Nasopharyngeal               Images:    CT (Most Recent). XRAY (Most Recent)      EKG No results found for this or any previous visit.      2D ECHO

## 2022-10-09 NOTE — PROGRESS NOTES
Acknowledging ST order; patient already on ST caseload and being followed. ST TX Attempt 11:30 a.m. RN states patient has been very sleepy and does not sustain adequate alertness for safe PO. ST attempted to wake patient with verbal/tactile stim but patient remained asleep. Will follow-up as patient's schedule allows.

## 2022-10-09 NOTE — PROGRESS NOTES
Bedside shift change report given to SHAMIKA Agustin (oncoming nurse) by Claudene Salm (offgoing nurse). Report included the following information SBAR, Kardex, Intake/Output, MAR, and Recent Results.    Wound Prevention Checklist    Patient: Roland Marte (41 y.o. male)  Date: 10/9/2022  Diagnosis: Sepsis ([facility] [address] [A41.9]  Pneumonia [J18.9]  Multifocal pneumonia [J18.9] <principal problem not specified>    Precautions:         []  Heel prevention boots placed on patient    []  Patient turned q2h during shift    []  Lift team ordered    []  Patient on Ace bed/Specialty bed    [x]  Each Wound is documented during shift (Stage, Color, drainage, odor, measurements, and dressings)    [x]  Dual skin check done with Lino Mcburney, RN

## 2022-10-09 NOTE — PROGRESS NOTES
0255: Upon rounding on pt, it was noted that pt had drainage that had saturated through the dressing on his left foot. Dressing reinforced with 4x4 guaze, ABD pad and wrapped with kerlex.

## 2022-10-09 NOTE — PROGRESS NOTES
Reason for Readmission:    Sepsis ([facility] [A41.9]  Pneumonia [J18.9]  Multifocal pneumonia [J18.9]         RUR Score and Risk Level:      21%     Level of Readmission:    1   (1-3)   (1 - First Readmission, 2- Second Readmission within 30 days or multiple admissions over previous 90 days, 3- Greater than two readmissions within 30 days or multiple admissions over previous 90 days)             Resources/supports as identified by patient/family:  Sons and nursing home staff. Top Challenges facing patient (as identified by patient/family and CM):     none    Current Advanced Directive/Advance Care Plan: no                  Likelihood of additional readmission:   high             Transition of Care Plan:    Based on readmission, the patient's previous Plan of Care   has been evaluated and/or modified. The current Transition of Care Plan is:            Initial assessment completed with Aidee Gaffney III shahab, via phone. Cognitive status of patient: Patient mumbles. Face sheet information confirmed:  yes. Aidee Gulshan HAINES JESSICA,([phone] provided information needed. This patient lives at [facility].    Patient is not able to navigate steps as needed. Prior to hospitalization, patient was considered to be independent with ADLs/IADLS : no . If not independent,  patient needs assist with : dressing, bathing, food preparation, toileting, and grooming    Patient has a current ACP document on file: Has DNR on file    The patient will need S  stretcher transportation upon discharge. This patient is on dialysis :no    Currently, the discharge plan is LTC. Updated facesheet with [facility]'s address per Aidee Meghanshahab miller, request.       Patient's current insurance is Medicare Part A and [address] [place], [place] [address] Management Interventions  PCP Verified by CM:  Yes  Mode of Transport at Discharge: S  Transition of Care Consult (CM Consult): Discharge Planning, Long Term Care  Physical Therapy Consult: Yes  Occupational Therapy Consult: No  Speech Therapy Consult: Yes  Support Systems: Child(clau)  Confirm Follow Up Transport: Family  Discharge Location  Patient Expects to be Discharged to[de-identified] [address]    Readmission Assessment  Number of days since last admission?: 8-30 days  Previous disposition: SNF  What was the patient's/caregiver's perception as to why they think they needed to return back to the hospital?: Other (Comment) (sent to hospital by nursing home staff)  In our efforts to provide the best possible care to you and others like you, can you think of anything that we could have done to help you after you left the hospital the first time, so that you might not have needed to return so soon?: Other (Comment) (unknown)       SILVIA John, RN  Pager # [phone]  Care Manager

## 2022-10-09 NOTE — PROGRESS NOTES
Problem: Falls - Risk of  Goal: *Absence of Falls  Description: Document Gitfy Johnson Fall Risk and appropriate interventions in the flowsheet. Outcome: Progressing Towards Goal  Note: Fall Risk Interventions:       Mentation Interventions: Adequate sleep, hydration, pain control, Bed/chair exit alarm, Door open when patient unattended, More frequent rounding, Reorient patient, Toileting rounds, Room close to nurse's station, Evaluate medications/consider consulting pharmacy    Medication Interventions: Bed/chair exit alarm, Evaluate medications/consider consulting pharmacy    Elimination Interventions: Bed/chair exit alarm, Toileting schedule/hourly rounds              Problem: Pain  Goal: *Control of Pain  Outcome: Progressing Towards Goal  Goal: *PALLIATIVE CARE:  Alleviation of Pain  Outcome: Progressing Towards Goal     Problem: Pressure Injury - Risk of  Goal: *Prevention of pressure injury  Description: Document Dionte Scale and appropriate interventions in the flowsheet. Outcome: Progressing Towards Goal  Note: Pressure Injury Interventions:  Sensory Interventions: Assess changes in LOC, Keep linens dry and wrinkle-free, Maintain/enhance activity level, Minimize linen layers, Pressure redistribution bed/mattress (bed type), Turn and reposition approx. every two hours (pillows and wedges if needed)    Moisture Interventions: Absorbent underpads, Apply protective barrier, creams and emollients, Check for incontinence Q2 hours and as needed, Internal/External urinary devices, Minimize layers    Activity Interventions: Pressure redistribution bed/mattress(bed type)    Mobility Interventions: Pressure redistribution bed/mattress (bed type), Turn and reposition approx.  every two hours(pillow and wedges)    Nutrition Interventions: Document food/fluid/supplement intake    Friction and Shear Interventions: Apply protective barrier, creams and emollients, Transferring/repositioning devices, Minimize layers, Lift sheet                Problem: Patient Education: Go to Patient Education Activity  Goal: Patient/Family Education  Outcome: Progressing Towards Goal     Problem: Injury - Risk of, Adverse Drug Event  Goal: *Absence of adverse drug events  Outcome: Progressing Towards Goal  Goal: *Absence of medication errors  Outcome: Progressing Towards Goal  Goal: *Knowledge of prescribed medications  Outcome: Progressing Towards Goal     Problem: Nutrition Deficit  Goal: *Optimize nutritional status  Outcome: Progressing Towards Goal     Problem: Patient Education: Go to Patient Education Activity  Goal: Patient/Family Education  Outcome: Progressing Towards Goal

## 2022-10-10 LAB
ABO + RH BLD: NORMAL
ANION GAP SERPL CALC-SCNC: 8 MMOL/L (ref 3–18)
BLD PROD TYP BPU: NORMAL
BLD PROD TYP BPU: NORMAL
BLOOD GROUP ANTIBODIES SERPL: NORMAL
BPU ID: NORMAL
BPU ID: NORMAL
BUN SERPL-MCNC: 16 MG/DL (ref 7–18)
BUN/CREAT SERPL: 23 (ref 12–20)
CALCIUM SERPL-MCNC: 8.1 MG/DL (ref 8.5–10.1)
CALLED TO:,BCALL1: NORMAL
CALLED TO:,BCALL2: NORMAL
CHLORIDE SERPL-SCNC: 110 MMOL/L (ref 100–111)
CO2 SERPL-SCNC: 26 MMOL/L (ref 21–32)
CREAT SERPL-MCNC: 0.69 MG/DL (ref 0.6–1.3)
CROSSMATCH RESULT,%XM: NORMAL
CROSSMATCH RESULT,%XM: NORMAL
ERYTHROCYTE [DISTWIDTH] IN BLOOD BY AUTOMATED COUNT: 14.5 % (ref 11.6–14.5)
GLUCOSE BLD STRIP.AUTO-MCNC: 122 MG/DL (ref 70–110)
GLUCOSE BLD STRIP.AUTO-MCNC: 130 MG/DL (ref 70–110)
GLUCOSE BLD STRIP.AUTO-MCNC: 138 MG/DL (ref 70–110)
GLUCOSE BLD STRIP.AUTO-MCNC: 153 MG/DL (ref 70–110)
GLUCOSE BLD STRIP.AUTO-MCNC: 161 MG/DL (ref 70–110)
GLUCOSE SERPL-MCNC: 133 MG/DL (ref 74–99)
HCT VFR BLD AUTO: 24 % (ref 36–48)
HGB BLD-MCNC: 8 G/DL (ref 13–16)
MAGNESIUM SERPL-MCNC: 1.9 MG/DL (ref 1.6–2.6)
MCH RBC QN AUTO: 29 PG (ref 24–34)
MCHC RBC AUTO-ENTMCNC: 33.3 G/DL (ref 31–37)
MCV RBC AUTO: 87 FL (ref 78–100)
NRBC # BLD: 0 K/UL (ref 0–0.01)
NRBC BLD-RTO: 0 PER 100 WBC
PHOSPHATE SERPL-MCNC: 1.7 MG/DL (ref 2.5–4.9)
PLATELET # BLD AUTO: 123 K/UL (ref 135–420)
PMV BLD AUTO: 11 FL (ref 9.2–11.8)
POTASSIUM SERPL-SCNC: 2.9 MMOL/L (ref 3.5–5.5)
RBC # BLD AUTO: 2.76 M/UL (ref 4.35–5.65)
SODIUM SERPL-SCNC: 144 MMOL/L (ref 136–145)
SPECIMEN EXP DATE BLD: NORMAL
STATUS OF UNIT,%ST: NORMAL
STATUS OF UNIT,%ST: NORMAL
UNIT DIVISION, %UDIV: 0
UNIT DIVISION, %UDIV: 0
WBC # BLD AUTO: 6.6 K/UL (ref 4.6–13.2)

## 2022-10-10 PROCEDURE — 74011636637 HC RX REV CODE- 636/637: Performed by: INTERNAL MEDICINE

## 2022-10-10 PROCEDURE — 77010033678 HC OXYGEN DAILY

## 2022-10-10 PROCEDURE — 74011636637 HC RX REV CODE- 636/637: Performed by: NURSE PRACTITIONER

## 2022-10-10 PROCEDURE — 74011250636 HC RX REV CODE- 250/636: Performed by: INTERNAL MEDICINE

## 2022-10-10 PROCEDURE — 74011000250 HC RX REV CODE- 250: Performed by: NURSE PRACTITIONER

## 2022-10-10 PROCEDURE — 65270000029 HC RM PRIVATE

## 2022-10-10 PROCEDURE — 74011250637 HC RX REV CODE- 250/637: Performed by: NURSE PRACTITIONER

## 2022-10-10 PROCEDURE — 74011000250 HC RX REV CODE- 250: Performed by: INTERNAL MEDICINE

## 2022-10-10 PROCEDURE — 2709999900 HC NON-CHARGEABLE SUPPLY

## 2022-10-10 PROCEDURE — 84100 ASSAY OF PHOSPHORUS: CPT

## 2022-10-10 PROCEDURE — 83735 ASSAY OF MAGNESIUM: CPT

## 2022-10-10 PROCEDURE — 74011250636 HC RX REV CODE- 250/636: Performed by: EMERGENCY MEDICINE

## 2022-10-10 PROCEDURE — 74011000258 HC RX REV CODE- 258: Performed by: EMERGENCY MEDICINE

## 2022-10-10 PROCEDURE — 74011000258 HC RX REV CODE- 258: Performed by: INTERNAL MEDICINE

## 2022-10-10 PROCEDURE — 99232 SBSQ HOSP IP/OBS MODERATE 35: CPT | Performed by: INTERNAL MEDICINE

## 2022-10-10 PROCEDURE — 36415 COLL VENOUS BLD VENIPUNCTURE: CPT

## 2022-10-10 PROCEDURE — 74011250636 HC RX REV CODE- 250/636: Performed by: NURSE PRACTITIONER

## 2022-10-10 PROCEDURE — 92526 ORAL FUNCTION THERAPY: CPT

## 2022-10-10 PROCEDURE — 85027 COMPLETE CBC AUTOMATED: CPT

## 2022-10-10 PROCEDURE — 80048 BASIC METABOLIC PNL TOTAL CA: CPT

## 2022-10-10 PROCEDURE — 82962 GLUCOSE BLOOD TEST: CPT

## 2022-10-10 RX ORDER — POTASSIUM CHLORIDE 7.45 MG/ML
10 INJECTION INTRAVENOUS
Status: COMPLETED | OUTPATIENT
Start: 2022-10-10 | End: 2022-10-10

## 2022-10-10 RX ORDER — POTASSIUM CHLORIDE 20 MEQ/1
20 TABLET, EXTENDED RELEASE ORAL 2 TIMES DAILY
Status: DISPENSED | OUTPATIENT
Start: 2022-10-11 | End: 2022-10-13

## 2022-10-10 RX ORDER — POTASSIUM CHLORIDE 20 MEQ/1
20 TABLET, EXTENDED RELEASE ORAL 2 TIMES DAILY
Status: DISCONTINUED | OUTPATIENT
Start: 2022-10-10 | End: 2022-10-10

## 2022-10-10 RX ORDER — INSULIN LISPRO 100 [IU]/ML
INJECTION, SOLUTION INTRAVENOUS; SUBCUTANEOUS
Status: DISCONTINUED | OUTPATIENT
Start: 2022-10-10 | End: 2022-10-13 | Stop reason: HOSPADM

## 2022-10-10 RX ADMIN — DEXTROSE MONOHYDRATE 100 ML/HR: 50 INJECTION, SOLUTION INTRAVENOUS at 06:41

## 2022-10-10 RX ADMIN — DEXTROSE MONOHYDRATE 100 ML/HR: 50 INJECTION, SOLUTION INTRAVENOUS at 16:47

## 2022-10-10 RX ADMIN — Medication 2 UNITS: at 00:09

## 2022-10-10 RX ADMIN — VANCOMYCIN HYDROCHLORIDE 1000 MG: 1 INJECTION, POWDER, LYOPHILIZED, FOR SOLUTION INTRAVENOUS at 12:43

## 2022-10-10 RX ADMIN — POTASSIUM CHLORIDE 10 MEQ: 7.46 INJECTION, SOLUTION INTRAVENOUS at 08:38

## 2022-10-10 RX ADMIN — FAMOTIDINE 20 MG: 10 INJECTION, SOLUTION INTRAVENOUS at 08:25

## 2022-10-10 RX ADMIN — POTASSIUM CHLORIDE 10 MEQ: 7.46 INJECTION, SOLUTION INTRAVENOUS at 21:51

## 2022-10-10 RX ADMIN — POTASSIUM CHLORIDE 10 MEQ: 7.46 INJECTION, SOLUTION INTRAVENOUS at 20:00

## 2022-10-10 RX ADMIN — POTASSIUM CHLORIDE 10 MEQ: 7.46 INJECTION, SOLUTION INTRAVENOUS at 07:36

## 2022-10-10 RX ADMIN — POTASSIUM PHOSPHATE, MONOBASIC AND POTASSIUM PHOSPHATE, DIBASIC: 224; 236 INJECTION, SOLUTION, CONCENTRATE INTRAVENOUS at 21:50

## 2022-10-10 RX ADMIN — WATER 2 G: 1 INJECTION INTRAMUSCULAR; INTRAVENOUS; SUBCUTANEOUS at 08:24

## 2022-10-10 RX ADMIN — PIPERACILLIN AND TAZOBACTAM 3.38 G: 3; .375 INJECTION, POWDER, FOR SOLUTION INTRAVENOUS at 03:00

## 2022-10-10 RX ADMIN — FAMOTIDINE 20 MG: 10 INJECTION, SOLUTION INTRAVENOUS at 17:31

## 2022-10-10 RX ADMIN — POTASSIUM CHLORIDE 10 MEQ: 7.46 INJECTION, SOLUTION INTRAVENOUS at 06:41

## 2022-10-10 RX ADMIN — Medication 2 UNITS: at 12:43

## 2022-10-10 NOTE — WOUND CARE
Physical Exam  Room 453: Introduced myself & services to pt at this time. No family member in room at this time. Discussed with primary nurse Jimmy WICK. Pt will not allow me to trim his fingernails or remaining toenails on right foot. Pt shook his head \"no\". Pt stated \"I don't like this\" when I held his hand to assess fingernails,   & pt folded his hands across his abdomen on top of the covers. Pt no cooperative & nail care services are not able to be performed at this time. Will turn over care to nursing staff at this time.   Cain Koyanagi BSN, RN, [facility], [address]

## 2022-10-10 NOTE — PROGRESS NOTES
Bedside shift change report given to [address] [place] [address] (oncoming nurse) by Marshall Silva (offgoing nurse). Report included the following information SBAR, Kardex, Intake/Output, MAR, and Recent Results.    Wound Prevention Checklist    Patient: Arthur Epstein (56 y.o. male)  Date: 10/10/2022  Diagnosis: Sepsis ([facility] [A41.9]  Pneumonia [J18.9]  Multifocal pneumonia [J18.9] <principal problem not specified>    Precautions:         [x]  Heel prevention boots placed on patient    [x]  Patient turned q2h during shift    []  Lift team ordered    [x]  Patient on [facility] bed/Specialty bed    [x]  Each Wound is documented during shift (Stage, Color, drainage, odor, measurements, and dressings)    [x]  Dual skin check done with Glenn Vieira RN

## 2022-10-10 NOTE — PROGRESS NOTES
Problem: Dysphagia (Adult)  Goal: *Acute Goals and Plan of Care (Insert Text)  Description: Patient will:  1. Tolerate PO trials with 0 s/s overt distress in 4/5 trials. 2. Utilize compensatory swallow strategies/maneuvers (decrease bite/sip, size/rate, alt. liq/sol) with min cues in 4/5 trials. 3. Perform oral-motor/laryngeal exercises to increase oropharyngeal swallow function with min cues. 4. Complete an objective swallow study (i.e., MBSS) to assess swallow integrity, r/o aspiration, and determine of safest LRD, min A.    Rec:     Puree with nectar thick liquids- feeding assist.  Aspiration precautions  HOB >45 during po intake, remain >30 for 30-45 minutes after po   Alternate liquid/solid  Meds crushed in puree  Outcome: Progressing Towards Goal     SPEECH LANGUAGE PATHOLOGY DYSPHAGIA TREATMENT    Patient: Marixa Rainey (66 y.o. male)  Date: 10/10/2022  Diagnosis: [facility] [A41.9]  Pneumonia [J18.9]  Multifocal pneumonia [J18.9] <principal problem not specified>  Procedure(s) (LRB):  Left Foot Transmetatarsal Amputation (Left) 3 Days Post-Op  Precautions: aspiration    PLOF:as per H&P     ASSESSMENT:  Pt seen on this date for dysphagia management. Pt alert and oriented to self only. Very irritable, confused, limited command following, and decreased attention. PO trials attempted: thin via cup sip, puree, regular, and nectar thick liquid. Pt consumed very limited PO trials. Demonstrated immediate cough and wet vocal quality with thin liquid trials. Cough resolved with trials of nectar thick via cup sip. Pt spit out 2/3 trials puree and 1/1 trials regular solid. Pt attempted to self feed regular solid and observe to take large bite essentially gagging himself, resulting in him spitting out bolus. Pt required liquid wash to clear puree. Labored/prolonged/discoordinated mastication, bolus manipulation, and A-P transit. Delayed swallow initiation (~3-5 seconds).  Pt refused to allow clinician to check oral cavity. PO trials discontinued due to pt irritability and refusal to participate. Recommend puree diet with nectar thick liquids. Feeding assistance, alternate solid/liquid, and no straws. Aspiration precautions. SLP following. D/w SHAMIKA Quesada and Dr. Tash Kramer. Progression toward goals:  []         Improving appropriately and progressing toward goals  [x]         Improving slowly and progressing toward goals  []         Not making progress toward goals and plan of care will be adjusted     PLAN:  Recommendations and Planned Interventions:  See above  Patient continues to benefit from skilled intervention to address the above impairments. Continue treatment per established plan of care. SUBJECTIVE:   Patient stated Get away from me, let me lay back. OBJECTIVE:   Cognitive and Communication Status:  Neurologic State: Alert, Confused, Irritable  Orientation Level: Oriented to person  Cognition: Decreased attention/concentration, Decreased command following  Perseveration: No perseveration noted  Safety/Judgement: Decreased insight into deficits  Dysphagia Treatment:  Oral Assessment:  Oral Assessment  Labial: Decreased seal  Dentition: Natural, Poor  Oral Hygiene:  (poor)  Lingual: Other (comment) (unable to assess due to patial refusal)  Velum: Unable to visualize  Mandible: No impairment  Gag Reflex:  (did not test)  P.O.  Trials:   Patient Position:  (HOB >45 degrees)   Vocal quality prior to P.O.: Low volume   Consistency Presented: Puree, Thin liquid, Solid, Nectar thick liquid   How Presented: SLP-fed/presented, Cup/sip, Spoon   Bolus Acceptance: No impairment   Bolus Formation/Control: Impaired   Type of Impairment: Mastication, Piecemeal, Spillage   Propulsion: Delayed (# of seconds), Discoordination   Oral Residue: Anterior, Lingual, Greater than 50% of bolus   Initiation of Swallow: Delayed (# of seconds)   Laryngeal Elevation: Decreased, Weak   Aspiration Signs/Symptoms: Change vocal quality, Weak cough   Pharyngeal Phase Characteristics: Easily fatigued , Effortful swallow, Suspected pharyngeal residue, Poor endurance   Effective Modifications: None   Cues for Modifications: Moderate   Oral Phase Severity: Moderate-severe   Pharyngeal Phase Severity : Moderate     PAIN:  Pain level pre-treatment: non reported/10   Pain level post-treatment: non reported/10   After treatment:   []              Patient left in no apparent distress sitting up in chair  [x]              Patient left in no apparent distress in bed  [x]              Call bell left within reach  [x]              Nursing notified  []              Family present  []              Caregiver present  []              Bed alarm activated      COMMUNICATION/EDUCATION:   [x] Aspiration precautions; swallow safety; compensatory techniques  [x]        Patient unable to participate in education; education ongoing with staff  [x]  Posted safety precautions in patient's room.   [] Oral-motor/laryngeal strengthening exercises      Sam Hathaway M.S., CFY-SLP  Speech-Language Pathologist

## 2022-10-10 NOTE — PROGRESS NOTES
Nutrition Note      Pt seen by SLP on 10/8; remained NPO. Pt was discussed this morning with MD regarding possible need for NGT/ initiation of TF if remain NPO per SLP. Noted pt recently seen again by SLP today; pt tolerated po trials and was started on dysphagia diet. Plan to add nutrition supplement. TF recommendation no longer applicable. IVF, D5 at 100 mL/hr (120 gm dextrose, 408 kcal per day). BM 10/9,  10/7. Nutrition goals are not being met. Nutrition Recommendations/Plan:   Add supplement: Magic cup TID  Continue all other nutrition interventions. Encourage/ monitor po intake of meals and supplements.    Continue IVF per MD         Electronically signed by Geraldine Maza RD on 10/10/2022 at 1:50 PM    Contact: [phone]

## 2022-10-10 NOTE — PROGRESS NOTES
Infectious Disease progress Note        Reason:septic shock    Current abx Prior abx   Piperacillin/tazobactam, vancomycin since 10/5/22      Lines:       Assessment :   78 y.o. male with a significant PMHx of PVD, hx gangrene b/l feet/toes s/p amputations and endovascular intervention from Podiatry and Vascular surgery 9/2022, HTN, DM II, Depression, tobacco abuse and chronic back pain in the setting of lumbar spondylosis s/p T12-L3 laminectomy, L1-3 TLIF, ISAK L3-5, instrumentation from T10-pelvis performed by Dr. Anju Goss on 4/15/2022 presented to [facility] ED from [facility] on 10/5/22 with acute encephalopathy, diaphoresis,hypoxia, SOB, hypotension, and tachycardia. Hospitalization at [facility] 9/2022 for  partially treated right foot cellulitis, dry gangrene of tip of toes bilateral feet status post right foot partial second and third toe amputation, left foot partial second and fourth toe amputation on 9/17/2022     Intra-Op culture left second toe 9/17-proteus, klebsiella, group B strep, Staph aureus  Intra-Op culture right second toe 9/17-heavy proteus, klebsiella, Staph aureus    Clinical presentation consistent with septic shock (present on admission)  secondary to  left foot cellulitis,  multifocal pneumonia- ? Healthcare associated pneumonia, dry gangrene bilateral toes. Status post left foot transmetatarsal amputation on 10/7/2022. Increased erythema/warmth left foot suggestive of left foot cellulitis. Concurrent severe peripheral artery disease will likely mask the full clinical presentation-improved erythema/warmth left foot on today's exam    Acute hypoxic respiratory failure-could be secondary to healthcare associated pneumonia/aspiration pneumonia. Agree with ruling out pulm embolism looking at the elevated D-dimer     Peripheral arterial disease-. S/P orbital atherectomy of left tibial peroneal artery, drug-coated balloon angioplasty of the left popliteal and tibial peroneal artery, orbital atherectomy of the left superficial femoral artery, drug-coated balloon angioplasty of the left superficial femoral artery, balloon angioplasty and stenting of the left external iliac artery, angiogram of the right femoral artery on 9/16/22     Acute kidney injury-likely due to sepsis    Significantly elevated procalcitonin 38 on 10/6/2022 concerning for gram-negative sepsis    Altered mental status-likely metabolic encephalopathy. Improving    Hypernatremia- pip/tazo can worsen this. Recommendations:     D/c Zosyn, levofloxacin. start ceftriaxone, continue  vancomycin. Follow-up wound cultures, modify antibiotics accordingly  Follow-up podiatry recommendations   Continue wound care sacral decubiti  Monitor procalcitonin, cbc, clinically to determine duration of antibiotics  Mx of hypernatremia per primary team        Above plan was discussed in details with dr. Carlyon Schwab. Please call me if any further questions or concerns. Will continue to participate in the care of this patient. HPI:     confused.          Past Medical History:   Diagnosis Date    Arthritis     Cognitive impairment     Depression     Dorsalgia, unspecified     GERD (gastroesophageal reflux disease)     High blood pressure     Insomnia     L1 vertebral fracture (HCC)     Migraine headache     Other chronic pain     Periorbital headache     Sleep apnea     Spinal stenosis     Type 2 diabetes mellitus, without long-term current use of insulin [facility])        Past Surgical History:   Procedure Laterality Date    ENDOSCOPY, COLON, DIAGNOSTIC      HX LUMBAR DISKECTOMY  6/2001, 4/11    left L3-4 microdiskectomy with intradural rupture    HX ORTHOPAEDIC      spinal Surgery Dr. Crawford Both  9/11    spinal fusion, L3-5       Current Discharge Medication List        CONTINUE these medications which have NOT CHANGED    Details   insulin lispro (HUMALOG) 100 unit/mL injection Check FSBS Three times daily with meals,   For sugar between 150 and 200- give 1 units SQ,   For sugar between 201 and 250- give 3 units SQ,   For sugar between 251 and 300- give 5 units SQ,   For sugar between 301 and 400- give 7 units SQ,  For sugars > 400, contact PCP  Qty: 1 Each, Refills: 0      polyethylene glycol (MIRALAX) 17 gram packet Take 1 Packet by mouth daily as needed for Constipation. Qty: 15 Each, Refills: 0      therapeutic multivitamin (THERAGRAN) tablet Take 1 Tablet by mouth daily. Qty: 30 Tablet, Refills: 0      clopidogreL (Plavix) 75 mg tab Take 1 Tablet by mouth daily. Qty: 30 Tablet, Refills: 3      divalproex DR (DEPAKOTE) 250 mg tablet Take  by mouth two (2) times a day.      gabapentin (NEURONTIN) 300 mg capsule Take 300 mg by mouth two (2) times a day. dronabinoL (MARINOL) 2.5 mg capsule Take 2.5 mg by mouth two (2) times a day. Associated Diagnoses: Weight loss      sertraline (ZOLOFT) 50 mg tablet Take 1 Tablet by mouth daily. Qty: 90 Tablet, Refills: 2    Associated Diagnoses:  Moderate episode of recurrent major depressive disorder (HCC)           STOP taking these medications       L. acidophilus,casei,rhamnosus (BIO-K PLUS) 50 billion cell cpDR capsule Comments:   Reason for Stopping:               Current Facility-Administered Medications   Medication Dose Route Frequency    potassium chloride 10 mEq in 100 ml IVPB  10 mEq IntraVENous Q1H    dextrose 5% infusion  100 mL/hr IntraVENous CONTINUOUS    0.9% sodium chloride infusion 250 mL  250 mL IntraVENous PRN    0.9% sodium chloride infusion 250 mL  250 mL IntraVENous PRN    0.9% sodium chloride infusion 250 mL  250 mL IntraVENous PRN    levoFLOXacin (LEVAQUIN) 750 mg in D5W IVPB  750 mg IntraVENous Q24H    [Held by provider] clopidogreL (PLAVIX) tablet 75 mg  75 mg Oral DAILY    acetaminophen (TYLENOL) tablet 650 mg  650 mg Oral Q6H PRN    Or    acetaminophen (TYLENOL) suppository 650 mg  650 mg Rectal Q6H PRN    polyethylene glycol (MIRALAX) packet 17 g  17 g Oral DAILY PRN    ondansetron (ZOFRAN ODT) tablet 4 mg  4 mg Oral Q8H PRN    Or    ondansetron (ZOFRAN) injection 4 mg  4 mg IntraVENous Q6H PRN    famotidine (PF) (PEPCID) 20 mg in 0.9% sodium chloride 10 mL injection  20 mg IntraVENous BID    nicotine (NICODERM CQ) 14 mg/24 hr patch 1 Patch  1 Patch TransDERmal DAILY    insulin lispro (HUMALOG) injection   SubCUTAneous Q6H    glucose chewable tablet 16 g  4 Tablet Oral PRN    glucagon (GLUCAGEN) injection 1 mg  1 mg IntraMUSCular PRN    dextrose 10% infusion 0-250 mL  0-250 mL IntraVENous PRN    albuterol-ipratropium (DUO-NEB) 2.5 MG-0.5 MG/3 ML  3 mL Nebulization Q6H PRN    piperacillin-tazobactam (ZOSYN) 3.375 g in 0.9% sodium chloride (MBP/ADV) 100 mL MBP  3.375 g IntraVENous Q8H    vancomycin (VANCOCIN) 1250 mg in  ml infusion  1,250 mg IntraVENous Q24H       Allergies: Codeine and Tegretol [carbamazepine]    Family History   Problem Relation Age of Onset    Heart Attack Mother     Cancer Mother     Heart Disease Mother     Hypertension Father     Seizures Father     Alcohol abuse Father      Social History     Socioeconomic History    Marital status:      Spouse name: Not on file    Number of children: Not on file    Years of education: Not on file    Highest education level: Not on file   Occupational History    Not on file   Tobacco Use    Smoking status: Every Day     Packs/day: 1.00     Years: 51.00     Pack years: 51.00     Types: Cigarettes    Smokeless tobacco: Never   Vaping Use    Vaping Use: Never used   Substance and Sexual Activity    Alcohol use: Yes     Comment: rarely    Drug use: No    Sexual activity: Yes     Partners: Female   Other Topics Concern    Not on file   Social History Narrative    Not on file     Social Determinants of Health     Financial Resource Strain: Not on file   Food Insecurity: Not on file   Transportation Needs: Not on file   Physical Activity: Not on file   Stress: Not on file Social Connections: Not on file   Intimate Partner Violence: Not on file   Housing Stability: Not on file     Social History     Tobacco Use   Smoking Status Every Day    Packs/day: 1.00    Years: 51.00    Pack years: 51.00    Types: Cigarettes   Smokeless Tobacco Never        Temp (24hrs), Av.8 °F (36.6 °C), Min:97.1 °F (36.2 °C), Max:98.3 °F (36.8 °C)    Visit Vitals  /60 (BP 1 Location: Left upper arm, BP Patient Position: At rest;Lying)   Pulse 64   Temp 97.7 °F (36.5 °C)   Resp 19   Ht 6' (1.829 m)   Wt 68 kg (150 lb)   SpO2 97%   BMI 20.34 kg/m²       ROS: Unable to obtain due to patient factors    Physical Exam:    General:  laying on bed,confused, not very communicative  HEENT: EOMI, supple neck, no JVD, dry oral mucosa  Cardiovascular: S1S2 regular, no rub/gallop   Pulmonary: air entry bilaterally, no wheezing, no crackle  GI:  Soft, non tender, non distended, +bs, no guarding   Extremities: + Pedal edema, no calf tenderness, tip of multiple toes right foot with dry necrosis, left foot dressing not removed  Neuro: AOx2, moving all extremities  Skin: skin changes bilateral feet as mentioned above       Labs: Results:   Chemistry Recent Labs     10/10/22  0241 10/09/22  0345 10/08/22  1440   * 124* 113*    153* 147*   K 2.9* 3.2* 3.7    120* 115*   CO2 26 27 26   BUN 16 21* 25*   CREA 0.69 0.64 0.84   CA 8.1* 8.4* 8.5   AGAP 8 6 6   BUCR 23* 33* 30*        CBC w/Diff Recent Labs     10/10/22  0241 10/09/22  0345 10/08/22  1700 10/08/22  1440 10/08/22  0258   WBC 6.6 6.9  --   --  9.3   RBC 2.76* 2.63*  --   --  2.48*   HGB 8.0* 7.8* 6.7*   < > 6.9*   HCT 24.0* 23.5* 21.1*  --  22.4*   * 125*  --   --  144   GRANS  --  85*  --   --  80*   LYMPH  --  9*  --   --  3*   EOS  --  1  --   --  1    < > = values in this interval not displayed.         Microbiology Recent Labs     10/07/22  1544   CULT NO GROWTH THUS FAR  NO GROWTH THUS FAR            RADIOLOGY:    All available imaging studies/reports in [facility] for this admission were reviewed    Total time spent >35 minutes. High complexity decision making was performed during the evaluation of this patient at high risk for decompensation with multiple organ involvement     Above mentioned total time spent on reviewing the case/medical record/data/notes/EMR/patient examination/documentation/coordinating care with nurse/consultants, exclusive of procedures with complex decision making performed and > 50% time spent in face to face evaluation. Disclaimer: Sections of this note are dictated utilizing voice recognition software, which may have resulted in some phonetic based errors in grammar and contents. Even though attempts were made to correct all the mistakes, some may have been missed, and remained in the body of the document. If questions arise, please contact our department.     Dr. Blas Gaytan, Infectious Disease Specialist  [phone]  October 10, 2022  11:52 AM

## 2022-10-10 NOTE — PROGRESS NOTES
[address] [facility] Pharmacokinetic Monitoring Service - Vancomycin    Indication: DM foot infection  Goal AUC/CARYL: 400-600 mg*hr/L  Day of Therapy: 5  Additional Antimicrobials: pip-tazo    Pertinent Laboratory Values: Wt Readings from Last 1 Encounters:   10/08/22 68 kg (150 lb)     Temp Readings from Last 1 Encounters:   10/10/22 98.5 °F (36.9 °C)     No components found for: PROCAL  Estimated Creatinine Clearance: 87.7 mL/min (by C-G formula based on SCr of 0.69 mg/dL). Recent Labs     10/10/22  0241 10/09/22  0345   WBC 6.6 6.9     Pertinent Cultures:  Culture Date Source Results   10/5 blood NGTD   10/6 Wound MRSA, Enterococcus   MRSA Nasal Swab: N/A. Non-respiratory infection. Assessment:  Date/Time Current Dose Concentration (mg/L) Timing of Concentration (h) AUC   10/5 1,000 mg x1  500 mg x1 - - -   10/6 1,250 mg q24h - - -   10/7 1,250 mg q24h 14.6 11 509   10/10 1000 mg q 12 h 16.6 19h 22m 519   Note: Serum concentrations collected for AUC dosing may appear elevated if collected in close proximity to the dose administered, this is not necessarily an indication of toxicity    Plan:  Current dose subtherapeutic.  Dose will be increased to 1,000 mg every 12 hours  No levels at this time  Pharmacy will continue to monitor patient and adjust therapy as indicated    Thank you for the consult,  MILDRED Marie  10/10/2022

## 2022-10-10 NOTE — PROGRESS NOTES
Problem: Falls - Risk of  Goal: *Absence of Falls  Description: Document Rey Nicole Fall Risk and appropriate interventions in the flowsheet. Outcome: Progressing Towards Goal  Note: Fall Risk Interventions:       Mentation Interventions: Bed/chair exit alarm, Door open when patient unattended, Reorient patient    Medication Interventions: Bed/chair exit alarm, Patient to call before getting OOB    Elimination Interventions: Call light in reach, Bed/chair exit alarm              Problem: Pain  Goal: *Control of Pain  Outcome: Progressing Towards Goal  Goal: *PALLIATIVE CARE:  Alleviation of Pain  Outcome: Progressing Towards Goal     Problem: Pressure Injury - Risk of  Goal: *Prevention of pressure injury  Description: Document Dionte Scale and appropriate interventions in the flowsheet.   Outcome: Progressing Towards Goal  Note: Pressure Injury Interventions:  Sensory Interventions: Keep linens dry and wrinkle-free, Minimize linen layers, Pad between skin to skin, Monitor skin under medical devices    Moisture Interventions: Assess need for specialty bed, Absorbent underpads, Limit adult briefs, Maintain skin hydration (lotion/cream), Minimize layers    Activity Interventions: Increase time out of bed, Pressure redistribution bed/mattress(bed type)    Mobility Interventions: HOB 30 degrees or less, Pressure redistribution bed/mattress (bed type)    Nutrition Interventions: Document food/fluid/supplement intake    Friction and Shear Interventions: HOB 30 degrees or less, Lift sheet, Minimize layers                Problem: Injury - Risk of, Adverse Drug Event  Goal: *Absence of adverse drug events  Outcome: Progressing Towards Goal  Goal: *Absence of medication errors  Outcome: Progressing Towards Goal  Goal: *Knowledge of prescribed medications  Outcome: Progressing Towards Goal     Problem: Nutrition Deficit  Goal: *Optimize nutritional status  Outcome: Progressing Towards Goal     Problem: Patient Education: Go to Patient Education Activity  Goal: Patient/Family Education  Outcome: Progressing Towards Goal

## 2022-10-10 NOTE — PALLIATIVE CARE
[facility]  Good Help to Those in Need  [phone]    Consult Note  Patient Name: Coco Pearson  YOB: 1945  Age: 78 y.o. Date of Visit: 10/10/22  Facility of Care: [facility]  Patient Room: 453/01     Attending: Alis Mccoy MD   Diagnosis: Sepsis [facility]) [A41.9]  Pneumonia [J18.9]  Multifocal pneumonia [J18.9]    Spoke with patient's son August Hanson who is planning to come in tomorrow 10/11/22 at 12:00 to sign POST form for DNR/DNI    Thank you for the opportunity to assist in the care of this patient and their family. Please contact me at [phone] if you have any further questions.      ALISE Chavez  Palliative Medicine   [place], [place]

## 2022-10-10 NOTE — PROGRESS NOTES
Patient not medically stable for discharge. LTC resident at [facility].  will continue to monitor and assist with transition of care needs.     SILVIA Tesfaye, RN  Care Management

## 2022-10-10 NOTE — PROGRESS NOTES
Hospitalist Progress Note    Patient: Shun Corona Age: 78 y.o. : 1945 MR#: [id number] SSN: [id number]  Date/Time: 10/10/2022 11:42 AM    DOA: 10/5/2022  PCP: Ambar Rowley MD    Subjective:       Patient remains noncompliant and not willing to participate in speech evaluation today. Patient expressed to be wished to leave him alone again. Has no pain  Has no fever  His sodium improved, 144  Hemoglobin stable 8.0, status post 2 units packed RBC previously      Interval Hospital Course:        ROS: limited due to not wanting to participate  No chills, no headache, no facial pain, no cough   No swallowing pain, No chest pain, no shortness of breath, no abd pain, no urinary complaint, ++ leg pain or swelling       Assessment/Plan:   Sepsis shock POA, resolved  Left foot cellulitis, status post imaging of the left foot, right foot dry gangrene. 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 Appreciate speech therapy evaluation, will consider advancing purée and nectar thick liquids with assistance feed for now. Continue IV D5W, his potassium was replaced this morning, will replace for more this evening.   Note continue IV antibiotics, appreciate ID assistance with antibiotic selection  Wound care  Avoid nephrotoxic medications  Continue oxygen supplement  Pepcid  ISS  Continue to monitor Hgb and transfuse to keep hemoglobin >7    DM        Disposition planning: Placement with family meeting on Monday  Family updated (.  None.)  Additional Notes:    Time spent > 30 minutes    Case discussed with:  [x]Patient  []Family  [x]Nursing  []Case Management  DVT Prophylaxis:  []Lovenox  []Hep SQ  [x]SCDs  []Coumadin   []On Heparin gtt    Signed By: Christiano Mahan MD     October 10, 2022 11:42 AM              Objective:   VS: Visit Vitals  /61 (BP 1 Location: Left upper arm, BP Patient Position: At rest)   Pulse 68   Temp 98.5 °F (36.9 °C)   Resp 20   Ht 6' (1.829 m)   Wt 68 kg (150 lb)   SpO2 97%   BMI 20.34 kg/m²      Tmax/24hrs: Temp (24hrs), Av °F (36.7 °C), Min:97.6 °F (36.4 °C), Max:98.5 °F (36.9 °C)    Intake/Output Summary (Last 24 hours) at 10/10/2022 1142  Last data filed at 10/10/2022 0700  Gross per 24 hour   Intake --   Output 1100 ml   Net -1100 ml       Tele:   General:  Cooperative, Not in acute distress, speaks in short sentence while in bed, confused  HEENT: PERRL, EOMI, supple neck, no JVD, dry oral mucosa  Cardiovascular: S1S2 regular, no rub/gallop   Pulmonary: air entry bilaterally, no wheezing, + crackle  GI:  Soft, non tender, non distended, +bs, no guarding   Extremities:  trace pedal edema, +distal pulses appreciated   Left wound in place, no bleeding, right foot with dry gangrene   Neuro: AOx1    Additional:       Current Facility-Administered Medications   Medication Dose Route Frequency    cefTRIAXone (ROCEPHIN) 2 g in sterile water (preservative free) 20 mL IV syringe  2 g IntraVENous Q24H    dextrose 5% infusion  100 mL/hr IntraVENous CONTINUOUS    0.9% sodium chloride infusion 250 mL  250 mL IntraVENous PRN    0.9% sodium chloride infusion 250 mL  250 mL IntraVENous PRN    0.9% sodium chloride infusion 250 mL  250 mL IntraVENous PRN    [Held by provider] clopidogreL (PLAVIX) tablet 75 mg  75 mg Oral DAILY    acetaminophen (TYLENOL) tablet 650 mg  650 mg Oral Q6H PRN    Or    acetaminophen (TYLENOL) suppository 650 mg  650 mg Rectal Q6H PRN    polyethylene glycol (MIRALAX) packet 17 g  17 g Oral DAILY PRN    ondansetron (ZOFRAN ODT) tablet 4 mg  4 mg Oral Q8H PRN    Or    ondansetron (ZOFRAN) injection 4 mg  4 mg IntraVENous Q6H PRN    famotidine (PF) (PEPCID) 20 mg in 0.9% sodium chloride 10 mL injection  20 mg IntraVENous BID    nicotine (NICODERM CQ) 14 mg/24 hr patch 1 Patch  1 Patch TransDERmal DAILY    insulin lispro (HUMALOG) injection   SubCUTAneous Q6H    glucose chewable tablet 16 g  4 Tablet Oral PRN    glucagon (GLUCAGEN) injection 1 mg  1 mg IntraMUSCular PRN    dextrose 10% infusion 0-250 mL  0-250 mL IntraVENous PRN    albuterol-ipratropium (DUO-NEB) 2.5 MG-0.5 MG/3 ML  3 mL Nebulization Q6H PRN    vancomycin (VANCOCIN) 1250 mg in  ml infusion  1,250 mg IntraVENous Q24H            Lab/Data Review:  Labs: Results:       Chemistry Recent Labs     10/10/22  0241 10/09/22  0345 10/08/22  1440 10/08/22  0258   * 124* 113* 108*    153* 147* 148*   K 2.9* 3.2* 3.7 3.0*    120* 115* 114*   CO2 26 27 26 27   BUN 16 21* 25* 26*   CREA 0.69 0.64 0.84 0.93   BUCR 23* 33* 30* 28*   AGAP 8 6 6 7   CA 8.1* 8.4* 8.5 8.7   PHOS 1.7* 2.0*  --  2.0*     No results for input(s): TBIL, ALT, ALKP, TP, ALB, GLOB, AGRAT in the last 72 hours. No lab exists for component: SGOT   CBC w/Diff Recent Labs     10/10/22  0241 10/09/22  0345 10/08/22  1700 10/08/22  1440 10/08/22  0258   WBC 6.6 6.9  --   --  9.3   RBC 2.76* 2.63*  --   --  2.48*   HGB 8.0* 7.8* 6.7*   < > 6.9*   HCT 24.0* 23.5* 21.1*  --  22.4*   MCV 87.0 89.4  --   --  90.3   MCH 29.0 29.7  --   --  27.8   MCHC 33.3 33.2  --   --  30.8*   RDW 14.5 14.6*  --   --  14.0   * 125*  --   --  144   BANDS  --   --   --   --  16*   GRANS  --  85*  --   --  80*   LYMPH  --  9*  --   --  3*   EOS  --  1  --   --  1    < > = values in this interval not displayed.       Coagulation No results for input(s): PTP, INR, APTT, INREXT, INREXT in the last 72 hours. Iron/Ferritin No results found for: IRON, FE, TIBC, IBCT, PSAT, FERR    BNP    Cardiac Enzymes Lab Results   Component Value Date/Time    CK 67 07/21/2022 02:35 PM        Lactic Acid    Thyroid Studies          All Micro Results       Procedure Component Value Units Date/Time    CULTURE, ANAEROBIC [[id number]]  (Abnormal) Collected: 10/07/22 1544    Order Status: Completed Specimen: Foot, left Updated: 10/10/22 1030     Special Requests: LEFT FOOT METATARSAL CLEAN MARGIN     Culture result: NO GROWTH ON SOLID MEDIA            GRAM POSITIVE COCCI IN CLUSTERS SEEN ON GRAM STAIN OF THE THIO BROTH          CULTURE, BLOOD [[id number]] Collected: 10/05/22 2004    Order Status: Completed Specimen: Blood Updated: 10/10/22 0636     Special Requests: NO SPECIAL REQUESTS        Culture result: NO GROWTH 5 DAYS       CULTURE, BLOOD [[id number]] Collected: 10/05/22 1940    Order Status: Completed Specimen: Blood Updated: 10/10/22 0636     Special Requests: NO SPECIAL REQUESTS        Culture result: NO GROWTH 5 DAYS       CULTURE, WOUND Clarissa Bernardo STAIN [[id number]]  (Abnormal)  (Susceptibility) Collected: 10/06/22 1255    Order Status: Completed Specimen: Wound from Buttock Updated: 10/09/22 1600     Special Requests: NO SPECIAL REQUESTS        GRAM STAIN RARE EPITHELIAL         RARE BUDDING YEAST        Culture result:       LIGHT * Methicillin Resistant Staphylococcus aureus *                  MODERATE YEAST, (APPARENT CANDIDA ALBICANS)                  MODERATE ENTEROCOCCUS FAECALIS            (NOTE) MRSA BY PRELIMINARY ANTIGEN CALLED TO Areli De La Garza RN AT 1900 10/8/22.  Giorgi Jonas STAIN [[id number]] Collected: 10/07/22 1544    Order Status: Completed Specimen: Foot, left Updated: 10/09/22 1237     Special Requests: LEFT FOOT METATARSAL CLEAN MARGIN     GRAM STAIN RARE WBCS SEEN         NO ORGANISMS SEEN        Culture result: NO GROWTH THUS FAR       CULTURE, MRSA [[id number]] Collected: 10/07/22 0116    Order Status: Completed Specimen: Nasal from Nares Updated: 10/08/22 0824     Special Requests: NO SPECIAL REQUESTS        Culture result: MRSA NOT PRESENT               Screening of patient nares for MRSA is for surveillance purposes and, if positive, to facilitate isolation considerations in high risk settings. It is not intended for automatic decolonization interventions per se as regimens are not sufficiently effective to warrant routine use.       CULTURE, URINE [[id number]] Collected: 10/05/22 0551    Order Status: Completed Specimen: Urine from Clean catch Updated: 10/07/22 1021     Special Requests: NO SPECIAL REQUESTS        Culture result: No growth (<1,000 CFU/ML)       LEGIONELLA PNEUMOPHILA AG, URINE [[id number]] Collected: 10/06/22 0551    Order Status: Completed Specimen: Urine, random Updated: 10/06/22 0959     Legionella Ag, urine Negative       STREP Donold Sis, URINE [[id number]] Collected: 10/06/22 0551    Order Status: Completed Specimen: Urine, random Updated: 10/06/22 0959     Strep pneumo Ag, urine Negative       CULTURE, RESPIRATORY/SPUTUM/BRONCH Laurian Pierre STAIN [[id number]] Collected: 10/06/22 0600    Order Status: Canceled Specimen: Sputum     RESPIRATORY VIRUS PANEL W/COVID-19, PCR [[id number]] Collected: 10/05/22 2350    Order Status: Completed Specimen: Nasopharyngeal Updated: 10/06/22 0145     Adenovirus Not detected        Coronavirus 229E Not detected        Coronavirus HKU1 Not detected        Coronavirus CVNL63 Not detected        Coronavirus OC43 Not detected        SARS-CoV-2, PCR Not detected        Metapneumovirus Not detected        Rhinovirus and Enterovirus Not detected        Influenza A Not detected        Influenza A, subtype H1 Not detected        Influenza A, subtype H3 Not detected        INFLUENZA A H1N1 PCR Not detected        Influenza B Not detected        Parainfluenza 1 Not detected        Parainfluenza 2 Not detected        Parainfluenza 3 Not detected Parainfluenza virus 4 Not detected        RSV by PCR Not detected        B. parapertussis, PCR Not detected        Bordetella pertussis - PCR Not detected        Chlamydophila pneumoniae DNA, QL, PCR Not detected        Mycoplasma pneumoniae DNA, QL, PCR Not detected       COVID-19 RAPID TEST [[id number]] Collected: 10/06/22 0000    Order Status: Canceled     INFLUENZA A & B AG (RAPID TEST) [[id number]] Collected: 10/06/22 0000    Order Status: Canceled Specimen: Nasopharyngeal               Images:    CT (Most Recent). XRAY (Most Recent)      EKG No results found for this or any previous visit.      2D ECHO

## 2022-10-11 LAB
ANION GAP SERPL CALC-SCNC: 4 MMOL/L (ref 3–18)
BACTERIA SPEC CULT: NORMAL
BACTERIA SPEC CULT: NORMAL
BUN SERPL-MCNC: 11 MG/DL (ref 7–18)
BUN/CREAT SERPL: 21 (ref 12–20)
CALCIUM SERPL-MCNC: 7.9 MG/DL (ref 8.5–10.1)
CHLORIDE SERPL-SCNC: 107 MMOL/L (ref 100–111)
CO2 SERPL-SCNC: 27 MMOL/L (ref 21–32)
CREAT SERPL-MCNC: 0.53 MG/DL (ref 0.6–1.3)
ERYTHROCYTE [DISTWIDTH] IN BLOOD BY AUTOMATED COUNT: 13.6 % (ref 11.6–14.5)
GLUCOSE BLD STRIP.AUTO-MCNC: 127 MG/DL (ref 70–110)
GLUCOSE BLD STRIP.AUTO-MCNC: 143 MG/DL (ref 70–110)
GLUCOSE BLD STRIP.AUTO-MCNC: 149 MG/DL (ref 70–110)
GLUCOSE BLD STRIP.AUTO-MCNC: 161 MG/DL (ref 70–110)
GLUCOSE BLD STRIP.AUTO-MCNC: 64 MG/DL (ref 70–110)
GLUCOSE SERPL-MCNC: 136 MG/DL (ref 74–99)
HCT VFR BLD AUTO: 26.5 % (ref 36–48)
HGB BLD-MCNC: 8.8 G/DL (ref 13–16)
MCH RBC QN AUTO: 28.7 PG (ref 24–34)
MCHC RBC AUTO-ENTMCNC: 33.2 G/DL (ref 31–37)
MCV RBC AUTO: 86.3 FL (ref 78–100)
NRBC # BLD: 0 K/UL (ref 0–0.01)
NRBC BLD-RTO: 0 PER 100 WBC
PLATELET # BLD AUTO: 125 K/UL (ref 135–420)
PMV BLD AUTO: 11.1 FL (ref 9.2–11.8)
POTASSIUM SERPL-SCNC: 3.5 MMOL/L (ref 3.5–5.5)
RBC # BLD AUTO: 3.07 M/UL (ref 4.35–5.65)
SERVICE CMNT-IMP: NORMAL
SERVICE CMNT-IMP: NORMAL
SODIUM SERPL-SCNC: 138 MMOL/L (ref 136–145)
WBC # BLD AUTO: 6.5 K/UL (ref 4.6–13.2)

## 2022-10-11 PROCEDURE — 74011250637 HC RX REV CODE- 250/637: Performed by: INTERNAL MEDICINE

## 2022-10-11 PROCEDURE — 2709999900 HC NON-CHARGEABLE SUPPLY

## 2022-10-11 PROCEDURE — 77030040831 HC BAG URINE DRNG MDII -A

## 2022-10-11 PROCEDURE — 99232 SBSQ HOSP IP/OBS MODERATE 35: CPT | Performed by: NURSE PRACTITIONER

## 2022-10-11 PROCEDURE — 82962 GLUCOSE BLOOD TEST: CPT

## 2022-10-11 PROCEDURE — 74011000250 HC RX REV CODE- 250: Performed by: NURSE PRACTITIONER

## 2022-10-11 PROCEDURE — 74011000250 HC RX REV CODE- 250: Performed by: INTERNAL MEDICINE

## 2022-10-11 PROCEDURE — 80048 BASIC METABOLIC PNL TOTAL CA: CPT

## 2022-10-11 PROCEDURE — 74011250636 HC RX REV CODE- 250/636: Performed by: INTERNAL MEDICINE

## 2022-10-11 PROCEDURE — 74011250637 HC RX REV CODE- 250/637: Performed by: NURSE PRACTITIONER

## 2022-10-11 PROCEDURE — 85027 COMPLETE CBC AUTOMATED: CPT

## 2022-10-11 PROCEDURE — 77030040392 HC DRSG OPTIFOAM MDII -A

## 2022-10-11 PROCEDURE — 74011636637 HC RX REV CODE- 636/637: Performed by: INTERNAL MEDICINE

## 2022-10-11 PROCEDURE — 36415 COLL VENOUS BLD VENIPUNCTURE: CPT

## 2022-10-11 PROCEDURE — 74011250636 HC RX REV CODE- 250/636: Performed by: NURSE PRACTITIONER

## 2022-10-11 PROCEDURE — 99232 SBSQ HOSP IP/OBS MODERATE 35: CPT | Performed by: INTERNAL MEDICINE

## 2022-10-11 PROCEDURE — 65270000029 HC RM PRIVATE

## 2022-10-11 RX ORDER — DEXTROSE MONOHYDRATE 50 MG/ML
100 INJECTION, SOLUTION INTRAVENOUS CONTINUOUS
Status: DISCONTINUED | OUTPATIENT
Start: 2022-10-11 | End: 2022-10-13 | Stop reason: HOSPADM

## 2022-10-11 RX ADMIN — WATER 2 G: 1 INJECTION INTRAMUSCULAR; INTRAVENOUS; SUBCUTANEOUS at 09:04

## 2022-10-11 RX ADMIN — DEXTROSE MONOHYDRATE 100 ML/HR: 50 INJECTION, SOLUTION INTRAVENOUS at 07:58

## 2022-10-11 RX ADMIN — FAMOTIDINE 20 MG: 10 INJECTION, SOLUTION INTRAVENOUS at 09:03

## 2022-10-11 RX ADMIN — VANCOMYCIN HYDROCHLORIDE 1000 MG: 1 INJECTION, POWDER, LYOPHILIZED, FOR SOLUTION INTRAVENOUS at 02:00

## 2022-10-11 RX ADMIN — Medication 2 UNITS: at 17:59

## 2022-10-11 RX ADMIN — POTASSIUM CHLORIDE 20 MEQ: 1500 TABLET, EXTENDED RELEASE ORAL at 17:59

## 2022-10-11 RX ADMIN — DEXTROSE MONOHYDRATE 100 ML/HR: 50 INJECTION, SOLUTION INTRAVENOUS at 23:00

## 2022-10-11 RX ADMIN — VANCOMYCIN HYDROCHLORIDE 1000 MG: 1 INJECTION, POWDER, LYOPHILIZED, FOR SOLUTION INTRAVENOUS at 17:59

## 2022-10-11 RX ADMIN — GLUCAGON HYDROCHLORIDE 1 MG: KIT at 21:37

## 2022-10-11 RX ADMIN — FAMOTIDINE 20 MG: 10 INJECTION, SOLUTION INTRAVENOUS at 17:59

## 2022-10-11 NOTE — ACP (ADVANCE CARE PLANNING)
Advance Care Planning     General Advance Care Planning (ACP) Conversation  Advanced Steps [facility] (Physician Orders for Scope of Treatment)       Date of conversation: 10/11/22   Location:[facility]  Length (minutes): 20    Advanced Steps® ACP Facilitator: Jed Conner RN    Conversation Topics   (If Patient does not have decision making capacity, Agent/Surrogate responds based on understanding of how patient would respond if capable)    Met with patient's son August Hanson outside of room for scheduled meeting. Son is aware of patient's overall condition and understands his prognosis. Goals of care discussion held with son prior to this meeting, states he and his brother have talked about this and do not want their father to be resuscitated or intubated. August Hanson shared he is feeling somewhat overwhelmed since his \"stepmother\" has \"dumped\" all of the responsibility onto both sons. She does not want anything to do with making medical decisions for her . This was confirmed per phone conversation on 10/6/22. Introduced the completion of Physician Order For Scope of Treatment, form reviewed and signed by son.      Cardiopulmonary Resuscitation      Order Elected for CPR:  []  Attempt Resuscitation [x]  Do Not Attempt Resuscitation      When NOT in Cardiopulmonary Arrest, Order Elected:      [] Comfort Measures  [x] Limited Additional Interventions  [] Full Interventions    Artificially Administered Nutrition, Order Elected:    [] No Feeding Tube   [] Feeding Tube for a defined trial period  [] Feeding Tube long-term if indicated   Tube feeding discussion deferred    The following was provided (check all that apply):      [] Review of existing Advance Directive  [] Assistance with Completion of New Advance Directive   [x] Review of [place] POST Form       Meeting Outcomes:   [x] ACP discussion completed   [x] [place] form completed  [x] [place] prepared for Provider review and signature   [x] Original placed on Chart, if in facility (form to be sent with patient at discharge)  [x] Copy given to healthcare agent    [x] Copy placed on chart to be scanned to electronic medical record     Follow-up plan:     [x] Schedule follow-up conversation to continue planning   [] Referred individual to Provider for additional questions/concerns   [] Advised patient/agent/surrogate to review completed POST form and update if needed with changes in condition, patient preferences or care setting       CODE STATUS: DNR/DNI, LIMITED INTERVENTIONS, TUBE FEEDING DISCUSSION 61 Felix Green RN  Palliative Medicine Inpatient RN  Palliative COPE Line: [phone]

## 2022-10-11 NOTE — PROGRESS NOTES
Problem: Pressure Injury - Risk of  Goal: *Prevention of pressure injury  Description: Document Dionte Scale and appropriate interventions in the flowsheet.   Outcome: Progressing Towards Goal  Note: Pressure Injury Interventions:  Sensory Interventions: Assess changes in LOC    Moisture Interventions: Absorbent underpads    Activity Interventions: PT/OT evaluation, Pressure redistribution bed/mattress(bed type)    Mobility Interventions: Float heels    Nutrition Interventions: Document food/fluid/supplement intake    Friction and Shear Interventions: Apply protective barrier, creams and emollients

## 2022-10-11 NOTE — PROGRESS NOTES
[facility]: [phone]  [facility]: [phone]     Patient Name: Didi Ng  YOB: 1945    Date of consult : 10/6/22  Date of follow up 10/11/2022   Reason for Consult: establish goals of care  Requesting Provider: [address], [facility]   Primary Care Physician: Candance Cornell, MD      SUMMARY:   Didi Ng is a 78 y.o. male with a past history of DM2, HTN, Major Depressive disorder, Degeneration of lumbar spine, Neuropathy, Gangrene of bilateral lower extremities, PAD, who was admitted on 10/5/2022 from SNF with a diagnosis of Sepsis. Current medical issues leading to Palliative Medicine involvement include: Pt with a long term life limiting chronic disease process that warrants discussions about her goals of care. .    CHIEF COMPLAINT: AMS and sepsis    HPI/SUBJECTIVE:    Pt is known to our services from a prior admission. He was placed in SNF post hospitalization and was brought in through the ED for hypoxia, shaking and AMS. Pt taken to the ICU for acute respiratory failure. 10/11/2022   On medical floor, eyes closed this am comfortable appearing. POST completed with son Yolande Soulier. The patient is:   [] Verbal and participatory  [x] Non-participatory due to: did not wish to participate in any conversation     GOALS OF CARE:  Patient/Health Care Proxy Stated Goals: Prolong life      TREATMENT PREFERENCES:   Code Status: DNR/ DNI          PALLIATIVE DIAGNOSES:   Goals of Care/AMD  Sepsis  Acute hypoxic respiratory failure  Debility       PLAN:   10/11/2022 Patient seen along with Ms J Luis Hoover RN and Ms Flynn Ganser. Patient was sleeping with eyes closed. He did not awaken to our presence. We met his son Yolande Soulier outside of room. POST completion introduced and completed for DNR/DNI limited intervention, no decision on feeding tubes. POST signed by son Yolande Soulier. Both sons in agreement with DNR/DNI.  Patient is not able to participate in his medical decisions. Goals of care DNR/DNI limited interventions feeding tube discussion deferred. ( Please see below for previous notes per palliative team)     Goals of care/ACP  10/6/22: This NP along with Vicki Du RN in to see the patient at the bedside. He is minimally interactive and was having a procedure at that time. He is not at this time able to participate in a goals of care discussion and we are contacting his family to determine his legal NOK. Pt is  but has been  for many years. We were able to track down and speak with Lynne Uribe who abdicates that responsibility. She is aware as are the two sons that by the [facility] hierarchy of decision making his two sons become his surrogate decision makers. We discussed with them the burdens and benefits of CPR and intubation and they both are in agreement that they do not want these things done to their father. They plan to come in tomorrow to complete a POST. They are in agreement to change the code status now in the system to DNR/DNI I have updated his orders and notified the ICU staff. Goals of care: DNR/DNI  2. Sepsis  Pt with a significant history of gangrene of both toes and feet. He has multiple toe amputations and areas of necrosis. Unknown etiology of this current sepsis but is being followed by Infection Control Physician  3. Acute hypoxic respiratory failure  Acute hypoxic respiratory failure contributed to possible healthcare associated pneumonia vs aspiration. 4.   Debility  Pt with low level of function at this time with a Palliative performance score of around 40-30%. He is mostly bed bound, total care to mainly assisted, with drowsy level of consciousness. 5.  Initial consult note routed to primary continuity provider  6.   Communicated plan of care with: Palliative IDT      Advance Care Planning:  [] The [employer] Interdisciplinary Team has updated the ACP Navigator with [address] and Patient Capacity    Primary Decision Maker ([address]):     Medical Interventions: Limited additional interventions   Other Instructions:         As far as possible, the palliative care team has discussed with patient / health care proxy about goals of care / treatment preferences for patient. HISTORY:     History obtained from: Chart review   Active Problems:    Pneumonia (10/6/2022)      Sepsis ([facility] [address] (10/6/2022)      Multifocal pneumonia (10/6/2022)    Past Medical History:   Diagnosis Date    Arthritis     Cognitive impairment     Depression     Dorsalgia, unspecified     GERD (gastroesophageal reflux disease)     High blood pressure     Insomnia     L1 vertebral fracture (HCC)     Migraine headache     Other chronic pain     Periorbital headache     Sleep apnea     Spinal stenosis     Type 2 diabetes mellitus, without long-term current use of insulin ([facility] [address]       Past Surgical History:   Procedure Laterality Date    ENDOSCOPY, COLON, DIAGNOSTIC      HX LUMBAR DISKECTOMY  6/2001, 4/11    left L3-4 microdiskectomy with intradural rupture    HX ORTHOPAEDIC      spinal Surgery Dr. Fay Diaz  9/11    spinal fusion, L3-5      Family History   Problem Relation Age of Onset    Heart Attack Mother     Cancer Mother     Heart Disease Mother     Hypertension Father     Seizures Father     Alcohol abuse Father      History reviewed, no pertinent family history.   Social History     Tobacco Use    Smoking status: Every Day     Packs/day: 1.00     Years: 51.00     Pack years: 51.00     Types: Cigarettes    Smokeless tobacco: Never   Substance Use Topics    Alcohol use: Yes     Comment: rarely     Allergies   Allergen Reactions    Codeine Not Reported This Time    Tegretol [Carbamazepine] Not Reported This Time      Current Facility-Administered Medications   Medication Dose Route Frequency    [START ON 10/12/2022] Vancomycin Level Due at 8am  1 Each Other DAILY cefTRIAXone (ROCEPHIN) 2 g in sterile water (preservative free) 20 mL IV syringe  2 g IntraVENous Q24H    vancomycin (VANCOCIN) 1,000 mg in 0.9% sodium chloride 250 mL (VIAL-MATE)  1,000 mg IntraVENous Q12H    insulin lispro (HUMALOG) injection   SubCUTAneous AC&HS    potassium chloride (K-DUR, KLOR-CON M20) SR tablet 20 mEq  20 mEq Oral BID    0.9% sodium chloride infusion 250 mL  250 mL IntraVENous PRN    0.9% sodium chloride infusion 250 mL  250 mL IntraVENous PRN    0.9% sodium chloride infusion 250 mL  250 mL IntraVENous PRN    [Held by provider] clopidogreL (PLAVIX) tablet 75 mg  75 mg Oral DAILY    acetaminophen (TYLENOL) tablet 650 mg  650 mg Oral Q6H PRN    Or    acetaminophen (TYLENOL) suppository 650 mg  650 mg Rectal Q6H PRN    polyethylene glycol (MIRALAX) packet 17 g  17 g Oral DAILY PRN    ondansetron (ZOFRAN ODT) tablet 4 mg  4 mg Oral Q8H PRN    Or    ondansetron (ZOFRAN) injection 4 mg  4 mg IntraVENous Q6H PRN    famotidine (PF) (PEPCID) 20 mg in 0.9% sodium chloride 10 mL injection  20 mg IntraVENous BID    nicotine (NICODERM CQ) 14 mg/24 hr patch 1 Patch  1 Patch TransDERmal DAILY    glucose chewable tablet 16 g  4 Tablet Oral PRN    glucagon (GLUCAGEN) injection 1 mg  1 mg IntraMUSCular PRN    dextrose 10% infusion 0-250 mL  0-250 mL IntraVENous PRN    albuterol-ipratropium (DUO-NEB) 2.5 MG-0.5 MG/3 ML  3 mL Nebulization Q6H PRN          Clinical Pain Assessment (nonverbal scale for nonverbal patients): Clinical Pain Assessment  Severity: 0     Activity (Movement): Lying quietly, normal position    Duration: for how long has pt been experiencing pain (e.g., 2 days, 1 month, years)  Frequency: how often pain is an issue (e.g., several times per day, once every few days, constant)     FUNCTIONAL ASSESSMENT:     Palliative Performance Scale (PPS):  PPS: 50    ECOG  ECOG Status : Completely disabled     PSYCHOSOCIAL/SPIRITUAL SCREENING:      Any spiritual / Judaism concerns:  [] Yes /  [x] No    Caregiver Burnout:  [] Yes /  [] No /  [x] No Caregiver Present      Anticipatory grief assessment:   [x] Normal  / [] Maladaptive        REVIEW OF SYSTEMS:     Systems: constitutional, ears/nose/mouth/throat, respiratory, gastrointestinal, genitourinary, musculoskeletal, integumentary, neurologic, psychiatric, endocrine. Positive findings noted below. Modified ESAS Completed by: provider           Pain: 0           Dyspnea: 0           Stool Occurrence(s): 1   Positive and pertinent negative findings in ROS are noted above in HPI. The following systems were [x] reviewed / [] unable to be reviewed as noted in HPI  Other findings are noted below. PHYSICAL EXAM:     Constitutional: eyes closed did not respond to his name, comfortable appearing   Eyes: closed   ENMT: moist MM   Cardiovascular: regular rhythm  Respiratory: respirations not labored   Gastrointestinal: soft non-tender  Skin: warm, dry  Neurologic: eyes closed did not awaken to his name      Other: Wt Readings from Last 3 Encounters:   10/08/22 68 kg (150 lb)   09/19/22 68 kg (150 lb)   08/11/22 68 kg (150 lb)     Blood pressure 125/77, pulse 75, temperature 97.4 °F (36.3 °C), resp. rate 18, height 6' (1.829 m), weight 68 kg (150 lb), SpO2 97 %. Pain:  Pain Scale 1: Visual  Pain Intensity 1: 0                      LAB AND IMAGING FINDINGS:     Lab Results   Component Value Date/Time    WBC 6.5 10/11/2022 02:58 AM    HGB 8.8 (L) 10/11/2022 02:58 AM    PLATELET 300 (L) 09/56/4069 02:58 AM     Lab Results   Component Value Date/Time    Sodium 138 10/11/2022 02:58 AM    Potassium 3.5 10/11/2022 02:58 AM    Chloride 107 10/11/2022 02:58 AM    CO2 27 10/11/2022 02:58 AM    BUN 11 10/11/2022 02:58 AM    Creatinine 0.53 (L) 10/11/2022 02:58 AM    Calcium 7.9 (L) 10/11/2022 02:58 AM    Magnesium 1.9 10/10/2022 02:41 AM    Phosphorus 1.7 (L) 10/10/2022 02:41 AM      Lab Results   Component Value Date/Time    Alk.  phosphatase 69 10/06/2022 10:57 AM Protein, total 6.2 (L) 10/06/2022 10:57 AM    Albumin 2.0 (L) 10/06/2022 10:57 AM    Globulin 4.2 (H) 10/06/2022 10:57 AM     Lab Results   Component Value Date/Time    INR 1.2 10/06/2022 10:57 AM    Prothrombin time 15.7 (H) 10/06/2022 10:57 AM      No results found for: IRON, FE, TIBC, IBCT, PSAT, FERR   No results found for: PH, PCO2, PO2  No components found for: [place]   Lab Results   Component Value Date/Time    CK 67 07/21/2022 02:35 PM              Total time: 25 minutes   Counseling / coordination time, spent as noted above:   > 50% counseling / coordination:  Time spent in direct consultation with the patient, medical team, and family     Prolonged service was provided for  []30 min   []75 min in face to face time in the presence of the patient, spent as noted above. Time Start:   Time End:     Disclaimer: Sections of this note are dictated using utilizing voice recognition software, which may have resulted in some phonetic based errors in grammar and contents. Even though attempts were made to correct all the mistakes, some may have been missed, and remained in the body of the document. If questions arise, please contact our department.

## 2022-10-11 NOTE — PROGRESS NOTES
Physician Progress Note      Huey Torres  [facility] #:                  [id number]  :                       1945  ADMIT DATE:       10/5/2022 7:52 PM  [address] [place] DATE:  RESPONDING  PROVIDER #:        Philip Worley MD [id number] [facility] MD          QUERY TEXT:    Patient admitted with sepsis. Per wound care note, noted to also have right heel pressure ulcer, right foot multiple ulcers, left foot ulcers, and right clavicle traumatic wound. If possible, please document in progress notes and discharge summary the type of ulcer, site of the ulcer and present on admission status of the ulcer: The medical record reflects the following:  Risk Factors: diabetes, sepsis, malnutrition, cellulitis to left lower limb    Clinical Indicators:  10/06/2022, wound care, Rena Pacheco. Seymour VEGA, RN, [facility], [facility]  Wound #2Location: Foot, Right  Stage/Etiology: Arterial  Characteristics: poorly perfused. Wound bed is purple/maroon clean. Abby-wound skin is discoloration and pale  Wound #3 Location: Foot, Left  Stage/Etiology: Arterial  Characteristics: ulcerated Wound bed is dusky and purple/maroon clean, necrotic. Abby-wound skin is clear/intact and pale  Wound #4 Location: Heel, Right  Stage/Etiology: Pressure Unstageable  Characteristics: shows no evidence of infection, separation, or keloid formation Wound bed is eschar clean. Abby-wound skin is clear/intact and erythema  Wound #5 Location: Right, clavicle  Stage/Etiology: Traumatic  Characteristics: shows no evidence of infection, separation, or keloid formation Wound bed is granulating clean. Abby-wound skin is clear/intact  POA wounds #: 1-5      Treatment: Silicone dressing to sacrum, right clavicle and bilateral heels. Change every 3 days and prn soilage.     Thank you,  KEMI Gonzalez, RN, [email or web address]  Options provided:  -- Decubitus Pressure Ulcer to right heel unstageable, right foot multiple arterial ulcers, left foot multiple arterial ulcers, and right clavicle traumatic wound, all likely present on admission  -- Decubitus Pressure Ulcer to right heel unstageable, right foot multiple arterial ulcers, left foot multiple arterial ulcers, and right clavicle traumatic wound, all likely not present on admission  -- Other - I will add my own diagnosis  -- Disagree - Not applicable / Not valid  -- Disagree - Clinically unable to determine / Unknown  -- Refer to Clinical Documentation Reviewer    PROVIDER RESPONSE TEXT:    This patient has a decubitus pressure ulcer to right heel unstageable, right foot multiple arterial ulcers, left foot multiple arterial ulcers, and right clavicle traumatic wound, all likely present on admission. Query created by:  Saint Kraft on 10/10/2022 4:38 PM      Electronically signed by:  Toni De Paz MD 10/11/2022 12:33 PM

## 2022-10-11 NOTE — PROGRESS NOTES
[address] [facility] Pharmacokinetic Monitoring Service - Vancomycin    Indication: DM foot infection  Goal AUC/CARYL: 400-600 mg*hr/L  Day of Therapy: 6  Additional Antimicrobials: Ceftriaxone    Pertinent Laboratory Values: Wt Readings from Last 1 Encounters:   10/08/22 68 kg (150 lb)     Temp Readings from Last 1 Encounters:   10/11/22 98.1 °F (36.7 °C)     No components found for: PROCAL  Estimated Creatinine Clearance: 87.7 mL/min (A) (by C-G formula based on SCr of 0.53 mg/dL (L)). Recent Labs     10/11/22  0258 10/10/22  0241   WBC 6.5 6.6     Pertinent Cultures:  Culture Date Source Results   10/5 blood NGTD   10/6 Wound MRSA, Enterococcus   MRSA Nasal Swab: N/A. Non-respiratory infection. Assessment:  Date/Time Current Dose Concentration (mg/L) Timing of Concentration (h) AUC   10/5 1,000 mg x1  500 mg x1 - - -   10/6 1,250 mg q24h - - -   10/7 1,250 mg q24h 14.6 11 509   10/10 1000 mg q 12 h 16.6 19h 22m 519   10/11 1,000 mg q 12 h 12.7 13.6 427   Note: Serum concentrations collected for AUC dosing may appear elevated if collected in close proximity to the dose administered, this is not necessarily an indication of toxicity    Plan:  Current dose is therapeutic.  Dose will remain at 1,000 mg every 12 hours  Level ordered for 10/12 at 0800  Pharmacy will continue to monitor patient and adjust therapy as indicated    Thank you for the consult,  MILDRED Andino  10/11/2022

## 2022-10-11 NOTE — PROGRESS NOTES
Post left iliac stent and multilevel atherectomy angioplasty  Remains intact  Seems well perfused  Can follow up in office  Continue plavix

## 2022-10-11 NOTE — PROGRESS NOTES
Infectious Disease progress Note        Reason:septic shock    Current abx Prior abx   Piperacillin/tazobactam, vancomycin since 10/5/22      Lines:       Assessment :   78 y.o. male with a significant PMHx of PVD, hx gangrene b/l feet/toes s/p amputations and endovascular intervention from Podiatry and Vascular surgery 9/2022, HTN, DM II, Depression, tobacco abuse and chronic back pain in the setting of lumbar spondylosis s/p T12-L3 laminectomy, L1-3 TLIF, ISAK L3-5, instrumentation from T10-pelvis performed by Dr. Roberta Gardner on 4/15/2022 presented to [facility] ED from [facility] on 10/5/22 with acute encephalopathy, diaphoresis,hypoxia, SOB, hypotension, and tachycardia. Hospitalization at [facility] 9/2022 for  partially treated right foot cellulitis, dry gangrene of tip of toes bilateral feet status post right foot partial second and third toe amputation, left foot partial second and fourth toe amputation on 9/17/2022     Intra-Op culture left second toe 9/17-proteus, klebsiella, group B strep, Staph aureus  Intra-Op culture right second toe 9/17-heavy proteus, klebsiella, Staph aureus    Clinical presentation consistent with septic shock (present on admission)  secondary to  left foot cellulitis,  multifocal pneumonia- ? Healthcare associated pneumonia, dry gangrene bilateral toes. Status post left foot transmetatarsal amputation on 10/7/2022. Bone biopsy of clean margins negative for osteomyelitis    Intra-Op cultures 10/7-Staph aureus    Increased erythema/warmth left foot suggestive of left foot cellulitis. Concurrent severe peripheral artery disease will likely mask the full clinical presentation-improved erythema/warmth left foot on today's exam    Acute hypoxic respiratory failure-could be secondary to healthcare associated pneumonia/aspiration pneumonia. Agree with ruling out pulm embolism looking at the elevated D-dimer     Peripheral arterial disease-. S/P orbital atherectomy of left tibial peroneal artery, drug-coated balloon angioplasty of the left popliteal and tibial peroneal artery, orbital atherectomy of the left superficial femoral artery, drug-coated balloon angioplasty of the left superficial femoral artery, balloon angioplasty and stenting of the left external iliac artery, angiogram of the right femoral artery on 9/16/22     Acute kidney injury-likely due to sepsis    Significantly elevated procalcitonin 38 on 10/6/2022 concerning for gram-negative sepsis    Altered mental status-likely metabolic encephalopathy. Improving    Hypernatremia- pip/tazo can worsen this. Recommendations:     Discontinue ceftriaxone, continue  vancomycin. Follow-up wound cultures  Continue wound care sacral decubiti  Mx of hypernatremia per primary team  Follow-up susceptibility of Staph aureus and Intra-Op cultures and switch to appropriate oral antibiotics accordingly        Above plan was discussed in details with primary team. Please call me if any further questions or concerns. Will continue to participate in the care of this patient. HPI:     confused.          Past Medical History:   Diagnosis Date    Arthritis     Cognitive impairment     Depression     Dorsalgia, unspecified     GERD (gastroesophageal reflux disease)     High blood pressure     Insomnia     L1 vertebral fracture (HCC)     Migraine headache     Other chronic pain     Periorbital headache     Sleep apnea     Spinal stenosis     Type 2 diabetes mellitus, without long-term current use of insulin [facility])        Past Surgical History:   Procedure Laterality Date    ENDOSCOPY, COLON, DIAGNOSTIC      HX LUMBAR DISKECTOMY  6/2001, 4/11    left L3-4 microdiskectomy with intradural rupture    HX ORTHOPAEDIC      spinal Surgery Dr. Reece Daley  9/11    spinal fusion, L3-5       Current Discharge Medication List        CONTINUE these medications which have NOT CHANGED    Details   insulin lispro (HUMALOG) 100 unit/mL injection Check FSBS Three times daily with meals,   For sugar between 150 and 200- give 1 units SQ,   For sugar between 201 and 250- give 3 units SQ,   For sugar between 251 and 300- give 5 units SQ,   For sugar between 301 and 400- give 7 units SQ,  For sugars > 400, contact PCP  Qty: 1 Each, Refills: 0      polyethylene glycol (MIRALAX) 17 gram packet Take 1 Packet by mouth daily as needed for Constipation. Qty: 15 Each, Refills: 0      therapeutic multivitamin (THERAGRAN) tablet Take 1 Tablet by mouth daily. Qty: 30 Tablet, Refills: 0      clopidogreL (Plavix) 75 mg tab Take 1 Tablet by mouth daily. Qty: 30 Tablet, Refills: 3      divalproex DR (DEPAKOTE) 250 mg tablet Take  by mouth two (2) times a day.      gabapentin (NEURONTIN) 300 mg capsule Take 300 mg by mouth two (2) times a day. dronabinoL (MARINOL) 2.5 mg capsule Take 2.5 mg by mouth two (2) times a day. Associated Diagnoses: Weight loss      sertraline (ZOLOFT) 50 mg tablet Take 1 Tablet by mouth daily. Qty: 90 Tablet, Refills: 2    Associated Diagnoses:  Moderate episode of recurrent major depressive disorder (HCC)           STOP taking these medications       L. acidophilus,casei,rhamnosus (BIO-K PLUS) 50 billion cell cpDR capsule Comments:   Reason for Stopping:               Current Facility-Administered Medications   Medication Dose Route Frequency    cefTRIAXone (ROCEPHIN) 2 g in sterile water (preservative free) 20 mL IV syringe  2 g IntraVENous Q24H    vancomycin (VANCOCIN) 1,000 mg in 0.9% sodium chloride 250 mL (VIAL-MATE)  1,000 mg IntraVENous Q12H    insulin lispro (HUMALOG) injection   SubCUTAneous AC&HS    potassium chloride (K-DUR, KLOR-CON M20) SR tablet 20 mEq  20 mEq Oral BID    0.9% sodium chloride infusion 250 mL  250 mL IntraVENous PRN    0.9% sodium chloride infusion 250 mL  250 mL IntraVENous PRN    0.9% sodium chloride infusion 250 mL  250 mL IntraVENous PRN    [Held by provider] clopidogreL (PLAVIX) tablet 75 mg  75 mg Oral DAILY    acetaminophen (TYLENOL) tablet 650 mg  650 mg Oral Q6H PRN    Or    acetaminophen (TYLENOL) suppository 650 mg  650 mg Rectal Q6H PRN    polyethylene glycol (MIRALAX) packet 17 g  17 g Oral DAILY PRN    ondansetron (ZOFRAN ODT) tablet 4 mg  4 mg Oral Q8H PRN    Or    ondansetron (ZOFRAN) injection 4 mg  4 mg IntraVENous Q6H PRN    famotidine (PF) (PEPCID) 20 mg in 0.9% sodium chloride 10 mL injection  20 mg IntraVENous BID    nicotine (NICODERM CQ) 14 mg/24 hr patch 1 Patch  1 Patch TransDERmal DAILY    glucose chewable tablet 16 g  4 Tablet Oral PRN    glucagon (GLUCAGEN) injection 1 mg  1 mg IntraMUSCular PRN    dextrose 10% infusion 0-250 mL  0-250 mL IntraVENous PRN    albuterol-ipratropium (DUO-NEB) 2.5 MG-0.5 MG/3 ML  3 mL Nebulization Q6H PRN       Allergies: Codeine and Tegretol [carbamazepine]    Family History   Problem Relation Age of Onset    Heart Attack Mother     Cancer Mother     Heart Disease Mother     Hypertension Father     Seizures Father     Alcohol abuse Father      Social History     Socioeconomic History    Marital status:      Spouse name: Not on file    Number of children: Not on file    Years of education: Not on file    Highest education level: Not on file   Occupational History    Not on file   Tobacco Use    Smoking status: Every Day     Packs/day: 1.00     Years: 51.00     Pack years: 51.00     Types: Cigarettes    Smokeless tobacco: Never   Vaping Use    Vaping Use: Never used   Substance and Sexual Activity    Alcohol use: Yes     Comment: rarely    Drug use: No    Sexual activity: Yes     Partners: Female   Other Topics Concern    Not on file   Social History Narrative    Not on file     Social Determinants of Health     Financial Resource Strain: Not on file   Food Insecurity: Not on file   Transportation Needs: Not on file   Physical Activity: Not on file   Stress: Not on file   Social Connections: Not on file   Intimate Partner Violence: Not on file   Housing Stability: Not on file     Social History     Tobacco Use   Smoking Status Every Day    Packs/day: 1.00    Years: 51.00    Pack years: 51.00    Types: Cigarettes   Smokeless Tobacco Never        Temp (24hrs), Av.1 °F (36.7 °C), Min:97.5 °F (36.4 °C), Max:98.5 °F (36.9 °C)    Visit Vitals  /77 (BP 1 Location: Left upper arm, BP Patient Position: At rest)   Pulse 75   Temp 98.1 °F (36.7 °C)   Resp 18   Ht 6' (1.829 m)   Wt 68 kg (150 lb)   SpO2 97%   BMI 20.34 kg/m²       ROS: Unable to obtain due to patient factors    Physical Exam:    General:  laying on bed,confused, not very communicative  HEENT: EOMI, supple neck, no JVD, dry oral mucosa  Cardiovascular: S1S2 regular, no rub/gallop   Pulmonary: air entry bilaterally, no wheezing, no crackle  GI:  Soft, non tender, non distended, +bs, no guarding   Extremities: + Pedal edema, no calf tenderness, tip of multiple toes right foot with dry necrosis, left foot dressing not removed  Neuro: AOx2, moving all extremities  Skin: skin changes bilateral feet as mentioned above       Labs: Results:   Chemistry Recent Labs     10/11/22  0258 10/10/22  0241 10/09/22  034   * 133* 124*    144 153*   K 3.5 2.9* 3.2*    110 120*   CO2 27 26 27   BUN 11 16 21*   CREA 0.53* 0.69 0.64   CA 7.9* 8.1* 8.4*   AGAP 4 8 6   BUCR 21* 23* 33*        CBC w/Diff Recent Labs     10/11/22  0258 10/10/22  0241 10/09/22  034   WBC 6.5 6.6 6.9   RBC 3.07* 2.76* 2.63*   HGB 8.8* 8.0* 7.8*   HCT 26.5* 24.0* 23.5*   * 123* 125*   GRANS  --   --  85*   LYMPH  --   --  9*   EOS  --   --  1        Microbiology No results for input(s): CULT in the last 72 hours. RADIOLOGY:    All available imaging studies/reports in [facility] for this admission were reviewed    Total time spent >35 minutes.  High complexity decision making was performed during the evaluation of       Disclaimer: Sections of this note are dictated utilizing voice recognition software, which may have resulted in some phonetic based errors in grammar and contents. Even though attempts were made to correct all the mistakes, some may have been missed, and remained in the body of the document. If questions arise, please contact our department.     Dr. Bonita Quintana, Infectious Disease Specialist  [phone]  October 11, 2022  11:52 AM

## 2022-10-12 ENCOUNTER — HOSPICE ADMISSION (OUTPATIENT)
Dept: HOSPICE | Facility: HOSPICE | Age: 77
End: 2022-10-12

## 2022-10-12 LAB
ANION GAP SERPL CALC-SCNC: 9 MMOL/L (ref 3–18)
BACTERIA SPEC CULT: ABNORMAL
BACTERIA SPEC CULT: ABNORMAL
BACTERIA SPEC CULT: NORMAL
BACTERIA SPEC CULT: NORMAL
BUN SERPL-MCNC: 9 MG/DL (ref 7–18)
BUN/CREAT SERPL: 18 (ref 12–20)
CALCIUM SERPL-MCNC: 8 MG/DL (ref 8.5–10.1)
CHLORIDE SERPL-SCNC: 105 MMOL/L (ref 100–111)
CO2 SERPL-SCNC: 26 MMOL/L (ref 21–32)
CREAT SERPL-MCNC: 0.51 MG/DL (ref 0.6–1.3)
ERYTHROCYTE [DISTWIDTH] IN BLOOD BY AUTOMATED COUNT: 13.3 % (ref 11.6–14.5)
GLUCOSE BLD STRIP.AUTO-MCNC: 142 MG/DL (ref 70–110)
GLUCOSE BLD STRIP.AUTO-MCNC: 153 MG/DL (ref 70–110)
GLUCOSE BLD STRIP.AUTO-MCNC: 173 MG/DL (ref 70–110)
GLUCOSE SERPL-MCNC: 150 MG/DL (ref 74–99)
GRAM STN SPEC: ABNORMAL
GRAM STN SPEC: ABNORMAL
HCT VFR BLD AUTO: 26.9 % (ref 36–48)
HGB BLD-MCNC: 8.9 G/DL (ref 13–16)
MCH RBC QN AUTO: 28.3 PG (ref 24–34)
MCHC RBC AUTO-ENTMCNC: 33.1 G/DL (ref 31–37)
MCV RBC AUTO: 85.4 FL (ref 78–100)
NRBC # BLD: 0 K/UL (ref 0–0.01)
NRBC BLD-RTO: 0 PER 100 WBC
PLATELET # BLD AUTO: 124 K/UL (ref 135–420)
PMV BLD AUTO: 10.3 FL (ref 9.2–11.8)
POTASSIUM SERPL-SCNC: 3.1 MMOL/L (ref 3.5–5.5)
RBC # BLD AUTO: 3.15 M/UL (ref 4.35–5.65)
SERVICE CMNT-IMP: ABNORMAL
SERVICE CMNT-IMP: NORMAL
SODIUM SERPL-SCNC: 140 MMOL/L (ref 136–145)
WBC # BLD AUTO: 5.5 K/UL (ref 4.6–13.2)

## 2022-10-12 PROCEDURE — 92526 ORAL FUNCTION THERAPY: CPT

## 2022-10-12 PROCEDURE — 80048 BASIC METABOLIC PNL TOTAL CA: CPT

## 2022-10-12 PROCEDURE — 99232 SBSQ HOSP IP/OBS MODERATE 35: CPT | Performed by: INTERNAL MEDICINE

## 2022-10-12 PROCEDURE — 74011000250 HC RX REV CODE- 250: Performed by: NURSE PRACTITIONER

## 2022-10-12 PROCEDURE — 2709999900 HC NON-CHARGEABLE SUPPLY

## 2022-10-12 PROCEDURE — 85027 COMPLETE CBC AUTOMATED: CPT

## 2022-10-12 PROCEDURE — 74011636637 HC RX REV CODE- 636/637: Performed by: INTERNAL MEDICINE

## 2022-10-12 PROCEDURE — 65270000029 HC RM PRIVATE

## 2022-10-12 PROCEDURE — 36415 COLL VENOUS BLD VENIPUNCTURE: CPT

## 2022-10-12 PROCEDURE — 74011250636 HC RX REV CODE- 250/636: Performed by: NURSE PRACTITIONER

## 2022-10-12 PROCEDURE — 74011250636 HC RX REV CODE- 250/636: Performed by: INTERNAL MEDICINE

## 2022-10-12 PROCEDURE — 74011250637 HC RX REV CODE- 250/637: Performed by: INTERNAL MEDICINE

## 2022-10-12 PROCEDURE — 77010033678 HC OXYGEN DAILY

## 2022-10-12 PROCEDURE — 99233 SBSQ HOSP IP/OBS HIGH 50: CPT | Performed by: NURSE PRACTITIONER

## 2022-10-12 PROCEDURE — 74011250637 HC RX REV CODE- 250/637: Performed by: NURSE PRACTITIONER

## 2022-10-12 PROCEDURE — 82962 GLUCOSE BLOOD TEST: CPT

## 2022-10-12 RX ORDER — SULFAMETHOXAZOLE AND TRIMETHOPRIM 800; 160 MG/1; MG/1
1 TABLET ORAL EVERY 12 HOURS
Status: DISCONTINUED | OUTPATIENT
Start: 2022-10-13 | End: 2022-10-13 | Stop reason: HOSPADM

## 2022-10-12 RX ORDER — FAMOTIDINE 20 MG/1
20 TABLET, FILM COATED ORAL DAILY
Status: DISCONTINUED | OUTPATIENT
Start: 2022-10-12 | End: 2022-10-12

## 2022-10-12 RX ORDER — SERTRALINE HYDROCHLORIDE 50 MG/1
100 TABLET, FILM COATED ORAL DAILY
Status: DISCONTINUED | OUTPATIENT
Start: 2022-10-13 | End: 2022-10-13 | Stop reason: HOSPADM

## 2022-10-12 RX ORDER — AMOXICILLIN AND CLAVULANATE POTASSIUM 875; 125 MG/1; MG/1
1 TABLET, FILM COATED ORAL EVERY 12 HOURS
Status: DISCONTINUED | OUTPATIENT
Start: 2022-10-13 | End: 2022-10-13 | Stop reason: HOSPADM

## 2022-10-12 RX ORDER — FAMOTIDINE 20 MG/1
20 TABLET, FILM COATED ORAL 2 TIMES DAILY
Status: DISCONTINUED | OUTPATIENT
Start: 2022-10-12 | End: 2022-10-13 | Stop reason: HOSPADM

## 2022-10-12 RX ADMIN — VANCOMYCIN HYDROCHLORIDE 1000 MG: 1 INJECTION, POWDER, LYOPHILIZED, FOR SOLUTION INTRAVENOUS at 13:48

## 2022-10-12 RX ADMIN — FAMOTIDINE 20 MG: 10 INJECTION, SOLUTION INTRAVENOUS at 08:25

## 2022-10-12 RX ADMIN — VANCOMYCIN HYDROCHLORIDE 1000 MG: 1 INJECTION, POWDER, LYOPHILIZED, FOR SOLUTION INTRAVENOUS at 00:57

## 2022-10-12 RX ADMIN — POTASSIUM CHLORIDE 20 MEQ: 1500 TABLET, EXTENDED RELEASE ORAL at 08:24

## 2022-10-12 RX ADMIN — Medication 2 UNITS: at 23:10

## 2022-10-12 NOTE — ROUTINE PROCESS
Bedside and Verbal shift change report given to Donita Corona (oncoming nurse) by Daren Parsons (offgoing nurse). Report included the following information SBAR, Kardex, MAR, Recent Results, and Cardiac Rhythm NSR .

## 2022-10-12 NOTE — PROGRESS NOTES
Infectious Disease progress Note        Reason:septic shock    Current abx Prior abx   vancomycin since 10/5/22 Piperacillin/tazobactam 10/5-10/10  Ceftriaxone 10/10-10/11     Lines:       Assessment :   78 y.o. male with a significant PMHx of PVD, hx gangrene b/l feet/toes s/p amputations and endovascular intervention from Podiatry and Vascular surgery 9/2022, HTN, DM II, Depression, tobacco abuse and chronic back pain in the setting of lumbar spondylosis s/p T12-L3 laminectomy, L1-3 TLIF, ISAK L3-5, instrumentation from T10-pelvis performed by Dr. Brayan Landers on 4/15/2022 [facility] with acute encephalopathy, diaphoresis,hypoxia, SOB, hypotension, and tachycardia. Hospitalization at [facility] 9/2022 for  partially treated right foot cellulitis, dry gangrene of tip of toes bilateral feet status post right foot partial second and third toe amputation, left foot partial second and fourth toe amputation on 9/17/2022     Intra-Op culture left second toe 9/17-proteus, klebsiella, group B strep, Staph aureus  Intra-Op culture right second toe 9/17-heavy proteus, klebsiella, Staph aureus    Clinical presentation consistent with septic shock (present on admission)  secondary to  left foot cellulitis,  multifocal pneumonia- ? Healthcare associated pneumonia, dry gangrene bilateral toes. Status post left foot transmetatarsal amputation on 10/7/2022. Bone biopsy of clean margins negative for osteomyelitis    Intra-Op cultures 10/7-MRSA    Increased erythema/warmth left foot suggestive of left foot cellulitis. Concurrent severe peripheral artery disease will likely mask the full clinical presentation-improved erythema/warmth left foot on today's exam    Acute hypoxic respiratory failure-could be secondary to healthcare associated pneumonia/aspiration pneumonia. Agree with ruling out pulm embolism looking at the elevated D-dimer     Peripheral arterial disease-. S/P orbital atherectomy of left tibial peroneal artery, drug-coated balloon angioplasty of the left popliteal and tibial peroneal artery, orbital atherectomy of the left superficial femoral artery, drug-coated balloon angioplasty of the left superficial femoral artery, balloon angioplasty and stenting of the left external iliac artery, angiogram of the right femoral artery on 9/16/22     Acute kidney injury-likely due to sepsis    Significantly elevated procalcitonin 38 on 10/6/2022 concerning for gram-negative sepsis    Altered mental status-likely metabolic encephalopathy. Improving    Hypernatremia- pip/tazo can worsen this. Recommendations:     discontinue  vancomycin. Start po bactrim/augmentin till 10/14/22  Continue wound care sacral decubiti  Mx of hypernatremia per primary team  Follow-up palliative care recommendation regarding goals of care  Discharge planning per primary team        Above plan was discussed in details with primary team. Please call me if any further questions or concerns. Will continue to participate in the care of this patient. HPI:     confused.   Not very communicative        Past Medical History:   Diagnosis Date    Arthritis     Cognitive impairment     Depression     Dorsalgia, unspecified     GERD (gastroesophageal reflux disease)     High blood pressure     Insomnia     L1 vertebral fracture (HCC)     Migraine headache     Other chronic pain     Periorbital headache     Sleep apnea     Spinal stenosis     Type 2 diabetes mellitus, without long-term current use of insulin [facility])        Past Surgical History:   Procedure Laterality Date    ENDOSCOPY, COLON, DIAGNOSTIC      HX LUMBAR DISKECTOMY  6/2001, 4/11    left L3-4 microdiskectomy with intradural rupture    HX ORTHOPAEDIC      spinal Surgery Dr. Aime Vee  9/11    spinal fusion, L3-5       Current Discharge Medication List        CONTINUE these medications which have NOT CHANGED    Details insulin lispro (HUMALOG) 100 unit/mL injection Check FSBS Three times daily with meals,   For sugar between 150 and 200- give 1 units SQ,   For sugar between 201 and 250- give 3 units SQ,   For sugar between 251 and 300- give 5 units SQ,   For sugar between 301 and 400- give 7 units SQ,  For sugars > 400, contact PCP  Qty: 1 Each, Refills: 0      polyethylene glycol (MIRALAX) 17 gram packet Take 1 Packet by mouth daily as needed for Constipation. Qty: 15 Each, Refills: 0      therapeutic multivitamin (THERAGRAN) tablet Take 1 Tablet by mouth daily. Qty: 30 Tablet, Refills: 0      clopidogreL (Plavix) 75 mg tab Take 1 Tablet by mouth daily. Qty: 30 Tablet, Refills: 3      divalproex DR (DEPAKOTE) 250 mg tablet Take  by mouth two (2) times a day.      gabapentin (NEURONTIN) 300 mg capsule Take 300 mg by mouth two (2) times a day. dronabinoL (MARINOL) 2.5 mg capsule Take 2.5 mg by mouth two (2) times a day. Associated Diagnoses: Weight loss      sertraline (ZOLOFT) 50 mg tablet Take 1 Tablet by mouth daily. Qty: 90 Tablet, Refills: 2    Associated Diagnoses:  Moderate episode of recurrent major depressive disorder (HCC)           STOP taking these medications       L. acidophilus,casei,rhamnosus (BIO-K PLUS) 50 billion cell cpDR capsule Comments:   Reason for Stopping:               Current Facility-Administered Medications   Medication Dose Route Frequency    famotidine (PEPCID) tablet 20 mg  20 mg Oral BID    [START ON 10/13/2022] sertraline (ZOLOFT) tablet 100 mg  100 mg Oral DAILY    dextrose 5% infusion  100 mL/hr IntraVENous CONTINUOUS    vancomycin (VANCOCIN) 1,000 mg in 0.9% sodium chloride 250 mL (VIAL-MATE)  1,000 mg IntraVENous Q12H    insulin lispro (HUMALOG) injection   SubCUTAneous AC&HS    potassium chloride (K-DUR, KLOR-CON M20) SR tablet 20 mEq  20 mEq Oral BID    0.9% sodium chloride infusion 250 mL  250 mL IntraVENous PRN    0.9% sodium chloride infusion 250 mL  250 mL IntraVENous PRN 0.9% sodium chloride infusion 250 mL  250 mL IntraVENous PRN    [Held by provider] clopidogreL (PLAVIX) tablet 75 mg  75 mg Oral DAILY    acetaminophen (TYLENOL) tablet 650 mg  650 mg Oral Q6H PRN    Or    acetaminophen (TYLENOL) suppository 650 mg  650 mg Rectal Q6H PRN    polyethylene glycol (MIRALAX) packet 17 g  17 g Oral DAILY PRN    ondansetron (ZOFRAN ODT) tablet 4 mg  4 mg Oral Q8H PRN    Or    ondansetron (ZOFRAN) injection 4 mg  4 mg IntraVENous Q6H PRN    nicotine (NICODERM CQ) 14 mg/24 hr patch 1 Patch  1 Patch TransDERmal DAILY    glucose chewable tablet 16 g  4 Tablet Oral PRN    glucagon (GLUCAGEN) injection 1 mg  1 mg IntraMUSCular PRN    dextrose 10% infusion 0-250 mL  0-250 mL IntraVENous PRN    albuterol-ipratropium (DUO-NEB) 2.5 MG-0.5 MG/3 ML  3 mL Nebulization Q6H PRN       Allergies: Codeine and Tegretol [carbamazepine]    Family History   Problem Relation Age of Onset    Heart Attack Mother     Cancer Mother     Heart Disease Mother     Hypertension Father     Seizures Father     Alcohol abuse Father      Social History     Socioeconomic History    Marital status:      Spouse name: Not on file    Number of children: Not on file    Years of education: Not on file    Highest education level: Not on file   Occupational History    Not on file   Tobacco Use    Smoking status: Every Day     Packs/day: 1.00     Years: 51.00     Pack years: 51.00     Types: Cigarettes    Smokeless tobacco: Never   Vaping Use    Vaping Use: Never used   Substance and Sexual Activity    Alcohol use: Yes     Comment: rarely    Drug use: No    Sexual activity: Yes     Partners: Female   Other Topics Concern    Not on file   Social History Narrative    Not on file     Social Determinants of Health     Financial Resource Strain: Not on file   Food Insecurity: Not on file   Transportation Needs: Not on file   Physical Activity: Not on file   Stress: Not on file   Social Connections: Not on file   Intimate Partner Violence: Not on file   Housing Stability: Not on file     Social History     Tobacco Use   Smoking Status Every Day    Packs/day: 1.00    Years: 51.00    Pack years: 51.00    Types: Cigarettes   Smokeless Tobacco Never        Temp (24hrs), Av.4 °F (36.3 °C), Min:97 °F (36.1 °C), Max:98 °F (36.7 °C)    Visit Vitals  /78 (BP 1 Location: Left upper arm)   Pulse 89   Temp 98 °F (36.7 °C)   Resp 18   Ht 6' (1.829 m)   Wt 68 kg (150 lb)   SpO2 99%   BMI 20.34 kg/m²       ROS: Unable to obtain due to patient factors    Physical Exam:    General:  laying on bed,confused, not very communicative  HEENT: EOMI, supple neck, no JVD, dry oral mucosa  Cardiovascular: S1S2 regular, no rub/gallop   Pulmonary: air entry bilaterally, no wheezing, no crackle  GI:  Soft, non tender, non distended, +bs, no guarding   Extremities: + Pedal edema, no calf tenderness, tip of multiple toes right foot with dry necrosis, left foot dressing not removed  Neuro: AOx2, moving all extremities  Skin: skin changes bilateral feet as mentioned above       Labs: Results:   Chemistry Recent Labs     10/12/22  0503 10/11/22  0258 10/10/22  0241   * 136* 133*    138 144   K 3.1* 3.5 2.9*    107 110   CO2 26 27 26   BUN 9 11 16   CREA 0.51* 0.53* 0.69   CA 8.0* 7.9* 8.1*   AGAP 9 4 8   BUCR 18 21* 23*        CBC w/Diff Recent Labs     10/12/22  0503 10/11/22  0258 10/10/22  0241   WBC 5.5 6.5 6.6   RBC 3.15* 3.07* 2.76*   HGB 8.9* 8.8* 8.0*   HCT 26.9* 26.5* 24.0*   * 125* 123*        Microbiology No results for input(s): CULT in the last 72 hours. RADIOLOGY:    All available imaging studies/reports in [facility] for this admission were reviewed          Disclaimer: Sections of this note are dictated utilizing voice recognition software, which may have resulted in some phonetic based errors in grammar and contents.  Even though attempts were made to correct all the mistakes, some may have been missed, and remained in the body of the document. If questions arise, please contact our department.     Dr. Jessica Joyner, Infectious Disease Specialist  [phone]  October 12, 2022  11:52 AM

## 2022-10-12 NOTE — PROGRESS NOTES
Hospitalist Progress Note    Patient: Carlyle Barrow Age: 78 y.o. : 1945 MR#: [id number] SSN: [id number]  Date/Time: 10/11/2022 11:42 AM    DOA: 10/5/2022  PCP: Mojgan Weldon MD    Subjective:     Pt lethargic. Opened eyes only to verbal and tactile stimuli. Was not in distress. Assessment/Plan:   78year old male w/ h/o PVD, gangrene of bilateral feet, among other medical conditions admitted to the ICU after presenting with septic shock likely due to cellulitis left foot, pna, acute hypoxic resp failure      -Sepsis shock POA, resolved  -Left foot cellulitis, s/p TMA left foot on 10/7 by podiatry, bx with clean margins negative for OM. Intra op cx on 10/7--> S.aureus, sensitivities pending. Elevated procal  -Severe PAD  -Sacral decubitus ulcer  -JAMIL, associated with sepsis, resolved  -Hypernatremia: resp;christie  -Hypokalemia:improving, mag normal  -Acute on chronic anemia associated with post surgery blood loss, status post 2 units PRBC  -Acute hypoxic respiratory failure now on 2l/min  -Pneumonia, bacterial: on ABx  -Obstructive sleep apnea, not on CPAP  -Tobacco abuse  -Acute on chronic metabolic encephalopathy, worsening with current sepsis  -Severe protein malnutrition: Patient demonstrates ability to swallow dose consuming very limited amount of oral intake. Appreciate speech therapy evaluation, will consider advancing purée and nectar thick liquids with assistance feed for now.     PLAN:  Continue IV D5W,   Cont to monitor K  ABx per ID  Wound care  Avoid nephrotoxic medications  Continue oxygen supplement  Pepcid  ISS  Continue to monitor Hgb and transfuse to keep hemoglobin >7            Dispo: LTC once switched to oral ABx  Additional Notes:    Time spent > 30 minutes    Case discussed with:  [x]Patient  []Family  [x]Nursing  []Case Management  DVT Prophylaxis:  []Lovenox  []Hep SQ  [x]SCDs  []Coumadin   []On Heparin gtt    Signed By: Zac Cabezas MD     2022 11:42 AM Objective:   VS: Visit Vitals  /78 (BP 1 Location: Left upper arm, BP Patient Position: At rest)   Pulse 82   Temp 97.1 °F (36.2 °C)   Resp 18   Ht 6' (1.829 m)   Wt 68 kg (150 lb)   SpO2 96%   BMI 20.34 kg/m²      Tmax/24hrs: Temp (24hrs), Av.7 °F (36.5 °C), Min:97.1 °F (36.2 °C), Max:98.1 °F (36.7 °C)  No intake or output data in the 24 hours ending 10/11/22 2230      Tele:   General:  Cooperative, Not in acute distress, speaks in short sentence while in bed, confused  HEENT:, no JVD, dry oral mucosa  Cardiovascular: S1S2 regular, no rub/gallop   Pulmonary: air entry bilaterally, no wheezing, + crackle  GI:  Soft, non tender, non distended, +bs, no guarding   Extremities:  trace pedal edema, +distal pulses appreciated   Left wound in place, no bleeding, right foot with dry gangrene   Neuro: AOx1    Additional:       Current Facility-Administered Medications   Medication Dose Route Frequency    [START ON 10/12/2022] Vancomycin Level Due at 8am  1 Each Other DAILY    vancomycin (VANCOCIN) 1,000 mg in 0.9% sodium chloride 250 mL (VIAL-MATE)  1,000 mg IntraVENous Q12H    insulin lispro (HUMALOG) injection   SubCUTAneous AC&HS    potassium chloride (K-DUR, KLOR-CON M20) SR tablet 20 mEq  20 mEq Oral BID    0.9% sodium chloride infusion 250 mL  250 mL IntraVENous PRN    0.9% sodium chloride infusion 250 mL  250 mL IntraVENous PRN    0.9% sodium chloride infusion 250 mL  250 mL IntraVENous PRN    [Held by provider] clopidogreL (PLAVIX) tablet 75 mg  75 mg Oral DAILY    acetaminophen (TYLENOL) tablet 650 mg  650 mg Oral Q6H PRN    Or    acetaminophen (TYLENOL) suppository 650 mg  650 mg Rectal Q6H PRN    polyethylene glycol (MIRALAX) packet 17 g  17 g Oral DAILY PRN    ondansetron (ZOFRAN ODT) tablet 4 mg  4 mg Oral Q8H PRN    Or    ondansetron (ZOFRAN) injection 4 mg  4 mg IntraVENous Q6H PRN    famotidine (PF) (PEPCID) 20 mg in 0.9% sodium chloride 10 mL injection  20 mg IntraVENous BID    nicotine (NICODERM CQ) 14 mg/24 hr patch 1 Patch  1 Patch TransDERmal DAILY    glucose chewable tablet 16 g  4 Tablet Oral PRN    glucagon (GLUCAGEN) injection 1 mg  1 mg IntraMUSCular PRN    dextrose 10% infusion 0-250 mL  0-250 mL IntraVENous PRN    albuterol-ipratropium (DUO-NEB) 2.5 MG-0.5 MG/3 ML  3 mL Nebulization Q6H PRN            Lab/Data Review:  Labs: Results:       Chemistry Recent Labs     10/11/22  0258 10/10/22  0241 10/09/22  0345   * 133* 124*    144 153*   K 3.5 2.9* 3.2*    110 120*   CO2 27 26 27   BUN 11 16 21*   CREA 0.53* 0.69 0.64   BUCR 21* 23* 33*   AGAP 4 8 6   CA 7.9* 8.1* 8.4*   PHOS  --  1.7* 2.0*     No results for input(s): TBIL, ALT, ALKP, TP, ALB, GLOB, AGRAT in the last 72 hours. No lab exists for component: SGOT   CBC w/Diff Recent Labs     10/11/22  0258 10/10/22  0241 10/09/22  0345   WBC 6.5 6.6 6.9   RBC 3.07* 2.76* 2.63*   HGB 8.8* 8.0* 7.8*   HCT 26.5* 24.0* 23.5*   MCV 86.3 87.0 89.4   MCH 28.7 29.0 29.7   MCHC 33.2 33.3 33.2   RDW 13.6 14.5 14.6*   * 123* 125*   GRANS  --   --  85*   LYMPH  --   --  9*   EOS  --   --  1      Coagulation No results for input(s): PTP, INR, APTT, INREXT, INREXT in the last 72 hours.     Iron/Ferritin No results found for: IRON, FE, TIBC, IBCT, PSAT, FERR    BNP    Cardiac Enzymes Lab Results   Component Value Date/Time    CK 67 07/21/2022 02:35 PM        Lactic Acid    Thyroid Studies          All Micro Results       Procedure Component Value Units Date/Time    CULTURE, ANAEROBIC [[id number]]  (Abnormal) Collected: 10/07/22 9181    Order Status: Completed Specimen: Foot, left Updated: 10/11/22 0722     Special Requests: LEFT FOOT METATARSAL CLEAN MARGIN     Culture result:       PROBABLE STAPHYLOCOCCUS AUREUS ISOLATED FROM THIO BROTH ONLY SENSITIVITY TO FOLLOW            NO GROWTH ON SOLID MEDIA SO FAR    CULTURE, Conda Nailer STAIN [[id number]]  (Abnormal) Collected: 10/07/22 1544    Order Status: Completed Specimen: Foot, left Updated: 10/11/22 0714     Special Requests: LEFT FOOT METATARSAL CLEAN MARGIN     GRAM STAIN RARE WBCS SEEN         NO ORGANISMS SEEN        Culture result:       PROBABLE STAPHYLOCOCCUS AUREUS ISOLATED FROM THIO BROTH ONLY SENSITIVITY TO FOLLOW            NO GROWTH ON SOLID MEDIA    CULTURE, BLOOD [[id number]] Collected: 10/05/22 1940    Order Status: Completed Specimen: Blood Updated: 10/11/22 0635     Special Requests: NO SPECIAL REQUESTS        Culture result: NO GROWTH 6 DAYS       CULTURE, BLOOD [[id number]] Collected: 10/05/22 2004    Order Status: Completed Specimen: Blood Updated: 10/11/22 0635     Special Requests: NO SPECIAL REQUESTS        Culture result: NO GROWTH 6 DAYS       CULTURE, WOUND Curtistine Number STAIN [[id number]]  (Abnormal)  (Susceptibility) Collected: 10/06/22 1255    Order Status: Completed Specimen: Wound from Buttock Updated: 10/09/22 1600     Special Requests: NO SPECIAL REQUESTS        GRAM STAIN RARE EPITHELIAL         RARE BUDDING YEAST        Culture result:       LIGHT * Methicillin Resistant Staphylococcus aureus *                  MODERATE YEAST, (APPARENT CANDIDA ALBICANS)                  MODERATE ENTEROCOCCUS FAECALIS            (NOTE) MRSA BY PRELIMINARY ANTIGEN CALLED TO Leonardo Live RN AT 1900 10/8/22. LW    CULTURE, MRSA [[id number]] Collected: 10/07/22 0325    Order Status: Completed Specimen: Nasal from Nares Updated: 10/08/22 0824     Special Requests: NO SPECIAL REQUESTS        Culture result: MRSA NOT PRESENT               Screening of patient nares for MRSA is for surveillance purposes and, if positive, to facilitate isolation considerations in high risk settings. It is not intended for automatic decolonization interventions per se as regimens are not sufficiently effective to warrant routine use.       CULTURE, URINE [[id number]] Collected: 10/05/22 0551    Order Status: Completed Specimen: Urine from Clean catch Updated: 10/07/22 1021     Special Requests: NO SPECIAL REQUESTS Culture result: No growth (<1,000 CFU/ML)       LEGIONELLA PNEUMOPHILA AG, URINE [[id number]] Collected: 10/06/22 0551    Order Status: Completed Specimen: Urine, random Updated: 10/06/22 0959     Legionella Ag, urine Negative       STREP Giselle Rajput, URINE [[id number]] Collected: 10/06/22 0551    Order Status: Completed Specimen: Urine, random Updated: 10/06/22 0959     Strep pneumo Ag, urine Negative       CULTURE, RESPIRATORY/SPUTUM/BRONCH Harrie Tarrant STAIN [[id number]] Collected: 10/06/22 0600    Order Status: Canceled Specimen: Sputum     RESPIRATORY VIRUS PANEL W/COVID-19, PCR [[id number]] Collected: 10/05/22 2350    Order Status: Completed Specimen: Nasopharyngeal Updated: 10/06/22 0145     Adenovirus Not detected        Coronavirus 229E Not detected        Coronavirus HKU1 Not detected        Coronavirus CVNL63 Not detected        Coronavirus OC43 Not detected        SARS-CoV-2, PCR Not detected        Metapneumovirus Not detected        Rhinovirus and Enterovirus Not detected        Influenza A Not detected        Influenza A, subtype H1 Not detected        Influenza A, subtype H3 Not detected        INFLUENZA A H1N1 PCR Not detected        Influenza B Not detected        Parainfluenza 1 Not detected        Parainfluenza 2 Not detected        Parainfluenza 3 Not detected        Parainfluenza virus 4 Not detected        RSV by PCR Not detected        B. parapertussis, PCR Not detected        Bordetella pertussis - PCR Not detected        Chlamydophila pneumoniae DNA, QL, PCR Not detected        Mycoplasma pneumoniae DNA, QL, PCR Not detected       COVID-19 RAPID TEST [[id number]] Collected: 10/06/22 0000    Order Status: Canceled     INFLUENZA A & B AG (RAPID TEST) [[id number]] Collected: 10/06/22 0000    Order Status: Canceled Specimen: Nasopharyngeal               Images:    CT (Most Recent). XRAY (Most Recent)      EKG No results found for this or any previous visit.      2D ECHO

## 2022-10-12 NOTE — PROGRESS NOTES
Discharge planning    Received call from Frank zuleta, [address] with hospice. Per Marie Weimar, family is in agreement with hospice and wants patient to return to [facility]. Patient doesn't have medicaid. Family will need pay out of pocket to return to [facility] for hospice. Spoke with Stacie Kemp, ,  with [facility] to confirm that patient is in facility for SNF or LTC. April check with facility and call CM back.      HAYLIE BennettN, RN  Pager # [phone]  Care Manager

## 2022-10-12 NOTE — PROGRESS NOTES
Discharge planning    Returned call from April, [phone] [id number] director, with [facility]. Per April, the family has applied for medicaid. The facility will accept patient back with medicaid pending for hospice. If patient's medicaid is not approved, family will have to pay out of pocket from day of admission for hospice. Spoke with Uziel Sharpe, son, concerning above. Mr. Ghazal Gil verbalized understanding that the family will be responsible for payment if medicaid is not approved.      SILVIA Valentin, RN  Pager # [phone]  Care Manager

## 2022-10-12 NOTE — PROGRESS NOTES
Palliative Medicine     Palliative Medicine Team members, Ty Cabrera, MONICA and this writer for bedside follow up visit. Mr. Arlin Noriega is resting soundly and was not disturbed. Patient is maintained on IV antibiotic therapies. Palliative team placed a supportive call to patient's son, Dasha Hurst. Discussed his feeling about signed the DNR yesterday. He expressed his mixed emotions \" I know it was the right thing to do, and if I didn't sign that would be selfish since he would have no quality of life\". This writer provided time for Mr. Grover Jordan to vent his frustrations and concerns with his father's current condition. \"I think he has just given up\". This writer explored the option of hospice support post hospitalization. He is open to speaking with hospice to see what services they can provide his father. The eligibly criteria for hospice was explained. If the disease takes its normal course, that patient has a life expectancy of 6 months or less. He is agreeable to speak with Hospice. Informed him a consult order for hospice for information will be placed. Discussed the benefits and burdens of artificial nutrition via PEG tube in the event of inability to take food/fluids in a safe manner orally. He stated the \"feeding tube would just prolongs things for his father and he would likely pull it out\" . This writer confirmed the the decision for NO Feeding Tube and at that time family would focus on comfort feeds. This writer provided our department phone # and will remain available to family as patient's care warrants'.      Brandi Marshall BSN, RN, [facility]  Palliative Medicine Inpatient RN  Palliative [facility] Line: [phone]

## 2022-10-12 NOTE — PROGRESS NOTES
[address] [facility] Pharmacokinetic Monitoring Service - Vancomycin    Indication: DM foot infection  Goal AUC/CARYL: 400-600 mg*hr/L  Day of Therapy: 7  Additional Antimicrobials: Ceftriaxone    Pertinent Laboratory Values: Wt Readings from Last 1 Encounters:   10/08/22 68 kg (150 lb)     Temp Readings from Last 1 Encounters:   10/12/22 97.5 °F (36.4 °C)     No components found for: PROCAL  Estimated Creatinine Clearance: 87.7 mL/min (A) (by C-G formula based on SCr of 0.51 mg/dL (L)). Recent Labs     10/12/22  0503 10/11/22  0258   WBC 5.5 6.5     Pertinent Cultures:  Culture Date Source Results   10/5 blood NGTD   10/6 Wound MRSA, Enterococcus   MRSA Nasal Swab: N/A. Non-respiratory infection.     Assessment:  Date/Time Current Dose Concentration (mg/L) Timing of Concentration (h) AUC   10/5 1,000 mg x1  500 mg x1 - - -   10/6 1,250 mg q24h - - -   10/7 1,250 mg q24h 14.6 11 509   10/10 1000 mg q 12 h 16.6 19h 22m 519   10/11 1,000 mg q 12 h 12.7 13.6 427   Note: Serum concentrations collected for AUC dosing may appear elevated if collected in close proximity to the dose administered, this is not necessarily an indication of toxicity    Plan:  Current dose is therapeutic   Continue vancomycin 1,000 mg every 12 hours  Vancomycin level when clinically indicated  Pharmacy will continue to monitor patient and adjust therapy as indicated    Thank you for the consult,  Yaron yN  10/12/2022

## 2022-10-12 NOTE — PROGRESS NOTES
Hospitalist Progress Note    Patient: Roland Marte Age: 78 y.o. : 1945 MR#: [id number] SSN: [id number]  Date/Time: 10/12/2022 11:42 AM    DOA: 10/5/2022  PCP: oRsalia Zamudio MD    Subjective:     Pt did open eyes when called, but did not engage in conversation. He covered himself back up when attempted to examen him. Assessment/Plan:   78year old male w/ h/o PVD, gangrene of bilateral feet, among other medical conditions admitted to the ICU after presenting with septic shock likely due to cellulitis left foot, pna, acute hypoxic resp failure      -Sepsis shock POA, resolved  -Left foot cellulitis, s/p TMA left foot on 10/7 by podiatry, bx with clean margins negative for OM. Intra op cx on 10/7--> S.aureus, sensitivities pending. Elevated procal  -Dry gangrene right foot toes  -Severe PAD  -Sacral decubitus ulcer  -JAMIL, associated with sepsis, resolved  -Hypernatremia: resolved  -Hypokalemia:improving, mag normal  -Acute on chronic anemia associated with post surgery blood loss, status post 2 units PRBC  -Acute hypoxic respiratory failure now on 2l/min  -Pneumonia, bacterial: on ABx  -Obstructive sleep apnea, not on CPAP  -Tobacco abuse  -Acute on chronic metabolic encephalopathy, worsening with current acute medical issues  -Severe protein malnutrition: Patient demonstrates ability to swallow dose consuming very limited amount of oral intake. Appreciate speech therapy evaluation, will consider advancing purée and nectar thick liquids with assistance feed for now. PLAN:  Continue IV D5W,   Cont to monitor K  ABx per ID  Wound care  Avoid nephrotoxic medications  Continue oxygen supplement  Pepcid  ISS  Continue to monitor Hgb and transfuse to keep hemoglobin >7    Discussed w/ palliative care. Now DNR/DNI. Hospice consulted. Discussed w/ son.           Dispo: LTC once switched to oral ABx  Additional Notes:    Time spent > 30 minutes    Case discussed with:  [x]Patient  []Family  [x]Nursing []Case Management  DVT Prophylaxis:  []Lovenox  []Hep SQ  [x]SCDs  []Coumadin   []On Heparin gtt    Signed By: Tayla Garcia MD     2022 11:42 AM              Objective:   VS: Visit Vitals  /67 (BP 1 Location: Left upper arm, BP Patient Position: At rest)   Pulse 79   Temp 97.5 °F (36.4 °C)   Resp 18   Ht 6' (1.829 m)   Wt 68 kg (150 lb)   SpO2 100%   BMI 20.34 kg/m²      Tmax/24hrs: Temp (24hrs), Av.3 °F (36.3 °C), Min:97 °F (36.1 °C), Max:97.6 °F (36.4 °C)  No intake or output data in the 24 hours ending 10/12/22 0054      Tele:   General:  Cooperative, Not in acute distress, speaks in short sentence while in bed, confused  HEENT:, no JVD, dry oral mucosa  Cardiovascular: S1S2 regular, no rub/gallop   Pulmonary: air entry bilaterally, no wheezing, + crackle  GI:  Soft, non tender, non distended, +bs, no guarding   Extremities:  trace pedal edema, +distal pulses appreciated   Left wound in place, no bleeding, right foot with dry gangrene   Neuro: AOx1    Additional:       Current Facility-Administered Medications   Medication Dose Route Frequency    famotidine (PEPCID) tablet 20 mg  20 mg Oral BID    [START ON 10/13/2022] sertraline (ZOLOFT) tablet 50 mg  50 mg Oral DAILY    dextrose 5% infusion  100 mL/hr IntraVENous CONTINUOUS    vancomycin (VANCOCIN) 1,000 mg in 0.9% sodium chloride 250 mL (VIAL-MATE)  1,000 mg IntraVENous Q12H    insulin lispro (HUMALOG) injection   SubCUTAneous AC&HS    potassium chloride (K-DUR, KLOR-CON M20) SR tablet 20 mEq  20 mEq Oral BID    0.9% sodium chloride infusion 250 mL  250 mL IntraVENous PRN    0.9% sodium chloride infusion 250 mL  250 mL IntraVENous PRN    0.9% sodium chloride infusion 250 mL  250 mL IntraVENous PRN    [Held by provider] clopidogreL (PLAVIX) tablet 75 mg  75 mg Oral DAILY    acetaminophen (TYLENOL) tablet 650 mg  650 mg Oral Q6H PRN    Or    acetaminophen (TYLENOL) suppository 650 mg  650 mg Rectal Q6H PRN    polyethylene glycol (MIRALAX) packet 17 g  17 g Oral DAILY PRN    ondansetron (ZOFRAN ODT) tablet 4 mg  4 mg Oral Q8H PRN    Or    ondansetron (ZOFRAN) injection 4 mg  4 mg IntraVENous Q6H PRN    nicotine (NICODERM CQ) 14 mg/24 hr patch 1 Patch  1 Patch TransDERmal DAILY    glucose chewable tablet 16 g  4 Tablet Oral PRN    glucagon (GLUCAGEN) injection 1 mg  1 mg IntraMUSCular PRN    dextrose 10% infusion 0-250 mL  0-250 mL IntraVENous PRN    albuterol-ipratropium (DUO-NEB) 2.5 MG-0.5 MG/3 ML  3 mL Nebulization Q6H PRN            Lab/Data Review:  Labs: Results:       Chemistry Recent Labs     10/12/22  0503 10/11/22  0258 10/10/22  0241   * 136* 133*    138 144   K 3.1* 3.5 2.9*    107 110   CO2 26 27 26   BUN 9 11 16   CREA 0.51* 0.53* 0.69   BUCR 18 21* 23*   AGAP 9 4 8   CA 8.0* 7.9* 8.1*   PHOS  --   --  1.7*     No results for input(s): TBIL, ALT, ALKP, TP, ALB, GLOB, AGRAT in the last 72 hours. No lab exists for component: SGOT   CBC w/Diff Recent Labs     10/12/22  0503 10/11/22  0258 10/10/22  0241   WBC 5.5 6.5 6.6   RBC 3.15* 3.07* 2.76*   HGB 8.9* 8.8* 8.0*   HCT 26.9* 26.5* 24.0*   MCV 85.4 86.3 87.0   MCH 28.3 28.7 29.0   MCHC 33.1 33.2 33.3   RDW 13.3 13.6 14.5   * 125* 123*      Coagulation No results for input(s): PTP, INR, APTT, INREXT, INREXT in the last 72 hours.     Iron/Ferritin No results found for: IRON, FE, TIBC, IBCT, PSAT, FERR    BNP    Cardiac Enzymes Lab Results   Component Value Date/Time    CK 67 07/21/2022 02:35 PM        Lactic Acid    Thyroid Studies          All Micro Results       Procedure Component Value Units Date/Time    CULTURE, Thu Skhoustons STAIN [[id number]]  (Abnormal)  (Susceptibility) Collected: 10/07/22 1544    Order Status: Completed Specimen: Foot, left Updated: 10/12/22 1313     Special Requests: LEFT FOOT METATARSAL CLEAN MARGIN     GRAM STAIN RARE WBCS SEEN         NO ORGANISMS SEEN        Culture result:       * Methicillin Resistant Staphylococcus aureus * ISOLATED FROM [facility]IO BROTH ONLY (KNOWN MRSA PATIENT)            NO GROWTH ON SOLID MEDIA    CULTURE, ANAEROBIC [[id number]] Collected: 10/07/22 1544    Order Status: Completed Specimen: Foot, left Updated: 10/12/22 0900     Special Requests: LEFT FOOT METATARSAL CLEAN MARGIN     Culture result: NO ANAEROBES ISOLATED         SEE [id number] FOR ID OF GROWTH IN THIO BROTH    CULTURE, BLOOD [[id number]] Collected: 10/05/22 1940    Order Status: Completed Specimen: Blood Updated: 10/11/22 0635     Special Requests: NO SPECIAL REQUESTS        Culture result: NO GROWTH 6 DAYS       CULTURE, BLOOD [[id number]] Collected: 10/05/22 2004    Order Status: Completed Specimen: Blood Updated: 10/11/22 0635     Special Requests: NO SPECIAL REQUESTS        Culture result: NO GROWTH 6 DAYS       CULTURE, WOUND Codie Finner STAIN [[id number]]  (Abnormal)  (Susceptibility) Collected: 10/06/22 1255    Order Status: Completed Specimen: Wound from Buttock Updated: 10/09/22 1600     Special Requests: NO SPECIAL REQUESTS        GRAM STAIN RARE EPITHELIAL         RARE BUDDING YEAST        Culture result:       LIGHT * Methicillin Resistant Staphylococcus aureus *                  MODERATE YEAST, (APPARENT CANDIDA ALBICANS)                  MODERATE ENTEROCOCCUS FAECALIS            (NOTE) MRSA BY PRELIMINARY ANTIGEN CALLED TO Bryson Matamoros RN AT 1900 10/8/22. [facility]    CULTURE, MRSA [[id number]] Collected: 10/07/22 0325    Order Status: Completed Specimen: Nasal from Nares Updated: 10/08/22 0824     Special Requests: NO SPECIAL REQUESTS        Culture result: MRSA NOT PRESENT               Screening of patient nares for MRSA is for surveillance purposes and, if positive, to facilitate isolation considerations in high risk settings. It is not intended for automatic decolonization interventions per se as regimens are not sufficiently effective to warrant routine use.       CULTURE, URINE [[id number]] Collected: 10/05/22 0551    Order Status: Completed Specimen: Urine from Clean catch Updated: 10/07/22 1021     Special Requests: NO SPECIAL REQUESTS        Culture result: No growth (<1,000 CFU/ML)       LEGIONELLA PNEUMOPHILA AG, URINE [[id number]] Collected: 10/06/22 0551    Order Status: Completed Specimen: Urine, random Updated: 10/06/22 0959     Legionella Ag, urine Negative       STREP Dawson Payton, URINE [[id number]] Collected: 10/06/22 0551    Order Status: Completed Specimen: Urine, random Updated: 10/06/22 0959     Strep pneumo Ag, urine Negative       CULTURE, RESPIRATORY/SPUTUM/BRONCH Ellwood Peels STAIN [[id number]] Collected: 10/06/22 0600    Order Status: Canceled Specimen: Sputum     RESPIRATORY VIRUS PANEL W/COVID-19, PCR [[id number]] Collected: 10/05/22 2350    Order Status: Completed Specimen: Nasopharyngeal Updated: 10/06/22 0145     Adenovirus Not detected        Coronavirus 229E Not detected        Coronavirus HKU1 Not detected        Coronavirus CVNL63 Not detected        Coronavirus OC43 Not detected        SARS-CoV-2, PCR Not detected        Metapneumovirus Not detected        Rhinovirus and Enterovirus Not detected        Influenza A Not detected        Influenza A, subtype H1 Not detected        Influenza A, subtype H3 Not detected        INFLUENZA A H1N1 PCR Not detected        Influenza B Not detected        Parainfluenza 1 Not detected        Parainfluenza 2 Not detected        Parainfluenza 3 Not detected        Parainfluenza virus 4 Not detected        RSV by PCR Not detected        B. parapertussis, PCR Not detected        Bordetella pertussis - PCR Not detected        Chlamydophila pneumoniae DNA, QL, PCR Not detected        Mycoplasma pneumoniae DNA, QL, PCR Not detected       COVID-19 RAPID TEST [[id number]] Collected: 10/06/22 0000    Order Status: Canceled     INFLUENZA A & B AG (RAPID TEST) [[id number]] Collected: 10/06/22 0000    Order Status: Canceled Specimen: Nasopharyngeal               Images:    CT (Most Recent).       XRAY (Most Recent)      EKG No results found for this or any previous visit.      2D ECHO

## 2022-10-12 NOTE — PROGRESS NOTES
Progress Note    Patient: Roland Marte MRN: [id number]  SSN: [id number]    YOB: 1945  Age: 78 y.o. Sex: male      Admit Date: 10/5/2022  4 Days Post-Op     Procedure:   Procedure(s):  Left Foot Transmetatarsal Amputation    Subjective:     Patient seen resting quiet and comfortably and no apparent distress. Patient is confused and not responding verbal commands. His pathology dirty margin returned acute osteomyelitis. He has stable, dry gangrene on right foot. Denies any other pedal complaint. Status post: osteomyelitis    Objective:     Visit Vitals  /78 (BP 1 Location: Left upper arm, BP Patient Position: At rest)   Pulse 82   Temp 97.1 °F (36.2 °C)   Resp 18   Ht 6' (1.829 m)   Wt 68 kg (150 lb)   SpO2 96%   BMI 20.34 kg/m²        Physical Exam:  Left foot TMA stump flaps viable and healing fine. Skin edges well approximated, skin sutures intact. No erythema or edema noted. Right foot multiple toe dry gangrene, also dry eschar noted to right submet 5. Labs/Radiology Review: images and reports reviewed    Assessment:     Hospital Problems  Date Reviewed: 6/9/2022            Codes Class Noted POA    Pneumonia ICD-10-CM: J18.9  ICD-9-CM: 556  10/6/2022 Unknown        Sepsis ([facility] [address] ICD-10-CM: A41.9  ICD-9-CM: 038.9, 995.91  10/6/2022 Unknown        Multifocal pneumonia ICD-10-CM: J18.9  ICD-9-CM: 486  10/6/2022 Unknown           Plan/Recommendations/Medical Decision Making:     - Left foot TMA site healing well without complications. Dressed with betadine soaked gauze. - Right foot stable, dry gangrene. Will continue to monitor.   - Remain NWB to left forefoot. - Antibiotics per ID recommendation. Clean margin negative for osteomyelitis. - Patient is stable to d/c from foot standpoint.

## 2022-10-12 NOTE — PROGRESS NOTES
[facility]: [phone]  [facility]: [phone]     Patient Name: Roland Marte  YOB: 1945    Date of Progress note:  10/12/22  Reason for Consult: establish goals of care  Requesting Provider: [address], [facility]   Primary Care Physician: Rosalia Zamudio MD      SUMMARY:   Roland Marte is a 78 y.o. male with a past history of DM2, HTN, Major Depressive disorder, Degeneration of lumbar spine, Neuropathy, Gangrene of bilateral lower extremities, PAD, who was admitted on 10/5/2022 from SNF with a diagnosis of Sepsis. Current medical issues leading to Palliative Medicine involvement include: Pt with a long term life limiting chronic disease process that warrants discussions about her goals of care. .    CHIEF COMPLAINT: AMS and sepsis    HPI/SUBJECTIVE:    Pt is known to our services from a prior admission. He was placed in SNF post hospitalization and was brought in through the ED for hypoxia, shaking and AMS. Pt taken to the ICU for acute respiratory failure. 10/12/22:  Pt remains mostly bed bound and resting with eyes closed. He remains confused and not responding to verbal commands. 10/11/2022   On medical floor, eyes closed this am comfortable appearing. POST completed with shahab Torres. The patient is:   [] Verbal and participatory  [x] Non-participatory due to: did not wish to participate in any conversation     GOALS OF CARE:  Patient/Health Care Proxy Stated Goals: Prolong life      TREATMENT PREFERENCES:   Code Status: DNR/ DNI          PALLIATIVE DIAGNOSES:   Goals of Care/AMD  Sepsis  Acute hypoxic respiratory failure  Debility       PLAN:   Goals of care/ACP    10/12/22: This NP along with Nohemy Kinsey RN in to see patient at the bedside. He is resting with his eyes closed and minimally responsive to all stimulation. Patient not following commands at this time.   He remains on antibiotics IV and followed by infectious disease and podiatry. Patient with severe PAD with minimal options for recovery. Have reached out to his son Adam Haskins to discuss possible options of hospice on discharge. He has agreed to have evaluation done and information session. Placed order for hospice informational only. Goals of care: DNR/DNR/limited interventions. Clarification of feeding tube (NO FEEDING TUBES)   Recommendation for hospitalist to evaluate for antidepressants. 10/11/2022 Patient seen along with Ms Ai Ricci, SHAMIKA and Ms Nick Clark, [address] [place] [address]. Patient was sleeping with eyes closed. He did not awaken to our presence. We met his son Karla outside of room. POST completion introduced and completed for DNR/DNI limited intervention, no decision on feeding tubes. POST signed by son Karla. Both sons in agreement with DNR/DNI. Patient is not able to participate in his medical decisions. Goals of care DNR/DNI limited interventions feeding tube discussion deferred. ( Please see below for previous notes per palliative team)     10/6/22: This NP along with Ronnell Isabel RN in to see the patient at the bedside. He is minimally interactive and was having a procedure at that time. He is not at this time able to participate in a goals of care discussion and we are contacting his family to determine his legal NOK. Pt is  but has been  for many years. We were able to track down and speak with Ambar Pascal who abdicates that responsibility. She is aware as are the two sons that by the [place] hierarchy of decision making his two sons become his surrogate decision makers. We discussed with them the burdens and benefits of CPR and intubation and they both are in agreement that they do not want these things done to their father. They plan to come in tomorrow to complete a POST. They are in agreement to change the code status now in the system to DNR/DNI I have updated his orders and notified the ICU staff.   Goals of care: DNR/DNI  2. Sepsis  Pt with a significant history of gangrene of both toes and feet. He has multiple toe amputations and areas of necrosis. Unknown etiology of this current sepsis but is being followed by Infection Control Physician  3. Acute hypoxic respiratory failure  Acute hypoxic respiratory failure contributed to possible healthcare associated pneumonia vs aspiration. 4.   Debility  Pt with low level of function at this time with a Palliative performance score of around 40-30%. He is mostly bed bound, total care to mainly assisted, with drowsy level of consciousness. 5.  Initial consult note routed to primary continuity provider  6. Communicated plan of care with: Palliative IDT      Advance Care Planning:  [] [facility] Interdisciplinary Team has updated the ACP Navigator with [address] and Patient Capacity    Primary Decision Maker ([address]):     Medical Interventions: Limited additional interventions   Other Instructions:         As far as possible, the palliative care team has discussed with patient / health care proxy about goals of care / treatment preferences for patient.          HISTORY:     History obtained from: Chart review   Active Problems:    Pneumonia (10/6/2022)      Sepsis ([address] (10/6/2022)      Multifocal pneumonia (10/6/2022)    Past Medical History:   Diagnosis Date    Arthritis     Cognitive impairment     Depression     Dorsalgia, unspecified     GERD (gastroesophageal reflux disease)     High blood pressure     Insomnia     L1 vertebral fracture (HCC)     Migraine headache     Other chronic pain     Periorbital headache     Sleep apnea     Spinal stenosis     Type 2 diabetes mellitus, without long-term current use of insulin [facility])       Past Surgical History:   Procedure Laterality Date    ENDOSCOPY, COLON, DIAGNOSTIC      HX LUMBAR DISKECTOMY  6/2001, 4/11    left L3-4 microdiskectomy with intradural rupture    HX ORTHOPAEDIC      spinal Surgery  [place] 2011    NEUROLOGICAL PROCEDURE UNLISTED  9/11    spinal fusion, L3-5      Family History   Problem Relation Age of Onset    Heart Attack Mother     Cancer Mother     Heart Disease Mother     Hypertension Father     Seizures Father     Alcohol abuse Father      History reviewed, no pertinent family history.   Social History     Tobacco Use    Smoking status: Every Day     Packs/day: 1.00     Years: 51.00     Pack years: 51.00     Types: Cigarettes    Smokeless tobacco: Never   Substance Use Topics    Alcohol use: Yes     Comment: rarely     Allergies   Allergen Reactions    Codeine Not Reported This Time    Tegretol [Carbamazepine] Not Reported This Time      Current Facility-Administered Medications   Medication Dose Route Frequency    famotidine (PEPCID) tablet 20 mg  20 mg Oral BID    dextrose 5% infusion  100 mL/hr IntraVENous CONTINUOUS    vancomycin (VANCOCIN) 1,000 mg in 0.9% sodium chloride 250 mL (VIAL-MATE)  1,000 mg IntraVENous Q12H    insulin lispro (HUMALOG) injection   SubCUTAneous AC&HS    potassium chloride (K-DUR, KLOR-CON M20) SR tablet 20 mEq  20 mEq Oral BID    0.9% sodium chloride infusion 250 mL  250 mL IntraVENous PRN    0.9% sodium chloride infusion 250 mL  250 mL IntraVENous PRN    0.9% sodium chloride infusion 250 mL  250 mL IntraVENous PRN    [Held by provider] clopidogreL (PLAVIX) tablet 75 mg  75 mg Oral DAILY    acetaminophen (TYLENOL) tablet 650 mg  650 mg Oral Q6H PRN    Or    acetaminophen (TYLENOL) suppository 650 mg  650 mg Rectal Q6H PRN    polyethylene glycol (MIRALAX) packet 17 g  17 g Oral DAILY PRN    ondansetron (ZOFRAN ODT) tablet 4 mg  4 mg Oral Q8H PRN    Or    ondansetron (ZOFRAN) injection 4 mg  4 mg IntraVENous Q6H PRN    nicotine (NICODERM CQ) 14 mg/24 hr patch 1 Patch  1 Patch TransDERmal DAILY    glucose chewable tablet 16 g  4 Tablet Oral PRN    glucagon (GLUCAGEN) injection 1 mg  1 mg IntraMUSCular PRN    dextrose 10% infusion 0-250 mL  0-250 mL IntraVENous PRN    albuterol-ipratropium (DUO-NEB) 2.5 MG-0.5 MG/3 ML  3 mL Nebulization Q6H PRN          Clinical Pain Assessment (nonverbal scale for nonverbal patients): Clinical Pain Assessment  Severity: 0     Activity (Movement): Lying quietly, normal position    Duration: for how long has pt been experiencing pain (e.g., 2 days, 1 month, years)  Frequency: how often pain is an issue (e.g., several times per day, once every few days, constant)     FUNCTIONAL ASSESSMENT:     Palliative Performance Scale (PPS):  PPS: 50    ECOG  ECOG Status : Completely disabled     PSYCHOSOCIAL/SPIRITUAL SCREENING:      Any spiritual / Tenriism concerns:  [] Yes /  [x] No    Caregiver Burnout:  [] Yes /  [] No /  [x] No Caregiver Present      Anticipatory grief assessment:   [x] Normal  / [] Maladaptive        REVIEW OF SYSTEMS:     Systems: constitutional, ears/nose/mouth/throat, respiratory, gastrointestinal, genitourinary, musculoskeletal, integumentary, neurologic, psychiatric, endocrine. Positive findings noted below. Modified ESAS Completed by: provider           Pain: 0           Dyspnea: 0           Stool Occurrence(s): 1   Positive and pertinent negative findings in ROS are noted above in HPI. The following systems were [x] reviewed / [] unable to be reviewed as noted in HPI  Other findings are noted below. PHYSICAL EXAM:     Constitutional: eyes closed did not respond to his name, comfortable appearing   Eyes: closed   ENMT: moist MM   Cardiovascular: regular rhythm  Respiratory: respirations not labored   Gastrointestinal: soft non-tender  Skin: warm, dry  Neurologic: eyes closed did not awaken to his name      Other: Wt Readings from Last 3 Encounters:   10/08/22 68 kg (150 lb)   09/19/22 68 kg (150 lb)   08/11/22 68 kg (150 lb)     Blood pressure 124/67, pulse 79, temperature 97.5 °F (36.4 °C), resp. rate 18, height 6' (1.829 m), weight 68 kg (150 lb), SpO2 100 %.   Pain:  Pain Scale 1: Numeric (0 - 10)  Pain Intensity 1: 0                      LAB AND IMAGING FINDINGS:     Lab Results   Component Value Date/Time    WBC 5.5 10/12/2022 05:03 AM    HGB 8.9 (L) 10/12/2022 05:03 AM    PLATELET 347 (L) 85/62/6471 05:03 AM     Lab Results   Component Value Date/Time    Sodium 140 10/12/2022 05:03 AM    Potassium 3.1 (L) 10/12/2022 05:03 AM    Chloride 105 10/12/2022 05:03 AM    CO2 26 10/12/2022 05:03 AM    BUN 9 10/12/2022 05:03 AM    Creatinine 0.51 (L) 10/12/2022 05:03 AM    Calcium 8.0 (L) 10/12/2022 05:03 AM    Magnesium 1.9 10/10/2022 02:41 AM    Phosphorus 1.7 (L) 10/10/2022 02:41 AM      Lab Results   Component Value Date/Time    Alk. phosphatase 69 10/06/2022 10:57 AM    Protein, total 6.2 (L) 10/06/2022 10:57 AM    Albumin 2.0 (L) 10/06/2022 10:57 AM    Globulin 4.2 (H) 10/06/2022 10:57 AM     Lab Results   Component Value Date/Time    INR 1.2 10/06/2022 10:57 AM    Prothrombin time 15.7 (H) 10/06/2022 10:57 AM      No results found for: IRON, FE, TIBC, IBCT, PSAT, FERR   No results found for: PH, PCO2, PO2  No components found for: [place]   Lab Results   Component Value Date/Time    CK 67 07/21/2022 02:35 PM              Total time: 35 minutes   Counseling / coordination time, spent as noted above:   > 50% counseling / coordination:  Time spent in direct consultation with the patient, medical team, and family     Prolonged service was provided for  []30 min   []75 min in face to face time in the presence of the patient, spent as noted above. Time Start:   Time End:     Disclaimer: Sections of this note are dictated using utilizing voice recognition software, which may have resulted in some phonetic based errors in grammar and contents. Even though attempts were made to correct all the mistakes, some may have been missed, and remained in the body of the document. If questions arise, please contact our department.

## 2022-10-12 NOTE — ROUTINE PROCESS
2114- accucheck 64. Patient refusing to drink juice. IV fluids of D5 at 100 ml/hr infusing via pump. 2137- glucagon 1 mg given IM into left deltoid. 2157- Accucheck at 149.   0600- Slept most of shift. Incontinent of urine and stool. D5 continues at 100 ml/hr via IV pump. Vancomycin IVPB given. Random vancomycin level drawn at 0325. Dressing to left foot is dry and intact. Patient shaking prevalon boots off his feet.

## 2022-10-12 NOTE — PROGRESS NOTES
This nurse entered patient's room to obtain a blood glucose level and vital signs. This nurse able to get POC glucose before patient began trying to swat this nurse away from him. Patient states, \"Why won't you just leave me alone? \"     mg/dL. No s/s of pain, will continue to monitor.

## 2022-10-12 NOTE — HOSPICE
Family meeting. Spoke with son Randy Barrera via telephone to discuss Hospice criteria, philosophy, services and IDT. Gave Hospice 24-hour number. Discharge plan, patient to return [facility] with [facility] when ready for discharge. Informed son this nurse would speak to  and return call to set up a plan for signing consents. Spoke to , Stevo Loomis to update. Stevo Loomis states that she will call the facility to verify patient's Medicaid as he was sent to the facility for skilled nursing.  to get back with writer. Will continue to monitor. Thank you for the referral to [facility] to care for Pt and family. If there is an acute change in patient status, please call [facility] at [phone].

## 2022-10-13 VITALS
HEART RATE: 88 BPM | DIASTOLIC BLOOD PRESSURE: 72 MMHG | HEIGHT: 72 IN | BODY MASS INDEX: 20.32 KG/M2 | SYSTOLIC BLOOD PRESSURE: 121 MMHG | TEMPERATURE: 98.3 F | OXYGEN SATURATION: 97 % | WEIGHT: 150 LBS | RESPIRATION RATE: 20 BRPM

## 2022-10-13 LAB
GLUCOSE BLD STRIP.AUTO-MCNC: 104 MG/DL (ref 70–110)
GLUCOSE BLD STRIP.AUTO-MCNC: 147 MG/DL (ref 70–110)
GLUCOSE BLD STRIP.AUTO-MCNC: 166 MG/DL (ref 70–110)

## 2022-10-13 PROCEDURE — 74011250637 HC RX REV CODE- 250/637: Performed by: NURSE PRACTITIONER

## 2022-10-13 PROCEDURE — 77030040392 HC DRSG OPTIFOAM MDII -A

## 2022-10-13 PROCEDURE — 74011250637 HC RX REV CODE- 250/637: Performed by: INTERNAL MEDICINE

## 2022-10-13 PROCEDURE — 99239 HOSP IP/OBS DSCHRG MGMT >30: CPT | Performed by: INTERNAL MEDICINE

## 2022-10-13 PROCEDURE — 82962 GLUCOSE BLOOD TEST: CPT

## 2022-10-13 PROCEDURE — 2709999900 HC NON-CHARGEABLE SUPPLY

## 2022-10-13 PROCEDURE — 74011636637 HC RX REV CODE- 636/637: Performed by: INTERNAL MEDICINE

## 2022-10-13 RX ORDER — AMOXICILLIN AND CLAVULANATE POTASSIUM 875; 125 MG/1; MG/1
1 TABLET, FILM COATED ORAL EVERY 12 HOURS
Qty: 2 TABLET | Refills: 0 | Status: SHIPPED
Start: 2022-10-13 | End: 2022-10-14

## 2022-10-13 RX ORDER — IBUPROFEN 200 MG
1 TABLET ORAL DAILY
Qty: 30 PATCH | Refills: 0 | Status: SHIPPED
Start: 2022-10-14 | End: 2022-11-13

## 2022-10-13 RX ORDER — SULFAMETHOXAZOLE AND TRIMETHOPRIM 800; 160 MG/1; MG/1
1 TABLET ORAL EVERY 12 HOURS
Qty: 2 TABLET | Refills: 0 | Status: SHIPPED
Start: 2022-10-13 | End: 2022-10-14

## 2022-10-13 RX ORDER — SERTRALINE HYDROCHLORIDE 50 MG/1
100 TABLET, FILM COATED ORAL DAILY
Qty: 90 TABLET | Refills: 2 | Status: SHIPPED
Start: 2022-10-13

## 2022-10-13 RX ADMIN — Medication 2 UNITS: at 16:45

## 2022-10-13 RX ADMIN — AMOXICILLIN AND CLAVULANATE POTASSIUM 1 TABLET: 875; 125 TABLET, FILM COATED ORAL at 08:40

## 2022-10-13 NOTE — PROGRESS NOTES
Problem: Dysphagia (Adult)  Goal: *Acute Goals and Plan of Care (Insert Text)  Description: Patient will:  1. Tolerate PO trials with 0 s/s overt distress in 4/5 trials. 2. Utilize compensatory swallow strategies/maneuvers (decrease bite/sip, size/rate, alt. liq/sol) with min cues in 4/5 trials. 3. Perform oral-motor/laryngeal exercises to increase oropharyngeal swallow function with min cues. 4. Complete an objective swallow study (i.e., MBSS) to assess swallow integrity, r/o aspiration, and determine of safest LRD, min A.    Rec:     Puree with nectar thick liquids- feeding assist.  Aspiration precautions  HOB >45 during po intake, remain >30 for 30-45 minutes after po   Alternate liquid/solid  Meds crushed in puree  Outcome: Not Progressing Towards Goal   SPEECH LANGUAGE PATHOLOGY DYSPHAGIA TREATMENT    Patient: Hillary De Leon (89 y.o. male)  Date: 10/12/2022  Diagnosis: Sepsis ([facility] [address] [A41.9]  Pneumonia [J18.9]  Multifocal pneumonia [J18.9] <principal problem not specified>  Procedure(s) (LRB):  Left Foot Transmetatarsal Amputation (Left) 5 Days Post-Op  Precautions: aspiration    PLOF: As per H&P      ASSESSMENT:  Pt was seen at bedside for follow up dysphagia management. He was only accepting to one small sip of nectar-thick liquids despite max encouragement/cues. Pt with weak laryngeal elevation to palpation and multiple swallows with small ~tsp trial. Suspect limited intake. He may benefit from an alternate means of nutrition/hydration vs comfort feeds per care plan goals. Continue to recommend puree diet with nectar-thick liquids, aspiration precautions, oral care TID, and meds crushed. Rec assistance with PO as needed. ST will continue to follow for further dysphagia management.      Progression toward goals:  []         Improving appropriately and progressing toward goals  []         Improving slowly and progressing toward goals  [x]         Not making progress toward goals - continue to monitor PLAN:  Recommendations and Planned Interventions: See above  Patient continues to benefit from skilled intervention to address the above impairments. Continue treatment per established plan of care. SUBJECTIVE:   Patient stated Leave me alone.     OBJECTIVE:   Cognitive and Communication Status:  Neurologic State: Alert, Appropriate for age, Eyes open to voice, Eyes open spontaneously  Orientation Level: Oriented to person, Disoriented to time, Disoriented to situation, Disoriented to place  Cognition: Decreased command following, Impaired decision making  Perseveration: No perseveration noted  Safety/Judgement: Decreased insight into deficits  Dysphagia Treatment: see above  PAIN:  Start of Tx: 0  End of Tx: 0     After treatment:   []              Patient left in no apparent distress sitting up in chair  [x]              Patient left in no apparent distress in bed  [x]              Call bell left within reach  [x]              Nursing notified  []              Family present  []              Caregiver present  []              Bed alarm activated    COMMUNICATION/EDUCATION:   [x] Aspiration precautions; swallow safety; compensatory techniques  []        Patient/family able to participate in training and education   [x]  Patient unable to participate in training and education, education ongoing with staff   [] Patient understands goals and intent of therapy; neutral about participation     Thank you for this referral.    Basilio Singh M.S., CCC-SLP/L  Speech-Language Pathologist

## 2022-10-13 NOTE — PROGRESS NOTES
Patient unable to understand and/or complete the \"Important Message from [employer]". Reviewed document with patient's son, Jaki Hanna III at phone number [phone] telephonically and addressed questions. Mr. Ted Uriarte stated he understands his father right to appeal.  He is alright with the discharge and does not want a copy of the IMM letter nor did he want a copy of Livanta's phone number. Original, with verbal signature noted, placed in patient's chart.

## 2022-10-13 NOTE — DISCHARGE INSTRUCTIONS
DISCHARGE SUMMARY from Nurse    PATIENT INSTRUCTIONS:    After general anesthesia or intravenous sedation, for 24 hours or while taking prescription Narcotics:  Limit your activities  Do not drive and operate hazardous machinery  Do not make important personal or business decisions  Do  not drink alcoholic beverages  If you have not urinated within 8 hours after discharge, please contact your surgeon on call. Report the following to your surgeon:  Excessive pain, swelling, redness or odor of or around the surgical area  Temperature over 100.5  Nausea and vomiting lasting longer than 4 hours or if unable to take medications  Any signs of decreased circulation or nerve impairment to extremity: change in color, persistent  numbness, tingling, coldness or increase pain  Any questions    What to do at Home:  Recommended activity: Bedrest    If you experience any of the following symptoms nausea, vomiting, fever greater than 101.0 F, pain unrelieved by medication, shortness of breath,  please follow up with PCP. *  Please give a list of your current medications to your Primary Care Provider. *  Please update this list whenever your medications are discontinued, doses are      changed, or new medications (including over-the-counter products) are added. *  Please carry medication information at all times in case of emergency situations. These are general instructions for a healthy lifestyle:    No smoking/ No tobacco products/ Avoid exposure to second hand smoke  Surgeon General's Warning:  Quitting smoking now greatly reduces serious risk to your health.     Obesity, smoking, and sedentary lifestyle greatly increases your risk for illness    A healthy diet, regular physical exercise & weight monitoring are important for maintaining a healthy lifestyle    You may be retaining fluid if you have a history of heart failure or if you experience any of the following symptoms:  Weight gain of 3 pounds or more overnight or 5 pounds in a week, increased swelling in our hands or feet or shortness of breath while lying flat in bed. Please call your doctor as soon as you notice any of these symptoms; do not wait until your next office visit. The discharge information has been reviewed with Abelardo Enriquez RN at [facility].  The caregiver verbalized understanding. Discharge medications reviewed with the caregiver and appropriate educational materials and side effects teaching were provided. Patient armband removed and shredded.     _____________________________________________________________________________________________________________________  ______________

## 2022-10-13 NOTE — PROGRESS NOTES
Discharge planning    Spoke with Adarsh Craft with [address] concerning discharge.   Per [facility] will follow up with patient at SILVIA Uribe, RN  Pager # [phone]  Care Manager

## 2022-10-13 NOTE — DISCHARGE SUMMARY
[facility]  Discharge Summary       Patient: Yohannes Rayo Age: 78 y.o. : 1945 MR#: [id number] SSN: [id number]  PCP on record: Angela Crane MD  Admit date: 10/5/2022  Discharge date: 10/13/2022    Consults:  JUAREZ Camarena,-vascular surgery  - ZEN Mejias,-podiatrist  -Ms Rogerio Solo., NP- palliative medicine  -OSWALD Covington,-ID    Procedures: on 10/7/22 Left foot transmetatarsal amputation  -     Significant Diagnostic Studies: -CT head on 10/6/22: IMPRESSION     No acute intraparenchymal hemorrhage, mass effect or midline shift. Note: If clinical concern of acute stroke, CTA or MRI with diffusion weighted  images is recommended for better evaluation. Decreased left hemispheric subdural hygroma, similar in volume of right  hemispheric subdural fluid collection with multiple new hyperdense nodules along  the dural surface, concerning potential hemorrhage. -CTA chest on 10/6/22: IMPRESSION  1. No convincing CT evidence of pulmonary embolism. 2.  Consolidation pneumonia in posterior lungs, more pronounced on the left. 3. Mild mediastinal and hilar adenopathy. 4. Very small lateral pleural effusions. 5. Significant atherosclerotic aortic disease. 6. Questionable hypodense nodules in the posterior left thyroid lobe. Recommend  thyroid ultrasound correlation. -XR RT foot on 10/6/22: IMPRESSION     Right foot:  -Status post second and third ray amputation at proximal phalanx level, but  perhaps slightly increased lucency at the second proximal phalanx bony stump,  which may reflect osteomyelitis. See additional details above. Left foot:  -Second and fourth ray amputation site is above. Evidence of soft tissue gas at  the fourth ray amputation stump, suspicious for gas-forming infection and/or  gangrene. No definite radiographic evidence of osteomyelitis.      Of note, MRI is more sensitive for detecting osteomyelitis.    -Cardiac echo on 10/8/22:    Result status: Final result       Patient flat on back throughout exam    Normal right ventricular size and function    Left Ventricle: Low normal left ventricular systolic function with a visually estimated EF of 50 - 55%. Left ventricle size is normal. Mildly increased wall thickness. Normal wall motion. Aortic Valve: Moderately calcified cusp. Moderate stenosis of the aortic valve with a peak velocity 2.9m/s, peak gradient 33mmHg, mean gradient 22mmHg, ZOILA by cont. equation 1.0cm2. DVI=.32, trace AR.     Estimated pulmonary artery pressure of 17mmHg    Discharge Diagnoses: -Septic shock  -Left foot cellulitis  -Dry gangrene right foot toes  -Severe PAD     -JAMIL  -Hypernatremia  -Acute on chronic anemia  -Acute hypoxic respiratory failure  -Severe protein malnutrition                                       Patient Active Problem List   Diagnosis Code    Thoracic or lumbosacral neuritis or radiculitis, unspecified [id number]    Degeneration of lumbar or lumbosacral intervertebral disc M51.37    Lumbago M54.50    Hyperlipemia E78.5    Sleep apnea G47.30    Cluster headaches G44.009    Type 2 diabetes mellitus with diabetic neuropathy, without long-term current use of insulin ([facility]) E11.40    Chronic midline low back pain without sciatica M54.50, G89.29    Erectile dysfunction due to arterial insufficiency N52.01    Major depressive disorder, recurrent, mild F33.0    Major depressive disorder, recurrent, moderate F33.1    Major depressive disorder, recurrent, unspecified F33.9    Gangrene ([facility]) I96    Cellulitis L03.90    Moderate protein-calorie malnutrition ([facility]) E44.0    Pneumonia J18.9    Sepsis ([facility]) A41.9    Multifocal pneumonia J18.9       Hospital Course by Problem     78year old male w/ h/o PVD, gangrene of bilateral feet, bilateral feet/toes s/p amputations and endovascular intervention from podiatry and vascular surgery 9/2022 among other medical conditions admitted to the ICU after presenting with septic shock likely due to cellulitis left foot, pna, acute hypoxic resp failure        -Sepsis shock POA, resolved likely due to below. Has had normal bp's.   -Left foot cellulitis, s/p TMA left foot on 10/7 by podiatry, biopsy with clean margins negative for osteomyelitis. Intra op cx on 10/7--> S.aureus, sensitivities reviewed, antibiotics adjusted by ID, to be given augmentin and bactrim, end date on 10/14/22.   -Dry gangrene right foot toes  -Severe PAD: on plavix  -Sacral decubitus ulcer: wound care. -JAMIL, associated with sepsis, resolved  -Hypernatremia: resolved  -Hypokalemia:improving, mag normal, last K of 3.1 on 10/12/22  -Acute on chronic anemia associated with post surgery blood loss, status post 2 units PRBC. Last hgb on 10/12 was 8.9 and has been stable for the days prior to the .  -Acute hypoxic respiratory failure now on 2l/min. Stable overall. Thought to be due to possible multifocan pna.  -Pneumonia, bacterial: on ABx, to finish course on 10/14  -Obstructive sleep apnea, not on CPAP  -Tobacco abuse  -Acute on chronic metabolic encephalopathy, worsening with current acute medical issues. Stable. -Severe protein malnutrition: Patient demonstrates ability to swallow dose consuming very limited amount of oral intake. Seen by speech therapy , has been on purée and nectar thick liquids with feed assistance feed. Discussed w/ palliative care. DNR/DNI. Hospice consulted. pt to be transferred back to nursing home w/ hospice services.           Today's examination of the patient revealed:     Subjective:     Objective:   VS: Visit Vitals  /69 (BP 1 Location: Left arm, BP Patient Position: Semi fowlers)   Pulse 78   Temp 97 °F (36.1 °C)   Resp 20   Ht 6' (1.829 m)   Wt 68 kg (150 lb)   SpO2 98%   BMI 20.34 kg/m²      Tmax/24hrs: Temp (24hrs), Av.8 °F (36.6 °C), Min:97 °F (36.1 °C), Max:98.4 °F (36.9 °C)     Input/Output: No intake or output data in the 24 hours ending 10/13/22 1305    General:  Cooperative, Not in acute distress, speaks in short sentence while in bed, confused  HEENT: NCAT, dry oral mucosa  Cardiovascular: no jvd  Pulmonary: air entry bilaterally, no wheezing, + crackle  GI:  Soft, non tender, non distended, +bs, no guarding   Extremities:  trace pedal edema, +distal pulses appreciated   Left wound in place, no bleeding, right foot with dry gangrene   Neuro: AOx1    Labs:    Recent Results (from the past 24 hour(s))   GLUCOSE, POC    Collection Time: 10/12/22 11:00 PM   Result Value Ref Range    Glucose (POC) 173 (H) 70 - 110 mg/dL   GLUCOSE, POC    Collection Time: 10/13/22  8:38 AM   Result Value Ref Range    Glucose (POC) 104 70 - 110 mg/dL   GLUCOSE, POC    Collection Time: 10/13/22 12:33 PM   Result Value Ref Range    Glucose (POC) 147 (H) 70 - 110 mg/dL     Additional Data Reviewed:     Condition on discharge: fair   Disposition:    []Home   []Home with Home Health   [x]SNF/NH with hospice  []Rehab   []Home with family   []Alternate Facility:____________________      Discharge Medications:     Current Discharge Medication List        START taking these medications    Details   nicotine (NICODERM CQ) 14 mg/24 hr patch 1 Patch by TransDERmal route daily for 30 days. Qty: 30 Patch, Refills: 0      amoxicillin-clavulanate (AUGMENTIN) 875-125 mg per tablet Take 1 Tablet by mouth every twelve (12) hours for 1 day. Qty: 2 Tablet, Refills: 0      trimethoprim-sulfamethoxazole (BACTRIM DS, SEPTRA DS) 160-800 mg per tablet Take 1 Tablet by mouth every twelve (12) hours for 1 day. Qty: 2 Tablet, Refills: 0           CONTINUE these medications which have CHANGED    Details   L. acidophilus,casei,rhamnosus (BIO-K PLUS) 50 billion cell cpDR capsule Take 1 Capsule by mouth daily for 14 days. Qty: 14 Capsule, Refills: 0      sertraline (ZOLOFT) 50 mg tablet Take 2 Tablets by mouth daily. Qty: 90 Tablet, Refills: 2    Associated Diagnoses:  Moderate episode of recurrent major depressive disorder ([facility] [address]           CONTINUE these medications which have NOT CHANGED    Details   insulin lispro (HUMALOG) 100 unit/mL injection Check FSBS Three times daily with meals,   For sugar between 150 and 200- give 1 units SQ,   For sugar between 201 and 250- give 3 units SQ,   For sugar between 251 and 300- give 5 units SQ,   For sugar between 301 and 400- give 7 units SQ,  For sugars > 400, contact PCP  Qty: 1 Each, Refills: 0      polyethylene glycol (MIRALAX) 17 gram packet Take 1 Packet by mouth daily as needed for Constipation. Qty: 15 Each, Refills: 0      therapeutic multivitamin (THERAGRAN) tablet Take 1 Tablet by mouth daily. Qty: 30 Tablet, Refills: 0      clopidogreL (Plavix) 75 mg tab Take 1 Tablet by mouth daily. Qty: 30 Tablet, Refills: 3      divalproex DR (DEPAKOTE) 250 mg tablet Take  by mouth two (2) times a day.      gabapentin (NEURONTIN) 300 mg capsule Take 300 mg by mouth two (2) times a day. dronabinoL (MARINOL) 2.5 mg capsule Take 2.5 mg by mouth two (2) times a day. Associated Diagnoses: Weight loss               Follow-up Appointments:   1.  Your PCP: Areli Paul MD, within 7-10days      >30 minutes spent coordinating this discharge (review instructions/follow-up, prescriptions, preparing report for sign off)    Signed:  Connor Rice MD  10/13/2022  1:05 PM

## 2022-10-13 NOTE — PROGRESS NOTES
Infectious Disease progress Note        Reason:septic shock    Current abx Prior abx   vancomycin since 10/5/22-10/12  Bactrim, Augmentin since 10/12 Piperacillin/tazobactam 10/5-10/10  Ceftriaxone 10/10-10/11     Lines:       Assessment :   78 y.o. male with a significant PMHx of PVD, hx gangrene b/l feet/toes s/p amputations and endovascular intervention from Podiatry and Vascular surgery 9/2022, HTN, DM II, Depression, tobacco abuse and chronic back pain in the setting of lumbar spondylosis s/p T12-L3 laminectomy, L1-3 TLIF, ISAK L3-5, instrumentation from T10-pelvis performed by Dr. Reina Kan on 4/15/2022 presented to [facility] ED from [facility] facility on 10/5/22 with acute encephalopathy, diaphoresis,hypoxia, SOB, hypotension, and tachycardia. Hospitalization at [facility] 9/2022 for  partially treated right foot cellulitis, dry gangrene of tip of toes bilateral feet status post right foot partial second and third toe amputation, left foot partial second and fourth toe amputation on 9/17/2022     Intra-Op culture left second toe 9/17-proteus, klebsiella, group B strep, Staph aureus  Intra-Op culture right second toe 9/17-heavy proteus, klebsiella, Staph aureus    Clinical presentation consistent with septic shock (present on admission)  secondary to  left foot cellulitis,  multifocal pneumonia- ? Healthcare associated pneumonia, dry gangrene bilateral toes. Status post left foot transmetatarsal amputation on 10/7/2022. Bone biopsy of clean margins negative for osteomyelitis    Intra-Op cultures 10/7-MRSA    Increased erythema/warmth left foot suggestive of left foot cellulitis. Concurrent severe peripheral artery disease will likely mask the full clinical presentation-improved erythema/warmth left foot on today's exam    Acute hypoxic respiratory failure-could be secondary to healthcare associated pneumonia/aspiration pneumonia.   Agree with ruling out pulm embolism looking at the elevated D-dimer Peripheral arterial disease-. S/P orbital atherectomy of left tibial peroneal artery, drug-coated balloon angioplasty of the left popliteal and tibial peroneal artery, orbital atherectomy of the left superficial femoral artery, drug-coated balloon angioplasty of the left superficial femoral artery, balloon angioplasty and stenting of the left external iliac artery, angiogram of the right femoral artery on 9/16/22     Acute kidney injury-likely due to sepsis    Significantly elevated procalcitonin 38 on 10/6/2022 concerning for gram-negative sepsis    Altered mental status-likely metabolic encephalopathy. Improving       Tolerating p.o. antibiotics. Plans to transition patient to hospice as outpatient noted    Recommendations:     Continue po bactrim/augmentin till 10/14/22  Continue wound care sacral decubiti  Mx of hypernatremia per primary team  Follow-up palliative care recommendation regarding goals of care  Discharge planning per primary team    Will sign off. Follow-up as needed. Thanks        Above plan was discussed in details with primary team. Please call me if any further questions or concerns. Will continue to participate in the care of this patient.       HPI:      Not very communicative        Past Medical History:   Diagnosis Date    Arthritis     Cognitive impairment     Depression     Dorsalgia, unspecified     GERD (gastroesophageal reflux disease)     High blood pressure     Insomnia     L1 vertebral fracture (HCC)     Migraine headache     Other chronic pain     Periorbital headache     Sleep apnea     Spinal stenosis     Type 2 diabetes mellitus, without long-term current use of insulin [facility])        Past Surgical History:   Procedure Laterality Date    ENDOSCOPY, COLON, DIAGNOSTIC      HX LUMBAR DISKECTOMY  6/2001, 4/11    left L3-4 microdiskectomy with intradural rupture    HX ORTHOPAEDIC      spinal Surgery Dr. Oliver Medellin  9/11    spinal fusion, L3-5 Current Discharge Medication List        CONTINUE these medications which have NOT CHANGED    Details   insulin lispro (HUMALOG) 100 unit/mL injection Check FSBS Three times daily with meals,   For sugar between 150 and 200- give 1 units SQ,   For sugar between 201 and 250- give 3 units SQ,   For sugar between 251 and 300- give 5 units SQ,   For sugar between 301 and 400- give 7 units SQ,  For sugars > 400, contact PCP  Qty: 1 Each, Refills: 0      polyethylene glycol (MIRALAX) 17 gram packet Take 1 Packet by mouth daily as needed for Constipation. Qty: 15 Each, Refills: 0      therapeutic multivitamin (THERAGRAN) tablet Take 1 Tablet by mouth daily. Qty: 30 Tablet, Refills: 0      clopidogreL (Plavix) 75 mg tab Take 1 Tablet by mouth daily. Qty: 30 Tablet, Refills: 3      divalproex DR (DEPAKOTE) 250 mg tablet Take  by mouth two (2) times a day.      gabapentin (NEURONTIN) 300 mg capsule Take 300 mg by mouth two (2) times a day. dronabinoL (MARINOL) 2.5 mg capsule Take 2.5 mg by mouth two (2) times a day. Associated Diagnoses: Weight loss      sertraline (ZOLOFT) 50 mg tablet Take 1 Tablet by mouth daily. Qty: 90 Tablet, Refills: 2    Associated Diagnoses:  Moderate episode of recurrent major depressive disorder (HCC)                   Current Facility-Administered Medications   Medication Dose Route Frequency    famotidine (PEPCID) tablet 20 mg  20 mg Oral BID    sertraline (ZOLOFT) tablet 100 mg  100 mg Oral DAILY    trimethoprim-sulfamethoxazole (BACTRIM DS, SEPTRA DS) 160-800 mg per tablet 1 Tablet  1 Tablet Oral Q12H    amoxicillin-clavulanate (AUGMENTIN) 875-125 mg per tablet 1 Tablet  1 Tablet Oral Q12H    dextrose 5% infusion  100 mL/hr IntraVENous CONTINUOUS    insulin lispro (HUMALOG) injection   SubCUTAneous AC&HS    0.9% sodium chloride infusion 250 mL  250 mL IntraVENous PRN    0.9% sodium chloride infusion 250 mL  250 mL IntraVENous PRN    0.9% sodium chloride infusion 250 mL  250 mL IntraVENous PRN    [Held by provider] clopidogreL (PLAVIX) tablet 75 mg  75 mg Oral DAILY    acetaminophen (TYLENOL) tablet 650 mg  650 mg Oral Q6H PRN    Or    acetaminophen (TYLENOL) suppository 650 mg  650 mg Rectal Q6H PRN    polyethylene glycol (MIRALAX) packet 17 g  17 g Oral DAILY PRN    ondansetron (ZOFRAN ODT) tablet 4 mg  4 mg Oral Q8H PRN    Or    ondansetron (ZOFRAN) injection 4 mg  4 mg IntraVENous Q6H PRN    nicotine (NICODERM CQ) 14 mg/24 hr patch 1 Patch  1 Patch TransDERmal DAILY    glucose chewable tablet 16 g  4 Tablet Oral PRN    glucagon (GLUCAGEN) injection 1 mg  1 mg IntraMUSCular PRN    dextrose 10% infusion 0-250 mL  0-250 mL IntraVENous PRN    albuterol-ipratropium (DUO-NEB) 2.5 MG-0.5 MG/3 ML  3 mL Nebulization Q6H PRN       Allergies: Codeine and Tegretol [carbamazepine]    Family History   Problem Relation Age of Onset    Heart Attack Mother     Cancer Mother     Heart Disease Mother     Hypertension Father     Seizures Father     Alcohol abuse Father      Social History     Socioeconomic History    Marital status:      Spouse name: Not on file    Number of children: Not on file    Years of education: Not on file    Highest education level: Not on file   Occupational History    Not on file   Tobacco Use    Smoking status: Every Day     Packs/day: 1.00     Years: 51.00     Pack years: 51.00     Types: Cigarettes    Smokeless tobacco: Never   Vaping Use    Vaping Use: Never used   Substance and Sexual Activity    Alcohol use: Yes     Comment: rarely    Drug use: No    Sexual activity: Yes     Partners: Female   Other Topics Concern    Not on file   Social History Narrative    Not on file     Social Determinants of Health     Financial Resource Strain: Not on file   Food Insecurity: Not on file   Transportation Needs: Not on file   Physical Activity: Not on file   Stress: Not on file   Social Connections: Not on file   Intimate Partner Violence: Not on file   Housing Stability: Not on file     Social History     Tobacco Use   Smoking Status Every Day    Packs/day: 1.00    Years: 51.00    Pack years: 51.00    Types: Cigarettes   Smokeless Tobacco Never        Temp (24hrs), Av °F (36.7 °C), Min:97.4 °F (36.3 °C), Max:98.4 °F (36.9 °C)    Visit Vitals  /64 (BP 1 Location: Left upper arm, BP Patient Position: At rest)   Pulse 83   Temp 98.4 °F (36.9 °C)   Resp 18   Ht 6' (1.829 m)   Wt 68 kg (150 lb)   SpO2 96%   BMI 20.34 kg/m²       ROS: Unable to obtain due to patient factors    Physical Exam:    General:  laying on bed,confused, not very communicative  HEENT: EOMI, supple neck, no JVD, dry oral mucosa  Cardiovascular: S1S2 regular, no rub/gallop   Pulmonary: air entry bilaterally, no wheezing, no crackle  GI:  Soft, non tender, non distended, +bs, no guarding   Extremities: + Pedal edema, no calf tenderness, tip of multiple toes right foot with dry necrosis, left foot dressing not removed  Neuro: AOx2, moving all extremities  Skin: skin changes bilateral feet as mentioned above       Labs: Results:   Chemistry Recent Labs     10/12/22  0503 10/11/22  0258   * 136*    138   K 3.1* 3.5    107   CO2 26 27   BUN 9 11   CREA 0.51* 0.53*   CA 8.0* 7.9*   AGAP 9 4   BUCR 18 21*        CBC w/Diff Recent Labs     10/12/22  0503 10/11/22  0258   WBC 5.5 6.5   RBC 3.15* 3.07*   HGB 8.9* 8.8*   HCT 26.9* 26.5*   * 125*        Microbiology No results for input(s): CULT in the last 72 hours. RADIOLOGY:    All available imaging studies/reports in [facility] for this admission were reviewed          Disclaimer: Sections of this note are dictated utilizing voice recognition software, which may have resulted in some phonetic based errors in grammar and contents. Even though attempts were made to correct all the mistakes, some may have been missed, and remained in the body of the document. If questions arise, please contact our department.     Dr. Belgica Lenz, Infectious Disease Specialist  [phone]  October 13, 2022  11:52 AM

## 2022-10-13 NOTE — PROGRESS NOTES
Transition of Care Plan to LTC/Hospice    LTC/Hospice Transition:  Patient has been accepted to [facility] and meets criteria for admission. Patient will transported by  [facility] and expected to leave at 5:30 p.m. Transportation to [facility].  Address is [address] [place] [address], [address] and phone number is [phone]  Patient will require BLS transport. Pt requires Stretcher If stretcher, reason: Hospice, pneumonia, LT foot cellulitis s/p TMA, dry gangrene, sacral decubitus ulcer, JAMIL, acute on chronic metabolic encephalopathy  Patient is currently requiring oxygen Yes Yes: Comment: 3 liters oxygen  Height:6'   Weight: 150 lb  Pt is on isolation: Yes Isolation is for: contact  Is the pt ready now? yes  Requested time: Next Available  PCS Faxed: yes  Insurance verified on face sheet: yes  Auth needed for transport: N/A  CM completed PCS/ Envelope and placed on chart. [facility]    Communication to Patient/Family:  Junie Lawrence, son, via phone and  agreeable to the transition plan. Communication to SNF/Rehab:  Bedside RN, Pablo Gomes, has been notified to update the transition plan to the facility and call report [phone]. Nursing Please include all hard scripts for controlled substances, med rec and dc summary, and AVS in packet.      Reviewed and confirmed with Lilibeth Koo, , and can manage the patient care needs for the following:     Mable Ramos with (X) only those applicable:    Medication:  [x]  Medications will be available at the facility       Documents:      [x]Discharge Summary      Treatment:  [x]Isolation (for MRSA)  []Surgical Drain Management     Dietary:  [x] See d/c summary     Eligible for Medicaid Long Term Services and Supports  Yes:  [x] Eligible for medical assistance or will become eligible within 180 days    Financial Resources:  Medicare , [employer], and medicaid pending pending         Advanced Care Plan:    [x]Communicated Code Status (DDNR\")    Sherice Morton, BSN, RN  Pager # [phone]  Care Manager

## 2022-10-13 NOTE — PROGRESS NOTES
Nutrition Note      Pt remains on dysphagia diet; SLP following. Has poor/ no po intake of meals and nutrition supplements per RN report. Pt asking for foods consistent with regular consistency diet, but pt currently on pureed consistency diet/ nectar thick liquids. Palliative and Hospice following. Per Palliative note on 10/12; pt is DNR/ DNI, limited additional interventions; No feeding tubes per son/ family. Visited with pt; encouraged po intake; pt not interested in participating in nutrition visit. Noted plan for discharge later today to SNF. Remains on IVF, D5 at 100 mL/hr (120 gm dextrose, 408 kcal per day). BM 10/11. Nutrition goals are not being met. Nutrition Recommendations/Plan:   Continue all other nutrition interventions. Encourage/ monitor po intake of meals and supplements. Provide nutrition interventions consistent with plan of care goals.    Continue IVF per MD         Electronically signed by Mirian Quintero RD on 10/13/2022 at 1:50 PM    Contact: [phone]

## 2022-10-14 ENCOUNTER — PATIENT OUTREACH (OUTPATIENT)
Dept: CASE MANAGEMENT | Age: 77
End: 2022-10-14

## 2022-10-14 NOTE — PROGRESS NOTES
Transition of  Care     No transition of care outreach indicated. Please see discharge disposition of 10-13-22 for additional information as needed.

## 2023-05-18 NOTE — Clinical Note
Diagnostic catheter inserted over the wire. Bilateral Helical Rim Advancement Flap Text: The defect edges were debeveled with a #15 blade scalpel.  Given the location of the defect and the proximity to free margins (helical rim) a bilateral helical rim advancement flap was deemed most appropriate.  Using a sterile surgical marker, the appropriate advancement flaps were drawn incorporating the defect and placing the expected incisions between the helical rim and antihelix where possible.  The area thus outlined was incised through and through with a #15 scalpel blade.  With a skin hook and iris scissors, the flaps were gently and sharply undermined and freed up.

## 2025-04-01 NOTE — PROGRESS NOTES
Physical Therapy    Visit Type: treatment    Pain     Location: Patient reports pain in knee but never gave rate of pain.     OBJECTIVE     Cognitive Status   Level of Consciousness   - alert  Arousal Alertness   - appropriate responses to stimuli  Affect/Behavior    - cooperative  Orientation    - Oriented to: person, place, time and situation  Functional Communication   - Overall Communication Status: within functional limits   - Forms of Communication: verbal  Following Direction   - follows all commands and directions consistently    Patient Activity Tolerance: 1 to 2 activity to rest    Respiratory:   Patient on room air throughout therapy session.           Bed Mobility  - Rolling left: modified independent  - Rolling right: modified independent  - Supine to sit: modified independent  - Sit to supine: modified independent  Transfers  Assistive devices: gait belt, 2-wheeled walker  - Sit to stand: modified independent  - Stand to sit: modified independent    Ambulation / Gait  - Weight Bearing:   Left: weight bearing as tolerated  - Assistive device: gait belt and 2-wheeled walker  - Distance (feet unless otherwise indicated): 50, 120  - Assist Level: modified independent and with verbal cues  - Surface: even  - Description: antalgic and decreased bryosn/pace    Stair Ambulation  - Number of steps: 4;   - Assist Level: supervision and with verbal cues  - Railing: bilateral  - Pattern: step-to  - Devices Used: gait belt    Interventions     Supine    Lower Extremity: Left and right: quad sets,   Home Exercise Program/Education Materials: No active flexion over 60 degrees till follow up with doctor.          ASSESSMENT   Progress: progressing toward goals    Discharge needs based on today's assessment:   - Current level of function: slightly below baseline level of function   - Therapy needs at discharge: therapy 1-3 times per week   - Activities of daily living (ADLs) requiring support at discharge: bed  Radha of [facility] called back and accepted pt. She stated pt can be transferred today. Called pt's nurse Vivian Zabala and informed her and gave her number to call report to    Notified pt's son Sanket Morna.           Tony Torres, BSN RN  Care Management  Pager: [phone] mobility, transfers, ambulation and stairs   - Impairments that require further therapy intervention: activity tolerance, balance, strength and pain    AM-PAC  - Generalized Prior Level of Function: IND/MOD I ([facility] 22-24)       Key: MOD A=moderate assistance, IND/MOD I=independent/modified independent  - Generalized Current Level of Function     - Current Mobility Score: 17       AM-PAC Scoring Key= >21 Modified Independent; 20-21 Supervision; 18-19 Minimal assist; 13-17 Moderate assist; 9-12 Max assist; <9 Total assist      PLAN (while hospitalized)  Suggestions for next session as indicated: Continue to progress patient with plan of care.   PT Frequency: Twice per day      PT/OT Mobility Equipment for Discharge: owns RW           GOALS  Long Term Goals: (to be met by time of discharge from hospital)  Sit to stand: Patient will complete sit to stand transfer with gait belt and 2-wheeled walker, modified independent.   Status: met   Stand pivot: Patient will complete stand pivot transfer with gait belt and 2-wheeled walker, modified independent.   Status: met   Ambulation (even): Patient will ambulate on even surface for 150 feet with gait belt and 2-wheeled walker, modified independent.   Status: partially met   3-4 steps: Patient will ambulate 3-4 steps with gait belt and 2-wheeled walker stand by assist.   Status: partially met         Patient at End of Session:   Location: in bed  Safety measures: alarm system in place/re-engaged, bed rails x2 and call light within reach      Therapy procedure time and total treatment time can be found documented on the Time Entry flowsheet

## (undated) DEVICE — PACLITAXEL-COATED PTA BALLOON CATHETER: Brand: RANGER™

## (undated) DEVICE — TRAY PREP DRY W/ PREM GLV 2 APPL 6 SPNG 2 UNDPD 1 OVERWRAP

## (undated) DEVICE — INTENDED FOR TISSUE SEPARATION, AND OTHER PROCEDURES THAT REQUIRE A SHARP SURGICAL BLADE TO PUNCTURE OR CUT.: Brand: BARD-PARKER SAFETY BLADES SIZE 10, STERILE

## (undated) DEVICE — BNDG ELAS HK LOOP 4X5YD NS -- MATRIX

## (undated) DEVICE — INTRODUCER SHTH 6FR CANN L11CM DIL TIP 35MM GRN TUNGSTEN

## (undated) DEVICE — PACK PROCEDURE SURG MAJ W/ BASIN LF

## (undated) DEVICE — INTENDED FOR TISSUE SEPARATION, AND OTHER PROCEDURES THAT REQUIRE A SHARP SURGICAL BLADE TO PUNCTURE OR CUT.: Brand: BARD-PARKER SAFETY BLADES SIZE 15, STERILE

## (undated) DEVICE — RADIFOCUS GLIDEWIRE: Brand: GLIDEWIRE

## (undated) DEVICE — DRAPE,EXTREMITY,89X128,STERILE: Brand: MEDLINE

## (undated) DEVICE — CURITY NON-ADHERENT STRIPS: Brand: CURITY

## (undated) DEVICE — PAD,ABDOMINAL,8"X10",ST,LF: Brand: MEDLINE

## (undated) DEVICE — SUT PROL 3-0 36IN V7 DA BLU --

## (undated) DEVICE — Device

## (undated) DEVICE — BANDAGE COBAN 4 IN COMPR W4INXL5YD FOAM COHESIVE QUIK STK SELF ADH SFT

## (undated) DEVICE — STOCKINETTE SYN 4INX25YD -- PROTOUCH

## (undated) DEVICE — GUIDEWIRE VASC L260CM DIA0.035IN TIP L5CM PTFE COAT S STL

## (undated) DEVICE — GAUZE,SPONGE,4"X4",16PLY,STRL,LF,10/TRAY: Brand: MEDLINE

## (undated) DEVICE — THREE-QUARTER SHEET: Brand: CONVERTORS

## (undated) DEVICE — CULTURETTE SGL EVAC TUBE PALL -- 100/CA

## (undated) DEVICE — HANDPIECE SET WITH HIGH FLOW TIP AND SUCTION TUBE: Brand: INTERPULSE

## (undated) DEVICE — NEEDLE HYPO 25GA L1.5IN BVL ORIENTED ECLIPSE

## (undated) DEVICE — DEVICE INFL L13IN 40ATM 30ML BASIXTOUCH

## (undated) DEVICE — STERILE POLYISOPRENE POWDER-FREE SURGICAL GLOVES: Brand: PROTEXIS

## (undated) DEVICE — PREP SKN CHLRAPRP APL 26ML STR --

## (undated) DEVICE — KIT CLN UP BON SECOURS MARYV

## (undated) DEVICE — ZIMMER® STERILE DISPOSABLE TOURNIQUET CUFF WITH PROTECTIVE SLEEVE AND PLC, DUAL PORT, SINGLE BLADDER, 18 IN. (46 CM)

## (undated) DEVICE — DRAPE,UNDERBUTTOCKS,PCH,STERILE: Brand: MEDLINE

## (undated) DEVICE — SPLINT CAST PLASTER 4X15FT -- DYNACAST

## (undated) DEVICE — RADIFOCUS TORQUE DEVICE MULTI-TORQUE VISE: Brand: RADIFOCUS TORQUE DEVICE

## (undated) DEVICE — BLADE SAW OSC COARSE 25X9MM --

## (undated) DEVICE — SUT PROL 3-0 18IN PS1 BLU --

## (undated) DEVICE — LUB GUID WR CARDIC CATH 100ML -- VIPERSLIDE

## (undated) DEVICE — INTRODUCER SHTH 4FR CANN L11CM DIL TIP 25MM RED TUNGSTEN

## (undated) DEVICE — PADDING CAST W4INXL4YD NONSTERILE COT COHESIVE HND TEARABLE

## (undated) DEVICE — REM POLYHESIVE ADULT PATIENT RETURN ELECTRODE: Brand: VALLEYLAB

## (undated) DEVICE — VIPERWIRE, ADVANCE PERIPHERAL, .017" DIA SPRING TIP, 335CM, 5 PACK: Brand: VIPERWIRE, ADVANCE

## (undated) DEVICE — DRAPE TWL SURG 16X26IN BLU ORB04] ALLCARE INC]

## (undated) DEVICE — SWAB CULT LIQ STUART AGR AERB MOD IN BRK SGL RAYON TIP PLAS 220099] BECTON DICKINSON MICRO]

## (undated) DEVICE — BANDAGE,GAUZE,BULKEE II,4.5"X4.1YD,STRL: Brand: MEDLINE

## (undated) DEVICE — HEX-LOCKING BLADE ELECTRODE: Brand: EDGE

## (undated) DEVICE — 450 ML BOTTLE OF 0.05% CHLORHEXIDINE GLUCONATE IN 99.95% STERILE WATER FOR IRRIGATION, USP AND APPLICATOR.: Brand: IRRISEPT ANTIMICROBIAL WOUND LAVAGE

## (undated) DEVICE — COVER US PRB W15XL120CM W/ GEL RUBBERBAND TAPE STRP FLD GEN

## (undated) DEVICE — SUT PROL 2-0 30IN SH BLU --

## (undated) DEVICE — DIAMONDBACK PERIPHERAL, SOLID CROWN, 1.50MM, 145CM SHAFT: Brand: DIAMONDBACK PERIPHERAL

## (undated) DEVICE — ANGIOGRAPHY KIT CUST VASC

## (undated) DEVICE — PTA BALLOON DILATATION CATHETER: Brand: MUSTANG™

## (undated) DEVICE — FLEXOR, CHECK-FLO, INTRODUCER ANSEL MODIFICATION - WITH HIGH-FLEX DILATOR AND HYDROPHILIC COATING: Brand: FLEXOR

## (undated) DEVICE — CATHETER ANGIO 4FR L65CM 0.035IN VIS OMNI FLUSH-0 SFT TIP

## (undated) DEVICE — SOLUTION IV 1000ML 0.9% SOD CHL

## (undated) DEVICE — SNARE VASC SINGLE LOOP 0.035 IN 25 MMX150 CM SYMPRO ELITE

## (undated) DEVICE — SYR ART 700 CLEAR MARK 7 -- ARTERION

## (undated) DEVICE — Device: Brand: QUICK-CROSS SUPPORT CATHETER

## (undated) DEVICE — SET FLD ADMIN 3 W STPCOCK FIX FEM L BOR 1IN

## (undated) DEVICE — PACK PROCEDURE SURG VASC CATH 161 MMC LF

## (undated) DEVICE — SKIN PREP TRAY 4 COMPARTM TRAY: Brand: MEDLINE INDUSTRIES, INC.